# Patient Record
Sex: FEMALE | Race: WHITE | NOT HISPANIC OR LATINO | Employment: OTHER | URBAN - METROPOLITAN AREA
[De-identification: names, ages, dates, MRNs, and addresses within clinical notes are randomized per-mention and may not be internally consistent; named-entity substitution may affect disease eponyms.]

---

## 2017-06-12 ENCOUNTER — DOCUMENTATION ONLY (OUTPATIENT)
Dept: FAMILY MEDICINE | Facility: CLINIC | Age: 82
End: 2017-06-12

## 2017-06-12 NOTE — PROGRESS NOTES
Pre-Visit Chart Review  For Appointment Scheduled on 6/16/17.    Health Maintenance Due   Topic Date Due    Pneumococcal (65+) (1 of 2 - PCV13) 08/15/1990    DEXA SCAN  05/02/2017

## 2017-06-16 ENCOUNTER — OFFICE VISIT (OUTPATIENT)
Dept: FAMILY MEDICINE | Facility: CLINIC | Age: 82
End: 2017-06-16
Payer: MEDICARE

## 2017-06-16 VITALS
WEIGHT: 146.19 LBS | HEART RATE: 78 BPM | TEMPERATURE: 98 F | BODY MASS INDEX: 28.7 KG/M2 | DIASTOLIC BLOOD PRESSURE: 60 MMHG | HEIGHT: 60 IN | SYSTOLIC BLOOD PRESSURE: 120 MMHG

## 2017-06-16 DIAGNOSIS — E78.2 MIXED HYPERLIPIDEMIA: ICD-10-CM

## 2017-06-16 DIAGNOSIS — I10 ESSENTIAL HYPERTENSION: Primary | ICD-10-CM

## 2017-06-16 DIAGNOSIS — E55.9 HYPOVITAMINOSIS D: ICD-10-CM

## 2017-06-16 DIAGNOSIS — M62.838 NOCTURNAL MUSCLE SPASM: ICD-10-CM

## 2017-06-16 DIAGNOSIS — R79.89 ABNORMAL CBC: ICD-10-CM

## 2017-06-16 PROBLEM — K57.90 DIVERTICULOSIS: Status: ACTIVE | Noted: 2017-06-16

## 2017-06-16 PROBLEM — I25.810 CAD (CORONARY ARTERY DISEASE), AUTOLOGOUS VEIN BYPASS GRAFT: Status: ACTIVE | Noted: 2017-06-16

## 2017-06-16 PROBLEM — I87.9: Status: ACTIVE | Noted: 2017-06-16

## 2017-06-16 PROBLEM — K44.9 HIATAL HERNIA: Status: ACTIVE | Noted: 2017-06-16

## 2017-06-16 PROBLEM — G56.03 CARPAL TUNNEL SYNDROME, BILATERAL: Status: ACTIVE | Noted: 2017-06-16

## 2017-06-16 PROCEDURE — 99999 PR PBB SHADOW E&M-EST. PATIENT-LVL III: CPT | Mod: PBBFAC,,, | Performed by: FAMILY MEDICINE

## 2017-06-16 PROCEDURE — 1159F MED LIST DOCD IN RCRD: CPT | Mod: ,,, | Performed by: FAMILY MEDICINE

## 2017-06-16 PROCEDURE — 99213 OFFICE O/P EST LOW 20 MIN: CPT | Mod: PBBFAC,PO | Performed by: FAMILY MEDICINE

## 2017-06-16 PROCEDURE — 99214 OFFICE O/P EST MOD 30 MIN: CPT | Mod: S$PBB,,, | Performed by: FAMILY MEDICINE

## 2017-06-16 PROCEDURE — 1126F AMNT PAIN NOTED NONE PRSNT: CPT | Mod: ,,, | Performed by: FAMILY MEDICINE

## 2017-06-16 RX ORDER — OMEPRAZOLE 40 MG/1
40 CAPSULE, DELAYED RELEASE ORAL DAILY
Qty: 90 CAPSULE | Refills: 3 | Status: SHIPPED | OUTPATIENT
Start: 2017-06-16 | End: 2018-07-30 | Stop reason: SDUPTHER

## 2017-06-16 RX ORDER — NISOLDIPINE 8.5 MG/1
8.5 TABLET, FILM COATED, EXTENDED RELEASE ORAL DAILY
Qty: 90 TABLET | Refills: 3 | Status: SHIPPED | OUTPATIENT
Start: 2017-06-16 | End: 2018-07-30 | Stop reason: SDUPTHER

## 2017-06-16 RX ORDER — TRIAMCINOLONE ACETONIDE 0.25 MG/G
CREAM TOPICAL
COMMUNITY
Start: 2017-05-06 | End: 2018-08-22

## 2017-06-16 RX ORDER — LISINOPRIL 20 MG/1
20 TABLET ORAL DAILY
Qty: 90 TABLET | Refills: 3 | Status: SHIPPED | OUTPATIENT
Start: 2017-06-16 | End: 2018-07-30 | Stop reason: SDUPTHER

## 2017-06-16 NOTE — PROGRESS NOTES
CHIEF COMPLAINT:  Establish care       HISTORY OF PRESENT ILLNESS:  Arleen Zarate is a 89 y.o. female who presents to clinic as a new patient to me to establish care.  She has HTN and is on lisinopril and sular. She denies any CP, SOB, edema, cough.  He has hyperlipidemiai but is intolerant of statins. She has history of low vitamin D, is due for repeat vitamin D level. She has several year history of nocturnal leg cramps, denies any cramping during the day. She states that she is sometimes dehydrated.       REVIEW OF SYSTEMS:  The patient denies any fever, chills, night sweats, headaches, vision changes, difficulty speaking or swallowing, decreased hearing, weight loss, weight gain, chest pain, palpitations, shortness of breath, cough, nausea, vomiting, abdominal pain, dysuria, diarrhea, constipation, hematuria, hematochezia, melena, changes in her hair, skin, nails, numbness or weakness in her extremities, erythema, pain or swelling over any of her joints, myalgia, swollen glands, easy bruising, fatigue, edema, symptoms of anxiety or depression.       MEDICATIONS:   Reviewed and/or reconciled in EP[facility]    ALLERGIES:  Reviewed and/or reconciled in [facility]    PAST MEDICAL/SURGICAL HISTORY:   Past Medical History:   Diagnosis Date    Anxiety     Arthritis     fingers and back    Depression     Foot pain, left 9/18/2013    GERD (gastroesophageal reflux disease)     Hiatal hernia     Hypertension     Reflux     on meds    Tendon tear 8/21/2013    Tendonitis 8/21/2013      Past Surgical History:   Procedure Laterality Date    CHOLECYSTECTOMY      EYE SURGERY      bilateral catarcts    TONSILLECTOMY         FAMILY HISTORY:  No family history on file.    SOCIAL HISTORY:    Social History     Social History    Marital status:      Spouse name: N/A    Number of children: N/A    Years of education: N/A     Occupational History    Not on file.     Social History Main Topics    Smoking status: Never  Smoker    Smokeless tobacco: Not on file    Alcohol use No    Drug use: No    Sexual activity: No     Other Topics Concern    Not on file     Social History Narrative    No narrative on file       PHYSICAL EXAM:  VITAL SIGNS:   Vitals:    06/16/17 1024   Weight: 66.3 kg (146 lb 2.6 oz)   Height: 5' (1.524 m)     GENERAL:  Patient appears well nourished, sitting on exam table, in no acute distress.  HEENT:  Atraumatic, normocephalic, PERRLA, EOMI, no conjunctival injection, sclerae are anicteric, normal external auditory canals,TMs clear b/l, gross hearing intact to whisper, MMM, no oropharygneal erythema or exudate.  NECK:  Supple, normal ROM, trachea is midline , no supraclavicular or cervical LAD or masses palpated.  Thyroid gland not palpable.  CARDIOVASCULAR:  RRR, normal S1 and S2, no m/r/g.  RESPIRATORY:  CTA b/l, no wheezes, rhonchi, rales.  No increased work of breathing, no  use of accessory muscles.  ABDOMEN:  Soft, nontender, nondistended, normoactive bowel sounds in all four quadrants, no rebound or guarding, no HSM or masses palpated.  Normal percussion.  EXTREMITIES:  2+ DP pulses b/l, no edema.  SKIN:  Warm, no lesions on exposed skin.  NEUROMUSCULAR:  Cranial nerves II-XII grossly intact.   No clubbing or cyanosis of digits/nails.  Steady gait.  PSYCH:  Patient is alert and oriented to person, time, place. They are appropriately dressed and groomed. There is normal eye contact. Rate and tone of speech is normal. Normal insight, judgement. Normal thought content and process.     LABORATORY/IMAGING STUDIES: pending    ASSESSMENT/PLAN: This is a 89 y.o. female who presents to clinic to establish care    1. Essential hypertension  See below  - CBC auto differential; Future  - Comprehensive metabolic panel; Future  - Lipid panel; Future  - TSH; Future    2. Hypovitaminosis D  - Vitamin D; Future    3. Mixed hyperlipidemia  - Lipid panel; Future    4. Nocturnal muscle spasm  - Magnesium; Future  -  Ferritin; Future  - Iron and TIBC; Future  - Aldolase; Future  - Lactate dehydrogenase; Future  - CK; Future    5. Abnormal CBC  - Ferritin; Future  - Iron and TIBC; Future      Patient readiness: acceptance and barriers:none    During the course of the visit the patient was educated and counseled about the following:     Hypertension:   Medication: no change.    Goals: Hypertension: Reduce Blood Pressure    Did patient meet goals/outcomes: Yes    The following self management tools provided: declined    Patient Instructions (the written plan) was given to the patient/family.     Time spent with patient: 30 minutes      FOLLOW UP:  6 months      Tess Lew MD

## 2017-06-23 ENCOUNTER — LAB VISIT (OUTPATIENT)
Dept: LAB | Facility: HOSPITAL | Age: 82
End: 2017-06-23
Attending: FAMILY MEDICINE
Payer: MEDICARE

## 2017-06-23 DIAGNOSIS — R79.89 ABNORMAL CBC: ICD-10-CM

## 2017-06-23 DIAGNOSIS — E55.9 HYPOVITAMINOSIS D: ICD-10-CM

## 2017-06-23 DIAGNOSIS — E78.2 MIXED HYPERLIPIDEMIA: ICD-10-CM

## 2017-06-23 DIAGNOSIS — I10 ESSENTIAL HYPERTENSION: ICD-10-CM

## 2017-06-23 DIAGNOSIS — M62.838 NOCTURNAL MUSCLE SPASM: ICD-10-CM

## 2017-06-23 LAB
25(OH)D3+25(OH)D2 SERPL-MCNC: 33 NG/ML
ALBUMIN SERPL BCP-MCNC: 3.9 G/DL
ALP SERPL-CCNC: 99 U/L
ALT SERPL W/O P-5'-P-CCNC: 12 U/L
ANION GAP SERPL CALC-SCNC: 8 MMOL/L
AST SERPL-CCNC: 20 U/L
BASOPHILS # BLD AUTO: 0.03 K/UL
BASOPHILS NFR BLD: 0.4 %
BILIRUB SERPL-MCNC: 0.6 MG/DL
BUN SERPL-MCNC: 17 MG/DL
CALCIUM SERPL-MCNC: 9.7 MG/DL
CHLORIDE SERPL-SCNC: 107 MMOL/L
CHOLEST/HDLC SERPL: 3.8 {RATIO}
CK SERPL-CCNC: 66 U/L
CO2 SERPL-SCNC: 29 MMOL/L
CREAT SERPL-MCNC: 1 MG/DL
DIFFERENTIAL METHOD: ABNORMAL
EOSINOPHIL # BLD AUTO: 0.1 K/UL
EOSINOPHIL NFR BLD: 1.3 %
ERYTHROCYTE [DISTWIDTH] IN BLOOD BY AUTOMATED COUNT: 13.3 %
EST. GFR  (AFRICAN AMERICAN): 56.9 ML/MIN/1.73 M^2
EST. GFR  (NON AFRICAN AMERICAN): 49.4 ML/MIN/1.73 M^2
FERRITIN SERPL-MCNC: 29 NG/ML
GLUCOSE SERPL-MCNC: 92 MG/DL
HCT VFR BLD AUTO: 42.7 %
HDL/CHOLESTEROL RATIO: 26.2 %
HDLC SERPL-MCNC: 225 MG/DL
HDLC SERPL-MCNC: 59 MG/DL
HGB BLD-MCNC: 13.6 G/DL
IRON SERPL-MCNC: 78 UG/DL
LDH SERPL L TO P-CCNC: 193 U/L
LDLC SERPL CALC-MCNC: 144.2 MG/DL
LYMPHOCYTES # BLD AUTO: 2.2 K/UL
LYMPHOCYTES NFR BLD: 31.8 %
MAGNESIUM SERPL-MCNC: 2 MG/DL
MCH RBC QN AUTO: 29.2 PG
MCHC RBC AUTO-ENTMCNC: 31.9 %
MCV RBC AUTO: 92 FL
MONOCYTES # BLD AUTO: 0.6 K/UL
MONOCYTES NFR BLD: 8.4 %
NEUTROPHILS # BLD AUTO: 4 K/UL
NEUTROPHILS NFR BLD: 58 %
NONHDLC SERPL-MCNC: 166 MG/DL
PLATELET # BLD AUTO: 214 K/UL
PMV BLD AUTO: 10.8 FL
POTASSIUM SERPL-SCNC: 4.5 MMOL/L
PROT SERPL-MCNC: 7 G/DL
RBC # BLD AUTO: 4.65 M/UL
SATURATED IRON: 19 %
SODIUM SERPL-SCNC: 144 MMOL/L
TOTAL IRON BINDING CAPACITY: 404 UG/DL
TRANSFERRIN SERPL-MCNC: 273 MG/DL
TRIGL SERPL-MCNC: 109 MG/DL
TSH SERPL DL<=0.005 MIU/L-ACNC: 1.18 UIU/ML
WBC # BLD AUTO: 6.91 K/UL

## 2017-06-23 PROCEDURE — 80061 LIPID PANEL: CPT

## 2017-06-23 PROCEDURE — 83615 LACTATE (LD) (LDH) ENZYME: CPT

## 2017-06-23 PROCEDURE — 82550 ASSAY OF CK (CPK): CPT

## 2017-06-23 PROCEDURE — 85025 COMPLETE CBC W/AUTO DIFF WBC: CPT

## 2017-06-23 PROCEDURE — 83735 ASSAY OF MAGNESIUM: CPT

## 2017-06-23 PROCEDURE — 84443 ASSAY THYROID STIM HORMONE: CPT

## 2017-06-23 PROCEDURE — 82728 ASSAY OF FERRITIN: CPT

## 2017-06-23 PROCEDURE — 82085 ASSAY OF ALDOLASE: CPT

## 2017-06-23 PROCEDURE — 83540 ASSAY OF IRON: CPT

## 2017-06-23 PROCEDURE — 36415 COLL VENOUS BLD VENIPUNCTURE: CPT | Mod: PO

## 2017-06-23 PROCEDURE — 82306 VITAMIN D 25 HYDROXY: CPT

## 2017-06-23 PROCEDURE — 80053 COMPREHEN METABOLIC PANEL: CPT

## 2017-06-24 LAB — ALDOLASE SERPL-CCNC: 2 U/L

## 2017-07-07 ENCOUNTER — TELEPHONE (OUTPATIENT)
Dept: FAMILY MEDICINE | Facility: CLINIC | Age: 82
End: 2017-07-07

## 2017-07-07 NOTE — TELEPHONE ENCOUNTER
----- Message from Jacqui Velazquez sent at 7/7/2017 10:23 AM CDT -----  Contact: self  Patient called regarding her labs taken 6/23/17. Would like the results sent to her by mail. Please contact [phone] (home)

## 2017-08-05 ENCOUNTER — TELEPHONE (OUTPATIENT)
Dept: FAMILY MEDICINE | Facility: CLINIC | Age: 82
End: 2017-08-05

## 2017-08-05 NOTE — TELEPHONE ENCOUNTER
Lab work normal except for slightly decreased saturated iron level. Can add iron 325 mg daily with vitamin C or multivitamin with iron. Renal function is slightly low, needs to stay hydrated, avoid NSAIDS apart from daily ASA and will continue to monitor.

## 2017-08-08 ENCOUNTER — TELEPHONE (OUTPATIENT)
Dept: FAMILY MEDICINE | Facility: CLINIC | Age: 82
End: 2017-08-08

## 2017-11-29 DIAGNOSIS — F41.9 ANXIETY: ICD-10-CM

## 2017-11-29 RX ORDER — CLORAZEPATE DIPOTASSIUM 7.5 MG/1
7.5 TABLET ORAL DAILY PRN
Qty: 90 TABLET | Refills: 0 | Status: SHIPPED | OUTPATIENT
Start: 2017-11-29 | End: 2019-04-29 | Stop reason: SDUPTHER

## 2017-11-29 NOTE — TELEPHONE ENCOUNTER
Called pt. Need to reschedule appt with Dr. Lew on 12/21/17. Dr. Lew is not in clinic that day. Rescheduled to 1/24/17 with Dr. Lew (pt wanted to wait another month). Pt also asking if she can get a refill on her Tranxene? Last written 11/3/2015 #90 by Dr. Hobbs. Pt would like it to go to [facility]. Thanks, Alicja

## 2018-01-02 ENCOUNTER — DOCUMENTATION ONLY (OUTPATIENT)
Dept: FAMILY MEDICINE | Facility: CLINIC | Age: 83
End: 2018-01-02

## 2018-01-02 ENCOUNTER — OFFICE VISIT (OUTPATIENT)
Dept: FAMILY MEDICINE | Facility: CLINIC | Age: 83
End: 2018-01-02
Payer: MEDICARE

## 2018-01-02 ENCOUNTER — HOSPITAL ENCOUNTER (OUTPATIENT)
Facility: HOSPITAL | Age: 83
Discharge: HOME | End: 2018-01-03
Attending: EMERGENCY MEDICINE | Admitting: HOSPITALIST
Payer: MEDICARE

## 2018-01-02 ENCOUNTER — HOSPITAL ENCOUNTER (OUTPATIENT)
Dept: RADIOLOGY | Facility: CLINIC | Age: 83
Discharge: HOME | End: 2018-01-02
Attending: NURSE PRACTITIONER
Payer: MEDICARE

## 2018-01-02 VITALS
HEART RATE: 102 BPM | SYSTOLIC BLOOD PRESSURE: 152 MMHG | OXYGEN SATURATION: 95 % | BODY MASS INDEX: 25.19 KG/M2 | RESPIRATION RATE: 16 BRPM | WEIGHT: 128.31 LBS | DIASTOLIC BLOOD PRESSURE: 82 MMHG | TEMPERATURE: 101 F | HEIGHT: 60 IN

## 2018-01-02 DIAGNOSIS — R55 SYNCOPE, UNSPECIFIED SYNCOPE TYPE: Primary | ICD-10-CM

## 2018-01-02 DIAGNOSIS — R55 SYNCOPE: ICD-10-CM

## 2018-01-02 DIAGNOSIS — R05.9 COUGH: ICD-10-CM

## 2018-01-02 DIAGNOSIS — R50.9 FEVER, UNSPECIFIED FEVER CAUSE: Primary | ICD-10-CM

## 2018-01-02 DIAGNOSIS — R53.83 FATIGUE: ICD-10-CM

## 2018-01-02 DIAGNOSIS — J10.1 INFLUENZA A: ICD-10-CM

## 2018-01-02 PROBLEM — E86.0 DEHYDRATION: Status: ACTIVE | Noted: 2018-01-02

## 2018-01-02 PROBLEM — M19.90 ARTHRITIS: Status: ACTIVE | Noted: 2018-01-02

## 2018-01-02 PROBLEM — R53.81 DEBILITY: Status: ACTIVE | Noted: 2018-01-02

## 2018-01-02 PROBLEM — D69.6 THROMBOCYTOPENIA: Status: ACTIVE | Noted: 2018-01-02

## 2018-01-02 PROBLEM — N18.30 STAGE 3 CHRONIC KIDNEY DISEASE: Status: ACTIVE | Noted: 2018-01-02

## 2018-01-02 LAB
ALBUMIN SERPL BCP-MCNC: 3.4 G/DL
ALP SERPL-CCNC: 74 U/L
ALT SERPL W/O P-5'-P-CCNC: 17 U/L
ANION GAP SERPL CALC-SCNC: 6 MMOL/L
AST SERPL-CCNC: 30 U/L
BASOPHILS # BLD AUTO: 0 K/UL
BASOPHILS NFR BLD: 0.2 %
BILIRUB SERPL-MCNC: 0.4 MG/DL
BUN SERPL-MCNC: 14 MG/DL
CALCIUM SERPL-MCNC: 8.9 MG/DL
CHLORIDE SERPL-SCNC: 103 MMOL/L
CO2 SERPL-SCNC: 28 MMOL/L
CREAT SERPL-MCNC: 1 MG/DL
CTP QC/QA: YES
DIFFERENTIAL METHOD: ABNORMAL
EOSINOPHIL # BLD AUTO: 0 K/UL
EOSINOPHIL NFR BLD: 0.2 %
ERYTHROCYTE [DISTWIDTH] IN BLOOD BY AUTOMATED COUNT: 13.4 %
EST. GFR  (AFRICAN AMERICAN): 57 ML/MIN/1.73 M^2
EST. GFR  (NON AFRICAN AMERICAN): 49 ML/MIN/1.73 M^2
FLUAV AG NPH QL: POSITIVE
FLUBV AG NPH QL: NEGATIVE
GLUCOSE SERPL-MCNC: 102 MG/DL
HCT VFR BLD AUTO: 35.5 %
HGB BLD-MCNC: 12 G/DL
LYMPHOCYTES # BLD AUTO: 0.6 K/UL
LYMPHOCYTES NFR BLD: 8.9 %
MAGNESIUM SERPL-MCNC: 1.7 MG/DL
MCH RBC QN AUTO: 30.3 PG
MCHC RBC AUTO-ENTMCNC: 33.8 G/DL
MCV RBC AUTO: 90 FL
MONOCYTES # BLD AUTO: 0.7 K/UL
MONOCYTES NFR BLD: 10.3 %
NEUTROPHILS # BLD AUTO: 5.2 K/UL
NEUTROPHILS NFR BLD: 80.4 %
PLATELET # BLD AUTO: 121 K/UL
PMV BLD AUTO: 8.3 FL
POTASSIUM SERPL-SCNC: 3.9 MMOL/L
PROT SERPL-MCNC: 6.5 G/DL
RBC # BLD AUTO: 3.96 M/UL
SODIUM SERPL-SCNC: 137 MMOL/L
TROPONIN I SERPL DL<=0.01 NG/ML-MCNC: 0.02 NG/ML
WBC # BLD AUTO: 6.5 K/UL

## 2018-01-02 PROCEDURE — 84484 ASSAY OF TROPONIN QUANT: CPT

## 2018-01-02 PROCEDURE — 25000003 PHARM REV CODE 250: Performed by: EMERGENCY MEDICINE

## 2018-01-02 PROCEDURE — 36415 COLL VENOUS BLD VENIPUNCTURE: CPT

## 2018-01-02 PROCEDURE — 80053 COMPREHEN METABOLIC PANEL: CPT

## 2018-01-02 PROCEDURE — 25000003 PHARM REV CODE 250: Performed by: HOSPITALIST

## 2018-01-02 PROCEDURE — 99284 EMERGENCY DEPT VISIT MOD MDM: CPT

## 2018-01-02 PROCEDURE — G0378 HOSPITAL OBSERVATION PER HR: HCPCS

## 2018-01-02 PROCEDURE — 96361 HYDRATE IV INFUSION ADD-ON: CPT

## 2018-01-02 PROCEDURE — 93010 ELECTROCARDIOGRAM REPORT: CPT | Mod: ,,, | Performed by: INTERNAL MEDICINE

## 2018-01-02 PROCEDURE — 71046 X-RAY EXAM CHEST 2 VIEWS: CPT | Mod: 26,,, | Performed by: RADIOLOGY

## 2018-01-02 PROCEDURE — 99213 OFFICE O/P EST LOW 20 MIN: CPT | Mod: S$GLB,,, | Performed by: NURSE PRACTITIONER

## 2018-01-02 PROCEDURE — 83735 ASSAY OF MAGNESIUM: CPT

## 2018-01-02 PROCEDURE — 87804 INFLUENZA ASSAY W/OPTIC: CPT | Mod: ,,, | Performed by: NURSE PRACTITIONER

## 2018-01-02 PROCEDURE — 96360 HYDRATION IV INFUSION INIT: CPT

## 2018-01-02 PROCEDURE — 85025 COMPLETE CBC W/AUTO DIFF WBC: CPT

## 2018-01-02 PROCEDURE — 71046 X-RAY EXAM CHEST 2 VIEWS: CPT | Mod: TC,PO

## 2018-01-02 PROCEDURE — 93005 ELECTROCARDIOGRAM TRACING: CPT

## 2018-01-02 RX ORDER — IBUPROFEN 200 MG
200 TABLET ORAL EVERY 6 HOURS PRN
Status: DISCONTINUED | OUTPATIENT
Start: 2018-01-02 | End: 2018-01-03 | Stop reason: HOSPADM

## 2018-01-02 RX ORDER — BENZONATATE 200 MG/1
200 CAPSULE ORAL 3 TIMES DAILY PRN
Qty: 30 CAPSULE | Refills: 0 | Status: SHIPPED | OUTPATIENT
Start: 2018-01-02 | End: 2018-01-29

## 2018-01-02 RX ORDER — SODIUM CHLORIDE 9 MG/ML
INJECTION, SOLUTION INTRAVENOUS CONTINUOUS
Status: DISCONTINUED | OUTPATIENT
Start: 2018-01-02 | End: 2018-01-03

## 2018-01-02 RX ORDER — ONDANSETRON 2 MG/ML
4 INJECTION INTRAMUSCULAR; INTRAVENOUS EVERY 8 HOURS PRN
Status: DISCONTINUED | OUTPATIENT
Start: 2018-01-02 | End: 2018-01-02

## 2018-01-02 RX ORDER — CLORAZEPATE DIPOTASSIUM 3.75 MG/1
7.5 TABLET ORAL DAILY PRN
Status: DISCONTINUED | OUTPATIENT
Start: 2018-01-02 | End: 2018-01-03 | Stop reason: HOSPADM

## 2018-01-02 RX ORDER — LISINOPRIL 10 MG/1
20 TABLET ORAL DAILY
Status: DISCONTINUED | OUTPATIENT
Start: 2018-01-03 | End: 2018-01-03 | Stop reason: HOSPADM

## 2018-01-02 RX ORDER — PANTOPRAZOLE SODIUM 40 MG/1
40 TABLET, DELAYED RELEASE ORAL DAILY
Status: DISCONTINUED | OUTPATIENT
Start: 2018-01-03 | End: 2018-01-03 | Stop reason: HOSPADM

## 2018-01-02 RX ORDER — AMOXICILLIN 250 MG
1 CAPSULE ORAL DAILY PRN
Status: DISCONTINUED | OUTPATIENT
Start: 2018-01-02 | End: 2018-01-03 | Stop reason: HOSPADM

## 2018-01-02 RX ORDER — OSELTAMIVIR PHOSPHATE 75 MG/1
75 CAPSULE ORAL 2 TIMES DAILY
Status: DISCONTINUED | OUTPATIENT
Start: 2018-01-02 | End: 2018-01-02

## 2018-01-02 RX ORDER — PROMETHAZINE HYDROCHLORIDE AND DEXTROMETHORPHAN HYDROBROMIDE 6.25; 15 MG/5ML; MG/5ML
5 SYRUP ORAL NIGHTLY
Qty: 120 ML | Refills: 0 | Status: SHIPPED | OUTPATIENT
Start: 2018-01-02 | End: 2018-01-12

## 2018-01-02 RX ORDER — OSELTAMIVIR PHOSPHATE 6 MG/ML
30 FOR SUSPENSION ORAL 2 TIMES DAILY
Status: DISCONTINUED | OUTPATIENT
Start: 2018-01-03 | End: 2018-01-03 | Stop reason: HOSPADM

## 2018-01-02 RX ORDER — ONDANSETRON 2 MG/ML
4 INJECTION INTRAMUSCULAR; INTRAVENOUS EVERY 4 HOURS PRN
Status: DISCONTINUED | OUTPATIENT
Start: 2018-01-02 | End: 2018-01-03 | Stop reason: HOSPADM

## 2018-01-02 RX ORDER — ACETAMINOPHEN 325 MG/1
650 TABLET ORAL EVERY 6 HOURS PRN
Status: DISCONTINUED | OUTPATIENT
Start: 2018-01-02 | End: 2018-01-02

## 2018-01-02 RX ORDER — ACETAMINOPHEN 325 MG/1
650 TABLET ORAL EVERY 6 HOURS PRN
Status: DISCONTINUED | OUTPATIENT
Start: 2018-01-02 | End: 2018-01-03 | Stop reason: HOSPADM

## 2018-01-02 RX ORDER — BENZONATATE 100 MG/1
200 CAPSULE ORAL 3 TIMES DAILY PRN
Status: DISCONTINUED | OUTPATIENT
Start: 2018-01-02 | End: 2018-01-03 | Stop reason: HOSPADM

## 2018-01-02 RX ORDER — ONDANSETRON 4 MG/1
4 TABLET, ORALLY DISINTEGRATING ORAL
Status: COMPLETED | OUTPATIENT
Start: 2018-01-02 | End: 2018-01-02

## 2018-01-02 RX ORDER — OSELTAMIVIR PHOSPHATE 75 MG/1
75 CAPSULE ORAL 2 TIMES DAILY
Qty: 10 CAPSULE | Refills: 0 | Status: SHIPPED | OUTPATIENT
Start: 2018-01-02 | End: 2018-01-07

## 2018-01-02 RX ORDER — OSELTAMIVIR PHOSPHATE 75 MG/1
75 CAPSULE ORAL
Status: COMPLETED | OUTPATIENT
Start: 2018-01-02 | End: 2018-01-02

## 2018-01-02 RX ADMIN — ONDANSETRON 4 MG: 4 TABLET, ORALLY DISINTEGRATING ORAL at 03:01

## 2018-01-02 RX ADMIN — BENZONATATE 200 MG: 100 CAPSULE ORAL at 08:01

## 2018-01-02 RX ADMIN — SODIUM CHLORIDE 1000 ML: 0.9 INJECTION, SOLUTION INTRAVENOUS at 03:01

## 2018-01-02 RX ADMIN — OSELTAMIVIR PHOSPHATE 75 MG: 75 CAPSULE ORAL at 03:01

## 2018-01-02 RX ADMIN — SODIUM CHLORIDE: 0.9 INJECTION, SOLUTION INTRAVENOUS at 05:01

## 2018-01-02 RX ADMIN — CLORAZEPATE DIPOTASSIUM 7.5 MG: 3.75 TABLET ORAL at 08:01

## 2018-01-02 NOTE — PROGRESS NOTES
Subjective:       Patient ID: Arleen Zarate is a 89 y.o. female.    Chief Complaint: Cough; Chest Congestion; and Sore Throat    Sore Throat    This is a new problem. The current episode started in the past 7 days (2 days ago). The problem has been gradually worsening. The maximum temperature recorded prior to her arrival was 100.4 - 100.9 F. Associated symptoms include congestion, headaches and shortness of breath. Pertinent negatives include no diarrhea or vomiting.   States she had the flu vaccine at a pharmacy in [place].   Review of Systems   Constitutional: Positive for appetite change and fever.   HENT: Positive for congestion and sore throat. Negative for sinus pain and sinus pressure.    Respiratory: Positive for choking and shortness of breath.    Gastrointestinal: Negative for diarrhea, nausea and vomiting.   Neurological: Positive for headaches.       Objective:    Flu screen is positive for flu A  Physical Exam   Constitutional: She appears well-developed and well-nourished. No distress.   HENT:   Head: Normocephalic and atraumatic.   Right Ear: External ear normal.   Left Ear: External ear normal.   Nose: Nose normal.   Mouth/Throat: No oropharyngeal exudate.   Has post nasal drip and mild erythema of the throat.    Eyes: Conjunctivae are normal. Right eye exhibits no discharge. Left eye exhibits no discharge. No scleral icterus.   Neck: Normal range of motion. Neck supple.   Cardiovascular: Normal rate, regular rhythm and normal heart sounds.  Exam reveals no gallop and no friction rub.    No murmur heard.  Pulmonary/Chest: Effort normal. No respiratory distress. She has wheezes. She has no rales.   Left lower lobe wheeze noted.   Lymphadenopathy:     She has no cervical adenopathy.   Skin: Skin is warm and dry. She is not diaphoretic.   Psychiatric: She has a normal mood and affect. Her behavior is normal.       Assessment:       1. Fever, unspecified fever cause    2. Cough    3. Influenza A         Plan:       Fever, unspecified fever cause  -     POCT Influenza A/B    Cough  -     benzonatate (TESSALON) 200 MG capsule; Take 1 capsule (200 mg total) by mouth 3 (three) times daily as needed for Cough.  Dispense: 30 capsule; Refill: 0  -     promethazine-dextromethorphan (PROMETHAZINE-DM) 6.25-15 mg/5 mL Syrp; Take 5 mLs by mouth every evening.  Dispense: 120 mL; Refill: 0  -     X-Ray Chest PA And Lateral; Future; Expected date: 01/02/2018    Influenza A  -     oseltamivir (TAMIFLU) 75 MG capsule; Take 1 capsule (75 mg total) by mouth 2 (two) times daily.  Dispense: 10 capsule; Refill: 0         tylenol for fever 1 tab every 4 hours while awake. 1 week if not better

## 2018-01-02 NOTE — ED PROVIDER NOTES
Encounter Date: 1/2/2018    SCRIBE #1 NOTE: I, Regan Antonio, am scribing for, and in the presence of, Dr. Vasquez .       History     Chief Complaint   Patient presents with    Fatigue     lightheadedness. Tested pos for influenza this am by Katherine Estrella.       01/02/2018 2:23 PM     Chief complaint: Fatigue and lightheadedness      Arleen Zarate is a 89 y.o. female with a hx of HTN, GERD, and Arthritis who presents to the ED with a complaints of fatigue and lightheadedness PTA. She was seen by Katherine Estrella this morning with complaints of rhinorrhea, sore throat, congestion, cough, fever, chills and HA x 2 days. She tested positive for influenza, received a CXR and was advised to present to the ED if sx worsen. En route home, pt endorsed fatigue, lightheadedness, and nausea. Per daughter, pt slumped after gagging and vomiting. Sx improved now.  Allergens includes Doxy, Effexor, Biaxin, Codeine, Statins-hmg-coa- reductase inhibitors, and doxycycline. PSHx include cholecystectomy and tonsillectomy.       The history is provided by the patient and a relative.     Review of patient's allergies indicates:   Allergen Reactions    Doxy      Other reaction(s): Unknown    Effexor [venlafaxine] Other (See Comments)     shaking    Biaxin [clarithromycin] Other (See Comments)     Unknown    Codeine Other (See Comments)     Syncope, and nausea.    Statins-hmg-coa reductase inhibitors      myalgia    Doxycycline Other (See Comments)     Made very weak     Past Medical History:   Diagnosis Date    Anxiety     Arthritis     fingers and back    Depression     Foot pain, left 9/18/2013    GERD (gastroesophageal reflux disease)     Hiatal hernia     Hypertension     Reflux     on meds    Tendon tear 8/21/2013    Tendonitis 8/21/2013     Past Surgical History:   Procedure Laterality Date    CHOLECYSTECTOMY      EYE SURGERY Bilateral     cataracts and implants    TONSILLECTOMY       History reviewed. No pertinent  family history.  Social History   Substance Use Topics    Smoking status: Never Smoker    Smokeless tobacco: Not on file    Alcohol use No     Review of Systems   Constitutional: Positive for chills, fatigue and fever.   HENT: Positive for congestion, rhinorrhea and sore throat.    Respiratory: Positive for cough.    Gastrointestinal: Positive for nausea. Negative for vomiting.   Neurological: Positive for syncope, light-headedness and headaches.   All other systems reviewed and are negative.      Physical Exam     Initial Vitals [01/02/18 1307]   BP Pulse Resp Temp SpO2   113/70 95 16 99.1 °F (37.3 °C) 95 %      MAP       84.33         Physical Exam    Nursing note and vitals reviewed.  Constitutional: She appears well-developed and well-nourished.   HENT:   Head: Normocephalic and atraumatic.   Eyes: EOM are normal. Pupils are equal, round, and reactive to light.   Neck: Normal range of motion. Neck supple.   Cardiovascular: Normal rate, regular rhythm and normal heart sounds. Exam reveals no gallop and no friction rub.    No murmur heard.  Pulmonary/Chest: Breath sounds normal. No respiratory distress. She has no wheezes. She has no rhonchi. She has no rales.   Abdominal: She exhibits no distension. There is no tenderness.   Musculoskeletal: Normal range of motion.   Neurological: She is alert and oriented to person, place, and time.   Skin: Skin is warm and dry.   Psychiatric: She has a normal mood and affect. Her behavior is normal. Judgment and thought content normal.         ED Course   Procedures  Labs Reviewed - No data to display          Medical Decision Making:   History:   I obtained history from: someone other than patient.  Clinical Tests:   Lab Tests: Ordered and Reviewed  Radiological Study: Ordered and Reviewed  Medical Tests: Reviewed and Ordered            Scribe Attestation:   Scribe #1: I performed the above scribed service and the documentation accurately describes the services I performed.  I attest to the accuracy of the note.    I, Dr. Vasquez, personally performed the services described in this documentation. All medical record entries made by the scribe were at my direction and in my presence.  I have reviewed the chart and agree that the record reflects my personal performance and is accurate and complete.5:04 PM 01/02/2018            ED Course as of Jan 02 1702   Tue Jan 02, 2018   1606 Dr shrestha to admit  [EF]   1608 89-year-old presents to the ER after an episode of witnessed syncope by family member.  Diagnosed with influenza today.  Patient was driving home with her family member from the clinic when she had a witnessed episode of syncope after vomiting.  Triage nurse reports the patient had a decreased level of consciousness outside of the emergency room in the vehicle.  Dr. Shrestha of [facility] medicine will admit the patient.  Doing well at this time in the ER awake alert well-appearing.  [EF]      ED Course User Index  [EF] Magen Vasquez MD     Clinical Impression:     1. Fatigue                               Magen Vasquez MD  01/02/18 1515

## 2018-01-02 NOTE — HPI
This is a 89 y.o. female with a hx of HTN, GERD, and Arthritis who presents to the ED with a complaints of fatigue and lightheadedness PTA. She was seen by Katherine Estrella this morning with complaints of rhinorrhea, sore throat, congestion, cough, fever, chills and HA x 2 days. She tested positive for influenza, received a CXR and was advised to present to the ED if sx worsen. En route home, pt endorsed fatigue, lightheadedness, and nausea. Per daughter, pt slumped after gagging and vomiting. Sx improved now.  Allergens includes Doxy, Effexor, Biaxin, Codeine, Statins-hmg-coa- reductase inhibitors, and doxycycline. PSHx include cholecystectomy and tonsillectomy. The patient was evaluated in the ER and found to have Influenza A and placed in hospital for further evaluation and treatment.

## 2018-01-02 NOTE — SUBJECTIVE & OBJECTIVE
Past Medical History:   Diagnosis Date    Anxiety     Arthritis     fingers and back    Depression     Foot pain, left 9/18/2013    GERD (gastroesophageal reflux disease)     Hiatal hernia     Hypertension     Reflux     on meds    Tendon tear 8/21/2013    Tendonitis 8/21/2013       Past Surgical History:   Procedure Laterality Date    CHOLECYSTECTOMY      EYE SURGERY Bilateral     cataracts and implants    TONSILLECTOMY         Review of patient's allergies indicates:   Allergen Reactions    Doxy      Other reaction(s): Unknown    Effexor [venlafaxine] Other (See Comments)     shaking    Biaxin [clarithromycin] Other (See Comments)     Unknown    Codeine Other (See Comments)     Syncope, and nausea.    Statins-hmg-coa reductase inhibitors      myalgia    Doxycycline Other (See Comments)     Made very weak       No current facility-administered medications on file prior to encounter.      Current Outpatient Prescriptions on File Prior to Encounter   Medication Sig    benzonatate (TESSALON) 200 MG capsule Take 1 capsule (200 mg total) by mouth 3 (three) times daily as needed for Cough.    clorazepate (TRANXENE) 7.5 MG Tab Take 1 tablet (7.5 mg total) by mouth daily as needed.    clotrimazole-betamethasone 1-0.05% (LOTRISONE) cream Apply topically 2 (two) times daily.    ibuprofen (ADVIL,MOTRIN) 200 MG tablet Take 200 mg by mouth every 6 (six) hours as needed for Pain.    lisinopril (PRINIVIL,ZESTRIL) 20 MG tablet Take 1 tablet (20 mg total) by mouth once daily.    methylsulfonylmethane (MSM) 1,000 mg Cap Take 1 capsule by mouth 2 (two) times daily.     multivitamin capsule Take 1 capsule by mouth once daily.    nisoldipine (SULAR) 8.5 MG Tb24 Take 1 tablet (8.5 mg total) by mouth once daily.    omeprazole (PRILOSEC) 40 MG capsule Take 1 capsule (40 mg total) by mouth once daily.    oseltamivir (TAMIFLU) 75 MG capsule Take 1 capsule (75 mg total) by mouth 2 (two) times daily.     promethazine-dextromethorphan (PROMETHAZINE-DM) 6.25-15 mg/5 mL Syrp Take 5 mLs by mouth every evening.    triamcinolone acetonide 0.025% (KENALOG) 0.025 % cream     [DISCONTINUED] aspirin (ECOTRIN) 81 MG EC tablet Take 81 mg by mouth once daily.    [DISCONTINUED] diphenhydramine-acetaminophen (TYLENOL PM)  mg Tab Take 1 tablet by mouth nightly as needed.     Family History     None        Social History Main Topics    Smoking status: Never Smoker    Smokeless tobacco: Never Used    Alcohol use No    Drug use: No    Sexual activity: No     Review of Systems   Constitutional: Positive for activity change, appetite change, chills, fatigue and fever.   HENT: Positive for congestion and sore throat. Negative for facial swelling.    Eyes: Negative for pain and redness.   Respiratory: Negative for apnea, cough, choking, chest tightness, shortness of breath, wheezing and stridor.    Cardiovascular: Negative for chest pain, palpitations and leg swelling.   Gastrointestinal: Positive for nausea and vomiting. Negative for abdominal distention, abdominal pain, constipation and diarrhea.   Endocrine: Negative for polydipsia and polyphagia.   Genitourinary: Negative for difficulty urinating, dysuria, flank pain and hematuria.   Musculoskeletal: Positive for arthralgias and gait problem. Negative for neck pain and neck stiffness.   Allergic/Immunologic: Negative for food allergies.   Neurological: Positive for weakness, light-headedness and headaches.   Hematological: Does not bruise/bleed easily.   Psychiatric/Behavioral: Negative for agitation, behavioral problems, dysphoric mood and suicidal ideas. The patient is not nervous/anxious.      Objective:     Vital Signs (Most Recent):  Temp: 97.7 °F (36.5 °C) (01/02/18 1716)  Pulse: 90 (01/02/18 1716)  Resp: 15 (01/02/18 1716)  BP: (!) 151/55 (01/02/18 1716)  SpO2: 97 % (01/02/18 1716) Vital Signs (24h Range):  Temp:  [97.7 °F (36.5 °C)-100.6 °F (38.1 °C)] 97.7 °F  (36.5 °C)  Pulse:  [] 90  Resp:  [15-16] 15  SpO2:  [95 %-97 %] 97 %  BP: (113-152)/(55-82) 151/55     Weight: 64 kg (141 lb)  Body mass index is 27.54 kg/m².    Physical Exam   Constitutional: She is oriented to person, place, and time. She appears well-developed and well-nourished. No distress.   HENT:   Head: Normocephalic and atraumatic.   Eyes: Conjunctivae and EOM are normal. Pupils are equal, round, and reactive to light. Right eye exhibits no discharge. Left eye exhibits no discharge.   Neck: Normal range of motion. Neck supple. No JVD present.   Cardiovascular: Normal rate, regular rhythm and intact distal pulses.    Pulmonary/Chest: Effort normal and breath sounds normal. No stridor. No respiratory distress. She has no wheezes. She has no rales. She exhibits no tenderness.   Abdominal: Soft. Bowel sounds are normal. She exhibits no distension. There is no tenderness. There is no guarding.   Genitourinary:   Genitourinary Comments: Not examined   Musculoskeletal: Normal range of motion. She exhibits no edema.   Neurological: She is alert and oriented to person, place, and time.   Skin: Skin is warm and dry. Capillary refill takes less than 2 seconds. She is not diaphoretic.   Psychiatric: She has a normal mood and affect. Her behavior is normal. Judgment and thought content normal.         CRANIAL NERVES     CN III, IV, VI   Pupils are equal, round, and reactive to light.  Extraocular motions are normal.        Significant Labs: Reviewed    Significant Imaging: Reviewed

## 2018-01-02 NOTE — PROGRESS NOTES
Health Maintenance Due   Topic Date Due    TETANUS VACCINE  08/15/1943    Influenza Vaccine  08/01/2017

## 2018-01-03 VITALS
BODY MASS INDEX: 27.68 KG/M2 | WEIGHT: 141 LBS | HEIGHT: 60 IN | RESPIRATION RATE: 18 BRPM | DIASTOLIC BLOOD PRESSURE: 70 MMHG | SYSTOLIC BLOOD PRESSURE: 158 MMHG | OXYGEN SATURATION: 95 % | HEART RATE: 79 BPM | TEMPERATURE: 100 F

## 2018-01-03 PROCEDURE — G8978 MOBILITY CURRENT STATUS: HCPCS | Mod: CM

## 2018-01-03 PROCEDURE — 25000003 PHARM REV CODE 250: Performed by: HOSPITALIST

## 2018-01-03 PROCEDURE — 94761 N-INVAS EAR/PLS OXIMETRY MLT: CPT

## 2018-01-03 PROCEDURE — 97162 PT EVAL MOD COMPLEX 30 MIN: CPT

## 2018-01-03 PROCEDURE — 96361 HYDRATE IV INFUSION ADD-ON: CPT

## 2018-01-03 PROCEDURE — G8980 MOBILITY D/C STATUS: HCPCS | Mod: CM

## 2018-01-03 PROCEDURE — G8979 MOBILITY GOAL STATUS: HCPCS | Mod: CM

## 2018-01-03 PROCEDURE — G0378 HOSPITAL OBSERVATION PER HR: HCPCS

## 2018-01-03 RX ADMIN — THERA TABS 1 TABLET: TAB at 09:01

## 2018-01-03 RX ADMIN — LISINOPRIL 20 MG: 10 TABLET ORAL at 09:01

## 2018-01-03 RX ADMIN — OSELTAMIVIR PHOSPHATE 30 MG: 6 POWDER, FOR SUSPENSION ORAL at 09:01

## 2018-01-03 RX ADMIN — PANTOPRAZOLE SODIUM 40 MG: 40 TABLET, DELAYED RELEASE ORAL at 09:01

## 2018-01-03 NOTE — PLAN OF CARE
01/03/18 0814   Patient Assessment/Suction   Level of Consciousness (AVPU) alert   PRE-TX-O2-ETCO2   O2 Device (Oxygen Therapy) room air   SpO2 95 %   Pulse Oximetry Type Intermittent   $ Pulse Oximetry - Multiple Charge Pulse Oximetry - Multiple   Pulse 74   Resp 18

## 2018-01-03 NOTE — H&P
[facility] Medicine  History & Physical    Patient Name: Arleen Zarate  MRN: [id number]  Admission Date: 1/2/2018  Attending Physician: Kevin Jean Baptiste MD   Primary Care Provider: Tess Lew MD         Patient information was obtained from patient and ER records.     Subjective:     Principal Problem:Syncope    Chief Complaint:   Chief Complaint   Patient presents with    Fatigue     lightheadedness. Tested pos for influenza this am by Katherine Estrella.        HPI: This is a 89 y.o. female with a hx of HTN, GERD, and Arthritis who presents to the ED with a complaints of fatigue and lightheadedness PTA. She was seen by Katherine Estrella this morning with complaints of rhinorrhea, sore throat, congestion, cough, fever, chills and HA x 2 days. She tested positive for influenza, received a CXR and was advised to present to the ED if sx worsen. En route home, pt endorsed fatigue, lightheadedness, and nausea. Per daughter, pt slumped after gagging and vomiting. Sx improved now.  Allergens includes Doxy, Effexor, Biaxin, Codeine, Statins-hmg-coa- reductase inhibitors, and doxycycline. PSHx include cholecystectomy and tonsillectomy. The patient was evaluated in the ER and found to have Influenza A and placed in hospital for further evaluation and treatment.    Past Medical History:   Diagnosis Date    Anxiety     Arthritis     fingers and back    Depression     Foot pain, left 9/18/2013    GERD (gastroesophageal reflux disease)     Hiatal hernia     Hypertension     Reflux     on meds    Tendon tear 8/21/2013    Tendonitis 8/21/2013       Past Surgical History:   Procedure Laterality Date    CHOLECYSTECTOMY      EYE SURGERY Bilateral     cataracts and implants    TONSILLECTOMY         Review of patient's allergies indicates:   Allergen Reactions    Doxy      Other reaction(s): Unknown    Effexor [venlafaxine] Other (See Comments)     shaking    Biaxin [clarithromycin] Other (See  Comments)     Unknown    Codeine Other (See Comments)     Syncope, and nausea.    Statins-hmg-coa reductase inhibitors      myalgia    Doxycycline Other (See Comments)     Made very weak       No current facility-administered medications on file prior to encounter.      Current Outpatient Prescriptions on File Prior to Encounter   Medication Sig    benzonatate (TESSALON) 200 MG capsule Take 1 capsule (200 mg total) by mouth 3 (three) times daily as needed for Cough.    clorazepate (TRANXENE) 7.5 MG Tab Take 1 tablet (7.5 mg total) by mouth daily as needed.    clotrimazole-betamethasone 1-0.05% (LOTRISONE) cream Apply topically 2 (two) times daily.    ibuprofen (ADVIL,MOTRIN) 200 MG tablet Take 200 mg by mouth every 6 (six) hours as needed for Pain.    lisinopril (PRINIVIL,ZESTRIL) 20 MG tablet Take 1 tablet (20 mg total) by mouth once daily.    methylsulfonylmethane (MSM) 1,000 mg Cap Take 1 capsule by mouth 2 (two) times daily.     multivitamin capsule Take 1 capsule by mouth once daily.    nisoldipine (SULAR) 8.5 MG Tb24 Take 1 tablet (8.5 mg total) by mouth once daily.    omeprazole (PRILOSEC) 40 MG capsule Take 1 capsule (40 mg total) by mouth once daily.    oseltamivir (TAMIFLU) 75 MG capsule Take 1 capsule (75 mg total) by mouth 2 (two) times daily.    promethazine-dextromethorphan (PROMETHAZINE-DM) 6.25-15 mg/5 mL Syrp Take 5 mLs by mouth every evening.    triamcinolone acetonide 0.025% (KENALOG) 0.025 % cream     [DISCONTINUED] aspirin (ECOTRIN) 81 MG EC tablet Take 81 mg by mouth once daily.    [DISCONTINUED] diphenhydramine-acetaminophen (TYLENOL PM)  mg Tab Take 1 tablet by mouth nightly as needed.     Family History     None        Social History Main Topics    Smoking status: Never Smoker    Smokeless tobacco: Never Used    Alcohol use No    Drug use: No    Sexual activity: No     Review of Systems   Constitutional: Positive for activity change, appetite change, chills,  fatigue and fever.   HENT: Positive for congestion and sore throat. Negative for facial swelling.    Eyes: Negative for pain and redness.   Respiratory: Negative for apnea, cough, choking, chest tightness, shortness of breath, wheezing and stridor.    Cardiovascular: Negative for chest pain, palpitations and leg swelling.   Gastrointestinal: Positive for nausea and vomiting. Negative for abdominal distention, abdominal pain, constipation and diarrhea.   Endocrine: Negative for polydipsia and polyphagia.   Genitourinary: Negative for difficulty urinating, dysuria, flank pain and hematuria.   Musculoskeletal: Positive for arthralgias and gait problem. Negative for neck pain and neck stiffness.   Allergic/Immunologic: Negative for food allergies.   Neurological: Positive for weakness, light-headedness and headaches.   Hematological: Does not bruise/bleed easily.   Psychiatric/Behavioral: Negative for agitation, behavioral problems, dysphoric mood and suicidal ideas. The patient is not nervous/anxious.      Objective:     Vital Signs (Most Recent):  Temp: 97.7 °F (36.5 °C) (01/02/18 1716)  Pulse: 90 (01/02/18 1716)  Resp: 15 (01/02/18 1716)  BP: (!) 151/55 (01/02/18 1716)  SpO2: 97 % (01/02/18 1716) Vital Signs (24h Range):  Temp:  [97.7 °F (36.5 °C)-100.6 °F (38.1 °C)] 97.7 °F (36.5 °C)  Pulse:  [] 90  Resp:  [15-16] 15  SpO2:  [95 %-97 %] 97 %  BP: (113-152)/(55-82) 151/55     Weight: 64 kg (141 lb)  Body mass index is 27.54 kg/m².    Physical Exam   Constitutional: She is oriented to person, place, and time. She appears well-developed and well-nourished. No distress.   HENT:   Head: Normocephalic and atraumatic.   Eyes: Conjunctivae and EOM are normal. Pupils are equal, round, and reactive to light. Right eye exhibits no discharge. Left eye exhibits no discharge.   Neck: Normal range of motion. Neck supple. No JVD present.   Cardiovascular: Normal rate, regular rhythm and intact distal pulses.     Pulmonary/Chest: Effort normal and breath sounds normal. No stridor. No respiratory distress. She has no wheezes. She has no rales. She exhibits no tenderness.   Abdominal: Soft. Bowel sounds are normal. She exhibits no distension. There is no tenderness. There is no guarding.   Genitourinary:   Genitourinary Comments: Not examined   Musculoskeletal: Normal range of motion. She exhibits no edema.   Neurological: She is alert and oriented to person, place, and time.   Skin: Skin is warm and dry. Capillary refill takes less than 2 seconds. She is not diaphoretic.   Psychiatric: She has a normal mood and affect. Her behavior is normal. Judgment and thought content normal.         CRANIAL NERVES     CN III, IV, VI   Pupils are equal, round, and reactive to light.  Extraocular motions are normal.        Significant Labs: Reviewed    Significant Imaging: Reviewed    Assessment/Plan:     * Syncope    Fall precautions  Hydrate          Stage 3 chronic kidney disease    Monitor  Renal dose all medications  Avoid possible nephrotoxic agents          Arthritis    Prn tylenol  Increase activity as tolerated          Dehydration    Continue IVF          Thrombocytopenia    Monitor          Influenza A    Continue Tamiflu- dosed per pharmacy  Isolation          Debility    Fall and skin precautions  Consult physical therapy          Hiatal hernia              GERD (gastroesophageal reflux disease)    Hx Large Hiatal hernia-  Continue PPI          Benign essential HTN    Continue home lisinopril            VTE Risk Mitigation         Ordered     Medium Risk of VTE  Once      01/02/18 1704     Place MAURICIO hose  Until discontinued      01/02/18 1704        TITA Livingston  Department of Hospital Medicine   [facility]    Time spent seeing patient( greater than 1/2 spent in direct contact) : 76 minutes

## 2018-01-03 NOTE — PLAN OF CARE
PCP is Dr Lew.  Pharmacy is [facility] on [address].  Patient states she lives with her daughter in law; pt's son is .  Independent with ADL's; drives.  Denies HH.  Discharge plan is home; no needs.       18 1537   Discharge Assessment   Assessment Type Discharge Planning Assessment   Confirmed/corrected address and phone number on facesheet? Yes   Assessment information obtained from? Patient   Prior to hospitilization cognitive status: Alert/Oriented   Prior to hospitalization functional status: Independent   Current cognitive status: Alert/Oriented   Current Functional Status: Independent   Lives With other relative(s)  (daughter in law)   Able to Return to Prior Arrangements yes   Is patient able to care for self after discharge? Yes   Patient's perception of discharge disposition home or selfcare   Readmission Within The Last 30 Days no previous admission in last 30 days   Patient currently being followed by outpatient case management? No   Patient currently receives any other outside agency services? No   Equipment Currently Used at Home cane, straight  (uses prn)   Do you have any problems affording any of your prescribed medications? No   Is the patient taking medications as prescribed? yes   Does the patient have transportation home? Yes   Transportation Available family or friend will provide   Does the patient receive services at the [facility]? No   Discharge Plan A Home   Patient/Family In Agreement With Plan yes

## 2018-01-03 NOTE — PLAN OF CARE
Problem: Patient Care Overview  Goal: Plan of Care Review  Outcome: Ongoing (interventions implemented as appropriate)   01/03/18 8588   Coping/Psychosocial   Plan Of Care Reviewed With patient   Pt denies pain. NAD noted. POC reviewed w pt and they verbalized understanding.    Pt free from falls, Pt ambulates to the bathroom. Bed in low position, side rails up x 2. Call light in reach,  and wheels locked. Will continue to monitor.

## 2018-01-03 NOTE — DISCHARGE SUMMARY
[facility]  Discharge Summary      Patient Name: Arleen Zarate  MRN: [id number]  Admission Date: 1/2/2018  Hospital Length of Stay: 0 days  Discharge Date and Time: 1/3/2018  5:33 PM  Attending Physician: No att. providers found   Discharging Provider: Kevin Jean Baptiste MD  Primary Care Provider: Tess Lew MD        HPI: This is a 89 y.o. female with a hx of HTN, GERD, and Arthritis who presents to the ED with a complaints of fatigue and lightheadedness PTA. She was seen by Katherine Estrella this morning with complaints of rhinorrhea, sore throat, congestion, cough, fever, chills and HA x 2 days. She tested positive for influenza, received a CXR and was advised to present to the ED if sx worsen. En route home, pt endorsed fatigue, lightheadedness, and nausea. Per daughter, pt slumped after gagging and vomiting. Sx improved now.  Allergens includes Doxy, Effexor, Biaxin, Codeine, Statins-hmg-coa- reductase inhibitors, and doxycycline. PSHx include cholecystectomy and tonsillectomy. The patient was evaluated in the ER and found to have Influenza A and placed in hospital for further evaluation and treatment.    * No surgery found *      Hospital Course:   She was admitted with influenza.  Also syncope from dehydration and viral illness.  She was put on Tamiflu and IVF.  She felt better.  No fatigue or lightheadedness or nausea.  She was discharged home to continue her treatment.    PE:  Constitutional: She is oriented to person, place, and time. She appears well-developed and well-nourished. No distress.   Pulmonary/Chest: Effort normal and breath sounds normal. No stridor. No respiratory distress. She has no wheezes. She has no rales. She exhibits no tenderness.     Consults:     Final Active Diagnoses:    Diagnosis Date Noted POA    PRINCIPAL PROBLEM:  Syncope [R55] 01/02/2018 Yes    Debility [R53.81] 01/02/2018 Yes    Influenza A [J10.1] 01/02/2018 Yes    Thrombocytopenia  [D69.6] 01/02/2018 Yes    Dehydration [E86.0] 01/02/2018 Yes    Arthritis [M19.90] 01/02/2018 Yes    Stage 3 chronic kidney disease [N18.3] 01/02/2018 Yes    Hiatal hernia [K44.9] 06/16/2017 Yes    GERD (gastroesophageal reflux disease) [K21.9] 03/28/2012 Yes    Benign essential HTN [I10] 03/28/2012 Yes      Problems Resolved During this Admission:    Diagnosis Date Noted Date Resolved POA      Discharged Condition: stable    Disposition: Home or Self Care    Follow Up:  Follow-up Information     Tess Lew MD On 1/24/2018.    Specialty:  Family Medicine  Contact information:  [address]  [place] [id number] [phone]                 Patient Instructions:     Diet Adult Regular     Activity as tolerated       Medications:  Reconciled Home Medications:   Discharge Medication List as of 1/3/2018  5:05 PM      CONTINUE these medications which have NOT CHANGED    Details   benzonatate (TESSALON) 200 MG capsule Take 1 capsule (200 mg total) by mouth 3 (three) times daily as needed for Cough., Starting Tue 1/2/2018, Normal      clorazepate (TRANXENE) 7.5 MG Tab Take 1 tablet (7.5 mg total) by mouth daily as needed., Starting Wed 11/29/2017, Normal      clotrimazole-betamethasone 1-0.05% (LOTRISONE) cream Apply topically 2 (two) times daily., Starting 5/11/2016, Until Discontinued, Normal      ibuprofen (ADVIL,MOTRIN) 200 MG tablet Take 200 mg by mouth every 6 (six) hours as needed for Pain., Until Discontinued, Historical Med      lisinopril (PRINIVIL,ZESTRIL) 20 MG tablet Take 1 tablet (20 mg total) by mouth once daily., Starting Fri 6/16/2017, Normal      methylsulfonylmethane (MSM) 1,000 mg Cap Take 1 capsule by mouth 2 (two) times daily. , Historical Med      multivitamin capsule Take 1 capsule by mouth once daily., Until Discontinued, Historical Med      nisoldipine (SULAR) 8.5 MG Tb24 Take 1 tablet (8.5 mg total) by mouth once daily., Starting Fri 6/16/2017, Normal      omeprazole (PRILOSEC) 40 MG  capsule Take 1 capsule (40 mg total) by mouth once daily., Starting Fri 6/16/2017, Normal      oseltamivir (TAMIFLU) 75 MG capsule Take 1 capsule (75 mg total) by mouth 2 (two) times daily., Starting Tue 1/2/2018, Until Sun 1/7/2018, Normal      promethazine-dextromethorphan (PROMETHAZINE-DM) 6.25-15 mg/5 mL Syrp Take 5 mLs by mouth every evening., Starting Tue 1/2/2018, Until Fri 1/12/2018, Normal      triamcinolone acetonide 0.025% (KENALOG) 0.025 % cream Starting Sat 5/6/2017, Historical Med         STOP taking these medications       diphenhydramine-acetaminophen (TYLENOL PM)  mg Tab Comments:   Reason for Stopping:               Significant Diagnostic Studies:   BMP  Lab Results   Component Value Date     01/02/2018    K 3.9 01/02/2018     01/02/2018    CO2 28 01/02/2018    BUN 14 01/02/2018    CREATININE 1.0 01/02/2018    CALCIUM 8.9 01/02/2018    ANIONGAP 6 (L) 01/02/2018    ESTGFRAFRICA 57 (A) 01/02/2018    EGFRNONAA 49 (A) 01/02/2018   '  Lab Results   Component Value Date    WBC 6.50 01/02/2018    HGB 12.0 01/02/2018    HCT 35.5 (L) 01/02/2018    MCV 90 01/02/2018     (L) 01/02/2018         Pending Diagnostic Studies:     None        Indwelling Lines/Drains at time of discharge:   Lines/Drains/Airways          No matching active lines, drains, or airways          Time spent on the discharge of patient: 22 minutes  Patient was seen and examined on the date of discharge and determined to be suitable for discharge.         Kevin Jean Baptiste MD  Department of Hospital Medicine  [facility]

## 2018-01-03 NOTE — PLAN OF CARE
Problem: Patient Care Overview  Goal: Plan of Care Review  Outcome: Ongoing (interventions implemented as appropriate)  Patient alert and oriented resting in bed. NAD. Denies pain or SOB. VSS. Up with Standby assistance. No tele . Droplet precautions intact. Plan of care reviewed with patient. Verbalizes understanding.Call light in reach. Pt free from fall or injury. Will monitor.

## 2018-01-03 NOTE — UM SECONDARY REVIEW
Physician Advisor External    Level of Care Issue    Per Dr. Madrigal at [facility], pt is OP appropriate for 1/2/2018.

## 2018-01-03 NOTE — PLAN OF CARE
Problem: Patient Care Overview  Goal: Plan of Care Review  PT eval and treat completed. Pt ambulated 100ftn HHA- OOB to chair. Educated on MICHELLE's thera ex. Pt for discharge today

## 2018-01-03 NOTE — PT/OT/SLP EVAL
"Physical Therapy Evaluation    Patient Name:  Arleen Zarate   MRN:  [id number]    Recommendations:     Discharge Recommendations:  home with home health, home health PT   Discharge Equipment Recommendations: none   Barriers to discharge: None    Assessment:     Arleen Zarate is a 89 y.o. female admitted with a medical diagnosis of Syncope. Pt on droplet isolation for Flu. Pt stated feeling much better and stronger and is pleased about ability to walk today. Pt for discharge home today and will benefit from HHPT for 1-2 weeks.    Rehab Prognosis:  good; patient would benefit from acute skilled PT services to address these deficits and reach maximum level of function.      Recent Surgery: * No surgery found *      Plan:     During this hospitalization, patient to be seen  (eval and discharge) to address the above listed problems via gait training, therapeutic activities, therapeutic exercises  · Plan of Care Expires:  18   Plan of Care Reviewed with: patient    Subjective     Communicated with nurse Bita prior to session.  Patient found supine upon PT entry to room, agreeable to evaluation.    Pt pleased about ability to move today " could not walk yesterday"  "my son  but my daughter in law and her new  still takes care of me"    Chief Complaint: still weak but better  Patient comments/goals: go home today  Pain/Comfort:  · Pain Rating 1: 0/10    Patients cultural, spiritual, Adventism conflicts given the current situation:      Living Environment:  Home with daughter in law   Prior to admission, patients level of function was independent, ambulatory without AD and drives from [place] to [place].3..  Upon discharge, patient will have assistance from daughter in law.    Objective:     Patient found with: telemetry     General Precautions: Standard, droplet, fall   Orthopedic Precautions:    Braces: N/A     Exams:  · RLE ROM: WFL  · RLE Strength: WFL  · LLE ROM: WFL  · LLE Strength: " WFL    Functional Mobility:  · Bed Mobility:     · Supine to Sit: independence  · Transfers:     · Sit to Stand:  contact guard assistance with no AD  · Bed to Chair: contact guard assistance with  no AD  using  Stand Pivot  · Gait: 100ft HHA with mask on pt    AM-PAC 6 CLICK MOBILITY  Total Score:18       Therapeutic Activities and Exercises:   EOB sitting, another gown for draping, socks donned  Gait to hallways  OOB to chair  Educated on AP amd LAQ. Pt stated that she does her standing exercises at home  Tray table in front for lunch    Patient left up in chair with all lines intact, call button in reach and nurse miguel-  notified.    GOALS:    Physical Therapy Goals     Not on file                History:     Past Medical History:   Diagnosis Date    Anxiety     Arthritis     fingers and back    Depression     Foot pain, left 9/18/2013    GERD (gastroesophageal reflux disease)     Hiatal hernia     Hypertension     Reflux     on meds    Tendon tear 8/21/2013    Tendonitis 8/21/2013       Past Surgical History:   Procedure Laterality Date    CHOLECYSTECTOMY      EYE SURGERY Bilateral     cataracts and implants    TONSILLECTOMY         Clinical Decision Making:     History  Co-morbidities and personal factors that may impact the plan of care Examination  Body Structures and Functions, activity limitations and participation restrictions that may impact the plan of care Clinical Presentation   Decision Making/ Complexity Score   Co-morbidities:   [] Time since onset of injury / illness / exacerbation  [] Status of current condition  []Patient's cognitive status and safety concerns    [] Multiple Medical Problems (see med hx)  Personal Factors:   [] Patient's age  [] Prior Level of function   [] Patient's home situation (environment and family support)  [] Patient's level of motivation  [] Expected progression of patient      HISTORY:(criteria)    [] 88436 - no personal factors/history    [] 20071 - has  1-2 personal factor/comorbidity     [] 58659 - has >3 personal factor/comorbidity     Body Regions:  [] Objective examination findings  [] Head     []  Neck  [] Trunk   [] Upper Extremity  [] Lower Extremity    Body Systems:  [] For communication ability, affect, cognition, language, and learning style: the assessment of the ability to make needs known, consciousness, orientation (person, place, and time), expected emotional /behavioral responses, and learning preferences (eg, learning barriers, education  needs)  [] For the neuromuscular system: a general assessment of gross coordinated movement (eg, balance, gait, locomotion, transfers, and transitions) and motor function  (motor control and motor learning)  [] For the musculoskeletal system: the assessment of gross symmetry, gross range of motion, gross strength, height, and weight  [] For the integumentary system: the assessment of pliability(texture), presence of scar formation, skin color, and skin integrity  [] For cardiovascular/pulmonary system: the assessment of heart rate, respiratory rate, blood pressure, and edema     Activity limitations:    [] Patient's cognitive status and saf ety concerns          [] Status of current condition      [] Weight bearing restriction  [] Cardiopulmunary Restriction    Participation Restrictions:   [] Goals and goal agreement with the patient     [] Rehab potential (prognosis) and probable outcome      Examination of Body System: (criteria)    [] 11563 - addressing 1-2 elements    [] 63556 - addressing a total of 3 or more elements     [] 49913 -  Addressing a total of 4 or more elements         Clinical Presentation: (criteria)  Choose one     On examination of body system using standardized tests and measures patient presents with (CHOOSE ONE) elements from any of the following: body structures and functions, activity limitations, and/or participation restrictions.  Leading to a clinical presentation that is considered  (CHOOSE ONE)                              Clinical Decision Making  (Eval Complexity):  Choose One     Time Tracking:     PT Received On: 01/03/18  PT Start Time: 1136     PT Stop Time: 1152  PT Total Time (min): 16 min     Billable Minutes: Evaluation 16      Sharonda Diaz, PT  01/03/2018

## 2018-01-04 ENCOUNTER — TELEPHONE (OUTPATIENT)
Dept: MEDSURG UNIT | Facility: HOSPITAL | Age: 83
End: 2018-01-04

## 2018-01-19 ENCOUNTER — TELEPHONE (OUTPATIENT)
Dept: FAMILY MEDICINE | Facility: CLINIC | Age: 83
End: 2018-01-19

## 2018-01-19 NOTE — TELEPHONE ENCOUNTER
----- Message from Macey Rivas sent at 1/19/2018  8:54 AM CST -----  Contact: self   Patient want to speak with a nurse regarding blood work orders, please call back at [phone] (home)

## 2018-01-26 ENCOUNTER — DOCUMENTATION ONLY (OUTPATIENT)
Dept: FAMILY MEDICINE | Facility: CLINIC | Age: 83
End: 2018-01-26

## 2018-01-26 NOTE — PROGRESS NOTES
Pre-Visit Chart Review  For Appointment Scheduled on 1/29/2018    Health Maintenance Due   Topic Date Due    TETANUS VACCINE  08/15/1943

## 2018-01-29 ENCOUNTER — OFFICE VISIT (OUTPATIENT)
Dept: FAMILY MEDICINE | Facility: CLINIC | Age: 83
End: 2018-01-29
Payer: MEDICARE

## 2018-01-29 ENCOUNTER — LAB VISIT (OUTPATIENT)
Dept: LAB | Facility: HOSPITAL | Age: 83
End: 2018-01-29
Attending: PHYSICIAN ASSISTANT
Payer: MEDICARE

## 2018-01-29 VITALS
TEMPERATURE: 98 F | BODY MASS INDEX: 27.92 KG/M2 | DIASTOLIC BLOOD PRESSURE: 75 MMHG | WEIGHT: 142.19 LBS | SYSTOLIC BLOOD PRESSURE: 128 MMHG | HEART RATE: 79 BPM | HEIGHT: 60 IN

## 2018-01-29 DIAGNOSIS — E78.5 HYPERLIPIDEMIA, UNSPECIFIED HYPERLIPIDEMIA TYPE: ICD-10-CM

## 2018-01-29 DIAGNOSIS — N18.30 CKD (CHRONIC KIDNEY DISEASE), STAGE III: ICD-10-CM

## 2018-01-29 DIAGNOSIS — B37.2 YEAST DERMATITIS: ICD-10-CM

## 2018-01-29 DIAGNOSIS — I10 HYPERTENSION, UNSPECIFIED TYPE: Primary | ICD-10-CM

## 2018-01-29 DIAGNOSIS — R79.89 ABNORMAL CBC: ICD-10-CM

## 2018-01-29 DIAGNOSIS — I10 HYPERTENSION, UNSPECIFIED TYPE: ICD-10-CM

## 2018-01-29 LAB
ALBUMIN SERPL BCP-MCNC: 3.7 G/DL
ALP SERPL-CCNC: 86 U/L
ALT SERPL W/O P-5'-P-CCNC: 12 U/L
ANION GAP SERPL CALC-SCNC: 7 MMOL/L
AST SERPL-CCNC: 19 U/L
BASOPHILS # BLD AUTO: 0.04 K/UL
BASOPHILS NFR BLD: 0.7 %
BILIRUB SERPL-MCNC: 0.5 MG/DL
BUN SERPL-MCNC: 16 MG/DL
CALCIUM SERPL-MCNC: 9.5 MG/DL
CHLORIDE SERPL-SCNC: 106 MMOL/L
CHOLEST SERPL-MCNC: 229 MG/DL
CHOLEST/HDLC SERPL: 3.9 {RATIO}
CO2 SERPL-SCNC: 28 MMOL/L
CREAT SERPL-MCNC: 1 MG/DL
DIFFERENTIAL METHOD: NORMAL
EOSINOPHIL # BLD AUTO: 0.2 K/UL
EOSINOPHIL NFR BLD: 3.1 %
ERYTHROCYTE [DISTWIDTH] IN BLOOD BY AUTOMATED COUNT: 12.9 %
EST. GFR  (AFRICAN AMERICAN): 56.5 ML/MIN/1.73 M^2
EST. GFR  (NON AFRICAN AMERICAN): 49 ML/MIN/1.73 M^2
GLUCOSE SERPL-MCNC: 93 MG/DL
HCT VFR BLD AUTO: 37.6 %
HDLC SERPL-MCNC: 58 MG/DL
HDLC SERPL: 25.3 %
HGB BLD-MCNC: 12.3 G/DL
IMM GRANULOCYTES # BLD AUTO: 0.01 K/UL
IMM GRANULOCYTES NFR BLD AUTO: 0.2 %
LDLC SERPL CALC-MCNC: 147.8 MG/DL
LYMPHOCYTES # BLD AUTO: 1.5 K/UL
LYMPHOCYTES NFR BLD: 27.6 %
MCH RBC QN AUTO: 29.8 PG
MCHC RBC AUTO-ENTMCNC: 32.7 G/DL
MCV RBC AUTO: 91 FL
MONOCYTES # BLD AUTO: 0.6 K/UL
MONOCYTES NFR BLD: 11.3 %
NEUTROPHILS # BLD AUTO: 3.1 K/UL
NEUTROPHILS NFR BLD: 57.1 %
NONHDLC SERPL-MCNC: 171 MG/DL
NRBC BLD-RTO: 0 /100 WBC
PLATELET # BLD AUTO: 155 K/UL
PMV BLD AUTO: 11.1 FL
POTASSIUM SERPL-SCNC: 4.2 MMOL/L
PROT SERPL-MCNC: 7 G/DL
RBC # BLD AUTO: 4.13 M/UL
SODIUM SERPL-SCNC: 141 MMOL/L
TRIGL SERPL-MCNC: 116 MG/DL
WBC # BLD AUTO: 5.4 K/UL

## 2018-01-29 PROCEDURE — 80053 COMPREHEN METABOLIC PANEL: CPT

## 2018-01-29 PROCEDURE — 80061 LIPID PANEL: CPT

## 2018-01-29 PROCEDURE — 99999 PR PBB SHADOW E&M-EST. PATIENT-LVL III: CPT | Mod: PBBFAC,,, | Performed by: PHYSICIAN ASSISTANT

## 2018-01-29 PROCEDURE — 36415 COLL VENOUS BLD VENIPUNCTURE: CPT | Mod: PO

## 2018-01-29 PROCEDURE — 85025 COMPLETE CBC W/AUTO DIFF WBC: CPT

## 2018-01-29 PROCEDURE — 99213 OFFICE O/P EST LOW 20 MIN: CPT | Mod: PBBFAC,PO | Performed by: PHYSICIAN ASSISTANT

## 2018-01-29 PROCEDURE — 99214 OFFICE O/P EST MOD 30 MIN: CPT | Mod: S$PBB,,, | Performed by: PHYSICIAN ASSISTANT

## 2018-01-29 RX ORDER — CLOTRIMAZOLE AND BETAMETHASONE DIPROPIONATE 10; .64 MG/G; MG/G
CREAM TOPICAL 2 TIMES DAILY
Qty: 45 G | Refills: 3 | Status: SHIPPED | OUTPATIENT
Start: 2018-01-29 | End: 2018-08-22

## 2018-01-29 NOTE — PROGRESS NOTES
Subjective:       Patient ID: Arleen Zarate is a 89 y.o. female.    Chief Complaint: Follow-up    HPI   Patient is a 89 year old  female with HTN, GERD, DDD lumbar, HLD, CKD III presenting to the clinic for 6 month follow-up. She does report being admitted to the hospital earlier this month for Flu A. She reports feeling like herself again over the last 2 weeks.  1) HTN- well controlled on lisinopril 20mg daily; no side effects of medications reported. Bmp today  2) HLD- fair controlled; patient with STATIN allergy; lipid panel today  3) Abnormal CBC- low platelets on recent cbc in the hospital. Probably 2/2 flu.    Review of Systems   Constitutional: Negative for activity change, appetite change, chills, diaphoresis, fatigue and fever.   HENT: Negative for congestion, postnasal drip and rhinorrhea.    Respiratory: Negative.  Negative for cough, shortness of breath and wheezing.    Cardiovascular: Negative.  Negative for chest pain.   Gastrointestinal: Negative for abdominal pain, blood in stool, constipation, diarrhea, nausea and vomiting.   Genitourinary: Negative for dysuria, frequency, hematuria and urgency.   Musculoskeletal: Negative.    Skin: Negative.  Negative for color change and rash.   Neurological: Negative for dizziness and syncope.   Psychiatric/Behavioral: Negative for agitation, behavioral problems and confusion.       Objective:      Physical Exam   Constitutional: Vital signs are normal. She appears well-developed and well-nourished. No distress.   Cardiovascular: Normal rate, regular rhythm, S1 normal, S2 normal and normal heart sounds.  Exam reveals no gallop.    No murmur heard.  Pulses:       Radial pulses are 2+ on the right side, and 2+ on the left side.   <2sec cap refill fingers bilat     Pulmonary/Chest: Effort normal and breath sounds normal. No respiratory distress. She has no wheezes. She has no rhonchi.   Skin: Skin is warm and dry. She is not diaphoretic.   Appropriate  skin turgor   Psychiatric: She has a normal mood and affect. Her speech is normal and behavior is normal. Judgment and thought content normal. Cognition and memory are normal.       Assessment:       1. Hypertension, unspecified type    2. Hyperlipidemia, unspecified hyperlipidemia type    3. CKD (chronic kidney disease), stage III    4. Abnormal CBC    5. Yeast dermatitis        Plan:       Arleen was seen today for follow-up.    Diagnoses and all orders for this visit:    Hypertension, unspecified type  Well controlled; continue current BP mediations  -     Comprehensive metabolic panel; Future    Hyperlipidemia, unspecified hyperlipidemia type  -     Lipid panel; Future    CKD (chronic kidney disease), stage III  -     Comprehensive metabolic panel; Future    Abnormal CBC  -     CBC auto differential; Future    Yeast Dermatitis  -     clotrimazole-betamethasone 1-0.05% (LOTRISONE) cream; Apply topically 2 (two) times daily.    Patient readiness: acceptance and barriers:none    During the course of the visit the patient was educated and counseled about the following:     Hypertension:   Medication: no change.    Goals: Hypertension: Reduce Blood Pressure    Did patient meet goals/outcomes: No    The following self management tools provided: declined    Patient Instructions (the written plan) was given to the patient/family.     Time spent with patient: 25 minutes    Barriers to medications present (no )    Adverse reactions to current medications (no)    Over the counter medications reviewed (Yes)

## 2018-01-30 ENCOUNTER — TELEPHONE (OUTPATIENT)
Dept: FAMILY MEDICINE | Facility: CLINIC | Age: 83
End: 2018-01-30

## 2018-01-30 NOTE — TELEPHONE ENCOUNTER
----- Message from SAYRA Reddy sent at 1/30/2018  7:01 AM CST -----  Blood counts normal. Electrolytes are normal and show stable kidney function. Cholesterol controlled.

## 2018-01-31 ENCOUNTER — TELEPHONE (OUTPATIENT)
Dept: FAMILY MEDICINE | Facility: CLINIC | Age: 83
End: 2018-01-31

## 2018-04-16 ENCOUNTER — TELEPHONE (OUTPATIENT)
Dept: FAMILY MEDICINE | Facility: CLINIC | Age: 83
End: 2018-04-16

## 2018-04-16 ENCOUNTER — DOCUMENTATION ONLY (OUTPATIENT)
Dept: FAMILY MEDICINE | Facility: CLINIC | Age: 83
End: 2018-04-16

## 2018-04-16 ENCOUNTER — OFFICE VISIT (OUTPATIENT)
Dept: FAMILY MEDICINE | Facility: CLINIC | Age: 83
End: 2018-04-16
Payer: MEDICARE

## 2018-04-16 VITALS
DIASTOLIC BLOOD PRESSURE: 71 MMHG | TEMPERATURE: 98 F | BODY MASS INDEX: 28.35 KG/M2 | WEIGHT: 144.38 LBS | SYSTOLIC BLOOD PRESSURE: 131 MMHG | HEART RATE: 89 BPM | HEIGHT: 60 IN

## 2018-04-16 DIAGNOSIS — J01.90 ACUTE NON-RECURRENT SINUSITIS, UNSPECIFIED LOCATION: Primary | ICD-10-CM

## 2018-04-16 DIAGNOSIS — I10 BENIGN ESSENTIAL HTN: ICD-10-CM

## 2018-04-16 PROCEDURE — 99213 OFFICE O/P EST LOW 20 MIN: CPT | Mod: S$PBB,,, | Performed by: PHYSICIAN ASSISTANT

## 2018-04-16 PROCEDURE — 99999 PR PBB SHADOW E&M-EST. PATIENT-LVL IV: CPT | Mod: PBBFAC,,, | Performed by: PHYSICIAN ASSISTANT

## 2018-04-16 PROCEDURE — 99214 OFFICE O/P EST MOD 30 MIN: CPT | Mod: PBBFAC,PO | Performed by: PHYSICIAN ASSISTANT

## 2018-04-16 RX ORDER — AMOXICILLIN AND CLAVULANATE POTASSIUM 875; 125 MG/1; MG/1
1 TABLET, FILM COATED ORAL 2 TIMES DAILY
Qty: 20 TABLET | Refills: 0 | Status: SHIPPED | OUTPATIENT
Start: 2018-04-16 | End: 2018-04-26

## 2018-04-16 NOTE — TELEPHONE ENCOUNTER
----- Message from Brandi Oneil sent at 4/16/2018  9:46 AM CDT -----  Contact: patient   Patient calling to speak to the Nurse. Patient would like to schedule a same day appointment. No available appointments. Patient has had a cold for over a week. She is coughing up thick green mucus and she states she has chest congestion. Please advise.   Call back    Thanks!

## 2018-04-16 NOTE — TELEPHONE ENCOUNTER
Spoke to patient.  Patient has complaints of cough and congestion.  Appointment scheduled patient confirmed appointment for same day no appointments available with pcp team

## 2018-04-16 NOTE — PROGRESS NOTES
Subjective:       Patient ID: Arleen Zarate is a 89 y.o. female.    Chief Complaint: Cough and Nasal Congestion    Sinusitis   This is a new problem. The current episode started 1 to 4 weeks ago. The problem has been gradually worsening since onset. There has been no fever. She is experiencing no pain. Associated symptoms include congestion, coughing (rare), ear pain (and fullness), headaches and sinus pressure. Pertinent negatives include no chills, hoarse voice, neck pain, shortness of breath, sneezing, sore throat or swollen glands. (Thick purulent drainage and post-nasal drip) Treatments tried: corricidin  The treatment provided no relief.     Review of Systems   Constitutional: Negative for chills, fatigue and fever.   HENT: Positive for congestion, ear pain (and fullness), postnasal drip, rhinorrhea and sinus pressure. Negative for ear discharge, hoarse voice, nosebleeds, sneezing, sore throat, trouble swallowing and voice change.    Eyes: Negative.    Respiratory: Positive for cough (rare). Negative for chest tightness, shortness of breath and wheezing.    Cardiovascular: Negative for chest pain, palpitations and leg swelling.   Gastrointestinal: Negative for abdominal pain, constipation, diarrhea, nausea and vomiting.   Musculoskeletal: Negative for arthralgias, myalgias and neck pain.   Allergic/Immunologic: Negative for environmental allergies and immunocompromised state.   Neurological: Positive for headaches. Negative for dizziness, syncope and light-headedness.   Hematological: Negative for adenopathy.       Objective:      Vitals:    04/16/18 1447   BP: 131/71   Pulse: 89   Temp:      Physical Exam   Constitutional: She is oriented to person, place, and time. She appears well-developed and well-nourished.   HENT:   Head: Normocephalic and atraumatic.   Right Ear: Hearing, tympanic membrane, external ear and ear canal normal.   Left Ear: Hearing, tympanic membrane, external ear and ear canal normal.    Nose: Mucosal edema and rhinorrhea present. Right sinus exhibits no maxillary sinus tenderness and no frontal sinus tenderness. Left sinus exhibits no maxillary sinus tenderness and no frontal sinus tenderness.   Mouth/Throat: Oropharynx is clear and moist and mucous membranes are normal. No posterior oropharyngeal erythema.   Eyes: Conjunctivae, EOM and lids are normal. Pupils are equal, round, and reactive to light.   Cardiovascular: Normal rate, regular rhythm, normal heart sounds and intact distal pulses.    Pulmonary/Chest: Effort normal and breath sounds normal. She has no wheezes. She has no rhonchi. She has no rales.   Lymphadenopathy:     She has no cervical adenopathy.   Neurological: She is alert and oriented to person, place, and time.   Skin: Skin is warm.       Assessment:       1. Acute non-recurrent sinusitis, unspecified location    2. Benign essential HTN        Plan:       Acute non-recurrent sinusitis, unspecified location  -     amoxicillin-clavulanate 875-125mg (AUGMENTIN) 875-125 mg per tablet; Take 1 tablet by mouth 2 (two) times daily.  Dispense: 20 tablet; Refill: 0 creatinine clearance calculated at 37.12  - Take antibiotics with food.  Increase fluid intake.  Call the clinic if symptoms worsen, new symptoms develop or if you are not any better after completion of your antibiotics.      Benign essential HTN        - Stable on current medication    Patient readiness: acceptance and barriers:none    During the course of the visit the patient was educated and counseled about the following:     Hypertension:   Medication: no change.    Goals: Hypertension: Reduce Blood Pressure    Did patient meet goals/outcomes: Yes    The following self management tools provided: declined    Patient Instructions (the written plan) was given to the patient/family.     Time spent with patient: 15 minutes      Follow up in 1 week if symptoms persist or worsen

## 2018-04-16 NOTE — PROGRESS NOTES
Pre-Visit Chart Review  For Appointment Scheduled on 4/16/2018    Health Maintenance Due   Topic Date Due    TETANUS VACCINE  08/15/1943

## 2018-04-23 ENCOUNTER — DOCUMENTATION ONLY (OUTPATIENT)
Dept: FAMILY MEDICINE | Facility: CLINIC | Age: 83
End: 2018-04-23

## 2018-04-23 NOTE — PROGRESS NOTES
Pre-Visit Chart Review  For Appointment Scheduled on 4/24/2018    Health Maintenance Due   Topic Date Due    TETANUS VACCINE  08/15/1943

## 2018-04-24 ENCOUNTER — OFFICE VISIT (OUTPATIENT)
Dept: FAMILY MEDICINE | Facility: CLINIC | Age: 83
End: 2018-04-24
Payer: MEDICARE

## 2018-04-24 VITALS
TEMPERATURE: 98 F | SYSTOLIC BLOOD PRESSURE: 136 MMHG | DIASTOLIC BLOOD PRESSURE: 64 MMHG | WEIGHT: 143.5 LBS | BODY MASS INDEX: 28.17 KG/M2 | HEIGHT: 60 IN | HEART RATE: 90 BPM

## 2018-04-24 DIAGNOSIS — R53.83 FATIGUE, UNSPECIFIED TYPE: ICD-10-CM

## 2018-04-24 DIAGNOSIS — J32.9 SINUSITIS, UNSPECIFIED CHRONICITY, UNSPECIFIED LOCATION: Primary | ICD-10-CM

## 2018-04-24 DIAGNOSIS — I10 BENIGN ESSENTIAL HTN: ICD-10-CM

## 2018-04-24 PROCEDURE — 99213 OFFICE O/P EST LOW 20 MIN: CPT | Mod: S$PBB,,, | Performed by: PHYSICIAN ASSISTANT

## 2018-04-24 PROCEDURE — 99999 PR PBB SHADOW E&M-EST. PATIENT-LVL IV: CPT | Mod: PBBFAC,,, | Performed by: PHYSICIAN ASSISTANT

## 2018-04-24 PROCEDURE — 99214 OFFICE O/P EST MOD 30 MIN: CPT | Mod: PBBFAC,PO | Performed by: PHYSICIAN ASSISTANT

## 2018-04-24 RX ORDER — FLUTICASONE PROPIONATE 50 MCG
1 SPRAY, SUSPENSION (ML) NASAL DAILY
Qty: 1 BOTTLE | Refills: 0 | Status: SHIPPED | OUTPATIENT
Start: 2018-04-24 | End: 2018-08-22

## 2018-04-24 NOTE — PROGRESS NOTES
Subjective:       Patient ID: Arleen Zarate is a 89 y.o. female.    Chief Complaint: Follow-up    Mrs. Zarate is a 89 year old female who is presenting to clinic for follow up on sinusitis diagnosed last week. She reports significant improvement in her symptoms including nasal congestion, purulent drainage, and sinus pressure. She continues to have some post-nasal drip. She denies chest congestion or wheezing. She admits to mild shortness of breath on occasion, but is not sure if this is associated with activity. She overall feels like she has low energy. She was hospitalized in January of this year for influenza. She continues to have interest in activities and a very busy social life, but feels she can not keep up like she used to 3-4 months ago. She denies chest pain, lower ext edema, or palpitations. She is sleeping fairly well. Her mood is good.       Review of Systems   Constitutional: Positive for fatigue. Negative for activity change, appetite change, chills and fever.   HENT: Positive for postnasal drip. Negative for ear pain, rhinorrhea, sinus pain, sinus pressure and sore throat.    Eyes: Negative for visual disturbance.   Respiratory: Positive for shortness of breath. Negative for cough.    Cardiovascular: Negative for chest pain, palpitations and leg swelling.   Gastrointestinal: Negative for abdominal pain, constipation, diarrhea, nausea and vomiting.   Musculoskeletal: Negative for arthralgias.   Neurological: Negative for dizziness, weakness, light-headedness and headaches.       Objective:      Vitals:    04/24/18 1017   BP: 136/64   Pulse: 90   Temp: 97.9 °F (36.6 °C)     Physical Exam   Constitutional: She is oriented to person, place, and time. She appears well-developed and well-nourished.   HENT:   Head: Normocephalic and atraumatic.   Nose: No rhinorrhea. Right sinus exhibits no maxillary sinus tenderness and no frontal sinus tenderness. Left sinus exhibits no maxillary sinus tenderness and  no frontal sinus tenderness.   Mouth/Throat: Mucous membranes are normal. No posterior oropharyngeal erythema.   Eyes: Conjunctivae and EOM are normal. Pupils are equal, round, and reactive to light.   Cardiovascular: Normal rate, regular rhythm, normal heart sounds and intact distal pulses.    Pulmonary/Chest: Effort normal and breath sounds normal.   Neurological: She is alert and oriented to person, place, and time.   Skin: Skin is warm and dry.   Psychiatric: She has a normal mood and affect. Her behavior is normal.   Vitals reviewed.      Assessment:       1. Sinusitis, unspecified chronicity, unspecified location    2. Fatigue, unspecified type    3. Benign essential HTN        Plan:       Sinusitis, unspecified chronicity, unspecified location  -     fluticasone (FLONASE) 50 mcg/actuation nasal spray; 1 spray (50 mcg total) by Each Nare route once daily.  Dispense: 1 Bottle; Refill: 0\  - Improving, complete antibiotics as prescribed    Fatigue, unspecified type        - May be from recent illness, continue to monitor. If symptoms persist then consider additional work up. Pt to call with an update of symptoms x 1 week.     Benign essential HTN        - Controlled on current medication    Patient readiness: acceptance and barriers:none    During the course of the visit the patient was educated and counseled about the following:     Hypertension:   Medication: no change.    Goals: Hypertension: Reduce Blood Pressure    Did patient meet goals/outcomes: No    The following self management tools provided: declined    Patient Instructions (the written plan) was given to the patient/family.     Time spent with patient: 30 minutes

## 2018-07-23 ENCOUNTER — DOCUMENTATION ONLY (OUTPATIENT)
Dept: FAMILY MEDICINE | Facility: CLINIC | Age: 83
End: 2018-07-23

## 2018-07-23 NOTE — PROGRESS NOTES
Pre-Visit Chart Review  For Appointment Scheduled on 7/30/2018    Health Maintenance Due   Topic Date Due    TETANUS VACCINE  08/15/1943

## 2018-07-30 ENCOUNTER — TELEPHONE (OUTPATIENT)
Dept: FAMILY MEDICINE | Facility: CLINIC | Age: 83
End: 2018-07-30

## 2018-07-30 RX ORDER — NISOLDIPINE 8.5 MG/1
8.5 TABLET, FILM COATED, EXTENDED RELEASE ORAL DAILY
Qty: 90 TABLET | Refills: 3 | Status: SHIPPED | OUTPATIENT
Start: 2018-07-30 | End: 2019-07-09 | Stop reason: SDUPTHER

## 2018-07-30 RX ORDER — LISINOPRIL 20 MG/1
20 TABLET ORAL DAILY
Qty: 90 TABLET | Refills: 3 | Status: SHIPPED | OUTPATIENT
Start: 2018-07-30 | End: 2019-07-09 | Stop reason: SDUPTHER

## 2018-07-30 RX ORDER — OMEPRAZOLE 40 MG/1
40 CAPSULE, DELAYED RELEASE ORAL DAILY
Qty: 90 CAPSULE | Refills: 3 | Status: SHIPPED | OUTPATIENT
Start: 2018-07-30 | End: 2019-07-09 | Stop reason: SDUPTHER

## 2018-07-30 NOTE — TELEPHONE ENCOUNTER
----- Message from Yesica Garcia sent at 7/30/2018  8:23 AM CDT -----  Contact: Patient  Arleen, patient [phone] calling to speak with nurse in office, patient stated it was regarding some medication but she did not want to go into detail. Please advise. Thanks.

## 2018-07-30 NOTE — TELEPHONE ENCOUNTER
Called pt. Need to reschedule appt with Dr. Lew today. Dr. Lew called out sick.  Rescheduled to 7/30. Thanks, Alicja

## 2018-07-30 NOTE — TELEPHONE ENCOUNTER
Patient had appointment scheduled for today appointment scheduled for a later date.  Patient requesting refills on medication

## 2018-08-07 ENCOUNTER — DOCUMENTATION ONLY (OUTPATIENT)
Dept: FAMILY MEDICINE | Facility: CLINIC | Age: 83
End: 2018-08-07

## 2018-08-07 NOTE — PROGRESS NOTES
Pre-Visit Chart Review  For Appointment Scheduled on 8/13/2018    Health Maintenance Due   Topic Date Due    TETANUS VACCINE  08/15/1943    Influenza Vaccine  08/01/2018

## 2018-08-13 ENCOUNTER — TELEPHONE (OUTPATIENT)
Dept: FAMILY MEDICINE | Facility: CLINIC | Age: 83
End: 2018-08-13
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
## 2018-08-20 ENCOUNTER — TELEPHONE (OUTPATIENT)
Dept: FAMILY MEDICINE | Facility: CLINIC | Age: 83
End: 2018-08-20

## 2018-08-20 NOTE — TELEPHONE ENCOUNTER
----- Message from Sd Colby sent at 8/20/2018 11:04 AM CDT -----  Contact: pt  Pt is calling to see if she can get some orders for blood work for her appt pm 8-22-18,pls call pt back and schedule when orders are in system!  Call Back#[phone]  Thanks

## 2018-08-20 NOTE — TELEPHONE ENCOUNTER
Spoke with patient and she agreed to wait until her appointment to have the bloodwork done since Dr. Lew might add other orders to the bloodwork.

## 2018-08-22 ENCOUNTER — LAB VISIT (OUTPATIENT)
Dept: LAB | Facility: HOSPITAL | Age: 83
End: 2018-08-22
Attending: FAMILY MEDICINE
Payer: MEDICARE

## 2018-08-22 ENCOUNTER — OFFICE VISIT (OUTPATIENT)
Dept: FAMILY MEDICINE | Facility: CLINIC | Age: 83
End: 2018-08-22
Payer: MEDICARE

## 2018-08-22 VITALS
BODY MASS INDEX: 28.26 KG/M2 | DIASTOLIC BLOOD PRESSURE: 78 MMHG | HEIGHT: 60 IN | HEART RATE: 84 BPM | WEIGHT: 143.94 LBS | TEMPERATURE: 99 F | SYSTOLIC BLOOD PRESSURE: 140 MMHG

## 2018-08-22 DIAGNOSIS — E78.2 MIXED HYPERLIPIDEMIA: ICD-10-CM

## 2018-08-22 DIAGNOSIS — E55.9 HYPOVITAMINOSIS D: ICD-10-CM

## 2018-08-22 DIAGNOSIS — R25.2 LEG CRAMPS: ICD-10-CM

## 2018-08-22 DIAGNOSIS — I10 BENIGN ESSENTIAL HTN: ICD-10-CM

## 2018-08-22 DIAGNOSIS — I10 BENIGN ESSENTIAL HTN: Primary | ICD-10-CM

## 2018-08-22 DIAGNOSIS — R79.9 ABNORMAL FINDING OF BLOOD CHEMISTRY: ICD-10-CM

## 2018-08-22 DIAGNOSIS — I83.90 VARICOSE VEIN OF LEG: ICD-10-CM

## 2018-08-22 DIAGNOSIS — I77.1 TORTUOUS AORTA: ICD-10-CM

## 2018-08-22 LAB
25(OH)D3+25(OH)D2 SERPL-MCNC: 26 NG/ML
ANION GAP SERPL CALC-SCNC: 7 MMOL/L
BASOPHILS # BLD AUTO: 0.04 K/UL
BASOPHILS NFR BLD: 0.6 %
BUN SERPL-MCNC: 18 MG/DL
CALCIUM SERPL-MCNC: 9.6 MG/DL
CHLORIDE SERPL-SCNC: 105 MMOL/L
CK SERPL-CCNC: 143 U/L
CO2 SERPL-SCNC: 29 MMOL/L
CREAT SERPL-MCNC: 0.9 MG/DL
DIFFERENTIAL METHOD: ABNORMAL
EOSINOPHIL # BLD AUTO: 0.1 K/UL
EOSINOPHIL NFR BLD: 1.3 %
ERYTHROCYTE [DISTWIDTH] IN BLOOD BY AUTOMATED COUNT: 13 %
EST. GFR  (AFRICAN AMERICAN): >60 ML/MIN/1.73 M^2
EST. GFR  (NON AFRICAN AMERICAN): 55.3 ML/MIN/1.73 M^2
FERRITIN SERPL-MCNC: 36 NG/ML
GLUCOSE SERPL-MCNC: 78 MG/DL
HCT VFR BLD AUTO: 39 %
HGB BLD-MCNC: 12.2 G/DL
IMM GRANULOCYTES # BLD AUTO: 0.02 K/UL
IMM GRANULOCYTES NFR BLD AUTO: 0.3 %
IRON SERPL-MCNC: 56 UG/DL
LDH SERPL L TO P-CCNC: 214 U/L
LYMPHOCYTES # BLD AUTO: 2.2 K/UL
LYMPHOCYTES NFR BLD: 31 %
MAGNESIUM SERPL-MCNC: 2 MG/DL
MCH RBC QN AUTO: 29.8 PG
MCHC RBC AUTO-ENTMCNC: 31.3 G/DL
MCV RBC AUTO: 95 FL
MONOCYTES # BLD AUTO: 0.6 K/UL
MONOCYTES NFR BLD: 8.8 %
NEUTROPHILS # BLD AUTO: 4.1 K/UL
NEUTROPHILS NFR BLD: 58 %
NRBC BLD-RTO: 0 /100 WBC
PLATELET # BLD AUTO: 191 K/UL
PMV BLD AUTO: 11 FL
POTASSIUM SERPL-SCNC: 4.6 MMOL/L
RBC # BLD AUTO: 4.1 M/UL
SATURATED IRON: 15 %
SODIUM SERPL-SCNC: 141 MMOL/L
TOTAL IRON BINDING CAPACITY: 382 UG/DL
TRANSFERRIN SERPL-MCNC: 258 MG/DL
WBC # BLD AUTO: 7.13 K/UL

## 2018-08-22 PROCEDURE — 82085 ASSAY OF ALDOLASE: CPT

## 2018-08-22 PROCEDURE — 36415 COLL VENOUS BLD VENIPUNCTURE: CPT | Mod: PO

## 2018-08-22 PROCEDURE — 82550 ASSAY OF CK (CPK): CPT

## 2018-08-22 PROCEDURE — 82728 ASSAY OF FERRITIN: CPT

## 2018-08-22 PROCEDURE — 80048 BASIC METABOLIC PNL TOTAL CA: CPT

## 2018-08-22 PROCEDURE — 82306 VITAMIN D 25 HYDROXY: CPT

## 2018-08-22 PROCEDURE — 83615 LACTATE (LD) (LDH) ENZYME: CPT

## 2018-08-22 PROCEDURE — 99999 PR PBB SHADOW E&M-EST. PATIENT-LVL III: CPT | Mod: PBBFAC,,, | Performed by: FAMILY MEDICINE

## 2018-08-22 PROCEDURE — 85025 COMPLETE CBC W/AUTO DIFF WBC: CPT

## 2018-08-22 PROCEDURE — 83735 ASSAY OF MAGNESIUM: CPT

## 2018-08-22 PROCEDURE — 99213 OFFICE O/P EST LOW 20 MIN: CPT | Mod: PBBFAC,PO | Performed by: FAMILY MEDICINE

## 2018-08-22 PROCEDURE — 99214 OFFICE O/P EST MOD 30 MIN: CPT | Mod: S$PBB,,, | Performed by: FAMILY MEDICINE

## 2018-08-22 PROCEDURE — 83540 ASSAY OF IRON: CPT

## 2018-08-22 NOTE — PROGRESS NOTES
CHIEF COMPLAINT:  Follow up HTN      HISTORY OF PRESENT ILLNESS:  Arleen Zarate is a 89 y.o. female who presents to clinic for follow up on HTN.  She is currently on  lisinopril and sular. She denies any CP, SOB, edema, cough.  She has hyperlipidemia  but is intolerant of statins. She has history of low vitamin D, is due for repeat vitamin D level. She still occasionally has LE leg cramps at night. They will sometimes occur when she is dehydrated. She does not notice them during the day.      REVIEW OF SYSTEMS:  The patient denies any fever, chills, night sweats, headaches, vision changes, difficulty speaking or swallowing, decreased hearing, weight loss, weight gain, chest pain, palpitations, shortness of breath, cough, nausea, vomiting, abdominal pain, dysuria, diarrhea, constipation, hematuria, hematochezia, melena, changes in her hair, skin, nails, numbness or weakness in her extremities, erythema, pain or swelling over any of her joints, myalgia, swollen glands, easy bruising, fatigue, edema, symptoms of anxiety or depression.       MEDICATIONS:   Reviewed and/or reconciled in EPIC    ALLERGIES:  Reviewed and/or reconciled in [facility]    PAST MEDICAL/SURGICAL HISTORY:   Past Medical History:   Diagnosis Date    Anxiety     Arthritis     fingers and back    Depression     Foot pain, left 9/18/2013    GERD (gastroesophageal reflux disease)     Hiatal hernia     Hypertension     Reflux     on meds    Tendon tear 8/21/2013    Tendonitis 8/21/2013      Past Surgical History:   Procedure Laterality Date    CHOLECYSTECTOMY      EYE SURGERY Bilateral     cataracts and implants    TONSILLECTOMY         FAMILY HISTORY:  No family history on file.    SOCIAL HISTORY:    Social History     Socioeconomic History    Marital status:      Spouse name: Not on file    Number of children: Not on file    Years of education: Not on file    Highest education level: Not on file   Social Needs    Financial  resource strain: Not on file    Food insecurity - worry: Not on file    Food insecurity - inability: Not on file    Transportation needs - medical: Not on file    Transportation needs - non-medical: Not on file   Occupational History    Not on file   Tobacco Use    Smoking status: Never Smoker    Smokeless tobacco: Never Used   Substance and Sexual Activity    Alcohol use: No    Drug use: No    Sexual activity: No   Other Topics Concern    Not on file   Social History Narrative    Not on file       PHYSICAL EXAM:  VITAL SIGNS:   Vitals:    08/22/18 1530   BP: (!) 140/78   BP Location: Left arm   Patient Position: Sitting   BP Method: Medium (Automatic)   Pulse: 84   Temp: 98.5 °F (36.9 °C)   TempSrc: Oral   Weight: 65.3 kg (143 lb 15.4 oz)   Height: 5' (1.524 m)     GENERAL:  Patient appears well nourished, sitting on exam table, in no acute distress.  HEENT:  Atraumatic, normocephalic, PERRLA, EOMI, no conjunctival injection, sclerae are anicteric, normal external auditory canals,TMs clear b/l, gross hearing intact to whisper, MMM, no oropharygneal erythema or exudate.  NECK:  Supple, normal ROM, trachea is midline , no supraclavicular or cervical LAD or masses palpated.  Thyroid gland not palpable.  CARDIOVASCULAR:  RRR, normal S1 and S2, no m/r/g.  RESPIRATORY:  CTA b/l, no wheezes, rhonchi, rales.  No increased work of breathing, no  use of accessory muscles.  ABDOMEN:  Soft, nontender, nondistended, normoactive bowel sounds in all four quadrants, no rebound or guarding, no HSM or masses palpated.  Normal percussion.  EXTREMITIES:  2+ DP pulses b/l, no edema.  SKIN:  Warm, no lesions on exposed skin.  NEUROMUSCULAR:  Cranial nerves II-XII grossly intact.   No clubbing or cyanosis of digits/nails.  Steady gait.  PSYCH:  Patient is alert and oriented to person, time, place. They are appropriately dressed and groomed. There is normal eye contact. Rate and tone of speech is normal. Normal insight,  judgement. Normal thought content and process.     LABORATORY/IMAGING STUDIES: pending    ASSESSMENT/PLAN: This is a 89 y.o. female who presents to clinic to establish care    1. Essential hypertension  See below    2. Hypovitaminosis D  - Vitamin D; Future    3. Mixed hyperlipidemia  -continue with low fat diet, recheck in 6 months    4. Leg cramps  -obtain BMP, magnesium level, iron studies, CK, aldolase, LDH. Consider repeat LE arterial u/s if labs are normal.     5. Tortuous aorta  Stable, continue to [facility].    Patient readiness: acceptance and barriers:none    During the course of the visit the patient was educated and counseled about the following:     Hypertension:   Medication: no change. BMP    Goals: Hypertension: Reduce Blood Pressure    Did patient meet goals/outcomes: Yes    The following self management tools provided: declined    Patient Instructions (the written plan) was given to the patient/family.     Time spent with patient: 30 minutes      FOLLOW UP:  6 months      Tess Lew MD

## 2018-08-24 LAB — ALDOLASE SERPL-CCNC: 3.5 U/L

## 2018-08-29 ENCOUNTER — TELEPHONE (OUTPATIENT)
Dept: FAMILY MEDICINE | Facility: CLINIC | Age: 83
End: 2018-08-29

## 2018-08-29 NOTE — TELEPHONE ENCOUNTER
Spoke with patient and she wondered when she would get her 8/22 lab results. I explained to the patient that Dr. Lew was away from the office both this week and next week. Patient confirmed her understanding.

## 2018-08-29 NOTE — TELEPHONE ENCOUNTER
----- Message from Irma Connor sent at 8/29/2018 11:43 AM CDT -----  Contact: Patient  Type:  Test Results    Who Called: Patient  Name of Test (Lab/Mammo/Etc): Lab  Date of Test:  8/22/18  Ordering Provider:  Dr Tess Lew  Where the test was performed: [facility]  Best Call Back Number:    Additional Information: Call to pod. No answer.

## 2018-09-12 ENCOUNTER — TELEPHONE (OUTPATIENT)
Dept: FAMILY MEDICINE | Facility: CLINIC | Age: 83
End: 2018-09-12

## 2018-09-12 DIAGNOSIS — R25.2 LEG CRAMPING: Primary | ICD-10-CM

## 2018-09-12 NOTE — TELEPHONE ENCOUNTER
Renal function is at baseline.    Her muscle enzymes are normal.    Electrolytes are normal.    There is slightly low vitamin D and saturated iron levels.    Can add vitamin D 4352-2490 IU daily, multivitamin with iron to help with leg cramps. If she wants repeat LE arterial ultrasound let me know

## 2018-09-12 NOTE — TELEPHONE ENCOUNTER
Called pt regarding below message. Pt states she had labs drawn approximately 3 wks ago and would like Dr. Lew's recommendations. Informed pt that Dr. Lew has been out of clinic and is working on her messages. Pt requests message be sent to provider for her recommendations. Informed pt I have sent a message to provider. Pt verbalized understanding with no further questions.     ----- Message from Diane Quevedo sent at 9/10/2018 11:50 AM CDT -----  Contact: pt  Pt calling states needs to speak with the nurse concerning a blood test had done,been 3 weeks...[phone] (home)

## 2018-09-20 NOTE — TELEPHONE ENCOUNTER
Called pt regarding below results and recommendations per Dr. Lew. Left voicemail with return number.

## 2018-09-21 ENCOUNTER — TELEPHONE (OUTPATIENT)
Dept: FAMILY MEDICINE | Facility: CLINIC | Age: 83
End: 2018-09-21

## 2018-09-21 NOTE — TELEPHONE ENCOUNTER
Called pt regarding below message. Left voicemail with return number.     ----- Message from RT Amado sent at 9/21/2018  9:07 AM CDT -----  Contact: pt    Called pod, pt , returned missed call, she will be at home until 01:00 pm, today, thanks.

## 2018-09-26 ENCOUNTER — TELEPHONE (OUTPATIENT)
Dept: FAMILY MEDICINE | Facility: CLINIC | Age: 83
End: 2018-09-26

## 2018-09-26 NOTE — TELEPHONE ENCOUNTER
Called Pt in regards to her results and instructions per Dr. Lew, understanding verbalized, she stated that she's fine in in regards to LE arterial US, she's said if anything progresses she will notify you

## 2018-10-01 ENCOUNTER — TELEPHONE (OUTPATIENT)
Dept: FAMILY MEDICINE | Facility: CLINIC | Age: 83
End: 2018-10-01

## 2018-10-01 NOTE — TELEPHONE ENCOUNTER
Called pt regarding below message. Pt has questions regarding previous recommendations per Dr. Lew. Informed pt per Dr. Lew, add OTC Vitamin D 1,00-2,000 units daily and multivitamin with iron. Pt takes Centrum silver multivitamin she takes does not contain Iron. Instructed pt to purchase OTC iron 65 mg in addition to the Vitamin D and Multivitamin daily. Pt verbalized understanding with no further questions.     ----- Message from Jacqui Velazquez sent at 10/1/2018 12:15 PM CDT -----  Type: Needs Medical Advice    Who Called: Patient    Best Call Back Number:[phone] (home)     Additional Information:  Directions on medications prescribed, has some questions. Confused on what was given before

## 2019-01-07 ENCOUNTER — DOCUMENTATION ONLY (OUTPATIENT)
Dept: FAMILY MEDICINE | Facility: CLINIC | Age: 84
End: 2019-01-07

## 2019-01-07 NOTE — PROGRESS NOTES
Pre-Visit Chart Review  For Appointment Scheduled on 1/10/2019    Health Maintenance Due   Topic Date Due    Influenza Vaccine  08/01/2018

## 2019-03-01 ENCOUNTER — DOCUMENTATION ONLY (OUTPATIENT)
Dept: FAMILY MEDICINE | Facility: CLINIC | Age: 84
End: 2019-03-01

## 2019-03-01 NOTE — PROGRESS NOTES
Pre-Visit Chart Review  For Appointment Scheduled on 3/11/19.    Health Maintenance Due   Topic Date Due    Influenza Vaccine  08/01/2018

## 2019-03-12 ENCOUNTER — TELEPHONE (OUTPATIENT)
Dept: GASTROENTEROLOGY | Facility: CLINIC | Age: 84
End: 2019-03-12

## 2019-03-12 NOTE — TELEPHONE ENCOUNTER
----- Message from Irma Connor sent at 3/12/2019  7:25 AM CDT -----  Contact: Patient  Type: Needs Medical Advice    Who Called:  Patient  Symptoms (please be specific): stool full of blood  How long has patient had these symptoms:  today  Best Call Back Number:   Additional Information: Calling to schedule a new patient appt today. She only wants to see the Doctor. Please advise.

## 2019-03-12 NOTE — TELEPHONE ENCOUNTER
Patient given first available 4/4 at 230pm  and placed on cancel list. Instructed to go to the er if symptoms worsen.

## 2019-03-13 ENCOUNTER — TELEPHONE (OUTPATIENT)
Dept: FAMILY MEDICINE | Facility: CLINIC | Age: 84
End: 2019-03-13

## 2019-03-13 NOTE — TELEPHONE ENCOUNTER
pts labs and appt rescheduled for 3/22            ----- Message from Irma Connor sent at 3/13/2019 12:56 PM CDT -----  Contact: Patient  Type: Needs Medical Advice    Who Called:  Patient  Best Call Back Number:   Additional Information: Calling to reschedule her appt that was cancelled on 3/11/19, due to the Provider being sick. Please advise.

## 2019-03-22 ENCOUNTER — DOCUMENTATION ONLY (OUTPATIENT)
Dept: FAMILY MEDICINE | Facility: CLINIC | Age: 84
End: 2019-03-22

## 2019-03-22 ENCOUNTER — OFFICE VISIT (OUTPATIENT)
Dept: FAMILY MEDICINE | Facility: CLINIC | Age: 84
End: 2019-03-22
Payer: MEDICARE

## 2019-03-22 ENCOUNTER — LAB VISIT (OUTPATIENT)
Dept: LAB | Facility: HOSPITAL | Age: 84
End: 2019-03-22
Attending: FAMILY MEDICINE
Payer: MEDICARE

## 2019-03-22 VITALS
SYSTOLIC BLOOD PRESSURE: 131 MMHG | TEMPERATURE: 99 F | BODY MASS INDEX: 28.13 KG/M2 | DIASTOLIC BLOOD PRESSURE: 67 MMHG | WEIGHT: 143.31 LBS | HEART RATE: 93 BPM | HEIGHT: 60 IN

## 2019-03-22 DIAGNOSIS — E78.2 MIXED HYPERLIPIDEMIA: ICD-10-CM

## 2019-03-22 DIAGNOSIS — I83.93 VARICOSE VEINS OF BOTH LOWER EXTREMITIES, UNSPECIFIED WHETHER COMPLICATED: ICD-10-CM

## 2019-03-22 DIAGNOSIS — F41.9 ANXIETY: ICD-10-CM

## 2019-03-22 DIAGNOSIS — K92.1 BLOOD IN STOOL: ICD-10-CM

## 2019-03-22 DIAGNOSIS — M79.604 PAIN IN BOTH LOWER EXTREMITIES: ICD-10-CM

## 2019-03-22 DIAGNOSIS — I10 BENIGN ESSENTIAL HTN: ICD-10-CM

## 2019-03-22 DIAGNOSIS — R25.2 LEG CRAMPING: ICD-10-CM

## 2019-03-22 DIAGNOSIS — I10 BENIGN ESSENTIAL HTN: Primary | ICD-10-CM

## 2019-03-22 DIAGNOSIS — M79.605 PAIN IN BOTH LOWER EXTREMITIES: ICD-10-CM

## 2019-03-22 DIAGNOSIS — E55.9 VITAMIN D DEFICIENCY: ICD-10-CM

## 2019-03-22 LAB
ALBUMIN SERPL BCP-MCNC: 4.1 G/DL
ALP SERPL-CCNC: 84 U/L
ALT SERPL W/O P-5'-P-CCNC: 14 U/L
ANION GAP SERPL CALC-SCNC: 10 MMOL/L
AST SERPL-CCNC: 21 U/L
BASOPHILS # BLD AUTO: 0.04 K/UL
BASOPHILS NFR BLD: 0.7 %
BILIRUB SERPL-MCNC: 0.6 MG/DL
BUN SERPL-MCNC: 18 MG/DL
CALCIUM SERPL-MCNC: 9.7 MG/DL
CHLORIDE SERPL-SCNC: 107 MMOL/L
CHOLEST SERPL-MCNC: 203 MG/DL
CHOLEST/HDLC SERPL: 3.6 {RATIO}
CK SERPL-CCNC: 116 U/L
CO2 SERPL-SCNC: 26 MMOL/L
CREAT SERPL-MCNC: 0.9 MG/DL
DIFFERENTIAL METHOD: ABNORMAL
EOSINOPHIL # BLD AUTO: 0.1 K/UL
EOSINOPHIL NFR BLD: 2.1 %
ERYTHROCYTE [DISTWIDTH] IN BLOOD BY AUTOMATED COUNT: 12.7 %
EST. GFR  (AFRICAN AMERICAN): >60 ML/MIN/1.73 M^2
EST. GFR  (NON AFRICAN AMERICAN): 55.3 ML/MIN/1.73 M^2
GLUCOSE SERPL-MCNC: 68 MG/DL
HCT VFR BLD AUTO: 38.9 %
HDLC SERPL-MCNC: 57 MG/DL
HDLC SERPL: 28.1 %
HGB BLD-MCNC: 12.3 G/DL
IMM GRANULOCYTES # BLD AUTO: 0.02 K/UL
IMM GRANULOCYTES NFR BLD AUTO: 0.3 %
LDLC SERPL CALC-MCNC: 123.2 MG/DL
LYMPHOCYTES # BLD AUTO: 1.7 K/UL
LYMPHOCYTES NFR BLD: 28.7 %
MAGNESIUM SERPL-MCNC: 1.9 MG/DL
MCH RBC QN AUTO: 29.9 PG
MCHC RBC AUTO-ENTMCNC: 31.6 G/DL
MCV RBC AUTO: 95 FL
MONOCYTES # BLD AUTO: 0.5 K/UL
MONOCYTES NFR BLD: 8.9 %
NEUTROPHILS # BLD AUTO: 3.6 K/UL
NEUTROPHILS NFR BLD: 59.3 %
NONHDLC SERPL-MCNC: 146 MG/DL
NRBC BLD-RTO: 0 /100 WBC
PLATELET # BLD AUTO: 201 K/UL
PMV BLD AUTO: 10.8 FL
POTASSIUM SERPL-SCNC: 4.2 MMOL/L
PROT SERPL-MCNC: 6.8 G/DL
RBC # BLD AUTO: 4.11 M/UL
SODIUM SERPL-SCNC: 143 MMOL/L
TRIGL SERPL-MCNC: 114 MG/DL
TSH SERPL DL<=0.005 MIU/L-ACNC: 0.97 UIU/ML
WBC # BLD AUTO: 6.07 K/UL

## 2019-03-22 PROCEDURE — 85025 COMPLETE CBC W/AUTO DIFF WBC: CPT

## 2019-03-22 PROCEDURE — 84443 ASSAY THYROID STIM HORMONE: CPT

## 2019-03-22 PROCEDURE — 99999 PR PBB SHADOW E&M-EST. PATIENT-LVL IV: ICD-10-PCS | Mod: PBBFAC,,, | Performed by: PHYSICIAN ASSISTANT

## 2019-03-22 PROCEDURE — 82550 ASSAY OF CK (CPK): CPT

## 2019-03-22 PROCEDURE — 99214 OFFICE O/P EST MOD 30 MIN: CPT | Mod: PBBFAC,PO | Performed by: PHYSICIAN ASSISTANT

## 2019-03-22 PROCEDURE — 99214 OFFICE O/P EST MOD 30 MIN: CPT | Mod: S$PBB,,, | Performed by: PHYSICIAN ASSISTANT

## 2019-03-22 PROCEDURE — 80061 LIPID PANEL: CPT

## 2019-03-22 PROCEDURE — 36415 COLL VENOUS BLD VENIPUNCTURE: CPT | Mod: PO

## 2019-03-22 PROCEDURE — 99999 PR PBB SHADOW E&M-EST. PATIENT-LVL IV: CPT | Mod: PBBFAC,,, | Performed by: PHYSICIAN ASSISTANT

## 2019-03-22 PROCEDURE — 99214 PR OFFICE/OUTPT VISIT, EST, LEVL IV, 30-39 MIN: ICD-10-PCS | Mod: S$PBB,,, | Performed by: PHYSICIAN ASSISTANT

## 2019-03-22 PROCEDURE — 80053 COMPREHEN METABOLIC PANEL: CPT

## 2019-03-22 PROCEDURE — 83735 ASSAY OF MAGNESIUM: CPT

## 2019-03-22 NOTE — PROGRESS NOTES
Subjective:       Patient ID: Arleen Zarate is a 89 y.o. female.    Chief Complaint: Follow-up    HPI     This is a 89 year old  female who presents to the clinic for regular follow up.     1) HTN - She is currently on lisinopril 20 mg and nisoldipine 8.5 mg. /67 today. States she does not check her pressure at home. Denies any CP, HA, or blurry vision.  2) Vitamin D deficiency - She is due for a vitamin D check today.  3) HLD - Last lipid panel 1/2018. Total cholesterol elevated at 229. Otherwise good, HDL 58, Triglycerides 116, . Patient with a statin allergy.  4) Leg cramps - Reports continued leg cramping in bilateral legs. This is intermittent, occurring on and off throughout the week, not daily. States it was extremely painful last night.   5) Varicose veins - Reports varicose veins in both legs with new pain in her right anterior foot which is worse after a long day. Denies any calf pain, claudication, or leg swelling.    She is complaining today of blood in stool. Reports she was started on iron at her last visit 8/2018 at which point she began to experience constipation and black stool. 10 days ago, she noticed bright red blood in the toilet and on her toilet paper. No blood in the stool. She does have a history of internal hemorrhoids which have never caused her any problems previously. She discontinued the iron and has since noticed relief in the constipation. She is having normal BM once per day and they are soft and brown. She additionally notes that the bleeding is improving. She has an appointment with GI on 4/4. Denies any N/V/D, abdominal pain, fevers or chills.    Review of Systems   Constitutional: Negative for activity change, appetite change, chills, diaphoresis, fatigue and fever.   HENT: Negative for congestion, postnasal drip and rhinorrhea.    Respiratory: Negative.  Negative for cough, shortness of breath and wheezing.    Cardiovascular: Negative.  Negative for chest  pain.   Gastrointestinal: Negative for abdominal distention, abdominal pain, constipation, diarrhea, nausea and vomiting.        Bleeding with bowel movements   Genitourinary: Negative for dysuria, frequency, hematuria and urgency.   Musculoskeletal: Negative.         Leg cramps, foot pain, varicose veins   Skin: Negative.  Negative for color change and rash.   Neurological: Negative for dizziness and syncope.   Psychiatric/Behavioral: Negative for agitation, behavioral problems and confusion.       Objective:      Physical Exam   Constitutional: She is oriented to person, place, and time. Vital signs are normal. She appears well-developed and well-nourished. No distress.   HENT:   Head: Normocephalic and atraumatic.   Right Ear: External ear normal.   Left Ear: External ear normal.   Mouth/Throat: Oropharynx is clear and moist.   Neck: Carotid bruit is not present.   Cardiovascular: Normal rate, regular rhythm, S1 normal, S2 normal and normal heart sounds. Exam reveals no gallop.   No murmur heard.  Pulses:       Radial pulses are 2+ on the right side, and 2+ on the left side.        Dorsalis pedis pulses are 2+ on the right side, and 2+ on the left side.        Posterior tibial pulses are 2+ on the right side, and 2+ on the left side.   <2sec cap refill fingers bilat     Pulmonary/Chest: Effort normal and breath sounds normal. No respiratory distress. She has no wheezes. She has no rhonchi.   Abdominal: Soft. Bowel sounds are normal. She exhibits no distension and no mass. There is no tenderness. There is no rebound and no guarding.   Musculoskeletal:        Right foot: There is normal range of motion and no deformity.        Left foot: There is normal range of motion and no deformity.   Diffuse varicose veins in bilateral legs that are not painful. Negative Leda's sign.    Neurological: She is alert and oriented to person, place, and time.   Skin: Skin is warm and dry. She is not diaphoretic.   Appropriate skin  katiecasandra   Psychiatric: She has a normal mood and affect. Her speech is normal and behavior is normal. Judgment and thought content normal. Cognition and memory are normal.       Assessment:       1. Benign essential HTN    2. Mixed hyperlipidemia    3. Vitamin D deficiency    4. Varicose veins of both lower extremities, unspecified whether complicated    5. Pain in both lower extremities    6. Leg cramping    7. Anxiety    8. Blood in stool        Plan:       Arleen was seen today for follow-up.    Diagnoses and all orders for this visit:    Benign essential HTN       -     Controlled. Blood pressure is 131/67 today. Continue lisinopril 20 mg and nisoldipine 8.5 mg.     Mixed hyperlipidemia       -     Labs to evaluate. Will f/u with results.    Vitamin D deficiency  Labs drawn prior to office visit and vitamin D could not be added.   We will check this at next visit, but ensure she is taking 800-1,000 IU vitamin D daily.    Varicose veins of both lower extremities, unspecified whether complicated  -     Ultrasound Lower Extremity Veins Bilateral Insuf (Cupid Only); Future  -     Will f/u with results. Encouraged use of compression stockings.     Pain in both lower extremities  -     Ultrasound Lower Extremity Veins Bilateral Insuf (Cupid Only); Future  -     Will f/u with results.     Leg cramping  -     CK; Future  -     Magnesium; Future  -     Will f/u with results.    Anxiety        -    Controlled. Patient reports this has been stable for years on current medication dose. Continue clorazepate. Will send rx request to Dr. Lew for refill.    Blood in stool  Likely 2/2 bleeding hemorrhoids. Will check CBC and ensure no anemia.        -     Discontinue iron. Keep appointment with GI 4/4. Use Colace if constipation occurs again.   Go to the ER with any worsening bleeding, black stools after stopping iron supplementation.         Patient readiness: acceptance and barriers:none    During the course of the visit  the patient was educated and counseled about the following:     Hypertension:   Medication: no change.    Goals: Hypertension: Reduce Blood Pressure    Did patient meet goals/outcomes: Yes    The following self management tools provided: blood pressure log    Patient Instructions (the written plan) was given to the patient/family.     Time spent with patient: 45 minutes    Barriers to medications present (no )    Adverse reactions to current medications (no)    Over the counter medications reviewed (Yes)

## 2019-03-22 NOTE — PROGRESS NOTES
Pre-Visit Chart Review  For Appointment Scheduled on 03/22/19    Health Maintenance Due   Topic Date Due    Influenza Vaccine  08/01/2018

## 2019-03-29 ENCOUNTER — TELEPHONE (OUTPATIENT)
Dept: FAMILY MEDICINE | Facility: CLINIC | Age: 84
End: 2019-03-29

## 2019-03-29 NOTE — TELEPHONE ENCOUNTER
While notifying patient of results. Patient requested to cancel the ultrasound that is schedule. Notified patient that the ultrasound was schedule for her leg pain patient states she is no longer having leg pain. Patient also requested to can GI appointment patient that's the bleeding was coming from her hemorrhoids. Advised patient to call office if her symptoms return

## 2019-04-03 ENCOUNTER — TELEPHONE (OUTPATIENT)
Dept: FAMILY MEDICINE | Facility: CLINIC | Age: 84
End: 2019-04-03

## 2019-04-03 NOTE — TELEPHONE ENCOUNTER
Spoke to patient,patient declines to schedule ultrasound at this time patient states she is not having pain at this time.

## 2019-04-03 NOTE — TELEPHONE ENCOUNTER
----- Message from Marylou Robledo sent at 4/3/2019 10:28 AM CDT -----  Contact: Arleen hunter  Type:  Patient Returning Call    Who Called:  Arleen  Who Left Message for Patient:  Breann  Does the patient know what this is regarding?:  beatriz  Best Call Back Number:  [phone]  Additional Information:  Pls call pt back to adv.

## 2019-04-29 ENCOUNTER — OFFICE VISIT (OUTPATIENT)
Dept: FAMILY MEDICINE | Facility: CLINIC | Age: 84
End: 2019-04-29
Payer: MEDICARE

## 2019-04-29 ENCOUNTER — TELEPHONE (OUTPATIENT)
Dept: FAMILY MEDICINE | Facility: CLINIC | Age: 84
End: 2019-04-29

## 2019-04-29 VITALS
HEART RATE: 85 BPM | HEIGHT: 60 IN | TEMPERATURE: 98 F | SYSTOLIC BLOOD PRESSURE: 149 MMHG | BODY MASS INDEX: 27.71 KG/M2 | OXYGEN SATURATION: 98 % | DIASTOLIC BLOOD PRESSURE: 86 MMHG | WEIGHT: 141.13 LBS

## 2019-04-29 DIAGNOSIS — E16.2 HYPOGLYCEMIA: ICD-10-CM

## 2019-04-29 DIAGNOSIS — K21.9 GASTROESOPHAGEAL REFLUX DISEASE, ESOPHAGITIS PRESENCE NOT SPECIFIED: Primary | ICD-10-CM

## 2019-04-29 DIAGNOSIS — R05.9 COUGH: ICD-10-CM

## 2019-04-29 DIAGNOSIS — I10 BENIGN ESSENTIAL HTN: ICD-10-CM

## 2019-04-29 DIAGNOSIS — N18.30 STAGE 3 CHRONIC KIDNEY DISEASE: ICD-10-CM

## 2019-04-29 DIAGNOSIS — F41.9 ANXIETY: ICD-10-CM

## 2019-04-29 PROCEDURE — 99999 PR PBB SHADOW E&M-EST. PATIENT-LVL IV: CPT | Mod: PBBFAC,,, | Performed by: NURSE PRACTITIONER

## 2019-04-29 PROCEDURE — 99214 OFFICE O/P EST MOD 30 MIN: CPT | Mod: PBBFAC,PO | Performed by: NURSE PRACTITIONER

## 2019-04-29 PROCEDURE — 99999 PR PBB SHADOW E&M-EST. PATIENT-LVL IV: ICD-10-PCS | Mod: PBBFAC,,, | Performed by: NURSE PRACTITIONER

## 2019-04-29 PROCEDURE — 99214 OFFICE O/P EST MOD 30 MIN: CPT | Mod: S$PBB,,, | Performed by: NURSE PRACTITIONER

## 2019-04-29 PROCEDURE — 99214 PR OFFICE/OUTPT VISIT, EST, LEVL IV, 30-39 MIN: ICD-10-PCS | Mod: S$PBB,,, | Performed by: NURSE PRACTITIONER

## 2019-04-29 RX ORDER — CLORAZEPATE DIPOTASSIUM 7.5 MG/1
7.5 TABLET ORAL DAILY PRN
Qty: 90 TABLET | Refills: 0 | Status: SHIPPED | OUTPATIENT
Start: 2019-04-29 | End: 2019-10-21 | Stop reason: ALTCHOICE

## 2019-04-29 NOTE — PROGRESS NOTES
"Subjective:       Patient ID: Arleen Zarate is a 89 y.o. female.    Chief Complaint: Cough (congestion)    Ms. Zarate presents to the clinic today for cough present for one week.  She denies allergies, sinus symptoms, or fever.  She states she "pigged out" over Easter weekend and since then has been eating poorly.  She has been taking omeprazole for years but this did not help her acid reflux earlier last week.  She woke up with burning in her chest.  She also had some constipation which has since resolved.  She also mentions she has been feeling occasionally weak and feels like she needs to eat more often.  She recently had fasting labs with mildly low glucose.  She admits she mostly eats carbohydrates.    Review of Systems   Constitutional: Negative for chills and fever.   HENT: Negative for congestion, ear pain and sinus pressure.    Respiratory: Positive for cough. Negative for shortness of breath and wheezing.    Cardiovascular: Negative for chest pain, palpitations and leg swelling.   Gastrointestinal: Positive for constipation. Negative for abdominal pain, diarrhea, nausea and vomiting.        Acid reflux   Neurological: Positive for weakness (off and on). Negative for dizziness and light-headedness.       Objective:      Physical Exam   Constitutional: She is oriented to person, place, and time. She appears well-nourished. No distress.   HENT:   Head: Normocephalic and atraumatic.   Right Ear: External ear normal.   Left Ear: External ear normal.   Mouth/Throat: Oropharynx is clear and moist. No oropharyngeal exudate.   Eyes: Pupils are equal, round, and reactive to light. Right eye exhibits no discharge. Left eye exhibits no discharge.   Neck: Neck supple. No thyromegaly present.   Cardiovascular: Normal rate and regular rhythm. Exam reveals no gallop and no friction rub.   No murmur heard.  Pulmonary/Chest: Effort normal and breath sounds normal. No respiratory distress. She has no wheezes. She has no " rales.   Abdominal: Soft. Bowel sounds are normal. She exhibits no distension. There is tenderness in the epigastric area.   Lymphadenopathy:     She has no cervical adenopathy.   Neurological: She is alert and oriented to person, place, and time. Coordination normal.   Skin: Skin is warm and dry.   Psychiatric: She has a normal mood and affect. Her behavior is normal. Thought content normal.   Vitals reviewed.          Current Outpatient Medications:     clorazepate (TRANXENE) 7.5 MG Tab, Take 1 tablet (7.5 mg total) by mouth daily as needed., Disp: 90 tablet, Rfl: 0    lisinopril (PRINIVIL,ZESTRIL) 20 MG tablet, Take 1 tablet (20 mg total) by mouth once daily., Disp: 90 tablet, Rfl: 3    methylsulfonylmethane (MSM) 1,000 mg Cap, Take 1 capsule by mouth 2 (two) times daily. , Disp: , Rfl:     multivitamin capsule, Take 1 capsule by mouth once daily., Disp: , Rfl:     nisoldipine (SULAR) 8.5 MG Tb24, Take 1 tablet (8.5 mg total) by mouth once daily., Disp: 90 tablet, Rfl: 3    omeprazole (PRILOSEC) 40 MG capsule, Take 1 capsule (40 mg total) by mouth once daily., Disp: 90 capsule, Rfl: 3    ranitidine (ZANTAC) 150 MG tablet, Take 1 tablet (150 mg total) by mouth 2 (two) times daily., Disp: 60 tablet, Rfl: 0  Assessment:       1. Gastroesophageal reflux disease, esophagitis presence not specified    2. Benign essential HTN    3. Cough    4. Stage 3 chronic kidney disease    5. Hypoglycemia        Plan:       Gastroesophageal reflux disease, esophagitis presence not specified  GERD diet handout given.  -     ranitidine (ZANTAC) 150 MG tablet; Take 1 tablet (150 mg total) by mouth 2 (two) times daily.  Dispense: 60 tablet; Refill: 0    Benign essential HTN  Stable, continue current medication.    Cough  Possibly due to cough.  Call for fever, worsening symptoms, or no improvement with ranitidine.    Stage 3 chronic kidney disease  Stable    Hypoglycemia  Small, frequent, high protein meals every 2-3  hours    Patient readiness: acceptance and barriers:none    During the course of the visit the patient was educated and counseled about the following:     Hypertension:   Medication: no change.    Goals: Hypertension: Reduce Blood Pressure    Did patient meet goals/outcomes: Yes    The following self management tools provided: declined    Patient Instructions (the written plan) was given to the patient/family.     Time spent with patient: 15 minutes    Barriers to medications present (no )    Adverse reactions to current medications (no)    Over the counter medications reviewed (No)

## 2019-04-29 NOTE — PATIENT INSTRUCTIONS

## 2019-04-29 NOTE — TELEPHONE ENCOUNTER
appt made today for cold and congestion only              ----- Message from Zenobia Bhardwaj sent at 4/29/2019  1:20 PM CDT -----    Type:  Same Day Appointment Request    Caller is requesting a same day appointment.  Caller declined first available appointment listed below.      Name of Caller: pt  When is the first available appointment?   May  22  Symptoms:  Cold  And  congestion  Best Call Back Number:[phone]  Additional Information:  pt  Is  Wanting to  Be seen  By  tomorrow

## 2019-07-09 RX ORDER — NISOLDIPINE 8.5 MG/1
TABLET, FILM COATED, EXTENDED RELEASE ORAL
Qty: 90 TABLET | Refills: 0 | Status: SHIPPED | OUTPATIENT
Start: 2019-07-09 | End: 2019-10-03 | Stop reason: SDUPTHER

## 2019-07-09 RX ORDER — OMEPRAZOLE 40 MG/1
CAPSULE, DELAYED RELEASE ORAL
Qty: 90 CAPSULE | Refills: 0 | Status: SHIPPED | OUTPATIENT
Start: 2019-07-09 | End: 2019-10-03 | Stop reason: SDUPTHER

## 2019-07-09 RX ORDER — LISINOPRIL 20 MG/1
TABLET ORAL
Qty: 90 TABLET | Refills: 0 | Status: SHIPPED | OUTPATIENT
Start: 2019-07-09 | End: 2019-10-03 | Stop reason: SDUPTHER

## 2019-09-11 ENCOUNTER — OFFICE VISIT (OUTPATIENT)
Dept: FAMILY MEDICINE | Facility: CLINIC | Age: 84
End: 2019-09-11
Payer: MEDICARE

## 2019-09-11 ENCOUNTER — TELEPHONE (OUTPATIENT)
Dept: FAMILY MEDICINE | Facility: CLINIC | Age: 84
End: 2019-09-11

## 2019-09-11 ENCOUNTER — LAB VISIT (OUTPATIENT)
Dept: LAB | Facility: HOSPITAL | Age: 84
End: 2019-09-11
Attending: NURSE PRACTITIONER
Payer: MEDICARE

## 2019-09-11 VITALS
HEART RATE: 88 BPM | OXYGEN SATURATION: 96 % | HEIGHT: 60 IN | WEIGHT: 140 LBS | SYSTOLIC BLOOD PRESSURE: 132 MMHG | TEMPERATURE: 98 F | DIASTOLIC BLOOD PRESSURE: 64 MMHG | BODY MASS INDEX: 27.48 KG/M2

## 2019-09-11 DIAGNOSIS — J01.90 ACUTE NON-RECURRENT SINUSITIS, UNSPECIFIED LOCATION: Primary | ICD-10-CM

## 2019-09-11 DIAGNOSIS — J01.90 ACUTE NON-RECURRENT SINUSITIS, UNSPECIFIED LOCATION: ICD-10-CM

## 2019-09-11 LAB
ALBUMIN SERPL BCP-MCNC: 3.8 G/DL (ref 3.5–5.2)
ALP SERPL-CCNC: 94 U/L (ref 55–135)
ALT SERPL W/O P-5'-P-CCNC: 11 U/L (ref 10–44)
ANION GAP SERPL CALC-SCNC: 8 MMOL/L (ref 8–16)
AST SERPL-CCNC: 18 U/L (ref 10–40)
BASOPHILS # BLD AUTO: 0.02 K/UL (ref 0–0.2)
BASOPHILS NFR BLD: 0.3 % (ref 0–1.9)
BILIRUB SERPL-MCNC: 0.3 MG/DL (ref 0.1–1)
BUN SERPL-MCNC: 18 MG/DL (ref 10–30)
CALCIUM SERPL-MCNC: 9.4 MG/DL (ref 8.7–10.5)
CHLORIDE SERPL-SCNC: 105 MMOL/L (ref 95–110)
CO2 SERPL-SCNC: 27 MMOL/L (ref 23–29)
CREAT SERPL-MCNC: 1.1 MG/DL (ref 0.5–1.4)
DIFFERENTIAL METHOD: ABNORMAL
EOSINOPHIL # BLD AUTO: 0.1 K/UL (ref 0–0.5)
EOSINOPHIL NFR BLD: 1 % (ref 0–8)
ERYTHROCYTE [DISTWIDTH] IN BLOOD BY AUTOMATED COUNT: 12.8 % (ref 11.5–14.5)
EST. GFR  (AFRICAN AMERICAN): 49.7 ML/MIN/1.73 M^2
EST. GFR  (NON AFRICAN AMERICAN): 43.1 ML/MIN/1.73 M^2
GLUCOSE SERPL-MCNC: 83 MG/DL (ref 70–110)
HCT VFR BLD AUTO: 37.5 % (ref 37–48.5)
HGB BLD-MCNC: 12 G/DL (ref 12–16)
IMM GRANULOCYTES # BLD AUTO: 0.02 K/UL (ref 0–0.04)
IMM GRANULOCYTES NFR BLD AUTO: 0.3 % (ref 0–0.5)
LYMPHOCYTES # BLD AUTO: 1.6 K/UL (ref 1–4.8)
LYMPHOCYTES NFR BLD: 24.1 % (ref 18–48)
MCH RBC QN AUTO: 30.4 PG (ref 27–31)
MCHC RBC AUTO-ENTMCNC: 32 G/DL (ref 32–36)
MCV RBC AUTO: 95 FL (ref 82–98)
MONOCYTES # BLD AUTO: 0.6 K/UL (ref 0.3–1)
MONOCYTES NFR BLD: 9.4 % (ref 4–15)
NEUTROPHILS # BLD AUTO: 4.3 K/UL (ref 1.8–7.7)
NEUTROPHILS NFR BLD: 64.9 % (ref 38–73)
NRBC BLD-RTO: 0 /100 WBC
PLATELET # BLD AUTO: 208 K/UL (ref 150–350)
PMV BLD AUTO: 11.2 FL (ref 9.2–12.9)
POTASSIUM SERPL-SCNC: 4.5 MMOL/L (ref 3.5–5.1)
PROT SERPL-MCNC: 6.8 G/DL (ref 6–8.4)
RBC # BLD AUTO: 3.95 M/UL (ref 4–5.4)
SODIUM SERPL-SCNC: 140 MMOL/L (ref 136–145)
WBC # BLD AUTO: 6.69 K/UL (ref 3.9–12.7)

## 2019-09-11 PROCEDURE — 99213 PR OFFICE/OUTPT VISIT, EST, LEVL III, 20-29 MIN: ICD-10-PCS | Mod: S$PBB,,, | Performed by: NURSE PRACTITIONER

## 2019-09-11 PROCEDURE — 85025 COMPLETE CBC W/AUTO DIFF WBC: CPT

## 2019-09-11 PROCEDURE — 36415 COLL VENOUS BLD VENIPUNCTURE: CPT | Mod: PO

## 2019-09-11 PROCEDURE — 99213 OFFICE O/P EST LOW 20 MIN: CPT | Mod: PBBFAC,PO | Performed by: NURSE PRACTITIONER

## 2019-09-11 PROCEDURE — 80053 COMPREHEN METABOLIC PANEL: CPT

## 2019-09-11 PROCEDURE — 99999 PR PBB SHADOW E&M-EST. PATIENT-LVL III: CPT | Mod: PBBFAC,,, | Performed by: NURSE PRACTITIONER

## 2019-09-11 PROCEDURE — 99999 PR PBB SHADOW E&M-EST. PATIENT-LVL III: ICD-10-PCS | Mod: PBBFAC,,, | Performed by: NURSE PRACTITIONER

## 2019-09-11 PROCEDURE — 99213 OFFICE O/P EST LOW 20 MIN: CPT | Mod: S$PBB,,, | Performed by: NURSE PRACTITIONER

## 2019-09-11 RX ORDER — FLUTICASONE PROPIONATE 50 MCG
1 SPRAY, SUSPENSION (ML) NASAL DAILY
Qty: 1 BOTTLE | Refills: 3 | Status: SHIPPED | OUTPATIENT
Start: 2019-09-11 | End: 2021-05-19

## 2019-09-11 RX ORDER — AMOXICILLIN AND CLAVULANATE POTASSIUM 875; 125 MG/1; MG/1
1 TABLET, FILM COATED ORAL 2 TIMES DAILY
Qty: 14 TABLET | Refills: 0 | Status: SHIPPED | OUTPATIENT
Start: 2019-09-11 | End: 2019-09-18

## 2019-09-11 NOTE — TELEPHONE ENCOUNTER
Spoke to patient complaints of uri symptoms x 4days appointment scheduled for same day appointment

## 2019-09-11 NOTE — PROGRESS NOTES
Subjective:       Patient ID: Arleen Zarate is a 89 y.o. female.    Chief Complaint: URI    Ms. Zarate presents today with sinus complaints. Began over a week ago and continues to become worse. Has very thick nasal discharge and congestion, hoarseness and fatigue. Denies fever, SOB, wheezing or appetite change. Cough has recently become productive.      Patient Active Problem List   Diagnosis    Benign essential HTN    GERD (gastroesophageal reflux disease)    ALLERGIC RHINITIS    Decreased mobility    Nocturnal muscle spasm    DDD (degenerative disc disease), lumbar    Lumbar spondylosis    Mixed hyperlipidemia    Encounter for long-term (current) use of other medications    Anxiety    Osteopenia    Hypovitaminosis D    Diverticulosis    Hiatal hernia    Carpal tunnel syndrome, bilateral    Disease of vein    Arthritis    Stage 3 chronic kidney disease    Varicose vein of leg    Tortuous aorta     Review of Systems   Constitutional: Positive for fatigue. Negative for appetite change, chills, fever and unexpected weight change.   HENT: Positive for congestion (yellow, green, thick), rhinorrhea, sinus pressure, sinus pain and voice change. Negative for sore throat.         Ear fullness     Respiratory: Positive for cough (productive). Negative for chest tightness, shortness of breath and wheezing.    Neurological: Negative for dizziness, weakness and light-headedness.       Objective:      Physical Exam   Constitutional: She is oriented to person, place, and time. She appears well-developed and well-nourished. She has a sickly appearance. She does not appear ill. No distress.   HENT:   Right Ear: Tympanic membrane, external ear and ear canal normal.   Left Ear: Tympanic membrane, external ear and ear canal normal.   Nose: Mucosal edema present. Right sinus exhibits maxillary sinus tenderness and frontal sinus tenderness. Left sinus exhibits maxillary sinus tenderness and frontal sinus  tenderness.   Mouth/Throat: Oropharynx is clear and moist.   Cerumen bilateral ear canals. Does no obstruct vision of TM.  Allergic shiners under eyes bilaterally    Cardiovascular: Normal rate, regular rhythm and normal heart sounds.   Pulmonary/Chest: Effort normal and breath sounds normal. She has no decreased breath sounds. She has no wheezes.   Musculoskeletal: She exhibits no edema.   Neurological: She is alert and oriented to person, place, and time.   Skin: Skin is warm and dry.   Psychiatric: She has a normal mood and affect.   Nursing note and vitals reviewed.      Assessment:       1. Acute non-recurrent sinusitis, unspecified location        Plan:       Arleen was seen today for uri.    Diagnoses and all orders for this visit:    Acute non-recurrent sinusitis, unspecified location  -     amoxicillin-clavulanate 875-125mg (AUGMENTIN) 875-125 mg per tablet; Take 1 tablet by mouth 2 (two) times daily. for 7 days  -     Comprehensive metabolic panel; Future  -     fluticasone propionate (FLONASE) 50 mcg/actuation nasal spray; 1 spray (50 mcg total) by Each Nostril route once daily.  -     CBC auto differential; Future  Take antibiotics with food, increase fluids. RTC if symptoms fail to improve, become worse or return after completion of antibiotics   Check labs to monitor kidney function   Creatinine clearance 38.32    F/U 1 week

## 2019-09-11 NOTE — TELEPHONE ENCOUNTER
----- Message from Tatum Naranjo sent at 9/11/2019  8:17 AM CDT -----  Contact: Patient  Type: Needs Medical Advice    Who Called:  Patient  Best Call Back Number: [phone] (home)   Additional Information: the patient wants to know if she can come in today she has a bad cold and very congested please call to advise. Thanks.

## 2019-09-18 ENCOUNTER — OFFICE VISIT (OUTPATIENT)
Dept: FAMILY MEDICINE | Facility: CLINIC | Age: 84
End: 2019-09-18
Payer: MEDICARE

## 2019-09-18 VITALS
TEMPERATURE: 98 F | HEART RATE: 84 BPM | BODY MASS INDEX: 27.61 KG/M2 | DIASTOLIC BLOOD PRESSURE: 66 MMHG | WEIGHT: 140.63 LBS | SYSTOLIC BLOOD PRESSURE: 112 MMHG | OXYGEN SATURATION: 94 % | HEIGHT: 60 IN

## 2019-09-18 DIAGNOSIS — J01.90 ACUTE NON-RECURRENT SINUSITIS, UNSPECIFIED LOCATION: Primary | ICD-10-CM

## 2019-09-18 DIAGNOSIS — N18.30 STAGE 3 CHRONIC KIDNEY DISEASE: ICD-10-CM

## 2019-09-18 DIAGNOSIS — J34.89 DRY NARES: ICD-10-CM

## 2019-09-18 DIAGNOSIS — I10 BENIGN ESSENTIAL HTN: ICD-10-CM

## 2019-09-18 DIAGNOSIS — K21.9 GASTROESOPHAGEAL REFLUX DISEASE, ESOPHAGITIS PRESENCE NOT SPECIFIED: ICD-10-CM

## 2019-09-18 PROCEDURE — 99213 OFFICE O/P EST LOW 20 MIN: CPT | Mod: S$PBB,,, | Performed by: NURSE PRACTITIONER

## 2019-09-18 PROCEDURE — 99213 OFFICE O/P EST LOW 20 MIN: CPT | Mod: PBBFAC,PO | Performed by: NURSE PRACTITIONER

## 2019-09-18 PROCEDURE — 99999 PR PBB SHADOW E&M-EST. PATIENT-LVL III: CPT | Mod: PBBFAC,,, | Performed by: NURSE PRACTITIONER

## 2019-09-18 PROCEDURE — 99999 PR PBB SHADOW E&M-EST. PATIENT-LVL III: ICD-10-PCS | Mod: PBBFAC,,, | Performed by: NURSE PRACTITIONER

## 2019-09-18 PROCEDURE — 99213 PR OFFICE/OUTPT VISIT, EST, LEVL III, 20-29 MIN: ICD-10-PCS | Mod: S$PBB,,, | Performed by: NURSE PRACTITIONER

## 2019-09-18 NOTE — PROGRESS NOTES
Subjective:       Patient ID: Arleen Zarate is a 89 y.o. female.    Chief Complaint: Follow-up    Ms. Zarate presents today for a 1 week follow up. Reports she is feeling much better. Flonase greatly helped congestion within 1 day of starting. Taking antibiotic as prescribed. Denies side effects. Still feels tired but has been resting more. Going on weekly trip to [email or web address] tomorrow. Feels hoarse but is unsure if it is related to sinusitis or GERD. Wants to wait to see if this continues to improve. Denies fever or appetite change. Has clear, thin nasal discharge only in the morning. No longer congested. Nose feels irritated from blowing and wiping. Vitals stable.     Patient Active Problem List   Diagnosis    Benign essential HTN    GERD (gastroesophageal reflux disease)    ALLERGIC RHINITIS    Decreased mobility    Nocturnal muscle spasm    DDD (degenerative disc disease), lumbar    Lumbar spondylosis    Mixed hyperlipidemia    Encounter for long-term (current) use of other medications    Anxiety    Osteopenia    Hypovitaminosis D    Diverticulosis    Hiatal hernia    Carpal tunnel syndrome, bilateral    Disease of vein    Arthritis    Stage 3 chronic kidney disease    Varicose vein of leg    Tortuous aorta     Lab Results   Component Value Date    WBC 6.69 09/11/2019    HGB 12.0 09/11/2019    HCT 37.5 09/11/2019     09/11/2019    CHOL 203 (H) 03/22/2019    TRIG 114 03/22/2019    HDL 57 03/22/2019    ALT 11 09/11/2019    AST 18 09/11/2019     09/11/2019    K 4.5 09/11/2019     09/11/2019    CREATININE 1.1 09/11/2019    BUN 18 09/11/2019    CO2 27 09/11/2019    TSH 0.974 03/22/2019       Current Outpatient Medications:     amoxicillin-clavulanate 875-125mg (AUGMENTIN) 875-125 mg per tablet, Take 1 tablet by mouth 2 (two) times daily. for 7 days, Disp: 14 tablet, Rfl: 0    clorazepate (TRANXENE) 7.5 MG Tab, Take 1 tablet (7.5 mg total) by mouth daily as needed., Disp: 90  tablet, Rfl: 0    fluticasone propionate (FLONASE) 50 mcg/actuation nasal spray, 1 spray (50 mcg total) by Each Nostril route once daily., Disp: 1 Bottle, Rfl: 3    lisinopril (PRINIVIL,ZESTRIL) 20 MG tablet, TAKE 1 TABLET DAILY, Disp: 90 tablet, Rfl: 0    methylsulfonylmethane (MSM) 1,000 mg Cap, Take 1 capsule by mouth 2 (two) times daily. , Disp: , Rfl:     multivitamin capsule, Take 1 capsule by mouth once daily., Disp: , Rfl:     nisoldipine (SULAR) 8.5 MG Tb24, TAKE 1 TABLET DAILY, Disp: 90 tablet, Rfl: 0    omeprazole (PRILOSEC) 40 MG capsule, TAKE 1 CAPSULE DAILY, Disp: 90 capsule, Rfl: 0    ranitidine (ZANTAC) 150 MG tablet, Take 1 tablet (150 mg total) by mouth 2 (two) times daily., Disp: 60 tablet, Rfl: 0    Review of Systems   Constitutional: Positive for fatigue. Negative for activity change, appetite change, fever and unexpected weight change.   HENT: Positive for rhinorrhea (scant in morning, clear/thin ) and voice change (reflux). Negative for congestion, ear pain, sinus pressure, sinus pain and sore throat.    Respiratory: Negative for cough, shortness of breath and wheezing.    Cardiovascular: Negative for chest pain and palpitations.   Neurological: Negative for dizziness, weakness and light-headedness.       Objective:      Physical Exam   Constitutional: She is oriented to person, place, and time. She appears well-developed and well-nourished. She does not have a sickly appearance. She does not appear ill. No distress.   HENT:   Mouth/Throat: Oropharynx is clear and moist.   Cardiovascular: Normal rate, regular rhythm and normal heart sounds.   Pulmonary/Chest: Effort normal and breath sounds normal. She has no wheezes. She has no rales.   Musculoskeletal: She exhibits no edema.   Neurological: She is alert and oriented to person, place, and time.   Skin: Skin is warm and dry.   Psychiatric: She has a normal mood and affect.   Nursing note and vitals reviewed.      Assessment:       1.  Acute non-recurrent sinusitis, unspecified location    2. Stage 3 chronic kidney disease    3. Dry nares    4. Gastroesophageal reflux disease, esophagitis presence not specified    5. Benign essential HTN        Plan:       Arleen was seen today for follow-up.    Diagnoses and all orders for this visit:    Acute non-recurrent sinusitis, unspecified location  Improved  Complete antibiotics as prescribed  Notify clinic if symptoms do not resolve or return    Stage 3 chronic kidney disease  -     Basic metabolic panel; Future  Monitor  Increase fluids  Recheck labs 1 week to monitor    Dry nares  Encouraged saline nasal spray  Should improve with less frequent blowing or wiping    Gastroesophageal reflux disease, esophagitis presence not specified  Continue current medications  If hoarseness does not improve, notify clinic    Benign essential HTN  Controlled    F/U to establish care with Dr. Gillespie or SAUR[facility]

## 2019-09-25 ENCOUNTER — LAB VISIT (OUTPATIENT)
Dept: LAB | Facility: HOSPITAL | Age: 84
End: 2019-09-25
Attending: NURSE PRACTITIONER
Payer: MEDICARE

## 2019-09-25 DIAGNOSIS — N18.30 STAGE 3 CHRONIC KIDNEY DISEASE: ICD-10-CM

## 2019-09-25 LAB
ANION GAP SERPL CALC-SCNC: 6 MMOL/L (ref 8–16)
BUN SERPL-MCNC: 17 MG/DL (ref 10–30)
CALCIUM SERPL-MCNC: 9.4 MG/DL (ref 8.7–10.5)
CHLORIDE SERPL-SCNC: 104 MMOL/L (ref 95–110)
CO2 SERPL-SCNC: 28 MMOL/L (ref 23–29)
CREAT SERPL-MCNC: 0.9 MG/DL (ref 0.5–1.4)
EST. GFR  (AFRICAN AMERICAN): >60 ML/MIN/1.73 M^2
EST. GFR  (NON AFRICAN AMERICAN): 54.9 ML/MIN/1.73 M^2
GLUCOSE SERPL-MCNC: 86 MG/DL (ref 70–110)
POTASSIUM SERPL-SCNC: 4.4 MMOL/L (ref 3.5–5.1)
SODIUM SERPL-SCNC: 138 MMOL/L (ref 136–145)

## 2019-09-25 PROCEDURE — 80048 BASIC METABOLIC PNL TOTAL CA: CPT

## 2019-09-25 PROCEDURE — 36415 COLL VENOUS BLD VENIPUNCTURE: CPT | Mod: PO

## 2019-10-02 ENCOUNTER — TELEPHONE (OUTPATIENT)
Dept: FAMILY MEDICINE | Facility: CLINIC | Age: 84
End: 2019-10-02

## 2019-10-02 NOTE — TELEPHONE ENCOUNTER
----- Message from Avery Ferreira sent at 10/2/2019 11:52 AM CDT -----  Contact: patient  Type: Needs Medical Advice    Who Called:  patient  Symptoms (please be specific):    How long has patient had these symptoms:    Pharmacy name and phone #:    Best Call Back Number: [phone]  Additional Information: requesting a call back regarding medications,stated [facility] will be sending over request for patient medications to be filled.

## 2019-10-03 RX ORDER — LISINOPRIL 20 MG/1
20 TABLET ORAL DAILY
Qty: 90 TABLET | Refills: 0 | Status: SHIPPED | OUTPATIENT
Start: 2019-10-03 | End: 2020-04-14

## 2019-10-03 RX ORDER — OMEPRAZOLE 40 MG/1
40 CAPSULE, DELAYED RELEASE ORAL DAILY
Qty: 90 CAPSULE | Refills: 0 | Status: SHIPPED | OUTPATIENT
Start: 2019-10-03 | End: 2020-04-14

## 2019-10-03 RX ORDER — NISOLDIPINE 8.5 MG/1
8.5 TABLET, FILM COATED, EXTENDED RELEASE ORAL DAILY
Qty: 90 TABLET | Refills: 0 | Status: SHIPPED | OUTPATIENT
Start: 2019-10-03 | End: 2020-04-14

## 2019-10-21 ENCOUNTER — LAB VISIT (OUTPATIENT)
Dept: LAB | Facility: HOSPITAL | Age: 84
End: 2019-10-21
Attending: FAMILY MEDICINE
Payer: MEDICARE

## 2019-10-21 ENCOUNTER — OFFICE VISIT (OUTPATIENT)
Dept: FAMILY MEDICINE | Facility: CLINIC | Age: 84
End: 2019-10-21
Payer: MEDICARE

## 2019-10-21 VITALS
HEART RATE: 80 BPM | BODY MASS INDEX: 27.57 KG/M2 | HEIGHT: 60 IN | DIASTOLIC BLOOD PRESSURE: 68 MMHG | WEIGHT: 140.44 LBS | TEMPERATURE: 97 F | SYSTOLIC BLOOD PRESSURE: 138 MMHG

## 2019-10-21 DIAGNOSIS — E55.9 HYPOVITAMINOSIS D: ICD-10-CM

## 2019-10-21 DIAGNOSIS — K21.9 HIATAL HERNIA WITH GERD WITHOUT ESOPHAGITIS: ICD-10-CM

## 2019-10-21 DIAGNOSIS — E78.2 MIXED HYPERLIPIDEMIA: ICD-10-CM

## 2019-10-21 DIAGNOSIS — J30.2 CHRONIC SEASONAL ALLERGIC RHINITIS: Primary | ICD-10-CM

## 2019-10-21 DIAGNOSIS — M15.9 PRIMARY OSTEOARTHRITIS INVOLVING MULTIPLE JOINTS: ICD-10-CM

## 2019-10-21 DIAGNOSIS — I10 ESSENTIAL HYPERTENSION: ICD-10-CM

## 2019-10-21 DIAGNOSIS — K44.9 HIATAL HERNIA WITH GERD WITHOUT ESOPHAGITIS: ICD-10-CM

## 2019-10-21 DIAGNOSIS — L30.4 INTERTRIGO: ICD-10-CM

## 2019-10-21 DIAGNOSIS — N18.30 STAGE 3 CHRONIC KIDNEY DISEASE: ICD-10-CM

## 2019-10-21 DIAGNOSIS — M85.80 OSTEOPENIA DETERMINED BY X-RAY: ICD-10-CM

## 2019-10-21 PROBLEM — M15.0 PRIMARY OSTEOARTHRITIS INVOLVING MULTIPLE JOINTS: Status: ACTIVE | Noted: 2019-10-21

## 2019-10-21 PROBLEM — M19.90 ARTHRITIS: Status: RESOLVED | Noted: 2018-01-02 | Resolved: 2019-10-21

## 2019-10-21 LAB
25(OH)D3+25(OH)D2 SERPL-MCNC: 38 NG/ML (ref 30–96)
CHOLEST SERPL-MCNC: 220 MG/DL (ref 120–199)
CHOLEST/HDLC SERPL: 3.5 {RATIO} (ref 2–5)
HDLC SERPL-MCNC: 62 MG/DL (ref 40–75)
HDLC SERPL: 28.2 % (ref 20–50)
LDLC SERPL CALC-MCNC: 138.2 MG/DL (ref 63–159)
NONHDLC SERPL-MCNC: 158 MG/DL
TRIGL SERPL-MCNC: 99 MG/DL (ref 30–150)

## 2019-10-21 PROCEDURE — 99214 PR OFFICE/OUTPT VISIT, EST, LEVL IV, 30-39 MIN: ICD-10-PCS | Mod: S$PBB,,, | Performed by: FAMILY MEDICINE

## 2019-10-21 PROCEDURE — 99214 OFFICE O/P EST MOD 30 MIN: CPT | Mod: S$PBB,,, | Performed by: FAMILY MEDICINE

## 2019-10-21 PROCEDURE — 36415 COLL VENOUS BLD VENIPUNCTURE: CPT | Mod: PO

## 2019-10-21 PROCEDURE — 82306 VITAMIN D 25 HYDROXY: CPT

## 2019-10-21 PROCEDURE — 99213 OFFICE O/P EST LOW 20 MIN: CPT | Mod: PBBFAC,PO | Performed by: FAMILY MEDICINE

## 2019-10-21 PROCEDURE — 80061 LIPID PANEL: CPT

## 2019-10-21 PROCEDURE — 99999 PR PBB SHADOW E&M-EST. PATIENT-LVL III: ICD-10-PCS | Mod: PBBFAC,,, | Performed by: FAMILY MEDICINE

## 2019-10-21 PROCEDURE — 99999 PR PBB SHADOW E&M-EST. PATIENT-LVL III: CPT | Mod: PBBFAC,,, | Performed by: FAMILY MEDICINE

## 2019-10-21 RX ORDER — CLOTRIMAZOLE AND BETAMETHASONE DIPROPIONATE 10; .64 MG/G; MG/G
CREAM TOPICAL 2 TIMES DAILY
Qty: 45 G | Refills: 0 | Status: SHIPPED | OUTPATIENT
Start: 2019-10-21 | End: 2020-09-23 | Stop reason: SDUPTHER

## 2019-10-21 NOTE — PROGRESS NOTES
Subjective:       Patient ID: Arleen Zarate is a 89 y.o. female.    Chief Complaint: Establish Care    HPI   Patient presents to establish care with a new family doctor for medication refill and to complete labs.  She was previously followed by Dr. Lew seen last about 14 months ago but has been followed by other providers within our system seen last about 1 month ago.  She tells me she is my normal, just fine and has no new concerns or complaints today although she notes she did have a rash under her breasts again and had to use her clotrimazole and betamethasone cream again.  She tells me she uses this a few times yearly and was upset with her pharmacy as she had refills on the medication but they would not refill this as it was prescribed last on 01/29/2018.  She is requesting a refill of this today but tells me her rash is completely gone currently.  She has a history of osteoarthritis in multiple joints but tells me pains are mild and infrequent and she only uses a single ibuprofen a few times monthly for pains with these resolving almost immediately.  She has a history of GERD but tells me on her current dosing of Prilosec she has not had any reflux breakthrough symptoms in many months following lifestyle modifications for GERD fairly closely.  She has a history of osteopenia found on past DEXA scan.  She is overdue for this to be repeated but tells me she does not want to do any more DEXA scans or other health maintenance issues and notes she does not accept any vaccines anymore.  She is taking an unknown dose of calcium and vitamin-D at home and tells me she will have to call us with this.  She has a history of hypertension but tells me her blood pressures have been controlled for well over a decade on her current dosing of lisinopril.  She does check her blood pressures at home infrequently and tells me they are generally around 120/70.  She does have a history of mixed hyperlipidemia but cannot  tolerate statin medications.  She notes that her values have been very good just with following a low-cholesterol diet and walking for exercise daily for at least 30 min.  She has a history of anxiety but tells me shin she has started completing a relaxation exercise program daily she has not had any problems with anxiety at all.  She has carpal tunnel syndrome bilateral listed on her problem list but denies this and has no problems with any upper extremity neuropathy symptoms.  She does have bilateral lower extremity varicosities but these are completely asymptomatic.  She has stage 3 chronic kidney disease although this has remained stable chronically.  She has nocturnal muscle spasm listed on her problem list but has not had any problems with muscle spasms recently.  She also has chronic seasonal allergic rhinitis listed but tells me she only takes Flonase as needed for symptoms and has not had any symptoms this fall.    Review of Systems   Constitutional: Negative for activity change, appetite change, fatigue and unexpected weight change.   HENT: Negative for congestion, hearing loss, postnasal drip, rhinorrhea, sinus pressure, sinus pain, sneezing, sore throat, trouble swallowing and voice change.    Eyes: Negative for visual disturbance.   Respiratory: Negative for cough, chest tightness, shortness of breath and wheezing.    Cardiovascular: Negative for chest pain, palpitations and leg swelling.   Gastrointestinal: Negative for abdominal pain, blood in stool, constipation, nausea and vomiting.   Genitourinary: Negative for difficulty urinating, dysuria, flank pain, frequency and pelvic pain.   Musculoskeletal: Negative for arthralgias, back pain, gait problem, joint swelling, myalgias, neck pain and neck stiffness.   Skin: Negative for rash and wound.   Neurological: Negative for dizziness, tremors, syncope, weakness, light-headedness, numbness and headaches.   Hematological: Negative for adenopathy. Does not  bruise/bleed easily.   Psychiatric/Behavioral: Negative for dysphoric mood and sleep disturbance. The patient is not nervous/anxious.          Objective:      Vitals:    10/21/19 1023   BP: 138/68   Pulse: 80   Temp: 97.4 °F (36.3 °C)   TempSrc: Oral   Weight: 63.7 kg (140 lb 6.9 oz)   Height: 5' (1.524 m)   PainSc: 0-No pain     Physical Exam   Constitutional: She is oriented to person, place, and time. She appears well-developed and well-nourished. No distress.   HENT:   Head: Normocephalic and atraumatic.   Right Ear: External ear normal.   Left Ear: External ear normal.   Nose: Nose normal.   Mouth/Throat: Oropharynx is clear and moist.   Eyes: Pupils are equal, round, and reactive to light. Conjunctivae and EOM are normal. No scleral icterus.   Neck: Trachea normal, normal range of motion and phonation normal. Neck supple. No thyroid mass and no thyromegaly present.   Cardiovascular: Normal rate, regular rhythm and normal heart sounds. Exam reveals no gallop and no friction rub.   No murmur heard.  Pulmonary/Chest: Effort normal and breath sounds normal. No respiratory distress. She has no wheezes.   Abdominal: Soft. Bowel sounds are normal. She exhibits no distension and no mass. There is no tenderness. There is no guarding.   Musculoskeletal: Normal range of motion. She exhibits no edema or deformity.   Extensive lower extremity varicose veins present bilateral without other abnormalities.   Lymphadenopathy:     She has no cervical adenopathy.   Neurological: She is alert and oriented to person, place, and time.   Skin: Skin is warm and dry. She is not diaphoretic.   Psychiatric: She has a normal mood and affect. Her behavior is normal. Judgment and thought content normal.   Nursing note and vitals reviewed.        Assessment:       1. Chronic seasonal allergic rhinitis    2. Primary osteoarthritis involving multiple joints    3. Osteopenia determined by x-ray    4. Hypovitaminosis D    5. Hiatal hernia with  GERD without esophagitis    6. Stage 3 chronic kidney disease    7. Mixed hyperlipidemia    8. Essential hypertension    9. Intertrigo          Plan:   Chronic seasonal allergic rhinitis    Primary osteoarthritis involving multiple joints    Osteopenia determined by x-ray    Hypovitaminosis D  -     Vitamin D; Future; Expected date: 10/21/2019    Hiatal hernia with GERD without esophagitis    Stage 3 chronic kidney disease    Mixed hyperlipidemia  -     Lipid panel; Future; Expected date: 10/21/2019    Essential hypertension  -     Microalbumin/creatinine urine ratio; Future    Intertrigo  -     clotrimazole-betamethasone 1-0.05% (LOTRISONE) cream; Apply topically 2 (two) times daily.  Dispense: 45 g; Refill: 0      Follow up in about 6 months (around 4/21/2020).    Arleen appears to be stable and doing quite well today with her blood pressure is well controlled taking lisinopril, GERD symptoms well controlled with use of Prilosec and lifestyle modifications for GERD and no current allergy symptoms even without use of Flonase.  She has also done well with use of Lotrisone cream as needed for intertrigo and I did provide her with a refill of this today after discussing the risks and benefits.  She can continue use this p.r.n. and I discussed ways to prevent intertrigo from developing future.  She was very interested in completing labs today and was fasting and recommended checking a lipid panel, microalbumin to creatinine ratio and vitamin-D level as above has she had essentially normal CBC and CMP last month showing her stage 3 chronic kidney disease was stable and at her baseline.  She was interested in all these.  I discussed osteopenia with her in the need for daily low-impact aerobic weight-bearing exercise and making sure she is getting 1200 mg of calcium and 800 international units of vitamin-D daily in the diet especially with her history of vitamin-D deficiency.  She will call us when she gets home with her  current dosing of calcium and vitamin-D but believe she is following recommendations for this.  I did recommend repeating a DEXA scan but after discussing this with her including the risks of avoiding continued screening she had no interest in this.  I also discussed the risks and benefits of the influenza, pneumococcal 13, Shingrix and Tdap vaccines with her but she has no interest in any vaccinations.  I did discuss the risks of going unvaccinated and she appears to understand these and has no concerns.  If she changes her mind I advised her she could return here for pneumococcal and influenza vaccination at any time but would have to receive the other vaccines through her pharmacy.  As long she continues to do well, I will see her back at 6 month interval as she requests.  Sooner if any problems.

## 2019-10-30 ENCOUNTER — TELEPHONE (OUTPATIENT)
Dept: FAMILY MEDICINE | Facility: CLINIC | Age: 84
End: 2019-10-30

## 2019-10-30 NOTE — TELEPHONE ENCOUNTER
Pt verbalized understanding of results               ----- Message from [employer] sent at 10/30/2019  8:52 AM CDT -----  Contact: pt  Type: Needs Medical Advice    Who Called:  pt  Best Call Back Number:      Additional Information: Requesting a call back from Nurse regarding Test results that was done the other day pt stated she had a miss call from a nurse but we show no record of it, please call back with advice pt will be home after 12

## 2019-11-18 ENCOUNTER — TELEPHONE (OUTPATIENT)
Dept: FAMILY MEDICINE | Facility: CLINIC | Age: 84
End: 2019-11-18

## 2019-11-18 NOTE — TELEPHONE ENCOUNTER
Pt made appt for 11/22/19              ----- Message from Avery Ferreira sent at 11/18/2019 11:12 AM CST -----  Contact: patient  Type: Needs Medical Advice    Who Called:  patient  Symptoms (please be specific):  pain right leg  How long has patient had these symptoms:  About two weeks  Pharmacy name and phone #:    Best Call Back Number: [phone]  Additional Information: requesting a call back unable to schedule in epic only wants to see

## 2019-11-22 ENCOUNTER — OFFICE VISIT (OUTPATIENT)
Dept: FAMILY MEDICINE | Facility: CLINIC | Age: 84
End: 2019-11-22
Payer: MEDICARE

## 2019-11-22 VITALS
TEMPERATURE: 98 F | DIASTOLIC BLOOD PRESSURE: 78 MMHG | WEIGHT: 141.13 LBS | HEIGHT: 60 IN | BODY MASS INDEX: 27.71 KG/M2 | SYSTOLIC BLOOD PRESSURE: 134 MMHG | HEART RATE: 80 BPM

## 2019-11-22 DIAGNOSIS — R25.2 LEG CRAMPS: ICD-10-CM

## 2019-11-22 DIAGNOSIS — I83.93 ASYMPTOMATIC VARICOSE VEINS OF LOWER EXTREMITY, BILATERAL: Primary | ICD-10-CM

## 2019-11-22 PROCEDURE — 1126F PR PAIN SEVERITY QUANTIFIED, NO PAIN PRESENT: ICD-10-PCS | Mod: ,,, | Performed by: FAMILY MEDICINE

## 2019-11-22 PROCEDURE — 99999 PR PBB SHADOW E&M-EST. PATIENT-LVL III: CPT | Mod: PBBFAC,,, | Performed by: FAMILY MEDICINE

## 2019-11-22 PROCEDURE — 99999 PR PBB SHADOW E&M-EST. PATIENT-LVL III: ICD-10-PCS | Mod: PBBFAC,,, | Performed by: FAMILY MEDICINE

## 2019-11-22 PROCEDURE — 99213 OFFICE O/P EST LOW 20 MIN: CPT | Mod: S$PBB,,, | Performed by: FAMILY MEDICINE

## 2019-11-22 PROCEDURE — 1159F MED LIST DOCD IN RCRD: CPT | Mod: ,,, | Performed by: FAMILY MEDICINE

## 2019-11-22 PROCEDURE — 1159F PR MEDICATION LIST DOCUMENTED IN MEDICAL RECORD: ICD-10-PCS | Mod: ,,, | Performed by: FAMILY MEDICINE

## 2019-11-22 PROCEDURE — 99213 PR OFFICE/OUTPT VISIT, EST, LEVL III, 20-29 MIN: ICD-10-PCS | Mod: S$PBB,,, | Performed by: FAMILY MEDICINE

## 2019-11-22 PROCEDURE — 99213 OFFICE O/P EST LOW 20 MIN: CPT | Mod: PBBFAC,PO | Performed by: FAMILY MEDICINE

## 2019-11-22 PROCEDURE — 1126F AMNT PAIN NOTED NONE PRSNT: CPT | Mod: ,,, | Performed by: FAMILY MEDICINE

## 2019-11-22 NOTE — PROGRESS NOTES
Subjective:       Patient ID: Arleen Zarate is a 89 y.o. female.    Chief Complaint: Varicose Veins    HPI   Patient presents scheduled for evaluation of right sided leg pain but she tells me this is not the case and she only had a few days of episodes of leg cramps and was recommended to drink an ounce of tonic water with quinine in it nightly and doing this the pains resolved.  She just has concerns that doing this long-term could have adverse health effects and would like my blessing to continue drinking the tonic water.  She notes she would also like me to look at her legs to make sure there is nothing wrong with her varicosities although these are unchanged in past years and do not cause her any claudication symptoms or other pains.  She has no other concerns or complaints.    Review of Systems      Objective:      Vitals:    11/22/19 1011   BP: 134/78   Pulse: 80   Temp: 98 °F (36.7 °C)   TempSrc: Oral   Weight: 64 kg (141 lb 1.5 oz)   Height: 5' (1.524 m)   PainSc: 0-No pain     Physical Exam   Constitutional: She is oriented to person, place, and time. She appears well-developed and well-nourished. No distress.   HENT:   Head: Normocephalic and atraumatic.   Cardiovascular: Normal rate, regular rhythm and normal heart sounds. Exam reveals no gallop and no friction rub.   No murmur heard.  Pulmonary/Chest: Effort normal and breath sounds normal. No respiratory distress. She has no wheezes. She exhibits no tenderness.   Abdominal: Soft. Bowel sounds are normal. She exhibits no distension and no mass. There is no tenderness. There is no rebound and no guarding.   Musculoskeletal: Normal range of motion. She exhibits no edema, tenderness or deformity.        Right knee: Normal.        Left knee: Normal.        Right ankle: Normal.        Left ankle: Normal.        Right upper leg: Normal.        Left upper leg: Normal.        Right lower leg: Normal.        Left lower leg: Normal.   Moderate lower extremity  varicose veins are present bilateral.  She has no rash, swelling, increased warmth, bruising or other skin changes present and has no bony or soft tissue tenderness to palpation.   Neurological: She is alert and oriented to person, place, and time.   Skin: Skin is warm and dry. She is not diaphoretic.   Psychiatric: She has a normal mood and affect. Her behavior is normal. Judgment and thought content normal.   Nursing note and vitals reviewed.        Assessment:       1. Asymptomatic varicose veins of lower extremity, bilateral    2. Leg cramps          Plan:   Asymptomatic varicose veins of lower extremity, bilateral    Leg cramps      Follow up if symptoms worsen or fail to improve.    Arleen appears to be stable and doing well today without any current concerns or complaints.  Her leg cramps remotely have resolved with drinking 1 oz of tonic water with quinine and advised her I am fine with her continuing on this.  I discussed varicose veins with her and recommended against any type of surgical intervention as these are completely asymptomatic.  We did discuss use of compression stockings but she has tried these in the past and they have been too tight.  I advised her she could try nonprescription compression stockings over-the-counter which would not be as tight or she could even use socks or regular stockings that are 1 size too small to provide a small amount of compression as tolerated.  As long she continues to do well I will see her back at her routine 6 month follow-up visit.  Sooner if any problems.    Arleen was seen for a 15 min visit today and greater than half this time was spent counseling on the above.

## 2019-11-25 ENCOUNTER — TELEPHONE (OUTPATIENT)
Dept: FAMILY MEDICINE | Facility: CLINIC | Age: 84
End: 2019-11-25

## 2019-11-25 NOTE — TELEPHONE ENCOUNTER
----- Message from Jacquelyn Bradley sent at 11/25/2019 10:51 AM CST -----  Type: Needs Medical Advice    Who Called:  Patient - Arleen Fournier Call Back Number: [phone]  Additional Information: pt would like nurse to call her directly has some questions that only the nurse can answer - please contact her to follow up

## 2019-11-25 NOTE — TELEPHONE ENCOUNTER
Patient notified I can't help with billing problem and do not know why they called her. She will try calling billing again.

## 2019-11-25 NOTE — TELEPHONE ENCOUNTER
Spoke to patient states she received a call regarding billing. Notified patient that im not sure of the reasoning for the call patient states she will try contact billing department

## 2019-11-25 NOTE — TELEPHONE ENCOUNTER
----- Message from Frank Ferro sent at 11/25/2019 10:27 AM CST -----  Contact: pt  Type: Needs Medical Advice    Who Called:  pt    Best Call Back Number: [phone]  Additional Information: billing questions transferred to billing department but still may have questions.

## 2020-04-14 RX ORDER — LISINOPRIL 20 MG/1
20 TABLET ORAL DAILY
Qty: 14 TABLET | Refills: 0 | Status: SHIPPED | OUTPATIENT
Start: 2020-04-14 | End: 2020-07-29 | Stop reason: SDUPTHER

## 2020-04-14 RX ORDER — OMEPRAZOLE 40 MG/1
40 CAPSULE, DELAYED RELEASE ORAL DAILY
Qty: 14 CAPSULE | Refills: 0 | Status: SHIPPED | OUTPATIENT
Start: 2020-04-14 | End: 2020-07-29 | Stop reason: SDUPTHER

## 2020-04-14 RX ORDER — NISOLDIPINE 8.5 MG/1
8.5 TABLET, FILM COATED, EXTENDED RELEASE ORAL DAILY
Qty: 14 TABLET | Refills: 0 | Status: SHIPPED | OUTPATIENT
Start: 2020-04-14 | End: 2020-07-29 | Stop reason: SDUPTHER

## 2020-04-14 RX ORDER — NISOLDIPINE 8.5 MG/1
TABLET, FILM COATED, EXTENDED RELEASE ORAL
Qty: 90 TABLET | Refills: 1 | Status: SHIPPED | OUTPATIENT
Start: 2020-04-14 | End: 2020-04-14 | Stop reason: SDUPTHER

## 2020-04-14 RX ORDER — LISINOPRIL 20 MG/1
TABLET ORAL
Qty: 90 TABLET | Refills: 1 | Status: SHIPPED | OUTPATIENT
Start: 2020-04-14 | End: 2020-04-14 | Stop reason: SDUPTHER

## 2020-04-14 RX ORDER — OMEPRAZOLE 40 MG/1
CAPSULE, DELAYED RELEASE ORAL
Qty: 90 CAPSULE | Refills: 1 | Status: SHIPPED | OUTPATIENT
Start: 2020-04-14 | End: 2020-04-14 | Stop reason: SDUPTHER

## 2020-04-14 NOTE — TELEPHONE ENCOUNTER
Pt notified that 14 day supply of medications sent to local preferred pharmacy [facility] located at [address] [place] [address] . Further refills for medications will be sent to [facility] per pt request.

## 2020-04-14 NOTE — TELEPHONE ENCOUNTER
Spoke to pt. Pt is concerned that she won't get her medications from [facility] in time. She only have 4 pills left. Pt would like enough medication to be e-prescribed to local pharmacy for pick-up to cover her until she's able to get medication delivered from [facility].

## 2020-04-14 NOTE — TELEPHONE ENCOUNTER
----- Message from Lia Vo sent at 4/14/2020  1:03 PM CDT -----  Contact: Pt  Type: Needs Medical Advice  Who Called: Pt  Symptoms (please be specific):    How long has patient had these symptoms:    Pharmacy name and phone #:    Best Call Back Number:   Additional Information: Asking for a call back in regards to the delivery of her medications.. [facility] haven't sent pt her 3 months supply, pt said she on;y have 4 tablet left. Please contact pt.

## 2020-07-27 RX ORDER — LISINOPRIL 20 MG/1
20 TABLET ORAL DAILY
Qty: 14 TABLET | Refills: 0 | OUTPATIENT
Start: 2020-07-27 | End: 2020-08-10

## 2020-07-27 NOTE — TELEPHONE ENCOUNTER
Refill request sent in a different encounter.       ----- Message from Sarah Escobar sent at 7/27/2020  9:17 AM CDT -----  Regarding: refill  Contact: patient  Type:  RX Refill Request    Who Called:  patient  Refill or New Rx:  Refill  RX Name and Strength:    1)lisinopriL (PRINIVIL,ZESTRIL) 20 MG tablet  2)nisoldipine (SULAR) 8.5 MG Tb24  3)omeprazole (PRILOSEC) 40 MG capsule  How is the patient currently taking it? (ex. 1XDay):  ?  Is this a 30 day or 90 day RX:  ?  Preferred Pharmacy with phone number:    [facility] - [address] [address]  Phone: [phone] Fax: [phone]  Local or Mail Order:  mail  Ordering Provider:  tristen Fournier Call Back Number:  [phone]  Additional Information:  Please call to advise.  Thanks!

## 2020-07-28 NOTE — TELEPHONE ENCOUNTER
Spoke with pt in regards to medication refills, and pt is non compliant with following up. She will see Mrs. Dawson tomorrow to discuss medication refills for her BP medications. She has verbalized understanding of date and time.

## 2020-07-29 ENCOUNTER — OFFICE VISIT (OUTPATIENT)
Dept: FAMILY MEDICINE | Facility: CLINIC | Age: 85
End: 2020-07-29
Payer: MEDICARE

## 2020-07-29 ENCOUNTER — LAB VISIT (OUTPATIENT)
Dept: LAB | Facility: HOSPITAL | Age: 85
End: 2020-07-29
Attending: NURSE PRACTITIONER
Payer: MEDICARE

## 2020-07-29 VITALS
SYSTOLIC BLOOD PRESSURE: 132 MMHG | BODY MASS INDEX: 28.05 KG/M2 | HEIGHT: 60 IN | HEART RATE: 87 BPM | WEIGHT: 142.88 LBS | DIASTOLIC BLOOD PRESSURE: 76 MMHG | TEMPERATURE: 98 F | OXYGEN SATURATION: 98 %

## 2020-07-29 DIAGNOSIS — E78.2 MIXED HYPERLIPIDEMIA: ICD-10-CM

## 2020-07-29 DIAGNOSIS — I10 ESSENTIAL HYPERTENSION: ICD-10-CM

## 2020-07-29 DIAGNOSIS — I10 ESSENTIAL HYPERTENSION: Primary | ICD-10-CM

## 2020-07-29 DIAGNOSIS — K21.9 GASTROESOPHAGEAL REFLUX DISEASE, ESOPHAGITIS PRESENCE NOT SPECIFIED: ICD-10-CM

## 2020-07-29 LAB
BASOPHILS # BLD AUTO: 0.04 K/UL (ref 0–0.2)
BASOPHILS NFR BLD: 0.7 % (ref 0–1.9)
DIFFERENTIAL METHOD: ABNORMAL
EOSINOPHIL # BLD AUTO: 0.1 K/UL (ref 0–0.5)
EOSINOPHIL NFR BLD: 1.1 % (ref 0–8)
ERYTHROCYTE [DISTWIDTH] IN BLOOD BY AUTOMATED COUNT: 12.6 % (ref 11.5–14.5)
HCT VFR BLD AUTO: 41.2 % (ref 37–48.5)
HGB BLD-MCNC: 12.5 G/DL (ref 12–16)
IMM GRANULOCYTES # BLD AUTO: 0.02 K/UL (ref 0–0.04)
IMM GRANULOCYTES NFR BLD AUTO: 0.4 % (ref 0–0.5)
LYMPHOCYTES # BLD AUTO: 1.5 K/UL (ref 1–4.8)
LYMPHOCYTES NFR BLD: 27 % (ref 18–48)
MCH RBC QN AUTO: 30 PG (ref 27–31)
MCHC RBC AUTO-ENTMCNC: 30.3 G/DL (ref 32–36)
MCV RBC AUTO: 99 FL (ref 82–98)
MONOCYTES # BLD AUTO: 0.4 K/UL (ref 0.3–1)
MONOCYTES NFR BLD: 7.5 % (ref 4–15)
NEUTROPHILS # BLD AUTO: 3.6 K/UL (ref 1.8–7.7)
NEUTROPHILS NFR BLD: 63.3 % (ref 38–73)
NRBC BLD-RTO: 0 /100 WBC
PLATELET # BLD AUTO: 193 K/UL (ref 150–350)
PMV BLD AUTO: 11.2 FL (ref 9.2–12.9)
RBC # BLD AUTO: 4.17 M/UL (ref 4–5.4)
WBC # BLD AUTO: 5.6 K/UL (ref 3.9–12.7)

## 2020-07-29 PROCEDURE — 99214 OFFICE O/P EST MOD 30 MIN: CPT | Mod: PBBFAC,PO | Performed by: NURSE PRACTITIONER

## 2020-07-29 PROCEDURE — 99214 OFFICE O/P EST MOD 30 MIN: CPT | Mod: S$PBB,,, | Performed by: NURSE PRACTITIONER

## 2020-07-29 PROCEDURE — 99214 PR OFFICE/OUTPT VISIT, EST, LEVL IV, 30-39 MIN: ICD-10-PCS | Mod: S$PBB,,, | Performed by: NURSE PRACTITIONER

## 2020-07-29 PROCEDURE — 99999 PR PBB SHADOW E&M-EST. PATIENT-LVL IV: CPT | Mod: PBBFAC,,, | Performed by: NURSE PRACTITIONER

## 2020-07-29 PROCEDURE — 85025 COMPLETE CBC W/AUTO DIFF WBC: CPT

## 2020-07-29 PROCEDURE — 80053 COMPREHEN METABOLIC PANEL: CPT

## 2020-07-29 PROCEDURE — 80061 LIPID PANEL: CPT

## 2020-07-29 PROCEDURE — 36415 COLL VENOUS BLD VENIPUNCTURE: CPT | Mod: PO

## 2020-07-29 PROCEDURE — 99999 PR PBB SHADOW E&M-EST. PATIENT-LVL IV: ICD-10-PCS | Mod: PBBFAC,,, | Performed by: NURSE PRACTITIONER

## 2020-07-29 RX ORDER — NISOLDIPINE 8.5 MG/1
8.5 TABLET, FILM COATED, EXTENDED RELEASE ORAL DAILY
Qty: 90 TABLET | Refills: 1 | Status: SHIPPED | OUTPATIENT
Start: 2020-07-29 | End: 2020-08-03 | Stop reason: SDUPTHER

## 2020-07-29 RX ORDER — OMEPRAZOLE 40 MG/1
40 CAPSULE, DELAYED RELEASE ORAL DAILY
Qty: 90 CAPSULE | Refills: 1 | Status: SHIPPED | OUTPATIENT
Start: 2020-07-29 | End: 2021-01-21 | Stop reason: SDUPTHER

## 2020-07-29 RX ORDER — LISINOPRIL 20 MG/1
20 TABLET ORAL DAILY
Qty: 90 TABLET | Refills: 1 | Status: SHIPPED | OUTPATIENT
Start: 2020-07-29 | End: 2020-08-03 | Stop reason: SDUPTHER

## 2020-07-29 NOTE — PATIENT INSTRUCTIONS
Lifestyle Changes for Controlling GERD  When you have GERD, stomach acid feels as if its backing up toward your mouth. Whether or not you take medicine to control your GERD, your symptoms can often be improved with lifestyle changes. Talk to your healthcare provider about the following suggestions. These suggestions may help you get relief from your symptoms.      Raise your head  Reflux is more likely to strike when youre lying down flat, because stomach fluid can flow backward more easily. Raising the head of your bed 4 to 6 inches can help. To do this:  · Slide blocks or books under the legs at the head of your bed. Or, place a wedge under the mattress. Many [employer] can make a suitable wedge for you. The wedge should run from your waist to the top of your head.  · Dont just prop your head on several pillows. This increases pressure on your stomach. It can make GERD worse.  Watch your eating habits  Certain foods may increase the acid in your stomach or relax the lower esophageal sphincter. This makes GERD more likely. Its best to avoid the following if they cause you symptoms:  · Coffee, tea, and carbonated drinks (with and without caffeine)  · Fatty, fried, or spicy food  · Mint, chocolate, onions, and tomatoes  · Peppermint  · Any other foods that seem to irritate your stomach or cause you pain  Relieve the pressure  Tips include the following:  · Eat smaller meals, even if you have to eat more often.  · Dont lie down right after you eat. Wait a few hours for your stomach to empty.  · Avoid tight belts and tight-fitting clothes.  · Lose excess weight.  Tobacco and alcohol  Avoid smoking tobacco and drinking alcohol. They can make GERD symptoms worse.  Date Last Reviewed: 7/1/2016  © 5341-9042 [employer]. [address] [place] [address], [place] [address]. All rights reserved. This information is not intended as a substitute for professional medical care. Always follow your healthcare  professional's instructions.

## 2020-07-29 NOTE — PROGRESS NOTES
Subjective:       Patient ID: Arleen Zarate is a 89 y.o. female presents to the clinic for medication refill. This patient is new to me.     Chief Complaint: Medication Refill    Hypertension  This is a chronic problem. The current episode started more than 1 year ago. The problem is unchanged. The problem is controlled. Pertinent negatives include no anxiety, blurred vision, chest pain, headaches, malaise/fatigue, neck pain, orthopnea, palpitations, peripheral edema, PND, shortness of breath or sweats. There are no associated agents to hypertension. Risk factors for coronary artery disease include obesity. Past treatments include ACE inhibitors and lifestyle changes. The current treatment provides significant improvement. There are no compliance problems.  Hypertensive end-organ damage includes kidney disease. There is no history of angina, CAD/MI, CVA, heart failure, left ventricular hypertrophy, PVD or retinopathy. There is no history of chronic renal disease, coarctation of the aorta, hyperaldosteronism, hypercortisolism, hyperparathyroidism, a hypertension causing med, pheochromocytoma, renovascular disease, sleep apnea or a thyroid problem.   Gastroesophageal Reflux  She reports no abdominal pain, no belching, no chest pain, no choking, no coughing, no dysphagia, no early satiety, no globus sensation, no heartburn, no hoarse voice, no nausea, no sore throat, no stridor, no tooth decay, no water brash or no wheezing. This is a chronic problem. The current episode started more than 1 year ago. The problem occurs occasionally. The problem has been unchanged. The heartburn duration is less than a minute. The heartburn is located in the substernum. The heartburn is of moderate intensity. The heartburn does not wake her from sleep. The heartburn does not limit her activity. The heartburn doesn't change with position. The symptoms are aggravated by certain foods. Pertinent negatives include no anemia, fatigue,  melena, muscle weakness, orthopnea or weight loss. There are no known risk factors. She has tried a PPI and a diet change for the symptoms. The treatment provided significant relief. Past procedures do not include an abdominal ultrasound, an EGD, esophageal manometry, esophageal pH monitoring, H. pylori antibody titer or a UGI. Past invasive treatments do not include gastroplasty or gastroplication.       Vitals:    07/29/20 1106 07/29/20 1114   BP: 132/76 132/76   Pulse: 87    Temp: 97.9 °F (36.6 °C)    SpO2: 98%    Weight: 64.8 kg (142 lb 13.7 oz)    Height: 5' (1.524 m)    PainSc: 0-No pain      Body mass index is 27.9 kg/m².    Past Medical History:   Diagnosis Date    Anxiety     Arthritis     fingers and back    Depression     Foot pain, left 9/18/2013    GERD (gastroesophageal reflux disease)     Hiatal hernia     Hypertension     Reflux     on meds    Tendon tear 8/21/2013    Tendonitis 8/21/2013     Past Surgical History:   Procedure Laterality Date    CHOLECYSTECTOMY      EYE SURGERY Bilateral     cataracts and implants    TONSILLECTOMY       Social History     Socioeconomic History    Marital status:      Spouse name: Not on file    Number of children: Not on file    Years of education: Not on file    Highest education level: Not on file   Occupational History    Not on file   Social Needs    Financial resource strain: Not on file    Food insecurity     Worry: Not on file     Inability: Not on file    Transportation needs     Medical: Not on file     Non-medical: Not on file   Tobacco Use    Smoking status: Never Smoker    Smokeless tobacco: Never Used   Substance and Sexual Activity    Alcohol use: No    Drug use: No    Sexual activity: Not Currently   Lifestyle    Physical activity     Days per week: Not on file     Minutes per session: Not on file    Stress: Not on file   Relationships    Social connections     Talks on phone: Not on file     Gets together: Not on  file     Attends Church service: Not on file     Active member of club or organization: Not on file     Attends meetings of clubs or organizations: Not on file     Relationship status: Not on file   Other Topics Concern    Not on file   Social History Narrative    Not on file       Review of patient's allergies indicates:   Allergen Reactions    Doxy      Other reaction(s): Unknown    Effexor [venlafaxine] Other (See Comments)     shaking    Biaxin [clarithromycin] Other (See Comments)     Unknown    Codeine Other (See Comments)     Syncope, and nausea.    Statins-hmg-coa reductase inhibitors      myalgia    Doxycycline Other (See Comments)     Made very weak       Current Outpatient Medications:     clotrimazole-betamethasone 1-0.05% (LOTRISONE) cream, Apply topically 2 (two) times daily., Disp: 45 g, Rfl: 0    fluticasone propionate (FLONASE) 50 mcg/actuation nasal spray, 1 spray (50 mcg total) by Each Nostril route once daily., Disp: 1 Bottle, Rfl: 3    lisinopriL (PRINIVIL,ZESTRIL) 20 MG tablet, Take 1 tablet (20 mg total) by mouth once daily., Disp: 90 tablet, Rfl: 1    methylsulfonylmethane (MSM) 1,000 mg Cap, Take 1 capsule by mouth 2 (two) times daily. , Disp: , Rfl:     multivitamin capsule, Take 1 capsule by mouth once daily., Disp: , Rfl:     nisoldipine (SULAR) 8.5 MG Tb24, Take 1 tablet (8.5 mg total) by mouth once daily., Disp: 90 tablet, Rfl: 1    omeprazole (PRILOSEC) 40 MG capsule, Take 1 capsule (40 mg total) by mouth once daily., Disp: 90 capsule, Rfl: 1    Review of Systems   Constitutional: Negative for activity change, appetite change, chills, fatigue, fever, malaise/fatigue and weight loss.   HENT: Negative for congestion, ear discharge, ear pain, facial swelling, hearing loss, hoarse voice, postnasal drip, rhinorrhea, sinus pressure, sore throat, trouble swallowing and voice change.    Eyes: Negative for blurred vision, pain, discharge and itching.   Respiratory: Negative  for cough, choking, chest tightness, shortness of breath and wheezing.    Cardiovascular: Negative for chest pain, palpitations, orthopnea, leg swelling and PND.   Gastrointestinal: Negative for abdominal distention, abdominal pain, constipation, diarrhea, dysphagia, heartburn, melena, nausea and vomiting.   Endocrine: Negative for polydipsia, polyphagia and polyuria.   Genitourinary: Negative for difficulty urinating, flank pain and urgency.   Musculoskeletal: Negative for back pain, gait problem, muscle weakness and neck pain.   Skin: Negative for color change, pallor, rash and wound.   Neurological: Negative for dizziness, syncope, facial asymmetry, numbness and headaches.   Hematological: Negative for adenopathy.   Psychiatric/Behavioral: Negative for agitation, behavioral problems and confusion.       Objective:      Physical Exam  Vitals signs and nursing note reviewed.   Constitutional:       General: She is not in acute distress.     Appearance: Normal appearance. She is well-developed. She is not ill-appearing, toxic-appearing or diaphoretic.   HENT:      Head: Normocephalic and atraumatic.      Right Ear: Tympanic membrane, ear canal and external ear normal. There is no impacted cerumen.      Left Ear: Tympanic membrane, ear canal and external ear normal.      Nose: Nose normal. No congestion or rhinorrhea.      Mouth/Throat:      Mouth: Mucous membranes are moist.      Pharynx: Oropharynx is clear. No oropharyngeal exudate or posterior oropharyngeal erythema.   Eyes:      General: No scleral icterus.        Right eye: No discharge.         Left eye: No discharge.      Extraocular Movements: Extraocular movements intact.      Conjunctiva/sclera: Conjunctivae normal.      Pupils: Pupils are equal, round, and reactive to light.   Neck:      Musculoskeletal: Normal range of motion and neck supple. No neck rigidity or muscular tenderness.      Thyroid: No thyromegaly.      Vascular: No carotid bruit or JVD.       Trachea: No tracheal deviation.   Cardiovascular:      Rate and Rhythm: Normal rate and regular rhythm.      Pulses: Normal pulses.           Carotid pulses are 2+ on the right side and 2+ on the left side.       Radial pulses are 2+ on the right side and 2+ on the left side.        Femoral pulses are 2+ on the right side and 2+ on the left side.       Dorsalis pedis pulses are 2+ on the right side and 2+ on the left side.        Posterior tibial pulses are 2+ on the right side and 2+ on the left side.      Heart sounds: Normal heart sounds, S1 normal and S2 normal. No murmur. No friction rub. No gallop.       Comments: Manual BP: 132/76  Pulmonary:      Effort: Pulmonary effort is normal. No respiratory distress.      Breath sounds: Normal breath sounds. No stridor. No wheezing, rhonchi or rales.   Chest:      Chest wall: No tenderness.   Abdominal:      General: Abdomen is flat. Bowel sounds are normal. There is no distension.      Palpations: Abdomen is soft. There is no mass.      Tenderness: There is no abdominal tenderness. There is no right CVA tenderness, left CVA tenderness, guarding or rebound.      Hernia: No hernia is present.   Musculoskeletal: Normal range of motion.         General: No swelling, tenderness, deformity or signs of injury.      Right lower leg: No edema.      Left lower leg: No edema.   Lymphadenopathy:      Cervical: No cervical adenopathy.   Skin:     General: Skin is warm and dry.      Capillary Refill: Capillary refill takes less than 2 seconds.      Coloration: Skin is not jaundiced or pale.      Findings: No bruising, erythema, lesion or rash.   Neurological:      General: No focal deficit present.      Mental Status: She is alert and oriented to person, place, and time.      Cranial Nerves: No cranial nerve deficit.      Sensory: No sensory deficit.      Motor: No weakness or abnormal muscle tone.      Coordination: Coordination normal.      Gait: Gait normal.      Deep Tendon  Reflexes: Reflexes normal.   Psychiatric:         Mood and Affect: Mood normal.         Behavior: Behavior normal.         Thought Content: Thought content normal.         Judgment: Judgment normal.         Assessment:       1. Essential hypertension    2. Gastroesophageal reflux disease, esophagitis presence not specified    3. Mixed hyperlipidemia        Plan:       Arleen was seen today for medication refill.    Diagnoses and all orders for this visit:    Essential hypertension  - Controlled   - continue current medication regimen  -     lisinopriL (PRINIVIL,ZESTRIL) 20 MG tablet; Take 1 tablet (20 mg total) by mouth once daily.  -     nisoldipine (SULAR) 8.5 MG Tb24; Take 1 tablet (8.5 mg total) by mouth once daily.  -     CBC auto differential; Future  -     Comprehensive metabolic panel; Future  -     Lipid Panel; Future    Gastroesophageal reflux disease, esophagitis presence not specified  - Controlled   - continue current medication   -     omeprazole (PRILOSEC) 40 MG capsule; Take 1 capsule (40 mg total) by mouth once daily.    Mixed hyperlipidemia  -     Lipid Panel; Future    Patient refused DEXA, pneumonia and shingles.  We will revisit in next visit.  Patient education provided.  All questions and concerns addressed  Patient verbalizes understanding    Follow up in about 6 months (around 1/29/2021) for with PCP.

## 2020-07-30 LAB
ALBUMIN SERPL BCP-MCNC: 4 G/DL (ref 3.5–5.2)
ALP SERPL-CCNC: 79 U/L (ref 55–135)
ALT SERPL W/O P-5'-P-CCNC: 13 U/L (ref 10–44)
ANION GAP SERPL CALC-SCNC: 9 MMOL/L (ref 8–16)
AST SERPL-CCNC: 23 U/L (ref 10–40)
BILIRUB SERPL-MCNC: 0.5 MG/DL (ref 0.1–1)
BUN SERPL-MCNC: 15 MG/DL (ref 10–30)
CALCIUM SERPL-MCNC: 9.5 MG/DL (ref 8.7–10.5)
CHLORIDE SERPL-SCNC: 107 MMOL/L (ref 95–110)
CHOLEST SERPL-MCNC: 225 MG/DL (ref 120–199)
CHOLEST/HDLC SERPL: 3.5 {RATIO} (ref 2–5)
CO2 SERPL-SCNC: 26 MMOL/L (ref 23–29)
CREAT SERPL-MCNC: 1 MG/DL (ref 0.5–1.4)
EST. GFR  (AFRICAN AMERICAN): 55.7 ML/MIN/1.73 M^2
EST. GFR  (NON AFRICAN AMERICAN): 48.3 ML/MIN/1.73 M^2
GLUCOSE SERPL-MCNC: 75 MG/DL (ref 70–110)
HDLC SERPL-MCNC: 65 MG/DL (ref 40–75)
HDLC SERPL: 28.9 % (ref 20–50)
LDLC SERPL CALC-MCNC: 136 MG/DL (ref 63–159)
NONHDLC SERPL-MCNC: 160 MG/DL
POTASSIUM SERPL-SCNC: 4.1 MMOL/L (ref 3.5–5.1)
PROT SERPL-MCNC: 7 G/DL (ref 6–8.4)
SODIUM SERPL-SCNC: 142 MMOL/L (ref 136–145)
TRIGL SERPL-MCNC: 120 MG/DL (ref 30–150)

## 2020-07-31 ENCOUNTER — TELEPHONE (OUTPATIENT)
Dept: FAMILY MEDICINE | Facility: CLINIC | Age: 85
End: 2020-07-31

## 2020-07-31 NOTE — TELEPHONE ENCOUNTER
----- Message from Griselda Serna MA sent at 7/31/2020 12:16 PM CDT -----  Type:  Patient Returning Call    Who Called:  Arleen  Who Left Message for Patient:  unknown  Does the patient know what this is regarding?:  results  Best Call Back Number:  [phone]  Additional Information:

## 2020-08-03 DIAGNOSIS — I10 ESSENTIAL HYPERTENSION: ICD-10-CM

## 2020-08-03 RX ORDER — LISINOPRIL 20 MG/1
20 TABLET ORAL DAILY
Qty: 30 TABLET | Refills: 0 | Status: SHIPPED | OUTPATIENT
Start: 2020-08-03 | End: 2020-08-11 | Stop reason: SDUPTHER

## 2020-08-03 RX ORDER — NISOLDIPINE 8.5 MG/1
8.5 TABLET, FILM COATED, EXTENDED RELEASE ORAL DAILY
Qty: 30 TABLET | Refills: 0 | Status: SHIPPED | OUTPATIENT
Start: 2020-08-03 | End: 2020-08-11 | Stop reason: SDUPTHER

## 2020-08-11 DIAGNOSIS — I10 ESSENTIAL HYPERTENSION: ICD-10-CM

## 2020-08-11 NOTE — TELEPHONE ENCOUNTER
----- Message from Frank Ferro sent at 8/10/2020 12:04 PM CDT -----  Regarding: pt  Type: Needs Medical Advice    Who Called:  pt    Best Call Back Number: [phone]   Additional Information: please call pt regarding meds.

## 2020-08-11 NOTE — TELEPHONE ENCOUNTER
Spoke to patient, patient stated that [facility] called patient and said Lisinopril nisoldipine and omeprazole need to be 90 day supply. It looks like the omeprazole is already a 90 supply however the other two are not. Pending medication and sending over to PCP.

## 2020-08-12 RX ORDER — NISOLDIPINE 8.5 MG/1
8.5 TABLET, FILM COATED, EXTENDED RELEASE ORAL DAILY
Qty: 90 TABLET | Refills: 3 | Status: SHIPPED | OUTPATIENT
Start: 2020-08-12 | End: 2021-01-21 | Stop reason: SDUPTHER

## 2020-08-12 RX ORDER — LISINOPRIL 20 MG/1
20 TABLET ORAL DAILY
Qty: 90 TABLET | Refills: 3 | Status: SHIPPED | OUTPATIENT
Start: 2020-08-12 | End: 2020-11-03

## 2020-08-26 ENCOUNTER — PES CALL (OUTPATIENT)
Dept: ADMINISTRATIVE | Facility: CLINIC | Age: 85
End: 2020-08-26

## 2020-09-22 ENCOUNTER — OFFICE VISIT (OUTPATIENT)
Dept: FAMILY MEDICINE | Facility: CLINIC | Age: 85
End: 2020-09-22
Payer: MEDICARE

## 2020-09-22 VITALS
OXYGEN SATURATION: 97 % | HEART RATE: 79 BPM | TEMPERATURE: 98 F | WEIGHT: 140.44 LBS | SYSTOLIC BLOOD PRESSURE: 130 MMHG | DIASTOLIC BLOOD PRESSURE: 74 MMHG | BODY MASS INDEX: 27.57 KG/M2 | HEIGHT: 60 IN

## 2020-09-22 DIAGNOSIS — I77.1 TORTUOUS AORTA: ICD-10-CM

## 2020-09-22 DIAGNOSIS — N18.30 STAGE 3 CHRONIC KIDNEY DISEASE: ICD-10-CM

## 2020-09-22 DIAGNOSIS — Z00.00 ENCOUNTER FOR MEDICARE ANNUAL WELLNESS EXAM: Primary | ICD-10-CM

## 2020-09-22 PROCEDURE — G0439 PPPS, SUBSEQ VISIT: HCPCS | Mod: S$GLB,,, | Performed by: NURSE PRACTITIONER

## 2020-09-22 PROCEDURE — 99214 OFFICE O/P EST MOD 30 MIN: CPT | Mod: PBBFAC,PO | Performed by: NURSE PRACTITIONER

## 2020-09-22 PROCEDURE — 99999 PR PBB SHADOW E&M-EST. PATIENT-LVL IV: ICD-10-PCS | Mod: PBBFAC,,, | Performed by: NURSE PRACTITIONER

## 2020-09-22 PROCEDURE — 99999 PR PBB SHADOW E&M-EST. PATIENT-LVL IV: CPT | Mod: PBBFAC,,, | Performed by: NURSE PRACTITIONER

## 2020-09-22 PROCEDURE — G0439 PR MEDICARE ANNUAL WELLNESS SUBSEQUENT VISIT: ICD-10-PCS | Mod: S$GLB,,, | Performed by: NURSE PRACTITIONER

## 2020-09-22 NOTE — PATIENT INSTRUCTIONS

## 2020-09-22 NOTE — PROGRESS NOTES
Subjective:       Patient ID: Arleen aZrate is a 89 y.o. female presented for a  Medicare AWV and comprehensive Health Risk Assessment today. The following components were reviewed and updated:     · Medical history  · Family History  · Social history  · Allergies and Current Medications  · Health Risk Assessment  · Health Maintenance  · Care Team      ** See Completed Assessments for Annual Wellness Visit within the encounter summary.**     The following assessments were completed:  · Living Situation  · CAGE  · Depression Screening  · Timed Get Up and Go  · Whisper Test  · Cognitive Function Screening  · Nutrition Screening  · ADL Screening  · PAQ Screening       Chief Complaint: Health Risk Assessment    Vitals:    09/22/20 1037   BP: 130/74   Pulse: 79   Temp: 97.9 °F (36.6 °C)   TempSrc: Skin   SpO2: 97%   Weight: 63.7 kg (140 lb 6.9 oz)   Height: 5' (1.524 m)   PainSc: 0-No pain     Body mass index is 27.43 kg/m².    Past Medical History:   Diagnosis Date    Anxiety     Arthritis     fingers and back    Depression     Foot pain, left 9/18/2013    GERD (gastroesophageal reflux disease)     Hiatal hernia     Hypertension     Reflux     on meds    Tendon tear 8/21/2013    Tendonitis 8/21/2013     Past Surgical History:   Procedure Laterality Date    CHOLECYSTECTOMY      EYE SURGERY Bilateral     cataracts and implants    TONSILLECTOMY       Social History     Socioeconomic History    Marital status:      Spouse name: Not on file    Number of children: Not on file    Years of education: Not on file    Highest education level: Not on file   Occupational History    Not on file   Social Needs    Financial resource strain: Not on file    Food insecurity     Worry: Not on file     Inability: Not on file    Transportation needs     Medical: Not on file     Non-medical: Not on file   Tobacco Use    Smoking status: Never Smoker    Smokeless tobacco: Never Used   Substance and Sexual  Activity    Alcohol use: Yes     Alcohol/week: 1.0 standard drinks     Types: 1 Glasses of wine per week     Comment: rarely    Drug use: No    Sexual activity: Not Currently   Lifestyle    Physical activity     Days per week: Not on file     Minutes per session: Not on file    Stress: Not on file   Relationships    Social connections     Talks on phone: Not on file     Gets together: Not on file     Attends [employer] service: Not on file     Active member of club or organization: Not on file     Attends meetings of clubs or organizations: Not on file     Relationship status: Not on file   Other Topics Concern    Not on file   Social History Narrative    Not on file       Review of patient's allergies indicates:   Allergen Reactions    Doxy      Other reaction(s): Unknown    Effexor [venlafaxine] Other (See Comments)     shaking    Biaxin [clarithromycin] Other (See Comments)     Unknown    Codeine Other (See Comments)     Syncope, and nausea.    Statins-hmg-coa reductase inhibitors      myalgia    Doxycycline Other (See Comments)     Made very weak       Current Outpatient Medications:     clotrimazole-betamethasone 1-0.05% (LOTRISONE) cream, Apply topically 2 (two) times daily., Disp: 45 g, Rfl: 0    fluticasone propionate (FLONASE) 50 mcg/actuation nasal spray, 1 spray (50 mcg total) by Each Nostril route once daily., Disp: 1 Bottle, Rfl: 3    lisinopriL (PRINIVIL,ZESTRIL) 20 MG tablet, Take 1 tablet (20 mg total) by mouth once daily., Disp: 90 tablet, Rfl: 3    methylsulfonylmethane (MSM) 1,000 mg Cap, Take 1 capsule by mouth 2 (two) times daily. , Disp: , Rfl:     multivitamin capsule, Take 1 capsule by mouth once daily., Disp: , Rfl:     nisoldipine (SULAR) 8.5 MG Tb24, Take 1 tablet (8.5 mg total) by mouth once daily., Disp: 90 tablet, Rfl: 3    omeprazole (PRILOSEC) 40 MG capsule, Take 1 capsule (40 mg total) by mouth once daily., Disp: 90 capsule, Rfl: 1        Objective:      Physical  Exam  Constitutional:       Appearance: Normal appearance. She is obese.   HENT:      Head: Normocephalic and atraumatic.      Right Ear: Hearing normal.      Left Ear: Hearing normal.      Nose: Nose normal.   Eyes:      General: Lids are normal.      Extraocular Movements: Extraocular movements intact.      Conjunctiva/sclera: Conjunctivae normal.      Pupils: Pupils are equal, round, and reactive to light.   Neck:      Musculoskeletal: Normal range of motion and neck supple.   Cardiovascular:      Rate and Rhythm: Normal rate and regular rhythm.      Pulses: Normal pulses.      Heart sounds: Normal heart sounds.   Pulmonary:      Effort: Pulmonary effort is normal.      Breath sounds: Normal breath sounds.   Abdominal:      General: Abdomen is flat.      Palpations: Abdomen is soft.   Musculoskeletal: Normal range of motion.   Skin:     General: Skin is warm and dry.      Capillary Refill: Capillary refill takes less than 2 seconds.   Neurological:      General: No focal deficit present.      Mental Status: She is alert and oriented to person, place, and time.   Psychiatric:         Mood and Affect: Mood normal.         Behavior: Behavior normal.         Thought Content: Thought content normal.         Assessment:       1. Encounter for Medicare annual wellness exam    2. Tortuous aorta    3. Stage 3 chronic kidney disease        Plan:    Arleen was seen today for health risk assessment.  Patient refused influenza vaccine and bone density scan.  A new PCP is Dr. Jorge.    Diagnoses and all orders for this visit:    Encounter for Medicare annual wellness exam    Tortuous aorta   Stable- continue current treatment and follow up routinely with PCP    Stage 3 chronic kidney disease  Continue to monitor  Followed by PCP.     Recommendations were developed using the USPSTF age appropriate recommendations. Education, counseling, and referrals were provided as needed. After Visit Summary printed and given to patient  which includes a list of additional screenings\tests needed.  Follow up in about 1 year (around 9/22/2021) for AWV.

## 2020-09-23 DIAGNOSIS — L30.4 INTERTRIGO: ICD-10-CM

## 2020-09-23 RX ORDER — CLOTRIMAZOLE AND BETAMETHASONE DIPROPIONATE 10; .64 MG/G; MG/G
CREAM TOPICAL 2 TIMES DAILY
Qty: 45 G | Refills: 0 | Status: SHIPPED | OUTPATIENT
Start: 2020-09-23 | End: 2020-10-16

## 2020-09-29 ENCOUNTER — LAB VISIT (OUTPATIENT)
Dept: LAB | Facility: HOSPITAL | Age: 85
End: 2020-09-29
Attending: STUDENT IN AN ORGANIZED HEALTH CARE EDUCATION/TRAINING PROGRAM
Payer: MEDICARE

## 2020-09-29 ENCOUNTER — OFFICE VISIT (OUTPATIENT)
Dept: FAMILY MEDICINE | Facility: CLINIC | Age: 85
End: 2020-09-29
Payer: MEDICARE

## 2020-09-29 VITALS
OXYGEN SATURATION: 93 % | TEMPERATURE: 98 F | HEART RATE: 92 BPM | DIASTOLIC BLOOD PRESSURE: 68 MMHG | SYSTOLIC BLOOD PRESSURE: 152 MMHG | BODY MASS INDEX: 27.57 KG/M2 | WEIGHT: 140.44 LBS | HEIGHT: 60 IN | RESPIRATION RATE: 16 BRPM

## 2020-09-29 DIAGNOSIS — D64.9 ANEMIA, UNSPECIFIED TYPE: ICD-10-CM

## 2020-09-29 DIAGNOSIS — E78.2 MIXED HYPERLIPIDEMIA: ICD-10-CM

## 2020-09-29 DIAGNOSIS — I83.90 VARICOSE VEINS OF LOWER EXTREMITY, UNSPECIFIED LATERALITY, UNSPECIFIED WHETHER COMPLICATED: ICD-10-CM

## 2020-09-29 DIAGNOSIS — I10 ESSENTIAL HYPERTENSION: ICD-10-CM

## 2020-09-29 DIAGNOSIS — R25.2 NOCTURNAL MUSCLE CRAMPS: ICD-10-CM

## 2020-09-29 DIAGNOSIS — R53.83 FATIGUE, UNSPECIFIED TYPE: ICD-10-CM

## 2020-09-29 DIAGNOSIS — R53.83 FATIGUE, UNSPECIFIED TYPE: Primary | ICD-10-CM

## 2020-09-29 DIAGNOSIS — E55.9 HYPOVITAMINOSIS D: ICD-10-CM

## 2020-09-29 DIAGNOSIS — F41.1 GAD (GENERALIZED ANXIETY DISORDER): ICD-10-CM

## 2020-09-29 DIAGNOSIS — N18.30 STAGE 3 CHRONIC KIDNEY DISEASE: ICD-10-CM

## 2020-09-29 DIAGNOSIS — M15.9 PRIMARY OSTEOARTHRITIS INVOLVING MULTIPLE JOINTS: ICD-10-CM

## 2020-09-29 DIAGNOSIS — M85.80 OSTEOPENIA DETERMINED BY X-RAY: ICD-10-CM

## 2020-09-29 LAB
ALBUMIN SERPL BCP-MCNC: 4 G/DL (ref 3.5–5.2)
ALP SERPL-CCNC: 81 U/L (ref 55–135)
ALT SERPL W/O P-5'-P-CCNC: 12 U/L (ref 10–44)
ANION GAP SERPL CALC-SCNC: 10 MMOL/L (ref 8–16)
AST SERPL-CCNC: 18 U/L (ref 10–40)
BASOPHILS # BLD AUTO: 0.04 K/UL (ref 0–0.2)
BASOPHILS NFR BLD: 0.7 % (ref 0–1.9)
BILIRUB SERPL-MCNC: 0.5 MG/DL (ref 0.1–1)
BUN SERPL-MCNC: 17 MG/DL (ref 10–30)
CALCIUM SERPL-MCNC: 9.5 MG/DL (ref 8.7–10.5)
CHLORIDE SERPL-SCNC: 106 MMOL/L (ref 95–110)
CO2 SERPL-SCNC: 27 MMOL/L (ref 23–29)
CREAT SERPL-MCNC: 1 MG/DL (ref 0.5–1.4)
DIFFERENTIAL METHOD: ABNORMAL
EOSINOPHIL # BLD AUTO: 0 K/UL (ref 0–0.5)
EOSINOPHIL NFR BLD: 0.7 % (ref 0–8)
ERYTHROCYTE [DISTWIDTH] IN BLOOD BY AUTOMATED COUNT: 12.7 % (ref 11.5–14.5)
EST. GFR  (AFRICAN AMERICAN): 55.3 ML/MIN/1.73 M^2
EST. GFR  (NON AFRICAN AMERICAN): 48 ML/MIN/1.73 M^2
FERRITIN SERPL-MCNC: 31 NG/ML (ref 20–300)
GLUCOSE SERPL-MCNC: 90 MG/DL (ref 70–110)
HCT VFR BLD AUTO: 39.8 % (ref 37–48.5)
HGB BLD-MCNC: 12.3 G/DL (ref 12–16)
IMM GRANULOCYTES # BLD AUTO: 0.01 K/UL (ref 0–0.04)
IMM GRANULOCYTES NFR BLD AUTO: 0.2 % (ref 0–0.5)
IRON SERPL-MCNC: 73 UG/DL (ref 30–160)
LYMPHOCYTES # BLD AUTO: 1.5 K/UL (ref 1–4.8)
LYMPHOCYTES NFR BLD: 26.2 % (ref 18–48)
MAGNESIUM SERPL-MCNC: 2 MG/DL (ref 1.6–2.6)
MCH RBC QN AUTO: 29.5 PG (ref 27–31)
MCHC RBC AUTO-ENTMCNC: 30.9 G/DL (ref 32–36)
MCV RBC AUTO: 95 FL (ref 82–98)
MONOCYTES # BLD AUTO: 0.5 K/UL (ref 0.3–1)
MONOCYTES NFR BLD: 8.1 % (ref 4–15)
NEUTROPHILS # BLD AUTO: 3.7 K/UL (ref 1.8–7.7)
NEUTROPHILS NFR BLD: 64.1 % (ref 38–73)
NRBC BLD-RTO: 0 /100 WBC
PLATELET # BLD AUTO: 200 K/UL (ref 150–350)
PMV BLD AUTO: 11.2 FL (ref 9.2–12.9)
POTASSIUM SERPL-SCNC: 4.5 MMOL/L (ref 3.5–5.1)
PROT SERPL-MCNC: 6.8 G/DL (ref 6–8.4)
RBC # BLD AUTO: 4.17 M/UL (ref 4–5.4)
SATURATED IRON: 19 % (ref 20–50)
SODIUM SERPL-SCNC: 143 MMOL/L (ref 136–145)
TOTAL IRON BINDING CAPACITY: 389 UG/DL (ref 250–450)
TRANSFERRIN SERPL-MCNC: 263 MG/DL (ref 200–375)
TSH SERPL DL<=0.005 MIU/L-ACNC: 0.95 UIU/ML (ref 0.4–4)
WBC # BLD AUTO: 5.83 K/UL (ref 3.9–12.7)

## 2020-09-29 PROCEDURE — 99214 OFFICE O/P EST MOD 30 MIN: CPT | Mod: PBBFAC,PO | Performed by: STUDENT IN AN ORGANIZED HEALTH CARE EDUCATION/TRAINING PROGRAM

## 2020-09-29 PROCEDURE — 99999 PR PBB SHADOW E&M-EST. PATIENT-LVL IV: CPT | Mod: PBBFAC,,, | Performed by: STUDENT IN AN ORGANIZED HEALTH CARE EDUCATION/TRAINING PROGRAM

## 2020-09-29 PROCEDURE — 85025 COMPLETE CBC W/AUTO DIFF WBC: CPT

## 2020-09-29 PROCEDURE — 84443 ASSAY THYROID STIM HORMONE: CPT

## 2020-09-29 PROCEDURE — 36415 COLL VENOUS BLD VENIPUNCTURE: CPT | Mod: PO

## 2020-09-29 PROCEDURE — 83540 ASSAY OF IRON: CPT

## 2020-09-29 PROCEDURE — 99999 PR PBB SHADOW E&M-EST. PATIENT-LVL IV: ICD-10-PCS | Mod: PBBFAC,,, | Performed by: STUDENT IN AN ORGANIZED HEALTH CARE EDUCATION/TRAINING PROGRAM

## 2020-09-29 PROCEDURE — 99214 OFFICE O/P EST MOD 30 MIN: CPT | Mod: S$PBB,,, | Performed by: STUDENT IN AN ORGANIZED HEALTH CARE EDUCATION/TRAINING PROGRAM

## 2020-09-29 PROCEDURE — 83921 ORGANIC ACID SINGLE QUANT: CPT

## 2020-09-29 PROCEDURE — 99214 PR OFFICE/OUTPT VISIT, EST, LEVL IV, 30-39 MIN: ICD-10-PCS | Mod: S$PBB,,, | Performed by: STUDENT IN AN ORGANIZED HEALTH CARE EDUCATION/TRAINING PROGRAM

## 2020-09-29 PROCEDURE — 83735 ASSAY OF MAGNESIUM: CPT

## 2020-09-29 PROCEDURE — 82728 ASSAY OF FERRITIN: CPT

## 2020-09-29 PROCEDURE — 80053 COMPREHEN METABOLIC PANEL: CPT

## 2020-09-29 RX ORDER — BUPROPION HYDROCHLORIDE 75 MG/1
75 TABLET ORAL DAILY
Qty: 30 TABLET | Refills: 1 | Status: SHIPPED | OUTPATIENT
Start: 2020-09-29 | End: 2021-03-01

## 2020-10-03 LAB — METHYLMALONATE SERPL-SCNC: 0.19 UMOL/L

## 2020-10-13 ENCOUNTER — CLINICAL SUPPORT (OUTPATIENT)
Dept: FAMILY MEDICINE | Facility: CLINIC | Age: 85
End: 2020-10-13
Payer: MEDICARE

## 2020-10-13 ENCOUNTER — TELEPHONE (OUTPATIENT)
Dept: FAMILY MEDICINE | Facility: CLINIC | Age: 85
End: 2020-10-13

## 2020-10-13 VITALS — SYSTOLIC BLOOD PRESSURE: 152 MMHG | DIASTOLIC BLOOD PRESSURE: 74 MMHG

## 2020-10-13 DIAGNOSIS — Z01.30 BP CHECK: Primary | ICD-10-CM

## 2020-10-13 PROCEDURE — 99212 OFFICE O/P EST SF 10 MIN: CPT | Mod: PBBFAC,PO

## 2020-10-13 PROCEDURE — 99999 PR PBB SHADOW E&M-EST. PATIENT-LVL II: CPT | Mod: PBBFAC,,,

## 2020-10-13 PROCEDURE — 99999 PR PBB SHADOW E&M-EST. PATIENT-LVL II: ICD-10-PCS | Mod: PBBFAC,,,

## 2020-10-13 NOTE — TELEPHONE ENCOUNTER
Patient presented to clinic for bp follow up.  Initial bp was 160/78 after a minimum of 5 minutes of rest, pt bp was 152/74.  Patient denied SOB, nausea, blurry vision, chest pain, and dizziness.  I advised patient to continue to monitor and report to ER if symptoms occur. Patient verbalized understanding.    Due to patient's allergies she is not interested in adding a new medication, but would be okay with changing her current meds if needed.

## 2020-10-13 NOTE — PROGRESS NOTES
Arleen Zarate 89 y.o. female is here today for Blood Pressure check.       Review of patient's allergies indicates:   Allergen Reactions    Doxy      Other reaction(s): Unknown    Effexor [venlafaxine] Other (See Comments)     shaking    Biaxin [clarithromycin] Other (See Comments)     Unknown    Codeine Other (See Comments)     Syncope, and nausea.    Statins-hmg-coa reductase inhibitors      myalgia    Doxycycline Other (See Comments)     Made very weak     Creatinine   Date Value Ref Range Status   09/29/2020 1.0 0.5 - 1.4 mg/dL Final     Sodium   Date Value Ref Range Status   09/29/2020 143 136 - 145 mmol/L Final     Potassium   Date Value Ref Range Status   09/29/2020 4.5 3.5 - 5.1 mmol/L Final   ]  Patient verifies taking blood pressure medications on a regular basis at the same time of the day.     Current Outpatient Medications:     lisinopriL (PRINIVIL,ZESTRIL) 20 MG tablet, Take 1 tablet (20 mg total) by mouth once daily., Disp: 90 tablet, Rfl: 3    nisoldipine (SULAR) 8.5 MG Tb24, Take 1 tablet (8.5 mg total) by mouth once daily., Disp: 90 tablet, Rfl: 3    buPROPion (WELLBUTRIN) 75 MG tablet, Take 1 tablet (75 mg total) by mouth once daily., Disp: 30 tablet, Rfl: 1    clotrimazole-betamethasone 1-0.05% (LOTRISONE) cream, Apply topically 2 (two) times daily. (Patient not taking: Reported on 9/29/2020), Disp: 45 g, Rfl: 0    fluticasone propionate (FLONASE) 50 mcg/actuation nasal spray, 1 spray (50 mcg total) by Each Nostril route once daily. (Patient not taking: Reported on 9/29/2020), Disp: 1 Bottle, Rfl: 3    methylsulfonylmethane (MSM) 1,000 mg Cap, Take 1 capsule by mouth 2 (two) times daily. , Disp: , Rfl:     multivitamin capsule, Take 1 capsule by mouth once daily., Disp: , Rfl:     omeprazole (PRILOSEC) 40 MG capsule, Take 1 capsule (40 mg total) by mouth once daily., Disp: 90 capsule, Rfl: 1      BP: (!) 152/74 ,   .    Patient presented to clinic for bp follow up.  Initial bp was  160/78 after a minimum of 5 minutes of rest, pt bp was 152/74.  Patient denied SOB, nausea, blurry vision, chest pain, and dizziness.  I advised patient to continue to monitor and report to ER if symptoms occur. Patient verbalized understanding. Dr. Jorge notified.

## 2020-10-26 ENCOUNTER — TELEPHONE (OUTPATIENT)
Dept: FAMILY MEDICINE | Facility: CLINIC | Age: 85
End: 2020-10-26

## 2020-10-26 NOTE — TELEPHONE ENCOUNTER
----- Message from Tonie Maria sent at 10/26/2020  1:08 PM CDT -----  Type: Needs to reschedule tomorrow's appointment    Who Called:  Patient  Best Call Back Number: [phone]  Additional Information:  Patient needs to reschedule tomorrow's nurse visit appointment/requesting to reschedule for next Tuesday, November 3rd around 10:00/please reschedule and call back to advise.

## 2020-11-03 ENCOUNTER — CLINICAL SUPPORT (OUTPATIENT)
Dept: FAMILY MEDICINE | Facility: CLINIC | Age: 85
End: 2020-11-03
Payer: MEDICARE

## 2020-11-03 ENCOUNTER — TELEPHONE (OUTPATIENT)
Dept: FAMILY MEDICINE | Facility: CLINIC | Age: 85
End: 2020-11-03

## 2020-11-03 VITALS — SYSTOLIC BLOOD PRESSURE: 146 MMHG | DIASTOLIC BLOOD PRESSURE: 68 MMHG

## 2020-11-03 DIAGNOSIS — I10 ESSENTIAL HYPERTENSION: ICD-10-CM

## 2020-11-03 DIAGNOSIS — Z01.30 BP CHECK: Primary | ICD-10-CM

## 2020-11-03 PROCEDURE — 99212 OFFICE O/P EST SF 10 MIN: CPT | Mod: PBBFAC,PO

## 2020-11-03 PROCEDURE — 99999 PR PBB SHADOW E&M-EST. PATIENT-LVL II: ICD-10-PCS | Mod: PBBFAC,,,

## 2020-11-03 PROCEDURE — 99999 PR PBB SHADOW E&M-EST. PATIENT-LVL II: CPT | Mod: PBBFAC,,,

## 2020-11-03 RX ORDER — LISINOPRIL 30 MG/1
30 TABLET ORAL DAILY
Qty: 90 TABLET | Refills: 0 | Status: SHIPPED | OUTPATIENT
Start: 2020-11-03 | End: 2021-01-21 | Stop reason: SDUPTHER

## 2020-11-03 NOTE — PROGRESS NOTES
Arleen Zarate 89 y.o. female is here today for Blood Pressure check.       Review of patient's allergies indicates:   Allergen Reactions    Doxy      Other reaction(s): Unknown    Effexor [venlafaxine] Other (See Comments)     shaking    Biaxin [clarithromycin] Other (See Comments)     Unknown    Codeine Other (See Comments)     Syncope, and nausea.    Statins-hmg-coa reductase inhibitors      myalgia    Doxycycline Other (See Comments)     Made very weak     Creatinine   Date Value Ref Range Status   09/29/2020 1.0 0.5 - 1.4 mg/dL Final     Sodium   Date Value Ref Range Status   09/29/2020 143 136 - 145 mmol/L Final     Potassium   Date Value Ref Range Status   09/29/2020 4.5 3.5 - 5.1 mmol/L Final   ]  Patient verifies taking blood pressure medications on a regular basis at the same time of the day.     Current Outpatient Medications:     methylsulfonylmethane (MSM) 1,000 mg Cap, Take 1 capsule by mouth 2 (two) times daily. , Disp: , Rfl:     nisoldipine (SULAR) 8.5 MG Tb24, Take 1 tablet (8.5 mg total) by mouth once daily., Disp: 90 tablet, Rfl: 3    buPROPion (WELLBUTRIN) 75 MG tablet, Take 1 tablet (75 mg total) by mouth once daily., Disp: 30 tablet, Rfl: 1    clotrimazole-betamethasone 1-0.05% (LOTRISONE) cream, APPLY TOPICALLY TWICE A DAY, Disp: 45 g, Rfl: 14    fluticasone propionate (FLONASE) 50 mcg/actuation nasal spray, 1 spray (50 mcg total) by Each Nostril route once daily. (Patient not taking: Reported on 9/29/2020), Disp: 1 Bottle, Rfl: 3    lisinopriL (PRINIVIL,ZESTRIL) 20 MG tablet, Take 1 tablet (20 mg total) by mouth once daily., Disp: 90 tablet, Rfl: 3    multivitamin capsule, Take 1 capsule by mouth once daily., Disp: , Rfl:     omeprazole (PRILOSEC) 40 MG capsule, Take 1 capsule (40 mg total) by mouth once daily., Disp: 90 capsule, Rfl: 1      BP: (!) 146/68 ,   .    Patient presented to clinic for bp follow up.  Initial bp was 152/74 after a minimum of 5 minutes of rest, pt bp  was 146/68.  Patient denied SOB, nausea, blurry vision, chest pain, and dizziness.  I advised patient to continue to monitor and report to ER if symptoms occur. Patient verbalized understanding.

## 2020-11-03 NOTE — TELEPHONE ENCOUNTER
Patient presented to clinic for bp follow up.  Initial bp was 152/74 after a minimum of 5 minutes of rest, pt bp was 146/68.  Patient denied SOB, nausea, blurry vision, chest pain, and dizziness.  I advised patient to continue to monitor and report to ER if symptoms occur. Patient verbalized understanding.

## 2020-11-03 NOTE — TELEPHONE ENCOUNTER
"Patient has elevated blood pressure above goal and after speaking with her we decided to increase her lisinopril dose from 20 mg daily to 30 mg daily.  Script was sent.  Will schedule nurse visit in 2-3 weeks for blood pressure check.  She agreed with the plan.    Documentation entered by me for this encounter may have been done in part using speech-recognition technology. Although I have made an effort to ensure accuracy, "sound like" errors may exist and should be interpreted in context.     Perry Jorge MD  11/03/2020        "

## 2020-11-24 ENCOUNTER — CLINICAL SUPPORT (OUTPATIENT)
Dept: FAMILY MEDICINE | Facility: CLINIC | Age: 85
End: 2020-11-24
Payer: MEDICARE

## 2020-11-24 VITALS — DIASTOLIC BLOOD PRESSURE: 70 MMHG | SYSTOLIC BLOOD PRESSURE: 132 MMHG

## 2020-11-24 DIAGNOSIS — Z01.30 BP CHECK: Primary | ICD-10-CM

## 2020-11-24 PROCEDURE — 99999 PR PBB SHADOW E&M-EST. PATIENT-LVL I: CPT | Mod: PBBFAC,,,

## 2020-11-24 PROCEDURE — 99211 OFF/OP EST MAY X REQ PHY/QHP: CPT | Mod: PBBFAC,PO

## 2020-11-24 PROCEDURE — 99999 PR PBB SHADOW E&M-EST. PATIENT-LVL I: ICD-10-PCS | Mod: PBBFAC,,,

## 2020-12-15 ENCOUNTER — TELEPHONE (OUTPATIENT)
Dept: FAMILY MEDICINE | Facility: CLINIC | Age: 85
End: 2020-12-15

## 2020-12-15 NOTE — TELEPHONE ENCOUNTER
Patient currently scheduled for appointment with Dr Jorge on 12- (3 month follow up). Dr Jorge will be out of the office due to temporary reassignment to hospital. Patient notified. Verbalized understanding. Patient states she will contact office after the holiday to reschedule.

## 2021-01-21 ENCOUNTER — TELEPHONE (OUTPATIENT)
Dept: FAMILY MEDICINE | Facility: CLINIC | Age: 86
End: 2021-01-21

## 2021-01-21 DIAGNOSIS — I10 ESSENTIAL HYPERTENSION: ICD-10-CM

## 2021-01-21 DIAGNOSIS — K21.9 GASTROESOPHAGEAL REFLUX DISEASE: ICD-10-CM

## 2021-01-21 RX ORDER — LISINOPRIL 30 MG/1
30 TABLET ORAL DAILY
Qty: 90 TABLET | Refills: 1 | Status: SHIPPED | OUTPATIENT
Start: 2021-01-21 | End: 2021-05-03 | Stop reason: SDUPTHER

## 2021-01-21 RX ORDER — NISOLDIPINE 8.5 MG/1
8.5 TABLET, FILM COATED, EXTENDED RELEASE ORAL DAILY
Qty: 90 TABLET | Refills: 3 | Status: SHIPPED | OUTPATIENT
Start: 2021-01-21 | End: 2021-05-03 | Stop reason: SDUPTHER

## 2021-01-21 RX ORDER — OMEPRAZOLE 40 MG/1
40 CAPSULE, DELAYED RELEASE ORAL DAILY
Qty: 90 CAPSULE | Refills: 1 | Status: SHIPPED | OUTPATIENT
Start: 2021-01-21 | End: 2021-05-03 | Stop reason: SDUPTHER

## 2021-03-01 ENCOUNTER — LAB VISIT (OUTPATIENT)
Dept: LAB | Facility: HOSPITAL | Age: 86
End: 2021-03-01
Attending: STUDENT IN AN ORGANIZED HEALTH CARE EDUCATION/TRAINING PROGRAM
Payer: MEDICARE

## 2021-03-01 ENCOUNTER — OFFICE VISIT (OUTPATIENT)
Dept: FAMILY MEDICINE | Facility: CLINIC | Age: 86
End: 2021-03-01
Payer: MEDICARE

## 2021-03-01 VITALS
HEIGHT: 60 IN | WEIGHT: 141.75 LBS | OXYGEN SATURATION: 96 % | RESPIRATION RATE: 16 BRPM | TEMPERATURE: 97 F | SYSTOLIC BLOOD PRESSURE: 136 MMHG | BODY MASS INDEX: 27.83 KG/M2 | HEART RATE: 86 BPM | DIASTOLIC BLOOD PRESSURE: 58 MMHG

## 2021-03-01 DIAGNOSIS — R79.9 ABNORMAL FINDING OF BLOOD CHEMISTRY, UNSPECIFIED: ICD-10-CM

## 2021-03-01 DIAGNOSIS — M15.9 PRIMARY OSTEOARTHRITIS INVOLVING MULTIPLE JOINTS: ICD-10-CM

## 2021-03-01 DIAGNOSIS — K21.9 HIATAL HERNIA WITH GERD WITHOUT ESOPHAGITIS: ICD-10-CM

## 2021-03-01 DIAGNOSIS — Z00.00 HEALTHCARE MAINTENANCE: Primary | ICD-10-CM

## 2021-03-01 DIAGNOSIS — R53.83 FATIGUE, UNSPECIFIED TYPE: ICD-10-CM

## 2021-03-01 DIAGNOSIS — K44.9 HIATAL HERNIA WITH GERD WITHOUT ESOPHAGITIS: ICD-10-CM

## 2021-03-01 DIAGNOSIS — D50.9 IRON DEFICIENCY ANEMIA, UNSPECIFIED IRON DEFICIENCY ANEMIA TYPE: ICD-10-CM

## 2021-03-01 DIAGNOSIS — N18.31 STAGE 3A CHRONIC KIDNEY DISEASE: ICD-10-CM

## 2021-03-01 DIAGNOSIS — E78.2 MIXED HYPERLIPIDEMIA: ICD-10-CM

## 2021-03-01 DIAGNOSIS — F41.1 GAD (GENERALIZED ANXIETY DISORDER): ICD-10-CM

## 2021-03-01 DIAGNOSIS — I10 ESSENTIAL HYPERTENSION: ICD-10-CM

## 2021-03-01 DIAGNOSIS — J30.2 CHRONIC SEASONAL ALLERGIC RHINITIS: ICD-10-CM

## 2021-03-01 DIAGNOSIS — E55.9 HYPOVITAMINOSIS D: ICD-10-CM

## 2021-03-01 DIAGNOSIS — M47.816 LUMBAR SPONDYLOSIS: ICD-10-CM

## 2021-03-01 LAB
ALBUMIN SERPL BCP-MCNC: 3.6 G/DL (ref 3.5–5.2)
ALP SERPL-CCNC: 80 U/L (ref 55–135)
ALT SERPL W/O P-5'-P-CCNC: 12 U/L (ref 10–44)
ANION GAP SERPL CALC-SCNC: 7 MMOL/L (ref 8–16)
AST SERPL-CCNC: 18 U/L (ref 10–40)
BASOPHILS # BLD AUTO: 0.03 K/UL (ref 0–0.2)
BASOPHILS NFR BLD: 0.5 % (ref 0–1.9)
BILIRUB SERPL-MCNC: 0.4 MG/DL (ref 0.1–1)
BUN SERPL-MCNC: 17 MG/DL (ref 10–30)
CALCIUM SERPL-MCNC: 9.1 MG/DL (ref 8.7–10.5)
CHLORIDE SERPL-SCNC: 108 MMOL/L (ref 95–110)
CO2 SERPL-SCNC: 27 MMOL/L (ref 23–29)
CREAT SERPL-MCNC: 1 MG/DL (ref 0.5–1.4)
DIFFERENTIAL METHOD: ABNORMAL
EOSINOPHIL # BLD AUTO: 0.1 K/UL (ref 0–0.5)
EOSINOPHIL NFR BLD: 1.8 % (ref 0–8)
ERYTHROCYTE [DISTWIDTH] IN BLOOD BY AUTOMATED COUNT: 12.7 % (ref 11.5–14.5)
EST. GFR  (AFRICAN AMERICAN): 55.3 ML/MIN/1.73 M^2
EST. GFR  (NON AFRICAN AMERICAN): 48 ML/MIN/1.73 M^2
FERRITIN SERPL-MCNC: 36 NG/ML (ref 20–300)
GLUCOSE SERPL-MCNC: 86 MG/DL (ref 70–110)
HCT VFR BLD AUTO: 38.8 % (ref 37–48.5)
HGB BLD-MCNC: 12.2 G/DL (ref 12–16)
IMM GRANULOCYTES # BLD AUTO: 0.01 K/UL (ref 0–0.04)
IMM GRANULOCYTES NFR BLD AUTO: 0.2 % (ref 0–0.5)
IRON SERPL-MCNC: 75 UG/DL (ref 30–160)
LYMPHOCYTES # BLD AUTO: 1.2 K/UL (ref 1–4.8)
LYMPHOCYTES NFR BLD: 21.6 % (ref 18–48)
MCH RBC QN AUTO: 29.5 PG (ref 27–31)
MCHC RBC AUTO-ENTMCNC: 31.4 G/DL (ref 32–36)
MCV RBC AUTO: 94 FL (ref 82–98)
MONOCYTES # BLD AUTO: 0.5 K/UL (ref 0.3–1)
MONOCYTES NFR BLD: 8.3 % (ref 4–15)
NEUTROPHILS # BLD AUTO: 3.8 K/UL (ref 1.8–7.7)
NEUTROPHILS NFR BLD: 67.6 % (ref 38–73)
NRBC BLD-RTO: 0 /100 WBC
PLATELET # BLD AUTO: 190 K/UL (ref 150–350)
PMV BLD AUTO: 11.1 FL (ref 9.2–12.9)
POTASSIUM SERPL-SCNC: 4.4 MMOL/L (ref 3.5–5.1)
PROT SERPL-MCNC: 6.5 G/DL (ref 6–8.4)
RBC # BLD AUTO: 4.13 M/UL (ref 4–5.4)
SATURATED IRON: 21 % (ref 20–50)
SODIUM SERPL-SCNC: 142 MMOL/L (ref 136–145)
TOTAL IRON BINDING CAPACITY: 355 UG/DL (ref 250–450)
TRANSFERRIN SERPL-MCNC: 240 MG/DL (ref 200–375)
WBC # BLD AUTO: 5.65 K/UL (ref 3.9–12.7)

## 2021-03-01 PROCEDURE — 36415 COLL VENOUS BLD VENIPUNCTURE: CPT | Mod: PO

## 2021-03-01 PROCEDURE — 85025 COMPLETE CBC W/AUTO DIFF WBC: CPT

## 2021-03-01 PROCEDURE — 83540 ASSAY OF IRON: CPT

## 2021-03-01 PROCEDURE — 80053 COMPREHEN METABOLIC PANEL: CPT

## 2021-03-01 PROCEDURE — 99214 PR OFFICE/OUTPT VISIT, EST, LEVL IV, 30-39 MIN: ICD-10-PCS | Mod: S$PBB,,, | Performed by: STUDENT IN AN ORGANIZED HEALTH CARE EDUCATION/TRAINING PROGRAM

## 2021-03-01 PROCEDURE — 99214 OFFICE O/P EST MOD 30 MIN: CPT | Mod: S$PBB,,, | Performed by: STUDENT IN AN ORGANIZED HEALTH CARE EDUCATION/TRAINING PROGRAM

## 2021-03-01 PROCEDURE — 82728 ASSAY OF FERRITIN: CPT

## 2021-03-01 PROCEDURE — 99214 OFFICE O/P EST MOD 30 MIN: CPT | Mod: PBBFAC,PO | Performed by: STUDENT IN AN ORGANIZED HEALTH CARE EDUCATION/TRAINING PROGRAM

## 2021-03-01 PROCEDURE — 99999 PR PBB SHADOW E&M-EST. PATIENT-LVL IV: ICD-10-PCS | Mod: PBBFAC,,, | Performed by: STUDENT IN AN ORGANIZED HEALTH CARE EDUCATION/TRAINING PROGRAM

## 2021-03-01 PROCEDURE — 99999 PR PBB SHADOW E&M-EST. PATIENT-LVL IV: CPT | Mod: PBBFAC,,, | Performed by: STUDENT IN AN ORGANIZED HEALTH CARE EDUCATION/TRAINING PROGRAM

## 2021-03-01 RX ORDER — ASPIRIN 81 MG/1
81 TABLET ORAL DAILY PRN
COMMUNITY
End: 2022-04-12

## 2021-04-27 ENCOUNTER — TELEPHONE (OUTPATIENT)
Dept: FAMILY MEDICINE | Facility: CLINIC | Age: 86
End: 2021-04-27

## 2021-05-03 DIAGNOSIS — K21.9 GASTROESOPHAGEAL REFLUX DISEASE: ICD-10-CM

## 2021-05-03 DIAGNOSIS — I10 ESSENTIAL HYPERTENSION: ICD-10-CM

## 2021-05-03 RX ORDER — NISOLDIPINE 8.5 MG/1
8.5 TABLET, FILM COATED, EXTENDED RELEASE ORAL DAILY
Qty: 90 TABLET | Refills: 3 | Status: SHIPPED | OUTPATIENT
Start: 2021-05-03 | End: 2022-04-12

## 2021-05-03 RX ORDER — OMEPRAZOLE 40 MG/1
40 CAPSULE, DELAYED RELEASE ORAL DAILY
Qty: 90 CAPSULE | Refills: 1 | Status: SHIPPED | OUTPATIENT
Start: 2021-05-03 | End: 2022-01-19

## 2021-05-03 RX ORDER — LISINOPRIL 30 MG/1
30 TABLET ORAL NIGHTLY
Qty: 90 TABLET | Refills: 1 | Status: SHIPPED | OUTPATIENT
Start: 2021-05-03 | End: 2021-09-26

## 2021-05-19 ENCOUNTER — HOSPITAL ENCOUNTER (OUTPATIENT)
Dept: RADIOLOGY | Facility: HOSPITAL | Age: 86
Discharge: HOME | End: 2021-05-19
Attending: PHYSICIAN ASSISTANT
Payer: MEDICARE

## 2021-05-19 ENCOUNTER — TELEPHONE (OUTPATIENT)
Dept: URGENT CARE | Facility: CLINIC | Age: 86
End: 2021-05-19

## 2021-05-19 ENCOUNTER — OFFICE VISIT (OUTPATIENT)
Dept: URGENT CARE | Facility: CLINIC | Age: 86
End: 2021-05-19
Payer: MEDICARE

## 2021-05-19 VITALS
HEIGHT: 60 IN | OXYGEN SATURATION: 98 % | BODY MASS INDEX: 26.7 KG/M2 | TEMPERATURE: 98 F | SYSTOLIC BLOOD PRESSURE: 148 MMHG | HEART RATE: 105 BPM | WEIGHT: 136 LBS | RESPIRATION RATE: 16 BRPM | DIASTOLIC BLOOD PRESSURE: 71 MMHG

## 2021-05-19 DIAGNOSIS — S00.31XA ABRASION, NOSE W/O INFECTION: ICD-10-CM

## 2021-05-19 DIAGNOSIS — S00.33XA CONTUSION OF NOSE, INITIAL ENCOUNTER: ICD-10-CM

## 2021-05-19 DIAGNOSIS — S80.212A ABRASION, KNEE, LEFT, INITIAL ENCOUNTER: ICD-10-CM

## 2021-05-19 DIAGNOSIS — S80.02XA CONTUSION OF LEFT KNEE, INITIAL ENCOUNTER: ICD-10-CM
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
## 2021-05-19 DIAGNOSIS — M25.561 ACUTE PAIN OF BOTH KNEES: ICD-10-CM

## 2021-05-19 DIAGNOSIS — S09.90XA HEAD TRAUMA, INITIAL ENCOUNTER: ICD-10-CM

## 2021-05-19 DIAGNOSIS — S80.01XA CONTUSION OF RIGHT KNEE, INITIAL ENCOUNTER: ICD-10-CM

## 2021-05-19 DIAGNOSIS — S09.90XA HEAD TRAUMA, INITIAL ENCOUNTER: Primary | ICD-10-CM

## 2021-05-19 DIAGNOSIS — S80.211A ABRASION OF RIGHT KNEE, INITIAL ENCOUNTER: ICD-10-CM

## 2021-05-19 DIAGNOSIS — M25.562 ACUTE PAIN OF BOTH KNEES: ICD-10-CM

## 2021-05-19 PROCEDURE — 99214 PR OFFICE/OUTPT VISIT, EST, LEVL IV, 30-39 MIN: ICD-10-PCS | Mod: S$GLB,,, | Performed by: PHYSICIAN ASSISTANT

## 2021-05-19 PROCEDURE — 73562 XR KNEE 3 VIEW BILATERAL: ICD-10-PCS | Mod: 50,S$GLB,, | Performed by: RADIOLOGY

## 2021-05-19 PROCEDURE — 73562 X-RAY EXAM OF KNEE 3: CPT | Mod: 50,S$GLB,, | Performed by: RADIOLOGY

## 2021-05-19 PROCEDURE — 99214 OFFICE O/P EST MOD 30 MIN: CPT | Mod: S$GLB,,, | Performed by: PHYSICIAN ASSISTANT

## 2021-05-19 PROCEDURE — 70450 CT HEAD/BRAIN W/O DYE: CPT | Mod: TC,PO

## 2021-05-19 RX ORDER — MUPIROCIN 20 MG/G
OINTMENT TOPICAL 2 TIMES DAILY
Qty: 22 G | Refills: 1 | Status: SHIPPED | OUTPATIENT
Start: 2021-05-19 | End: 2021-11-18

## 2021-09-09 ENCOUNTER — TELEPHONE (OUTPATIENT)
Dept: FAMILY MEDICINE | Facility: CLINIC | Age: 86
End: 2021-09-09

## 2021-10-12 ENCOUNTER — OFFICE VISIT (OUTPATIENT)
Dept: FAMILY MEDICINE | Facility: CLINIC | Age: 86
End: 2021-10-12
Payer: MEDICARE

## 2021-10-12 ENCOUNTER — LAB VISIT (OUTPATIENT)
Dept: LAB | Facility: HOSPITAL | Age: 86
End: 2021-10-12
Attending: STUDENT IN AN ORGANIZED HEALTH CARE EDUCATION/TRAINING PROGRAM
Payer: MEDICARE

## 2021-10-12 VITALS
TEMPERATURE: 98 F | RESPIRATION RATE: 16 BRPM | DIASTOLIC BLOOD PRESSURE: 70 MMHG | OXYGEN SATURATION: 96 % | BODY MASS INDEX: 26.75 KG/M2 | WEIGHT: 136.25 LBS | HEART RATE: 79 BPM | SYSTOLIC BLOOD PRESSURE: 134 MMHG | HEIGHT: 60 IN

## 2021-10-12 DIAGNOSIS — G62.9 NEUROPATHY: ICD-10-CM

## 2021-10-12 DIAGNOSIS — R53.83 FATIGUE, UNSPECIFIED TYPE: ICD-10-CM

## 2021-10-12 DIAGNOSIS — N18.31 STAGE 3A CHRONIC KIDNEY DISEASE: ICD-10-CM

## 2021-10-12 DIAGNOSIS — K31.83 ACHLORHYDRIA: ICD-10-CM

## 2021-10-12 DIAGNOSIS — I10 ESSENTIAL HYPERTENSION: ICD-10-CM

## 2021-10-12 DIAGNOSIS — E78.2 MIXED HYPERLIPIDEMIA: ICD-10-CM

## 2021-10-12 DIAGNOSIS — R79.9 ABNORMAL FINDING OF BLOOD CHEMISTRY, UNSPECIFIED: ICD-10-CM

## 2021-10-12 DIAGNOSIS — F41.1 GAD (GENERALIZED ANXIETY DISORDER): ICD-10-CM

## 2021-10-12 DIAGNOSIS — G62.9 NEUROPATHY: Primary | ICD-10-CM

## 2021-10-12 DIAGNOSIS — R41.3 MEMORY LOSS: ICD-10-CM

## 2021-10-12 DIAGNOSIS — M51.36 DDD (DEGENERATIVE DISC DISEASE), LUMBAR: ICD-10-CM

## 2021-10-12 LAB
ALBUMIN SERPL BCP-MCNC: 4 G/DL (ref 3.5–5.2)
ALP SERPL-CCNC: 83 U/L (ref 55–135)
ALT SERPL W/O P-5'-P-CCNC: 11 U/L (ref 10–44)
ANION GAP SERPL CALC-SCNC: 15 MMOL/L (ref 8–16)
AST SERPL-CCNC: 19 U/L (ref 10–40)
BASOPHILS # BLD AUTO: 0.05 K/UL (ref 0–0.2)
BASOPHILS NFR BLD: 0.7 % (ref 0–1.9)
BILIRUB SERPL-MCNC: 0.4 MG/DL (ref 0.1–1)
BUN SERPL-MCNC: 18 MG/DL (ref 10–30)
CALCIUM SERPL-MCNC: 9.9 MG/DL (ref 8.7–10.5)
CHLORIDE SERPL-SCNC: 104 MMOL/L (ref 95–110)
CHOLEST SERPL-MCNC: 220 MG/DL (ref 120–199)
CHOLEST/HDLC SERPL: 3.9 {RATIO} (ref 2–5)
CO2 SERPL-SCNC: 21 MMOL/L (ref 23–29)
CREAT SERPL-MCNC: 0.9 MG/DL (ref 0.5–1.4)
DIFFERENTIAL METHOD: NORMAL
EOSINOPHIL # BLD AUTO: 0.2 K/UL (ref 0–0.5)
EOSINOPHIL NFR BLD: 2.5 % (ref 0–8)
ERYTHROCYTE [DISTWIDTH] IN BLOOD BY AUTOMATED COUNT: 12.9 % (ref 11.5–14.5)
EST. GFR  (AFRICAN AMERICAN): >60 ML/MIN/1.73 M^2
EST. GFR  (NON AFRICAN AMERICAN): 54.1 ML/MIN/1.73 M^2
ESTIMATED AVG GLUCOSE: 97 MG/DL (ref 68–131)
FOLATE SERPL-MCNC: 36.3 NG/ML (ref 4–24)
GLUCOSE SERPL-MCNC: 74 MG/DL (ref 70–110)
HBA1C MFR BLD: 5 % (ref 4–5.6)
HCT VFR BLD AUTO: 38 % (ref 37–48.5)
HDLC SERPL-MCNC: 57 MG/DL (ref 40–75)
HDLC SERPL: 25.9 % (ref 20–50)
HGB BLD-MCNC: 12.3 G/DL (ref 12–16)
IMM GRANULOCYTES # BLD AUTO: 0.01 K/UL (ref 0–0.04)
IMM GRANULOCYTES NFR BLD AUTO: 0.1 % (ref 0–0.5)
LDLC SERPL CALC-MCNC: 134.8 MG/DL (ref 63–159)
LYMPHOCYTES # BLD AUTO: 1.6 K/UL (ref 1–4.8)
LYMPHOCYTES NFR BLD: 20.9 % (ref 18–48)
MCH RBC QN AUTO: 30 PG (ref 27–31)
MCHC RBC AUTO-ENTMCNC: 32.4 G/DL (ref 32–36)
MCV RBC AUTO: 93 FL (ref 82–98)
MONOCYTES # BLD AUTO: 0.6 K/UL (ref 0.3–1)
MONOCYTES NFR BLD: 8.6 % (ref 4–15)
NEUTROPHILS # BLD AUTO: 5 K/UL (ref 1.8–7.7)
NEUTROPHILS NFR BLD: 67.2 % (ref 38–73)
NONHDLC SERPL-MCNC: 163 MG/DL
NRBC BLD-RTO: 0 /100 WBC
PLATELET # BLD AUTO: 219 K/UL (ref 150–450)
PMV BLD AUTO: 10.9 FL (ref 9.2–12.9)
POTASSIUM SERPL-SCNC: 4.5 MMOL/L (ref 3.5–5.1)
PROT SERPL-MCNC: 7 G/DL (ref 6–8.4)
RBC # BLD AUTO: 4.1 M/UL (ref 4–5.4)
SODIUM SERPL-SCNC: 140 MMOL/L (ref 136–145)
TRIGL SERPL-MCNC: 141 MG/DL (ref 30–150)
TSH SERPL DL<=0.005 MIU/L-ACNC: 1.15 UIU/ML (ref 0.4–4)
WBC # BLD AUTO: 7.48 K/UL (ref 3.9–12.7)

## 2021-10-12 PROCEDURE — 99214 OFFICE O/P EST MOD 30 MIN: CPT | Mod: PBBFAC,PO | Performed by: STUDENT IN AN ORGANIZED HEALTH CARE EDUCATION/TRAINING PROGRAM

## 2021-10-12 PROCEDURE — 83921 ORGANIC ACID SINGLE QUANT: CPT | Performed by: STUDENT IN AN ORGANIZED HEALTH CARE EDUCATION/TRAINING PROGRAM

## 2021-10-12 PROCEDURE — 82607 VITAMIN B-12: CPT | Performed by: STUDENT IN AN ORGANIZED HEALTH CARE EDUCATION/TRAINING PROGRAM

## 2021-10-12 PROCEDURE — 85025 COMPLETE CBC W/AUTO DIFF WBC: CPT | Performed by: STUDENT IN AN ORGANIZED HEALTH CARE EDUCATION/TRAINING PROGRAM

## 2021-10-12 PROCEDURE — 99999 PR PBB SHADOW E&M-EST. PATIENT-LVL IV: CPT | Mod: PBBFAC,,, | Performed by: STUDENT IN AN ORGANIZED HEALTH CARE EDUCATION/TRAINING PROGRAM

## 2021-10-12 PROCEDURE — 80053 COMPREHEN METABOLIC PANEL: CPT | Performed by: STUDENT IN AN ORGANIZED HEALTH CARE EDUCATION/TRAINING PROGRAM

## 2021-10-12 PROCEDURE — 83036 HEMOGLOBIN GLYCOSYLATED A1C: CPT | Performed by: STUDENT IN AN ORGANIZED HEALTH CARE EDUCATION/TRAINING PROGRAM

## 2021-10-12 PROCEDURE — 99214 OFFICE O/P EST MOD 30 MIN: CPT | Mod: S$PBB,,, | Performed by: STUDENT IN AN ORGANIZED HEALTH CARE EDUCATION/TRAINING PROGRAM

## 2021-10-12 PROCEDURE — 84443 ASSAY THYROID STIM HORMONE: CPT | Performed by: STUDENT IN AN ORGANIZED HEALTH CARE EDUCATION/TRAINING PROGRAM

## 2021-10-12 PROCEDURE — 82746 ASSAY OF FOLIC ACID SERUM: CPT | Performed by: STUDENT IN AN ORGANIZED HEALTH CARE EDUCATION/TRAINING PROGRAM

## 2021-10-12 PROCEDURE — 80061 LIPID PANEL: CPT | Performed by: STUDENT IN AN ORGANIZED HEALTH CARE EDUCATION/TRAINING PROGRAM

## 2021-10-12 PROCEDURE — 99999 PR PBB SHADOW E&M-EST. PATIENT-LVL IV: ICD-10-PCS | Mod: PBBFAC,,, | Performed by: STUDENT IN AN ORGANIZED HEALTH CARE EDUCATION/TRAINING PROGRAM

## 2021-10-12 PROCEDURE — 99214 PR OFFICE/OUTPT VISIT, EST, LEVL IV, 30-39 MIN: ICD-10-PCS | Mod: S$PBB,,, | Performed by: STUDENT IN AN ORGANIZED HEALTH CARE EDUCATION/TRAINING PROGRAM

## 2021-10-12 RX ORDER — BUSPIRONE HYDROCHLORIDE 15 MG/1
15 TABLET ORAL 2 TIMES DAILY PRN
Qty: 60 TABLET | Refills: 2 | Status: SHIPPED | OUTPATIENT
Start: 2021-10-12 | End: 2021-11-04

## 2021-10-13 ENCOUNTER — TELEPHONE (OUTPATIENT)
Dept: FAMILY MEDICINE | Facility: CLINIC | Age: 86
End: 2021-10-13

## 2021-10-13 LAB — VIT B12 SERPL-MCNC: 451 PG/ML (ref 210–950)

## 2021-10-14 LAB — VIT B12 SERPL-MCNC: 312 NG/L (ref 180–914)

## 2021-10-19 LAB — METHYLMALONATE SERPL-SCNC: 0.16 NMOL/ML

## 2021-10-30 ENCOUNTER — HOSPITAL ENCOUNTER (EMERGENCY)
Facility: HOSPITAL | Age: 86
Discharge: HOME | End: 2021-10-30
Attending: EMERGENCY MEDICINE
Payer: MEDICARE

## 2021-10-30 VITALS
RESPIRATION RATE: 14 BRPM | TEMPERATURE: 97 F | OXYGEN SATURATION: 95 % | HEIGHT: 60 IN | BODY MASS INDEX: 26.5 KG/M2 | SYSTOLIC BLOOD PRESSURE: 160 MMHG | HEART RATE: 79 BPM | DIASTOLIC BLOOD PRESSURE: 71 MMHG | WEIGHT: 135 LBS

## 2021-10-30 DIAGNOSIS — K29.00 ACUTE SUPERFICIAL GASTRITIS WITHOUT HEMORRHAGE: ICD-10-CM

## 2021-10-30 DIAGNOSIS — R11.2 NON-INTRACTABLE VOMITING WITH NAUSEA, UNSPECIFIED VOMITING TYPE: Primary | ICD-10-CM

## 2021-10-30 LAB
ALBUMIN SERPL BCP-MCNC: 3.7 G/DL (ref 3.5–5.2)
ALP SERPL-CCNC: 68 U/L (ref 55–135)
ALT SERPL W/O P-5'-P-CCNC: 13 U/L (ref 10–44)
ANION GAP SERPL CALC-SCNC: 12 MMOL/L (ref 8–16)
AST SERPL-CCNC: 25 U/L (ref 10–40)
BASOPHILS # BLD AUTO: 0.05 K/UL (ref 0–0.2)
BASOPHILS NFR BLD: 0.6 % (ref 0–1.9)
BILIRUB SERPL-MCNC: 0.8 MG/DL (ref 0.1–1)
BNP SERPL-MCNC: 83 PG/ML (ref 0–99)
BUN SERPL-MCNC: 16 MG/DL (ref 10–30)
CALCIUM SERPL-MCNC: 8.8 MG/DL (ref 8.7–10.5)
CHLORIDE SERPL-SCNC: 105 MMOL/L (ref 95–110)
CO2 SERPL-SCNC: 20 MMOL/L (ref 23–29)
CREAT SERPL-MCNC: 0.9 MG/DL (ref 0.5–1.4)
DIFFERENTIAL METHOD: ABNORMAL
EOSINOPHIL # BLD AUTO: 0.1 K/UL (ref 0–0.5)
EOSINOPHIL NFR BLD: 1.7 % (ref 0–8)
ERYTHROCYTE [DISTWIDTH] IN BLOOD BY AUTOMATED COUNT: 12.5 % (ref 11.5–14.5)
EST. GFR  (AFRICAN AMERICAN): >60 ML/MIN/1.73 M^2
EST. GFR  (NON AFRICAN AMERICAN): 54.1 ML/MIN/1.73 M^2
GLUCOSE SERPL-MCNC: 120 MG/DL (ref 70–110)
HCT VFR BLD AUTO: 35.2 % (ref 37–48.5)
HGB BLD-MCNC: 11.5 G/DL (ref 12–16)
IMM GRANULOCYTES # BLD AUTO: 0.03 K/UL (ref 0–0.04)
IMM GRANULOCYTES NFR BLD AUTO: 0.4 % (ref 0–0.5)
LIPASE SERPL-CCNC: 38 U/L (ref 4–60)
LYMPHOCYTES # BLD AUTO: 2.8 K/UL (ref 1–4.8)
LYMPHOCYTES NFR BLD: 36 % (ref 18–48)
MCH RBC QN AUTO: 29.9 PG (ref 27–31)
MCHC RBC AUTO-ENTMCNC: 32.7 G/DL (ref 32–36)
MCV RBC AUTO: 92 FL (ref 82–98)
MONOCYTES # BLD AUTO: 0.6 K/UL (ref 0.3–1)
MONOCYTES NFR BLD: 8 % (ref 4–15)
NEUTROPHILS # BLD AUTO: 4.2 K/UL (ref 1.8–7.7)
NEUTROPHILS NFR BLD: 53.3 % (ref 38–73)
NRBC BLD-RTO: 0 /100 WBC
PLATELET # BLD AUTO: 175 K/UL (ref 150–450)
PMV BLD AUTO: 11.4 FL (ref 9.2–12.9)
POTASSIUM SERPL-SCNC: 3.8 MMOL/L (ref 3.5–5.1)
PROT SERPL-MCNC: 6.4 G/DL (ref 6–8.4)
RBC # BLD AUTO: 3.84 M/UL (ref 4–5.4)
SODIUM SERPL-SCNC: 137 MMOL/L (ref 136–145)
TROPONIN I SERPL DL<=0.01 NG/ML-MCNC: <0.03 NG/ML
WBC # BLD AUTO: 7.86 K/UL (ref 3.9–12.7)

## 2021-10-30 PROCEDURE — 93010 ELECTROCARDIOGRAM REPORT: CPT | Mod: ,,, | Performed by: INTERNAL MEDICINE

## 2021-10-30 PROCEDURE — 99284 EMERGENCY DEPT VISIT MOD MDM: CPT | Mod: 25

## 2021-10-30 PROCEDURE — 96375 TX/PRO/DX INJ NEW DRUG ADDON: CPT

## 2021-10-30 PROCEDURE — 96365 THER/PROPH/DIAG IV INF INIT: CPT

## 2021-10-30 PROCEDURE — 84484 ASSAY OF TROPONIN QUANT: CPT | Performed by: EMERGENCY MEDICINE

## 2021-10-30 PROCEDURE — 63600175 PHARM REV CODE 636 W HCPCS: Performed by: EMERGENCY MEDICINE

## 2021-10-30 PROCEDURE — 80053 COMPREHEN METABOLIC PANEL: CPT | Performed by: EMERGENCY MEDICINE

## 2021-10-30 PROCEDURE — 25000003 PHARM REV CODE 250: Performed by: EMERGENCY MEDICINE

## 2021-10-30 PROCEDURE — 83690 ASSAY OF LIPASE: CPT | Performed by: EMERGENCY MEDICINE

## 2021-10-30 PROCEDURE — 85025 COMPLETE CBC W/AUTO DIFF WBC: CPT | Performed by: EMERGENCY MEDICINE

## 2021-10-30 PROCEDURE — 93010 EKG 12-LEAD: ICD-10-PCS | Mod: ,,, | Performed by: INTERNAL MEDICINE

## 2021-10-30 PROCEDURE — 93005 ELECTROCARDIOGRAM TRACING: CPT | Performed by: INTERNAL MEDICINE

## 2021-10-30 PROCEDURE — 83880 ASSAY OF NATRIURETIC PEPTIDE: CPT | Performed by: EMERGENCY MEDICINE

## 2021-10-30 RX ORDER — ONDANSETRON 4 MG/1
4 TABLET, FILM COATED ORAL EVERY 6 HOURS PRN
Qty: 20 TABLET | Refills: 1 | Status: SHIPPED | OUTPATIENT
Start: 2021-10-30 | End: 2021-11-18

## 2021-10-30 RX ORDER — FAMOTIDINE 10 MG/ML
40 INJECTION INTRAVENOUS
Status: COMPLETED | OUTPATIENT
Start: 2021-10-30 | End: 2021-10-30

## 2021-10-30 RX ORDER — FAMOTIDINE 20 MG/1
20 TABLET, FILM COATED ORAL 2 TIMES DAILY
Qty: 20 TABLET | Refills: 0 | Status: SHIPPED | OUTPATIENT
Start: 2021-10-30 | End: 2021-11-18

## 2021-10-30 RX ADMIN — PROMETHAZINE HYDROCHLORIDE 6.25 MG: 25 INJECTION INTRAMUSCULAR; INTRAVENOUS at 12:10

## 2021-10-30 RX ADMIN — FAMOTIDINE 40 MG: 10 INJECTION INTRAVENOUS at 12:10

## 2021-11-01 ENCOUNTER — TELEPHONE (OUTPATIENT)
Dept: FAMILY MEDICINE | Facility: CLINIC | Age: 86
End: 2021-11-01
Payer: MEDICARE

## 2021-11-04 ENCOUNTER — OFFICE VISIT (OUTPATIENT)
Dept: FAMILY MEDICINE | Facility: CLINIC | Age: 86
End: 2021-11-04
Payer: MEDICARE

## 2021-11-04 VITALS
DIASTOLIC BLOOD PRESSURE: 62 MMHG | SYSTOLIC BLOOD PRESSURE: 136 MMHG | TEMPERATURE: 98 F | HEART RATE: 90 BPM | WEIGHT: 134.5 LBS | BODY MASS INDEX: 26.41 KG/M2 | RESPIRATION RATE: 16 BRPM | HEIGHT: 60 IN | OXYGEN SATURATION: 98 %

## 2021-11-04 DIAGNOSIS — T78.40XS ALLERGIC REACTION, SEQUELA: Primary | ICD-10-CM

## 2021-11-04 DIAGNOSIS — E78.2 MIXED HYPERLIPIDEMIA: ICD-10-CM

## 2021-11-04 DIAGNOSIS — K44.9 HIATAL HERNIA WITH GERD WITHOUT ESOPHAGITIS: ICD-10-CM

## 2021-11-04 DIAGNOSIS — I70.0 ATHEROSCLEROSIS OF AORTA: ICD-10-CM

## 2021-11-04 DIAGNOSIS — K21.9 HIATAL HERNIA WITH GERD WITHOUT ESOPHAGITIS: ICD-10-CM

## 2021-11-04 DIAGNOSIS — I10 ESSENTIAL HYPERTENSION: ICD-10-CM

## 2021-11-04 DIAGNOSIS — F41.1 GAD (GENERALIZED ANXIETY DISORDER): ICD-10-CM

## 2021-11-04 DIAGNOSIS — N18.31 STAGE 3A CHRONIC KIDNEY DISEASE: ICD-10-CM

## 2021-11-04 PROCEDURE — 99999 PR PBB SHADOW E&M-EST. PATIENT-LVL IV: CPT | Mod: PBBFAC,,, | Performed by: STUDENT IN AN ORGANIZED HEALTH CARE EDUCATION/TRAINING PROGRAM

## 2021-11-04 PROCEDURE — 99214 OFFICE O/P EST MOD 30 MIN: CPT | Mod: PBBFAC,PO | Performed by: STUDENT IN AN ORGANIZED HEALTH CARE EDUCATION/TRAINING PROGRAM

## 2021-11-04 PROCEDURE — 99214 OFFICE O/P EST MOD 30 MIN: CPT | Mod: S$PBB,,, | Performed by: STUDENT IN AN ORGANIZED HEALTH CARE EDUCATION/TRAINING PROGRAM

## 2021-11-04 PROCEDURE — 99214 PR OFFICE/OUTPT VISIT, EST, LEVL IV, 30-39 MIN: ICD-10-PCS | Mod: S$PBB,,, | Performed by: STUDENT IN AN ORGANIZED HEALTH CARE EDUCATION/TRAINING PROGRAM

## 2021-11-04 PROCEDURE — 99999 PR PBB SHADOW E&M-EST. PATIENT-LVL IV: ICD-10-PCS | Mod: PBBFAC,,, | Performed by: STUDENT IN AN ORGANIZED HEALTH CARE EDUCATION/TRAINING PROGRAM

## 2021-11-18 ENCOUNTER — TELEPHONE (OUTPATIENT)
Dept: FAMILY MEDICINE | Facility: CLINIC | Age: 86
End: 2021-11-18
Payer: MEDICARE

## 2021-11-18 DIAGNOSIS — S80.212A ABRASION, KNEE, LEFT, INITIAL ENCOUNTER: ICD-10-CM

## 2021-11-18 DIAGNOSIS — S00.31XA ABRASION, NOSE W/O INFECTION: ICD-10-CM

## 2021-11-18 DIAGNOSIS — S80.211A ABRASION OF RIGHT KNEE, INITIAL ENCOUNTER: ICD-10-CM

## 2021-11-18 RX ORDER — BUSPIRONE HYDROCHLORIDE 15 MG/1
15 TABLET ORAL 2 TIMES DAILY
COMMUNITY
Start: 2021-11-16 | End: 2021-11-18

## 2021-11-30 ENCOUNTER — PES CALL (OUTPATIENT)
Dept: ADMINISTRATIVE | Facility: CLINIC | Age: 86
End: 2021-11-30
Payer: MEDICARE

## 2021-12-03 NOTE — PROGRESS NOTES
[facility]  Subjective:    Chief Complaint:   Weak   History of Present Illness:  89 y.o. female presents for multiple issues.       Fatigue-suspect iron deficiency for due to night cramps and restricted diet verses depression.  Will get iron levels and recommend an iron pill trial and will start Wellbutrin.  Will check TSH.  No blood or black in stool no weight loss.  Good sleep   HPI-present for the last 4 weeks constant nothing alleviates or aggravates this.  No chest pain shortness of breath fever chills myalgias or joint pain.   Night cramps-present for the past few weeks in early present at night constant worsening calves suspect iron def trial iron check lytes.    ANYA-patient reports she has some anxiety due to being quarantined in no contact with her family and she was stress of the for this.    Hypertension-stable taking lisinopril for you she is now on try different medicine at home it is in the 140 systolic.   CKD 3-patient reports she has not take NSAIDs stable      This is the extent of the patient's complaints at this present time.     She denies chest pain upon exertion, dyspnea, nausea, vomiting, diaphoresis, and syncope. No pleuritic chest pain, unilateral leg swelling, calf tenderness, or calf pain.     The following portions of the patient's history were reviewed and updated as appropriate: allergies, current medications, past family history, past medical history, past social history, past surgical history and problem list.    Review of Systems [Negative unless checked off]  Gen ROS: []fever []chills []weight loss []malaise [x]fatigue   Neuro: []dizzy []numbness []LOC [x]weakness []headaches   Psych ROS: []hallucinate [] depression []anxiety []suicidal ideation    ENT ROS: []congestion []rhinorrhea []sore throat []neck pain []hearing loss   Eye ROS: []hazy vision [] diplopia []photophobia []eye pain    Pulm ROS: []cough []pleuritic pain []dyspnea []wheezing    [facility]S :  []chest pain []SOB on exertion []orthopnea []PND []leg swelling   GI ROS: []n/v []abd pain []diarrhea []constipation []blood/black stool   Uro ROS: []dysuria []frequency []flank pain [] trouble voiding []hematuria   MSK ROS: []myalgias []joint pain []neck pain []back pain [] falls   Derm ROS: []pruritis []rash []jaundice        Past Medical History:   Diagnosis Date    Anxiety     Arthritis     fingers and back    Depression     Disorder of kidney and ureter     Foot pain, left 9/18/2013    GERD (gastroesophageal reflux disease)     Hiatal hernia     Hyperlipidemia     Hypertension     Reflux     on meds    Tendon tear 8/21/2013    Tendonitis 8/21/2013     Past Surgical History:   Procedure Laterality Date    CHOLECYSTECTOMY      EYE SURGERY Bilateral     cataracts and implants    TONSILLECTOMY       Social History  Social History     Tobacco Use    Smoking status: Never Smoker    Smokeless tobacco: Never Used   Substance Use Topics    Alcohol use: Yes     Alcohol/week: 1.0 standard drinks     Types: 1 Glasses of wine per week     Comment: rarely    Drug use: No     History reviewed. No pertinent family history.  Review of patient's allergies indicates:   Allergen Reactions    Doxy      Other reaction(s): Unknown    Effexor [venlafaxine] Other (See Comments)     shaking    Biaxin [clarithromycin] Other (See Comments)     Unknown    Codeine Other (See Comments)     Syncope, and nausea.    Statins-hmg-coa reductase inhibitors      myalgia    Doxycycline Other (See Comments)     Made very weak       Physical Examination  BP (!) 150/70 (BP Location: Left arm)   Pulse 92   Temp 97.6 °F (36.4 °C) (Temporal)   Resp 16   Ht 5' (1.524 m)   Wt 63.7 kg (140 lb 6.9 oz)   SpO2 (!) 93%   BMI 27.43 kg/m²   Wt Readings from Last 3 Encounters:   09/29/20 63.7 kg (140 lb 6.9 oz)   09/22/20 63.7 kg (140 lb 6.9 oz)   07/29/20 64.8 kg (142 lb 13.7 oz)     BP Readings from Last 3 Encounters:   09/29/20 (!)  150/70   09/22/20 130/74   07/29/20 132/76     Estimated body mass index is 27.43 kg/m² as calculated from the following:    Height as of this encounter: 5' (1.524 m).    Weight as of this encounter: 63.7 kg (140 lb 6.9 oz).     General appearance: alert, cooperative, no distress  Eyes: pupils equal and reactive, extraocular eye movements intact   Ears: bilateral TM's and external ear canals normal   Nose: normal and patent, no erythema, discharge or polyps   Sinuses: Normal paranasal sinuses without tenderness   Throat: mucous membranes moist, pharynx normal without lesions   Neck: no thyromegaly, trachea midline  Lungs: clear to auscultation, no wheezes, rales or rhonchi, symmetric air entry, no dullness to percussion bilaterally.  Heart: normal rate, regular rhythm, normal S1, S2, no murmurs, rubs, clicks or gallops, no displacement of the PMI.  Abdomen: soft, nontender, nondistended, no masses or organomegaly No rigidity, rebound, or guarding.   Back: full range of motion, no tenderness, palpable spasm or pain on motion   Extremities: peripheral pulses normal, no pedal edema, no clubbing or cyanosis   Feet: warm, good capillary refill.  Neurological:alert, oriented, normal speech, no focal findings or movement disorder noted   Psychiatric: alert, oriented to person, place, and time  Integument: normal coloration and turgor, no rashes, no suspicious skin lesions noted.    Data reviewed    Previous medical records reviewed and summarized above in HPI.     Laboratory    I have reviewed old labs below:    Lab Results   Component Value Date    WBC 5.60 07/29/2020    HGB 12.5 07/29/2020    HCT 41.2 07/29/2020    MCV 99 (H) 07/29/2020     07/29/2020     07/29/2020    K 4.1 07/29/2020     07/29/2020    CALCIUM 9.5 07/29/2020    CO2 26 07/29/2020    GLU 75 07/29/2020    BUN 15 07/29/2020    CREATININE 1.0 07/29/2020    ANIONGAP 9 07/29/2020    ESTGFRAFRICA 55.7 (A) 07/29/2020    EGFRNONAA 48.3 (A)  07/29/2020    PROT 7.0 07/29/2020    ALBUMIN 4.0 07/29/2020    BILITOT 0.5 07/29/2020    ALKPHOS 79 07/29/2020    ALT 13 07/29/2020    AST 23 07/29/2020    CHOL 225 (H) 07/29/2020    TRIG 120 07/29/2020    HDL 65 07/29/2020    LDLCALC 136.0 07/29/2020    TSH 0.974 03/22/2019     Lab reviewed by me: Particular labs of significance that I will monitor, workup, or treat to improve are marked below.     []HGB low []A1c  high []LDL high [x]Cr    high []Na  high []K  high []AST high []TSH  high   []Plt low  []Gluc high  []Trig high [x]GFR low []Na  low []K  low []ALT high []TSH  low       Imaging/Tracing: I have reviewed the pertinent results/findings and my personal findings are below:       Assessment/Plan    Arleen Zarate is a 89 y.o. female who presents to clinic with:    1. Fatigue, unspecified type    2. Anemia, unspecified type    3. ANYA (generalized anxiety disorder)    4. Essential hypertension    5. Mixed hyperlipidemia    6. Stage 3 chronic kidney disease    7. Hypovitaminosis D    8. Varicose veins of lower extremity, unspecified laterality, unspecified whether complicated    9. Osteopenia determined by x-ray    10. Primary osteoarthritis involving multiple joints    11. Nocturnal muscle cramps         Diagnostic impression remarks      Fatigue-suspect iron deficiency for due to night cramps and restricted diet verses depression.  Will get iron levels and recommend an iron pill trial and will start Wellbutrin.  Will check TSH.  No blood or black in stool no weight loss.  Good sleep   HPI-present for the last 4 weeks constant nothing alleviates or aggravates this.  No chest pain shortness of breath fever chills myalgias or joint pain.   Night cramps-present for the past few weeks in early present at night constant worsening calves   GED-patient reports she has some anxiety due to being quarantined in no contact with her family and she was stress of the for this.   Hypertension-stable taking lisinopril  for you she is now on try different medicine at home it is in the 140 systolic.   CKD 3-patient reports she has not take NSAIDs stable   Low vitamin-D-stable taking vitamin-D    At this point in time the patient is considered a low risk as determined by her history, physical, and [place] syncope score. I have a low suspicion that her syncope is due to cardiac ischemia since she does not endorse chest pain upon exertion, dyspnea, nausea, vomiting, diaphoresis, or radiation to the arm, jaw, or back. Her EKG also did not show acute ischemic changes. Aortic dissection is low on the differential since she denies a ripping or tearing sensation chest pain, chest pain that radiates to the back, sudden severe abdominal pain, there is no pulse deficit, and there is no pulsatile abdominal mass. A PE is unlikely since she has no pleuritic chest pain, unilateral leg swelling, calf tenderness, tachycardia, tachypnea or hypoxia.     Regarding syncope, although patient presents with a syncopal or near-syncopal episode, I have no suspicion that the event is secondary to an arrhythmia such as WPW, prolonged QT syndrome, or ventricular tachycardia. I have no suspicion for a seizure episode, intracranial hemorrhage, pulmonary embolus, myocardial infarction, sepsis, ectopic pregnancy, hemorrhagic shock, or hypoglycemia. Based on my evaluation, there is no emergent medical condition. Nonetheless, syncope precautions were discussed with the patient and/or caretaker, specifically not to swim or bathe unattended, not to operate motor vehicles or other machinery, and to avoid heights or other areas where falls may occur until cleared by primary care physician. Patient is safe for discharge. I explained to patient possible reasons for a syncopal episode including: coughing very hard; straining while having a bowel movement; have been standing in one place for too long; experiencing emotional distress or fear; in severe pain; taking certain  medications (anxiety, depression, high blood pressure, and allergies); drug or alcohol use; hyperventilation; hypoglycemia; seizures; dehydration; or standing up suddenly from a lying position. Encouraged pt to drink plenty of water and eat healthy diet.    I explained the risks and benefits and share decision making was made. She verbalized understanding and chose symptomatic treatment with close monitoring. Therefore, we decided to monitor for improvement and then proceed with close follow up if necessary. She was instructed that if her symptoms persists or worsen to immediately go to the ED and she agreed to this plan.           BMI Goal    Counseled patient on her ideal body weight, health consequences of being obese and current recommendations including weekly exercise and a heart healthy diet.  She is aware that ideal BMI < 25  Estimated body mass index is 27.43 kg/m² as calculated from the following:    Height as of this encounter: 5' (1.524 m).    Weight as of this encounter: 63.7 kg (140 lb 6.9 oz).     She was counseled about the importance of healthy dietary habits as well as routine physical activity and exercise for better health outcomes. I also discussed the importance of cancer screening.     Medication Monitoring    In today's visit, monitoring for drug toxicity was accomplished. Proper use of medications was also discussed.     Counseling    Anxiety Counseling  Regarding anxiety, I discussed signs and symptoms of anxiety with patient including: tachycardia, dysrhythmias, tachypnea, diaphoresis, trembling, dizziness, diarrhea, dry mouth, and difficulty swallowing.    Patient was encouraged to eat a well-balanced, healthy diet; get plenty of rest; exercise daily; limit caffeine and alcohol intake; participate in meditation; talk with family and/or friends about things that may be considered stressful; and keep a diary of feelings and stress triggers.    Patient was instructed to take medications as  prescribed and follow up with primary care provider for long term management.   I recommended that the patient return to the emergency department if they: feel lightheaded or too dizzy to stand up; develop feelings that they want to hurt themselves or someone else; or develop chest pain, tightness, or heaviness that radiates to the shoulders, arms, jaw, neck, or back.       I discussed imaging findings, diagnosis, possibilities, treatment options, medications, risks, and benefits. She had many questions regarding the options and long-term effects. All questions were answered. She expressed understanding after counseling regarding the diagnosis and recommendations. She was capable and demonstrated competence with understanding of these options. Shared decision making was performed resulting in her choosing the current treatment plan.     I also discussed the importance of close follow up to discuss labs, change or modify her medications if needed, monitor side effects, and further evaluation of medical problems.     Additional workup planned: see labs ordered below.    See below for labs and meds ordered with associated diagnosis    1. Fatigue, unspecified type  - CBC auto differential; Future  - Comprehensive metabolic panel; Future  - TSH; Future  - Methylmalonic Acid, Serum; Future  - Ferritin; Future  - Iron and TIBC; Future    2. Anemia, unspecified type  - Methylmalonic Acid, Serum; Future  - Ferritin; Future  - Iron and TIBC; Future    3. ANYA (generalized anxiety disorder)  - buPROPion (WELLBUTRIN) 75 MG tablet; Take 1 tablet (75 mg total) by mouth once daily.  Dispense: 30 tablet; Refill: 1    4. Essential hypertension    5. Mixed hyperlipidemia    6. Stage 3 chronic kidney disease    7. Hypovitaminosis D    8. Varicose veins of lower extremity, unspecified laterality, unspecified whether complicated    9. Osteopenia determined by x-ray    10. Primary osteoarthritis involving multiple joints    11. Nocturnal  "muscle cramps  - Comprehensive metabolic panel; Future  - Ferritin; Future  - Iron and TIBC; Future  - Magnesium; Future       Medication List with Changes/Refills   New Medications    BUPROPION (WELLBUTRIN) 75 MG TABLET    Take 1 tablet (75 mg total) by mouth once daily.   Current Medications    CLOTRIMAZOLE-BETAMETHASONE 1-0.05% (LOTRISONE) CREAM    Apply topically 2 (two) times daily.    FLUTICASONE PROPIONATE (FLONASE) 50 MCG/ACTUATION NASAL SPRAY    1 spray (50 mcg total) by Each Nostril route once daily.    LISINOPRIL (PRINIVIL,ZESTRIL) 20 MG TABLET    Take 1 tablet (20 mg total) by mouth once daily.    METHYLSULFONYLMETHANE (MSM) 1,000 MG CAP    Take 1 capsule by mouth 2 (two) times daily.     MULTIVITAMIN CAPSULE    Take 1 capsule by mouth once daily.    NISOLDIPINE (SULAR) 8.5 MG TB24    Take 1 tablet (8.5 mg total) by mouth once daily.    OMEPRAZOLE (PRILOSEC) 40 MG CAPSULE    Take 1 capsule (40 mg total) by mouth once daily.     Modified Medications    No medications on file       Follow up in about 3 months (around 12/29/2020). for further workup and reassessment if labs and tests obtained are stable or sooner as needed. She was instructed to call the clinic or go to the emergency department if her symptoms do not improve, worsens, or if new symptoms develop. Patient knows to call any time if an emergency arises. Shared decision making occurred and she verbalized understanding in agreement with this plan.     Documentation entered by me for this encounter may have been done in part using speech-recognition technology. Although I have made an effort to ensure accuracy, "sound like" errors may exist and should be interpreted in context.     Perry Jorge MD  09/29/2020     " Admission

## 2021-12-21 ENCOUNTER — OFFICE VISIT (OUTPATIENT)
Dept: FAMILY MEDICINE | Facility: CLINIC | Age: 86
End: 2021-12-21
Payer: MEDICARE

## 2021-12-21 VITALS
TEMPERATURE: 99 F | DIASTOLIC BLOOD PRESSURE: 68 MMHG | SYSTOLIC BLOOD PRESSURE: 126 MMHG | HEART RATE: 78 BPM | WEIGHT: 136.69 LBS | BODY MASS INDEX: 26.84 KG/M2 | OXYGEN SATURATION: 96 % | HEIGHT: 60 IN

## 2021-12-21 DIAGNOSIS — R26.9 ABNORMALITY OF GAIT AND MOBILITY: ICD-10-CM

## 2021-12-21 DIAGNOSIS — Z00.00 ENCOUNTER FOR PREVENTIVE HEALTH EXAMINATION: Primary | ICD-10-CM

## 2021-12-21 DIAGNOSIS — I70.0 ATHEROSCLEROSIS OF AORTA: ICD-10-CM

## 2021-12-21 DIAGNOSIS — N18.31 STAGE 3A CHRONIC KIDNEY DISEASE: ICD-10-CM

## 2021-12-21 PROCEDURE — G0439 PR MEDICARE ANNUAL WELLNESS SUBSEQUENT VISIT: ICD-10-PCS | Mod: ,,, | Performed by: NURSE PRACTITIONER

## 2021-12-21 PROCEDURE — G0439 PPPS, SUBSEQ VISIT: HCPCS | Mod: ,,, | Performed by: NURSE PRACTITIONER

## 2021-12-21 PROCEDURE — 99214 OFFICE O/P EST MOD 30 MIN: CPT | Mod: PBBFAC,PO | Performed by: NURSE PRACTITIONER

## 2021-12-21 PROCEDURE — 99999 PR PBB SHADOW E&M-EST. PATIENT-LVL IV: ICD-10-PCS | Mod: PBBFAC,,, | Performed by: NURSE PRACTITIONER

## 2021-12-21 PROCEDURE — 99999 PR PBB SHADOW E&M-EST. PATIENT-LVL IV: CPT | Mod: PBBFAC,,, | Performed by: NURSE PRACTITIONER

## 2022-01-12 DIAGNOSIS — K21.9 GASTROESOPHAGEAL REFLUX DISEASE: ICD-10-CM

## 2022-01-12 NOTE — TELEPHONE ENCOUNTER
No new care gaps identified.  Powered by [employer]. Reference number: [id number].   1/12/2022 4:34:45 PM CST

## 2022-01-19 RX ORDER — OMEPRAZOLE 40 MG/1
40 CAPSULE, DELAYED RELEASE ORAL DAILY
Qty: 90 CAPSULE | Refills: 3 | Status: SHIPPED | OUTPATIENT
Start: 2022-01-19 | End: 2022-10-11 | Stop reason: SDUPTHER

## 2022-01-19 NOTE — TELEPHONE ENCOUNTER
Refill Authorization Note   Arleen Zarate  is requesting a refill authorization.  Brief Assessment and Rationale for Refill:  Approve     Medication Therapy Plan:       Medication Reconciliation Completed: No   Comments:   --->Care Gap information included below if applicable.   Orders Placed This Encounter    omeprazole (PRILOSEC) 40 MG capsule      Requested Prescriptions   Signed Prescriptions Disp Refills    omeprazole (PRILOSEC) 40 MG capsule 90 capsule 3     Sig: Take 1 capsule (40 mg total) by mouth once daily.       Gastroenterology: Proton Pump Inhibitors Passed - 1/19/2022  8:01 AM        Passed - Patient is at least 18 years old        Passed - Osteoporosis is not on problem list        Passed - Plavix is not on active medication list        Passed - An appropriate indication is on the problem list        Passed - Valid encounter within last 15 months     Recent Visits  Date Type Provider Dept   11/04/21 Office Visit Perry Jogre MD [facility]   10/12/21 Office Visit Perry Jorge MD [facility]   03/01/21 Office Visit Perry Jorge MD [facility]   09/29/20 Office Visit Perry Jorge MD [facility]   Showing recent visits within past 720 days and meeting all other requirements  Future Appointments  No visits were found meeting these conditions.  Showing future appointments within next 150 days and meeting all other requirements      Future Appointments              In 2 months Perry Jorge MD [facility] [place]                    Appointments  past 12m or future 3m with PCP    Date Provider   Last Visit   11/4/2021 Perry Jorge MD   Next Visit   4/12/2022 Perry Jorge MD   ED visits in past 90 days: 1     Note composed:8:22 AM 01/19/2022

## 2022-03-26 ENCOUNTER — HOSPITAL ENCOUNTER (EMERGENCY)
Facility: HOSPITAL | Age: 87
Discharge: HOME | End: 2022-03-26
Attending: EMERGENCY MEDICINE
Payer: MEDICARE

## 2022-03-26 VITALS
HEIGHT: 59 IN | TEMPERATURE: 99 F | WEIGHT: 130 LBS | RESPIRATION RATE: 18 BRPM | HEART RATE: 82 BPM | BODY MASS INDEX: 26.21 KG/M2 | OXYGEN SATURATION: 2 % | DIASTOLIC BLOOD PRESSURE: 67 MMHG | SYSTOLIC BLOOD PRESSURE: 151 MMHG

## 2022-03-26 DIAGNOSIS — R06.00 DYSPNEA: ICD-10-CM

## 2022-03-26 DIAGNOSIS — N30.00 ACUTE CYSTITIS WITHOUT HEMATURIA: Primary | ICD-10-CM

## 2022-03-26 DIAGNOSIS — R53.1 GENERALIZED WEAKNESS: ICD-10-CM

## 2022-03-26 DIAGNOSIS — R53.1 WEAKNESS: ICD-10-CM

## 2022-03-26 DIAGNOSIS — J01.10 ACUTE FRONTAL SINUSITIS, RECURRENCE NOT SPECIFIED: ICD-10-CM

## 2022-03-26 LAB
ALBUMIN SERPL BCP-MCNC: 3.9 G/DL (ref 3.5–5.2)
ALP SERPL-CCNC: 69 U/L (ref 55–135)
ALT SERPL W/O P-5'-P-CCNC: 16 U/L (ref 10–44)
ANION GAP SERPL CALC-SCNC: 10 MMOL/L (ref 8–16)
AST SERPL-CCNC: 20 U/L (ref 10–40)
BACTERIA #/AREA URNS HPF: NEGATIVE /HPF
BASOPHILS # BLD AUTO: 0.04 K/UL (ref 0–0.2)
BASOPHILS NFR BLD: 0.4 % (ref 0–1.9)
BILIRUB SERPL-MCNC: 0.7 MG/DL (ref 0.1–1)
BILIRUB UR QL STRIP: NEGATIVE
BNP SERPL-MCNC: 241 PG/ML (ref 0–99)
BUN SERPL-MCNC: 14 MG/DL (ref 10–30)
CALCIUM SERPL-MCNC: 9.6 MG/DL (ref 8.7–10.5)
CHLORIDE SERPL-SCNC: 104 MMOL/L (ref 95–110)
CLARITY UR: CLEAR
CO2 SERPL-SCNC: 29 MMOL/L (ref 23–29)
COLOR UR: YELLOW
CREAT SERPL-MCNC: 1.1 MG/DL (ref 0.5–1.4)
DIFFERENTIAL METHOD: ABNORMAL
EOSINOPHIL # BLD AUTO: 0 K/UL (ref 0–0.5)
EOSINOPHIL NFR BLD: 0.3 % (ref 0–8)
ERYTHROCYTE [DISTWIDTH] IN BLOOD BY AUTOMATED COUNT: 12.9 % (ref 11.5–14.5)
EST. GFR  (AFRICAN AMERICAN): 49 ML/MIN/1.73 M^2
EST. GFR  (NON AFRICAN AMERICAN): 42.5 ML/MIN/1.73 M^2
GLUCOSE SERPL-MCNC: 106 MG/DL (ref 70–110)
GLUCOSE SERPL-MCNC: 113 MG/DL (ref 70–110)
GLUCOSE UR QL STRIP: NEGATIVE
HCT VFR BLD AUTO: 37.9 % (ref 37–48.5)
HGB BLD-MCNC: 12.4 G/DL (ref 12–16)
HGB UR QL STRIP: NEGATIVE
HYALINE CASTS #/AREA URNS LPF: 20 /LPF
IMM GRANULOCYTES # BLD AUTO: 0.06 K/UL (ref 0–0.04)
IMM GRANULOCYTES NFR BLD AUTO: 0.6 % (ref 0–0.5)
INFLUENZA A, MOLECULAR: NEGATIVE
INFLUENZA B, MOLECULAR: NEGATIVE
KETONES UR QL STRIP: ABNORMAL
LEUKOCYTE ESTERASE UR QL STRIP: ABNORMAL
LYMPHOCYTES # BLD AUTO: 0.8 K/UL (ref 1–4.8)
LYMPHOCYTES NFR BLD: 7.5 % (ref 18–48)
MAGNESIUM SERPL-MCNC: 1.8 MG/DL (ref 1.6–2.6)
MCH RBC QN AUTO: 29.5 PG (ref 27–31)
MCHC RBC AUTO-ENTMCNC: 32.7 G/DL (ref 32–36)
MCV RBC AUTO: 90 FL (ref 82–98)
MICROSCOPIC COMMENT: ABNORMAL
MONOCYTES # BLD AUTO: 1.1 K/UL (ref 0.3–1)
MONOCYTES NFR BLD: 10.3 % (ref 4–15)
NEUTROPHILS # BLD AUTO: 8.3 K/UL (ref 1.8–7.7)
NEUTROPHILS NFR BLD: 80.9 % (ref 38–73)
NITRITE UR QL STRIP: NEGATIVE
NRBC BLD-RTO: 0 /100 WBC
PH UR STRIP: 7 [PH] (ref 5–8)
PLATELET # BLD AUTO: 156 K/UL (ref 150–450)
PMV BLD AUTO: 10.5 FL (ref 9.2–12.9)
POTASSIUM SERPL-SCNC: 4.1 MMOL/L (ref 3.5–5.1)
PROT SERPL-MCNC: 6.7 G/DL (ref 6–8.4)
PROT UR QL STRIP: ABNORMAL
RBC # BLD AUTO: 4.2 M/UL (ref 4–5.4)
RBC #/AREA URNS HPF: 3 /HPF (ref 0–4)
SARS-COV-2 RDRP RESP QL NAA+PROBE: NEGATIVE
SODIUM SERPL-SCNC: 143 MMOL/L (ref 136–145)
SP GR UR STRIP: 1.02 (ref 1–1.03)
SPECIMEN SOURCE: NORMAL
SQUAMOUS #/AREA URNS HPF: 6 /HPF
TROPONIN I SERPL DL<=0.01 NG/ML-MCNC: <0.03 NG/ML
URN SPEC COLLECT METH UR: ABNORMAL
UROBILINOGEN UR STRIP-ACNC: NEGATIVE EU/DL
WBC # BLD AUTO: 10.2 K/UL (ref 3.9–12.7)
WBC #/AREA URNS HPF: 15 /HPF (ref 0–5)

## 2022-03-26 PROCEDURE — 63600175 PHARM REV CODE 636 W HCPCS: Performed by: EMERGENCY MEDICINE

## 2022-03-26 PROCEDURE — 99284 EMERGENCY DEPT VISIT MOD MDM: CPT | Mod: 25

## 2022-03-26 PROCEDURE — U0002 COVID-19 LAB TEST NON-CDC: HCPCS | Performed by: EMERGENCY MEDICINE

## 2022-03-26 PROCEDURE — 87086 URINE CULTURE/COLONY COUNT: CPT | Performed by: EMERGENCY MEDICINE

## 2022-03-26 PROCEDURE — 84484 ASSAY OF TROPONIN QUANT: CPT | Performed by: EMERGENCY MEDICINE

## 2022-03-26 PROCEDURE — 85025 COMPLETE CBC W/AUTO DIFF WBC: CPT | Performed by: EMERGENCY MEDICINE

## 2022-03-26 PROCEDURE — 82962 GLUCOSE BLOOD TEST: CPT

## 2022-03-26 PROCEDURE — 81001 URINALYSIS AUTO W/SCOPE: CPT | Performed by: EMERGENCY MEDICINE

## 2022-03-26 PROCEDURE — 96374 THER/PROPH/DIAG INJ IV PUSH: CPT

## 2022-03-26 PROCEDURE — 83880 ASSAY OF NATRIURETIC PEPTIDE: CPT | Performed by: EMERGENCY MEDICINE

## 2022-03-26 PROCEDURE — 80053 COMPREHEN METABOLIC PANEL: CPT | Performed by: EMERGENCY MEDICINE

## 2022-03-26 PROCEDURE — 93010 ELECTROCARDIOGRAM REPORT: CPT | Mod: ,,, | Performed by: SPECIALIST

## 2022-03-26 PROCEDURE — 93010 EKG 12-LEAD: ICD-10-PCS | Mod: ,,, | Performed by: SPECIALIST

## 2022-03-26 PROCEDURE — 83735 ASSAY OF MAGNESIUM: CPT | Performed by: EMERGENCY MEDICINE

## 2022-03-26 PROCEDURE — 93005 ELECTROCARDIOGRAM TRACING: CPT | Performed by: SPECIALIST

## 2022-03-26 PROCEDURE — 87502 INFLUENZA DNA AMP PROBE: CPT | Performed by: EMERGENCY MEDICINE

## 2022-03-26 RX ORDER — TRIAMCINOLONE ACETONIDE 0.25 MG/G
CREAM TOPICAL
COMMUNITY
Start: 2021-08-10 | End: 2022-04-12

## 2022-03-26 RX ORDER — FLUTICASONE PROPIONATE 50 MCG
1 SPRAY, SUSPENSION (ML) NASAL 2 TIMES DAILY PRN
Qty: 15 G | Refills: 0 | Status: SHIPPED | OUTPATIENT
Start: 2022-03-26 | End: 2024-01-29

## 2022-03-26 RX ORDER — CEFDINIR 300 MG/1
300 CAPSULE ORAL 2 TIMES DAILY
Qty: 14 CAPSULE | Refills: 0 | Status: SHIPPED | OUTPATIENT
Start: 2022-03-26 | End: 2022-04-02

## 2022-03-26 RX ORDER — MUPIROCIN 20 MG/G
OINTMENT TOPICAL
COMMUNITY
Start: 2021-05-19 | End: 2022-04-12

## 2022-03-26 RX ORDER — BUSPIRONE HYDROCHLORIDE 15 MG/1
TABLET ORAL
COMMUNITY
Start: 2021-10-12 | End: 2022-04-12

## 2022-03-26 RX ORDER — FAMOTIDINE 20 MG/1
TABLET, FILM COATED ORAL
COMMUNITY
Start: 2021-10-30 | End: 2022-04-12

## 2022-03-26 RX ORDER — CETIRIZINE HYDROCHLORIDE 10 MG/1
10 TABLET ORAL DAILY
Qty: 15 TABLET | Refills: 0 | Status: SHIPPED | OUTPATIENT
Start: 2022-03-26 | End: 2022-04-12

## 2022-03-26 RX ORDER — ONDANSETRON 4 MG/1
TABLET, FILM COATED ORAL
COMMUNITY
Start: 2021-10-30 | End: 2022-04-12

## 2022-03-26 RX ADMIN — CEFTRIAXONE 1 G: 1 INJECTION, SOLUTION INTRAVENOUS at 12:03

## 2022-03-26 NOTE — ED PROVIDER NOTES
Encounter Date: 3/26/2022       History     Chief Complaint   Patient presents with    Weakness     Generalized weakness onset this am     This is a 89-year-old female who presents emergency department with generalized weakness and fatigue since earlier today.  She says yesterday she started with sinus congestion and drip and coughing up thick white sputum.  Says that she went to bed around 530 last night because she was tired slept altered tonight and when she got up this morning she felt very weak and fatigued and had no energy.  She said that she went to get out of bed and she just slid down to the floor and sat down.  She did not fall.  She did not hit her head.  Said that she had trouble getting back up just to generalized weakness and fatigue.  No vomiting or any diarrhea.  No fever chills reported.  No abdominal pain.  She denies any chest pain or shortness of breath.  She denies any urinary symptoms as well.        Review of patient's allergies indicates:   Allergen Reactions    Buspar [buspirone] Other (See Comments)     Syncope and nausea     Doxy      Other reaction(s): Unknown    Effexor [venlafaxine] Other (See Comments)     shaking    Biaxin [clarithromycin] Other (See Comments)     Unknown    Codeine Other (See Comments)     Syncope, and nausea.    Statins-hmg-coa reductase inhibitors      myalgia    Doxycycline Other (See Comments)     Made very weak     Past Medical History:   Diagnosis Date    Anxiety     Arthritis     fingers and back    Depression     Disorder of kidney and ureter     Foot pain, left 9/18/2013    GERD (gastroesophageal reflux disease)     Hiatal hernia     Hyperlipidemia     Hypertension     Reflux     on meds    Tendon tear 8/21/2013    Tendonitis 8/21/2013     Past Surgical History:   Procedure Laterality Date    CHOLECYSTECTOMY      EYE SURGERY Bilateral     cataracts and implants    TONSILLECTOMY       Family History   Problem Relation Age of Onset     Hearing loss Son      Social History     Tobacco Use    Smoking status: Never Smoker    Smokeless tobacco: Never Used   Substance Use Topics    Alcohol use: Yes     Alcohol/week: 1.0 standard drink     Types: 1 Glasses of wine per week     Comment: rarely    Drug use: No     Review of Systems   Constitutional: Positive for fatigue. Negative for chills and fever.   HENT: Positive for postnasal drip. Negative for sore throat.    Respiratory: Positive for cough. Negative for shortness of breath.    Cardiovascular: Negative for chest pain and palpitations.   Gastrointestinal: Negative for abdominal pain, nausea and vomiting.   Genitourinary: Negative for dysuria.   Musculoskeletal: Negative for back pain.   Skin: Negative for rash.   Neurological: Positive for weakness. Negative for headaches.   Hematological: Does not bruise/bleed easily.   All other systems reviewed and are negative.      Physical Exam     Initial Vitals [03/26/22 0906]   BP Pulse Resp Temp SpO2   (!) 144/68 87 18 98.6 °F (37 °C) 97 %      MAP       --         Physical Exam    Nursing note and vitals reviewed.  Constitutional: She appears well-developed and well-nourished. No distress.   HENT:   Head: Normocephalic and atraumatic.   Mouth/Throat: No oropharyngeal exudate.   Eyes: Conjunctivae and EOM are normal. Pupils are equal, round, and reactive to light.   Neck: Neck supple. No tracheal deviation present.   Normal range of motion.  Cardiovascular: Normal rate, regular rhythm, normal heart sounds and intact distal pulses.   No murmur heard.  Pulmonary/Chest: Breath sounds normal. No stridor. No respiratory distress. She has no wheezes. She has no rhonchi. She has no rales.   Abdominal: Abdomen is soft. She exhibits no distension. There is no abdominal tenderness. There is no rebound.   Musculoskeletal:         General: No tenderness or edema. Normal range of motion.      Cervical back: Normal range of motion and neck supple.     Neurological:  She is alert and oriented to person, place, and time. She has normal strength. No cranial nerve deficit or sensory deficit.   Skin: Skin is warm and dry. Capillary refill takes less than 2 seconds. No rash noted. No erythema. No pallor.   Psychiatric: She has a normal mood and affect. Her behavior is normal. Thought content normal.         ED Course   Procedures  Labs Reviewed   CBC W/ AUTO DIFFERENTIAL - Abnormal; Notable for the following components:       Result Value    Immature Granulocytes 0.6 (*)     Gran # (ANC) 8.3 (*)     Immature Grans (Abs) 0.06 (*)     Lymph # 0.8 (*)     Mono # 1.1 (*)     Gran % 80.9 (*)     Lymph % 7.5 (*)     All other components within normal limits   COMPREHENSIVE METABOLIC PANEL - Abnormal; Notable for the following components:    Glucose 113 (*)     eGFR if  49.0 (*)     eGFR if non  42.5 (*)     All other components within normal limits   B-TYPE NATRIURETIC PEPTIDE - Abnormal; Notable for the following components:     (*)     All other components within normal limits   URINALYSIS, REFLEX TO URINE CULTURE - Abnormal; Notable for the following components:    Protein, UA Trace (*)     Ketones, UA Trace (*)     Leukocytes, UA 3+ (*)     All other components within normal limits    Narrative:     Specimen Source->Urine   URINALYSIS MICROSCOPIC - Abnormal; Notable for the following components:    WBC, UA 15 (*)     Hyaline Casts, UA 20 (*)     All other components within normal limits    Narrative:     Specimen Source->Urine   CULTURE, URINE   INFLUENZA A AND B ANTIGEN    Narrative:     Specimen Source->Nasopharyngeal Swab   TROPONIN I   SARS-COV-2 RNA AMPLIFICATION, QUAL   MAGNESIUM   POCT GLUCOSE        ECG Results          EKG 12-lead (In process)  Result time 03/26/22 10:57:37    In process by Interface, Lab In [facility] (03/26/22 10:57:37)                 Narrative:    Test Reason : R53.1,    Vent. Rate : 079 BPM     Atrial Rate : 079 BPM      P-R Int : 152 ms          QRS Dur : 094 ms      QT Int : 382 ms       P-R-T Axes : 040 -10 018 degrees     QTc Int : 438 ms    Normal sinus rhythm  Inferior infarct ,age undetermined  Cannot rule out Anterior infarct ,age undetermined  Abnormal ECG  When compared with ECG of 30-OCT-2021 12:55,  No significant change was found    Referred By: [email or web address]   SELF           Confirmed By:                              Results for orders placed or performed during the hospital encounter of 03/26/22   CBC auto differential   Result Value Ref Range    WBC 10.20 3.90 - 12.70 K/uL    RBC 4.20 4.00 - 5.40 M/uL    Hemoglobin 12.4 12.0 - 16.0 g/dL    Hematocrit 37.9 37.0 - 48.5 %    MCV 90 82 - 98 fL    MCH 29.5 27.0 - 31.0 pg    MCHC 32.7 32.0 - 36.0 g/dL    RDW 12.9 11.5 - 14.5 %    Platelets 156 150 - 450 K/uL    MPV 10.5 9.2 - 12.9 fL    Immature Granulocytes 0.6 (H) 0.0 - 0.5 %    Gran # (ANC) 8.3 (H) 1.8 - 7.7 K/uL    Immature Grans (Abs) 0.06 (H) 0.00 - 0.04 K/uL    Lymph # 0.8 (L) 1.0 - 4.8 K/uL    Mono # 1.1 (H) 0.3 - 1.0 K/uL    Eos # 0.0 0.0 - 0.5 K/uL    Baso # 0.04 0.00 - 0.20 K/uL    nRBC 0 0 /100 WBC    Gran % 80.9 (H) 38.0 - 73.0 %    Lymph % 7.5 (L) 18.0 - 48.0 %    Mono % 10.3 4.0 - 15.0 %    Eosinophil % 0.3 0.0 - 8.0 %    Basophil % 0.4 0.0 - 1.9 %    Differential Method Automated    Comprehensive metabolic panel   Result Value Ref Range    Sodium 143 136 - 145 mmol/L    Potassium 4.1 3.5 - 5.1 mmol/L    Chloride 104 95 - 110 mmol/L    CO2 29 23 - 29 mmol/L    Glucose 113 (H) 70 - 110 mg/dL    BUN 14 10 - 30 mg/dL    Creatinine 1.1 0.5 - 1.4 mg/dL    Calcium 9.6 8.7 - 10.5 mg/dL    Total Protein 6.7 6.0 - 8.4 g/dL    Albumin 3.9 3.5 - 5.2 g/dL    Total Bilirubin 0.7 0.1 - 1.0 mg/dL    Alkaline Phosphatase 69 55 - 135 U/L    AST 20 10 - 40 U/L    ALT 16 10 - 44 U/L    Anion Gap 10 8 - 16 mmol/L    eGFR if African American 49.0 (A) >60 mL/min/1.73 m^2    eGFR if non  42.5 (A) >60 mL/min/1.73 m^2    Influenza antigen Nasopharyngeal Swab   Result Value Ref Range    Influenza A, Molecular Negative Negative    Influenza B, Molecular Negative Negative    Flu A & B Source Nasal swab    Troponin I   Result Value Ref Range    Troponin I <0.030 <=0.040 ng/mL   Brain natriuretic peptide   Result Value Ref Range     (H) 0 - 99 pg/mL   Urinalysis, Reflex to Urine Culture Urine, Clean Catch    Specimen: Urine, Clean Catch   Result Value Ref Range    Specimen UA Urine, Clean Catch     Color, UA Yellow Yellow, Straw, Sylvia    Appearance, UA Clear Clear    pH, UA 7.0 5.0 - 8.0    Specific Gravity, UA 1.020 1.005 - 1.030    Protein, UA Trace (A) Negative    Glucose, UA Negative Negative    Ketones, UA Trace (A) Negative    Bilirubin (UA) Negative Negative    Occult Blood UA Negative Negative    Nitrite, UA Negative Negative    Urobilinogen, UA Negative Negative EU/dL    Leukocytes, UA 3+ (A) Negative   COVID-19 Rapid Screening   Result Value Ref Range    SARS-CoV-2 RNA, Amplification, Qual Negative Negative   Magnesium   Result Value Ref Range    Magnesium 1.8 1.6 - 2.6 mg/dL   Urinalysis Microscopic   Result Value Ref Range    RBC, UA 3 0 - 4 /hpf    WBC, UA 15 (H) 0 - 5 /hpf    Bacteria Negative None-Occ /hpf    Squam Epithel, UA 6 /hpf    Hyaline Casts, UA 20 (A) 0-1/lpf /lpf    Microscopic Comment SEE COMMENT    POCT glucose   Result Value Ref Range    POC Glucose 106 70 - 110     Results for orders placed or performed during the hospital encounter of 03/26/22   CBC auto differential   Result Value Ref Range    WBC 10.20 3.90 - 12.70 K/uL    RBC 4.20 4.00 - 5.40 M/uL    Hemoglobin 12.4 12.0 - 16.0 g/dL    Hematocrit 37.9 37.0 - 48.5 %    MCV 90 82 - 98 fL    MCH 29.5 27.0 - 31.0 pg    MCHC 32.7 32.0 - 36.0 g/dL    RDW 12.9 11.5 - 14.5 %    Platelets 156 150 - 450 K/uL    MPV 10.5 9.2 - 12.9 fL    Immature Granulocytes 0.6 (H) 0.0 - 0.5 %    Gran # (ANC) 8.3 (H) 1.8 - 7.7 K/uL    Immature Grans (Abs) 0.06 (H) 0.00  - 0.04 K/uL    Lymph # 0.8 (L) 1.0 - 4.8 K/uL    Mono # 1.1 (H) 0.3 - 1.0 K/uL    Eos # 0.0 0.0 - 0.5 K/uL    Baso # 0.04 0.00 - 0.20 K/uL    nRBC 0 0 /100 WBC    Gran % 80.9 (H) 38.0 - 73.0 %    Lymph % 7.5 (L) 18.0 - 48.0 %    Mono % 10.3 4.0 - 15.0 %    Eosinophil % 0.3 0.0 - 8.0 %    Basophil % 0.4 0.0 - 1.9 %    Differential Method Automated    Comprehensive metabolic panel   Result Value Ref Range    Sodium 143 136 - 145 mmol/L    Potassium 4.1 3.5 - 5.1 mmol/L    Chloride 104 95 - 110 mmol/L    CO2 29 23 - 29 mmol/L    Glucose 113 (H) 70 - 110 mg/dL    BUN 14 10 - 30 mg/dL    Creatinine 1.1 0.5 - 1.4 mg/dL    Calcium 9.6 8.7 - 10.5 mg/dL    Total Protein 6.7 6.0 - 8.4 g/dL    Albumin 3.9 3.5 - 5.2 g/dL    Total Bilirubin 0.7 0.1 - 1.0 mg/dL    Alkaline Phosphatase 69 55 - 135 U/L    AST 20 10 - 40 U/L    ALT 16 10 - 44 U/L    Anion Gap 10 8 - 16 mmol/L    eGFR if African American 49.0 (A) >60 mL/min/1.73 m^2    eGFR if non  42.5 (A) >60 mL/min/1.73 m^2   Influenza antigen Nasopharyngeal Swab   Result Value Ref Range    Influenza A, Molecular Negative Negative    Influenza B, Molecular Negative Negative    Flu A & B Source Nasal swab    Troponin I   Result Value Ref Range    Troponin I <0.030 <=0.040 ng/mL   Brain natriuretic peptide   Result Value Ref Range     (H) 0 - 99 pg/mL   Urinalysis, Reflex to Urine Culture Urine, Clean Catch    Specimen: Urine, Clean Catch   Result Value Ref Range    Specimen UA Urine, Clean Catch     Color, UA Yellow Yellow, Straw, Sylvia    Appearance, UA Clear Clear    pH, UA 7.0 5.0 - 8.0    Specific Gravity, UA 1.020 1.005 - 1.030    Protein, UA Trace (A) Negative    Glucose, UA Negative Negative    Ketones, UA Trace (A) Negative    Bilirubin (UA) Negative Negative    Occult Blood UA Negative Negative    Nitrite, UA Negative Negative    Urobilinogen, UA Negative Negative EU/dL    Leukocytes, UA 3+ (A) Negative   COVID-19 Rapid Screening   Result Value Ref  Range    SARS-CoV-2 RNA, Amplification, Qual Negative Negative   Magnesium   Result Value Ref Range    Magnesium 1.8 1.6 - 2.6 mg/dL   Urinalysis Microscopic   Result Value Ref Range    RBC, UA 3 0 - 4 /hpf    WBC, UA 15 (H) 0 - 5 /hpf    Bacteria Negative None-Occ /hpf    Squam Epithel, UA 6 /hpf    Hyaline Casts, UA 20 (A) 0-1/lpf /lpf    Microscopic Comment SEE COMMENT    POCT glucose   Result Value Ref Range    POC Glucose 106 70 - 110       Imaging Results          CT Head Without Contrast (Final result)  Result time 03/26/22 11:08:24    Final result by Andrea Avila Jr., MD (03/26/22 11:08:24)                 Narrative:    CT of THE HEAD WITHOUT CONTRAST    HISTORY: Dizziness and persistent and recurrent weakness generalized since this morning.    Technical factors:   Spiral acquisition of the brain was generated at 3.0 mm thickness from the skull base to the skull vertex in helical fashion in the absence of intravenous contrast.  Additional coronal and sagittal reconstructed images were also included and reviewed.    CMS MANDATED QUALITY DATA - CT RADIATION  436    All CT scans at this facility utilize dose modulation, iterative reconstruction, and/or weight based dosing when appropriate to reduce radiation dose to as low as reasonably achievable.        FINDINGS:    Symmetrical low-attenuation changes throughout both the periventricular and subcortical white matter nonspecific though most likely attributed towards chronic deep white matter ischemia. No evidence of intracranial hemorrhage, masses, mass effect, or midline shift.    The sulcal space are widened though concordant degree of cerebral atrophy and involuting involutional changes. No evidence of intracranial hemorrhage.    Ventricular spaces are slightly prominent the concordant with the previous radiograph.    There is evidence of an exophytic bony protuberance projecting over the patient's right calvarium appears unchanged as compared to  previous probable small senescent osteoma formation. No evidence of bony destructive changes. The visualized paranasal sinuses are clear. The orbits and retro-orbital compartments are well-maintained.    IMPRESSION:  1. Age-appropriate cerebral atrophy and superimposed chronic deep white matter ischemia.  2. No evidence of acute intracranial pathology.  3. No adverse change upon comparison.    Electronically signed by:  Andrea Avila MD  3/26/2022 11:08 AM CDT Workstation: [id number]                             X-Ray Chest AP Portable (Final result)  Result time 03/26/22 10:06:37   Procedure changed from X-Ray Chest 1 View     Final result by Andrea Avila Jr., MD (03/26/22 10:06:37)                 Narrative:    Examination: Single view the chest.    CLINICAL HISTORY: Weakness    COMPARISON: 10/3/2021.    TECHNIQUE: AP view the chest.    FINDINGS: Heart is enlarged. Stable large area of added density in the posterior mediastinum in keeping with a large sliding-type hiatal hernia. Improvement in the degree of vascular congestion borderline pulmonary edema with returned to near normal parameters. No evidence of confluent airspace opacity or significant pleural effusion or pneumothorax formation. The osseous, there is normal. Mild spurring along the inferior at the right AC joint.    IMPRESSION:  1. No evidence of acute cardiopulmonary findings.  2. Stable moderate to large sized sliding-type hiatus hernia.  3. Improving pattern of pulmonary edema.    Electronically signed by:  Andrea Avila MD  3/26/2022 10:06 AM CDT Workstation: [id number]                               Medications   cefTRIAXone (ROCEPHIN) 1 g/50 mL D5W IVPB (1 g Intravenous New Bag 3/26/22 1244)     Medical Decision Making:   ED Management:  This is a 89-year-old female who presents emergency department with generalized weakness fatigue malaise.  She has improved after some fluids emergency department.  Ultimately was found that she has  evidence of urinary tract infection.  She also has sinusitis by history.  Plan is to place her on antibiotics Omnicef and will also treat with Flonase and Zyrtec which should cover for sinuses and for urine.  Urine culture has been sent.  Patient does not have sepsis, white count is normal, influenza and COVID are negative.  Patient is feeling much better.  Patient does not have any focal weakness to suggest any stroke.  Doubt any acute coronary syndrome with negative troponin nondiagnostic EKG.  Patient will be discharged home to follow-up with her primary care provider and to take her antibiotics as directed.  Also the last oxygen documented was 2%.  This is inaccurate.  Patient was between 95 and 98% on room air.    I had a detailed discussion with the patient and/or guardian regarding: The historical points, exam findings, and diagnostic results supporting the discharge diagnosis, lab results, pertinent radiology results, and the need for outpatient follow-up, for definitive care with a family practitioner and to return to the emergency department if symptoms worsen or persist or if there are any questions or concerns that arise at home. All questions have been answered in detail. Strict return to Emergency Department precautions have been provided.    A dictation software program was used for this note.  Please expect some simple typographical  errors in this note.              ED Course as of 03/26/22 1817   Sat Mar 26, 2022   0939 EKG 9:09 a.m. normal sinus rhythm rate of 79, no ST elevation or depression.  No STEMI.  EKG interpreted independently by me. [JR]      ED Course User Index  [JR] Sim Pedraza DO             Clinical Impression:   Final diagnoses:  [R53.1] Weakness  [R06.00] Dyspnea  [N30.00] Acute cystitis without hematuria (Primary)  [R53.1] Generalized weakness  [J01.10] Acute frontal sinusitis, recurrence not specified          ED Disposition Condition    Discharge Stable        ED  Prescriptions     Medication Sig Dispense Start Date End Date Auth. Provider    cefdinir (OMNICEF) 300 MG capsule Take 1 capsule (300 mg total) by mouth 2 (two) times daily. for 7 days 14 capsule 3/26/2022 4/2/2022 Sim Pedraza DO    fluticasone propionate (FLONASE) 50 mcg/actuation nasal spray 1 spray (50 mcg total) by Each Nostril route 2 (two) times daily as needed for Rhinitis. 15 g 3/26/2022  Sim Pedraza DO    cetirizine (ZYRTEC) 10 MG tablet Take 1 tablet (10 mg total) by mouth once daily. for 15 days 15 tablet 3/26/2022 4/10/2022 Sim Pedraza DO        Follow-up Information     Follow up With Specialties Details Why Contact Info Additional Information    Perry Jorge MD Family Medicine In 3 days  [address] [facility] [address]  [phone]       [facility] - Emergency Dept Emergency Medicine  If symptoms worsen [address] [facility] [address] [phone] 1st floor           Sim Pedraza DO  03/26/22 1789

## 2022-03-26 NOTE — DISCHARGE INSTRUCTIONS
RETURN TO EMERGENCY DEPARTMENT WITHOUT FAIL, IF YOUR SYMPTOMS WORSEN, IF YOU GET NEW OR DIFFERENT SYMPTOMS, IF YOU ARE UNABLE TO FOLLOW UP AS DIRECTED, OR IF YOU HAVE ANY CONCERNS OR WORRIES.    Take antibiotics as directed.  Follow-up with primary care provider.  Try Flonase as well for the sinuses and antihistamine.

## 2022-03-27 LAB
BACTERIA UR CULT: NORMAL
BACTERIA UR CULT: NORMAL

## 2022-04-12 ENCOUNTER — OFFICE VISIT (OUTPATIENT)
Dept: FAMILY MEDICINE | Facility: CLINIC | Age: 87
End: 2022-04-12
Payer: MEDICARE

## 2022-04-12 VITALS
TEMPERATURE: 98 F | WEIGHT: 134.5 LBS | SYSTOLIC BLOOD PRESSURE: 132 MMHG | OXYGEN SATURATION: 98 % | BODY MASS INDEX: 27.12 KG/M2 | RESPIRATION RATE: 16 BRPM | DIASTOLIC BLOOD PRESSURE: 64 MMHG | HEART RATE: 79 BPM | HEIGHT: 59 IN

## 2022-04-12 DIAGNOSIS — Z00.00 HEALTHCARE MAINTENANCE: Primary | ICD-10-CM

## 2022-04-12 DIAGNOSIS — E78.2 MIXED HYPERLIPIDEMIA: ICD-10-CM

## 2022-04-12 DIAGNOSIS — K21.9 GASTROESOPHAGEAL REFLUX DISEASE, UNSPECIFIED WHETHER ESOPHAGITIS PRESENT: ICD-10-CM

## 2022-04-12 DIAGNOSIS — I10 ESSENTIAL HYPERTENSION: ICD-10-CM

## 2022-04-12 DIAGNOSIS — N18.31 STAGE 3A CHRONIC KIDNEY DISEASE: ICD-10-CM

## 2022-04-12 DIAGNOSIS — I70.0 ATHEROSCLEROSIS OF AORTA: ICD-10-CM

## 2022-04-12 PROCEDURE — 99999 PR PBB SHADOW E&M-EST. PATIENT-LVL IV: ICD-10-PCS | Mod: PBBFAC,,, | Performed by: STUDENT IN AN ORGANIZED HEALTH CARE EDUCATION/TRAINING PROGRAM

## 2022-04-12 PROCEDURE — 99214 OFFICE O/P EST MOD 30 MIN: CPT | Mod: S$PBB,,, | Performed by: STUDENT IN AN ORGANIZED HEALTH CARE EDUCATION/TRAINING PROGRAM

## 2022-04-12 PROCEDURE — 99999 PR PBB SHADOW E&M-EST. PATIENT-LVL IV: CPT | Mod: PBBFAC,,, | Performed by: STUDENT IN AN ORGANIZED HEALTH CARE EDUCATION/TRAINING PROGRAM

## 2022-04-12 PROCEDURE — 99214 OFFICE O/P EST MOD 30 MIN: CPT | Mod: PBBFAC,PO | Performed by: STUDENT IN AN ORGANIZED HEALTH CARE EDUCATION/TRAINING PROGRAM

## 2022-04-12 PROCEDURE — 99214 PR OFFICE/OUTPT VISIT, EST, LEVL IV, 30-39 MIN: ICD-10-PCS | Mod: S$PBB,,, | Performed by: STUDENT IN AN ORGANIZED HEALTH CARE EDUCATION/TRAINING PROGRAM

## 2022-04-12 RX ORDER — NISOLDIPINE 8.5 MG/1
TABLET, FILM COATED, EXTENDED RELEASE ORAL
Qty: 90 TABLET | Refills: 1 | Status: SHIPPED | OUTPATIENT
Start: 2022-04-12 | End: 2022-10-10

## 2022-04-12 NOTE — TELEPHONE ENCOUNTER
Refill Routing Note   Medication(s) are not appropriate for processing by [facility] for the following reason(s):      - Required vitals are abnormal  - Patient has been seen in the ED/Hospital since the last PCP visit    [facility] action(s):  Defer          Medication reconciliation completed: No     Appointments  past 12m or future 3m with PCP    Date Provider   Last Visit   11/4/2021 Perry Jorge MD   Next Visit   4/12/2022 Perry Jorge MD   ED visits in past 90 days: 1        Note composed:9:41 AM 04/12/2022

## 2022-04-12 NOTE — TELEPHONE ENCOUNTER
No new care gaps identified.  Powered by [employer]. Reference number: [id number].   4/12/2022 2:37:38 AM CDT

## 2022-04-12 NOTE — PROGRESS NOTES
[facility] Note    Subjective:    The HPI and pertinent ROS is included in the Diagnostic Impression Remarks section at the end of the note. Please see below for further details. Chief complaint is at end of note.     Data reviewed 274}  Previous medical records reviewed and summarized in plan section at end of note.      The following portions of the patient's history were reviewed and updated as appropriate: allergies, current medications, past family history, past medical history, past social history, past surgical history and problem list.    She  has a past medical history of Anxiety, Arthritis, Depression, Disorder of kidney and ureter, Foot pain, left (9/18/2013), GERD (gastroesophageal reflux disease), Hiatal hernia, Hyperlipidemia, Hypertension, Reflux, Tendon tear (8/21/2013), and Tendonitis (8/21/2013).  She  has a past surgical history that includes Cholecystectomy; Tonsillectomy; and Eye surgery (Bilateral).    She  reports that she has never smoked. She has never used smokeless tobacco. She reports current alcohol use of about 1.0 standard drink of alcohol per week. She reports that she does not use drugs.  She family history includes Hearing loss in her son.    Review of patient's allergies indicates:   Allergen Reactions    Buspar [buspirone] Other (See Comments)     Syncope and nausea     Doxy      Other reaction(s): Unknown    Effexor [venlafaxine] Other (See Comments)     shaking    Biaxin [clarithromycin] Other (See Comments)     Unknown    Codeine Other (See Comments)     Syncope, and nausea.    Statins-hmg-coa reductase inhibitors      myalgia    Doxycycline Other (See Comments)     Made very weak       Tobacco Use: Low Risk     Smoking Tobacco Use: Never Smoker    Smokeless Tobacco Use: Never Used     Physical Examination  Wt Readings from Last 3 Encounters:   04/12/22 61 kg (134 lb 7.7 oz)   03/26/22 59 kg (130 lb)   12/21/21 62 kg (136 lb 11 oz)               "  Estimated body mass index is 27.16 kg/m² as calculated from the following:    Height as of this encounter: 4' 11" (1.499 m).    Weight as of this encounter: 61 kg (134 lb 7.7 oz).     General appearance: alert, cooperative, no distress  Neck: no thyromegaly, no neck stiffness  Lungs: clear to auscultation, no wheezes, rales or rhonchi, symmetric air entry  Heart: normal rate, regular rhythm, normal S1, S2, no murmurs, rubs, clicks or gallops  Abdomen: soft, nontender, nondistended, no rigidity, rebound, or guarding.   Back: no point tenderness over spine  Extremities: peripheral pulses normal, no unilateral leg swelling or calf tenderness   Neurological:alert, oriented, normal speech, no new focal findings or movement disorder noted from baseline    BP Readings from Last 3 Encounters:   04/12/22 132/64   03/26/22 (!) 151/67   12/21/21 126/68     /64 (BP Location: Right arm, Patient Position: Sitting, BP Method: Large (Manual))   Pulse 79   Temp 98 °F (36.7 °C) (Oral)   Resp 16   Ht 4' 11" (1.499 m)   Wt 61 kg (134 lb 7.7 oz)   SpO2 98%   BMI 27.16 kg/m²    274}  Laboratory: I have reviewed old labs below:    274}    Lab Results   Component Value Date    WBC 10.20 03/26/2022    HGB 12.4 03/26/2022    HCT 37.9 03/26/2022    MCV 90 03/26/2022     03/26/2022     03/26/2022    K 4.1 03/26/2022     03/26/2022    CALCIUM 9.6 03/26/2022    CO2 29 03/26/2022     (H) 03/26/2022    BUN 14 03/26/2022    CREATININE 1.1 03/26/2022    ANIONGAP 10 03/26/2022    ESTGFRAFRICA 49.0 (A) 03/26/2022    EGFRNONAA 42.5 (A) 03/26/2022    PROT 6.7 03/26/2022    ALBUMIN 3.9 03/26/2022    BILITOT 0.7 03/26/2022    ALKPHOS 69 03/26/2022    ALT 16 03/26/2022    AST 20 03/26/2022    CHOL 220 (H) 10/12/2021    TRIG 141 10/12/2021    HDL 57 10/12/2021    LDLCALC 134.8 10/12/2021    TSH 1.147 10/12/2021    HGBA1C 5.0 10/12/2021     Lab reviewed by me: Particular labs of significance that I will monitor, " "workup, or treat to improve are mentioned below in diagnostic impression remarks.    Imaging/EKG: I have reviewed the pertinent results and my findings are noted in remarks.  274}    Chief Complaint:   Chief Complaint   Patient presents with    Follow-up    Hypertension    Hospital Follow Up        274}    Assessment/Plan  Arleen Zarate is a 89 y.o. female who presents to clinic with:  1. Healthcare maintenance    2. Essential hypertension    3. Mixed hyperlipidemia    4. Stage 3a chronic kidney disease    5. Atherosclerosis of aorta    6. Gastroesophageal reflux disease, unspecified whether esophagitis present       274}  Diagnostic Impression Remarks + HPI     Documentation entered by me for this encounter may have been done in part using speech-recognition technology. Although I have made an effort to ensure accuracy, "sound like" errors may exist and should be interpreted in context.      HTN-well controlled continue current meds   HLD- stable rec healthy diet monitor   GERD-stable on ppi continue    atherosclerosis aorta-stable monitor lipids     The patient's chronic kidney disease stage 3 was monitored, evaluated, addressed and/or treated.    Regarding CKD, the patient was educated on etiology (including infections, diabetes, high blood pressure, kidney stones, circulation problems, and reactions to medications) and the importance on making healthy changes and complying with treatment plan to prevent kidney failure.   Patient was advised to limit sodium intake by:  eating 1,500 mg or less of sodium daily; limiting processed foods (i.e., frozen dinners and packaged meals, canned fish and meats, pickled foods, salted snacks, lunch meats, most cheeses, and fast foods); not adding salt to food while cooking or before eating at the table; eating unprocessed foods to lower the sodium intake (i.e., fresh turkey and chicken, lean beef, unsalted tuna, fresh fish, and fresh vegetables and fruits); seasoning " "foods with fresh herbs, garlic, onions, citrus, flavored vinegar, and sodium-free spice blends instead of salt when cooking;  avoiding drinking "softened" water, because of the sodium content; and avoiding over-the-counter medicines that contain sodium bicarbonate or sodium carbonate.  Patient was also instructed to: avoid becoming overly fatigued; getting plenty of rest and more sleep at night; moving around and bending legs to avoid getting blood clots when resting for long periods of time; taking medications as directed; keeping all medical appointments; taking steps to control high blood pressure and diabetes; and by obtaining a daily weight and keeping a record of those weights for healthcare provider to review.  The patient was instructed to contact primary care provider or seek medical care at the closest emergency department if they experience: difficulty eating or drinking; weight loss of more than 2 pounds in 24 hours or more than 5 pounds in 7 days; little or no urine output; trouble breathing; muscle aches;  fever of 100.4°F or higher, or chills; blood in urine or stool; bloody discharge from the nose, mouth, or ears; severe headache or a seizure; vomiting; swelling of legs or ankles, and/or chest pain.    Atherosclerosis of aorta- stable continue statin and low fat diet monitor follow lipid levels     This is the extent of the patient's complaints at this present time. She denies chest pain upon exertion, dyspnea, nausea, vomiting, diaphoresis, and syncope. No pleuritic chest pain, unilateral leg swelling, calf tenderness, or calf pain.     Arleen will return to clinic in a few months for further workup and reassessment or sooner as needed. She was instructed to call the clinic or go to the emergency department if her symptoms do not improve, worsens, or if new symptoms develop. As we discussed that symptoms could worsen over the next 24 hours she was advised that if any increased swelling, pain, or " numbness arise to go immediately to the ED. Patient knows to call any time if an emergency arises. Shared decision making occurred and she verbalized understanding in agreement with this plan.     I discussed imaging findings, diagnosis, possibilities, treatment options, medications, risks, and benefits. She had many questions regarding the options and long-term effects. All questions were answered. She expressed understanding after counseling regarding the diagnosis and recommendations. She was capable and demonstrated competence with understanding of these options. Shared decision making was performed resulting in her choosing the current treatment plan. Patient handout was given with instructions and recommendations. Advised the patient that if they become pregnant to alert us immediately to assess for medication changes.     I also discussed the importance of close follow up to discuss labs, change or modify her medications if needed, monitor side effects, and further evaluation of medical problems.     Additional workup planned: see labs ordered below.    See below for labs and meds ordered with associated diagnosis      1. Healthcare maintenance    2. Essential hypertension    3. Mixed hyperlipidemia    4. Stage 3a chronic kidney disease    5. Atherosclerosis of aorta    6. Gastroesophageal reflux disease, unspecified whether esophagitis present    Medication List with Changes/Refills   Current Medications    FLUTICASONE PROPIONATE (FLONASE) 50 MCG/ACTUATION NASAL SPRAY    1 spray (50 mcg total) by Each Nostril route 2 (two) times daily as needed for Rhinitis.    LISINOPRIL (PRINIVIL,ZESTRIL) 30 MG TABLET    TAKE 1 TABLET EVERY EVENING    MULTIVITAMIN CAPSULE    Take 1 capsule by mouth once daily.    NISOLDIPINE (SULAR) 8.5 MG TB24    TAKE 1 TABLET DAILY    OMEPRAZOLE (PRILOSEC) 40 MG CAPSULE    Take 1 capsule (40 mg total) by mouth once daily.   Discontinued Medications    ASPIRIN (ECOTRIN) 81 MG EC TABLET     Take 81 mg by mouth daily as needed.    BUSPIRONE (BUSPAR) 15 MG TABLET        CETIRIZINE (ZYRTEC) 10 MG TABLET    Take 1 tablet (10 mg total) by mouth once daily. for 15 days    FAMOTIDINE (PEPCID) 20 MG TABLET        METHYLSULFONYLMETHANE 1,000 MG CAP    Take 1 capsule by mouth 2 (two) times daily.     MUPIROCIN (BACTROBAN) 2 % OINTMENT        ONDANSETRON (ZOFRAN) 4 MG TABLET        TRIAMCINOLONE ACETONIDE 0.025% (KENALOG) 0.025 % CREAM         Modified Medications    No medications on file       Perry Jorge MD   274}  04/12/2022     This note was completed with dictation software and grammatical errors may exist.    If you are due for any health screening(s) below please notify me so we can arrange them to be ordered and scheduled to maintain your health.     Health Maintenance Due   Topic Date Due    TETANUS VACCINE  Never done    Shingles Vaccine (1 of 2) Never done    Pneumococcal Vaccines (Age 65+) (1 - PCV) Never done    DEXA Scan  05/02/2016

## 2022-04-12 NOTE — PATIENT INSTRUCTIONS
"  3 ounces of Ocean Spray Pure Unsweetened Cranberry provides a high enough concentration of proanthocyanidins (PACs) based on consumerlab testing which is a component that some studies show can help make it more difficult for bacterial to cling to your urinary tract cells thus helping prevent infection.             D-Mannose is a supplement that many studies have shown can help prevent urinary tract infections. There is some disagreement on the best dose to take daily but many studies have shown that 2 grams daily of D-Mannose has good results.  Please see study below.       Role of D-Mannose in the Prevention of Recurrent Urinary Tract Infections: Evidence from a Systematic Review of the Literature     "A potential approach is administration of D-mannose, an inert monosaccharide that is metabolised and excreted in urine and acts by inhibiting bacterial adhesion to the urothelium."     "PATIENT SUMMARY: D-Mannose is a sugar that seems to reduce the incidence of recurrent urinary tract infections and associated bothersome symptoms. It also leads to a longer duration between episodes of recurrences and consequently improves patient quality of life. D-Mannose can be used as a supplementary or alternate treatment for recurrent urinary tract infections."    Tha R, Maciej P, Lily BK. Role of D-Mannose in the Prevention of Recurrent Urinary Tract Infections: Evidence from a Systematic Review of the Literature. Eur Urol Focus. 2021 Sep;7(5):0188-5456. doi: [email or web address]. Epub 2020 Sep 22. PMID: [id number].      Amrit Bacon, Please read below to learn more on healthy choices, screenings, and useful information related to your health.  Most of my patients read it. Most of my healthy patients complete their cancer screenings. Don't lose out on improving your health.     Table of Contents:     Overdue health maintenance   Cancer prevention  Explanation on the different components of your blood work and " interpretation  Frequently asked questions     If you are due for any health screening(s) below please notify me so we can arrange them to be ordered and scheduled to maintain your health. Most healthy patients at your age complete them.   Health Maintenance Due   Topic    TETANUS VACCINE     Shingles Vaccine (1 of 2)    Pneumococcal Vaccines (Age 65+) (1 - PCV)    DEXA Scan                                 Cancer Prevention    Why should you choose to get screened for cancer? One simple reason is because you are important. You matter and deserve to have the best health so you can fulfill your great potential.       Colon Cancer screening - Most colon cancers can be prevented by screening. In most cases a polyp in the colon can grow and is not cancerous at first, but it can become cancerous years later. A polyp is like a seed that can grow into a weed if it is left in the soil. If the seed is detected and removed, no weed sprouts. A FIT test/Cologuard test and colonoscopy can detect precancerous polyps and lead to the prevention of cancer. A polyp is just like a seed that can be removed before the roots take hold. A colonoscopy can remove these polyps and eliminate the chance that these polyps turn into cancer.     Cervical Cancer screening- A PAP smear can detect precancerous cells that can become cervical cancer and can lead to procedures to remove them. It is very important to get a PAP smear as investing these few minutes can help prevent a lot of trouble down the road. If you want to do the most you can do to spend more time with your family and friends', cancer screenings can help with that goal.     Breast Cancer screening- Mammograms can detect breast cancer before it has spread and early detection allows the ability to remove the lesion prior to any spread. It is similar to a small rotten spot on fruit. If the spoiled part is removed quickly it can preserve the rest of the fruit, but if it is left alone it  "will corrupt the whole peach.     Smoking Cessation- "Smoking is like a chimney. The tar builds up and causes a back draft which leads to a cough. Smoking is like sitting in a car with a blocked exhaust system." Smoking can increase your risk for lung, mouth, throat, nose, esophagus, bladder, kidney, ureter, pancreas, stomach, liver, cervix, ovary, bowel, and leukemia [2]. If you have smoked in the past, you might meet the criteria for a CT lung cancer screening.     Lung Cancer Screening - "The U.S. Preventive Services Task Force (USPSTF) recommendsexternal icon yearly lung cancer screening with LDCT for people who--have a 20 pack-year or more smoking history, and smoke now or have quit within the past 15 years, and are between 50 and 80 years old. A pack-year is smoking an average of one pack of cigarettes per day for one year. For example, a person could have a 20 pack-year history by smoking one pack a day for 20 years or two packs a day for 10 years." [3]    Hypertension - Common high blood pressure meds may lower colorectal cancer risk-JULITA, July 6, 2020 - Hypertension Journal Report Medications commonly prescribed to treat high blood pressure may also reduce patients' colorectal cancer risk, according to new research published today in Hypertension, an American Heart Association journal." [4].     Low HbA1c - "Higher HbA1c levels (within both the non?diabetic and diabetic ranges) were associated with the risk of colorectal cancer (Model 2; P linear?=?0.009), especially for colon cancer." [5].    A healthy diet, exercise, limitation of alcohol use, certain infections, avoidance of radiation/environmental toxins, and staying lean lowers your cancer risk [6].     I want to empower you to make informed choices for your health. I have a listed below the most common blood components that we check for when you get blood work. I discuss what each component measures along with common reasons for why they can be " abnormal. If you are interested in understanding your blood work better, please read the lab explanation attached below.     Explanation of Lab Results    Please note: This information is included as a reference to help you better understand your lab results and is not be used for diagnosis.     Lipid Panel:  Cholesterol is a measure of cardiac risk and stroke risk. A lipid panel measures total cholesterol and provides readings which are broken down into 3 subsections: Triglycerides, HDL and LDL.    Total Cholesterol: Your total blood cholesterol is a measure of LDL cholesterol, HDL cholesterol, and other lipid components.  If your individual lipid level components are in the normal range and you have an elevated total cholesterol level we are generally not to concerned since we primarily look at the LDL cholesterol which is considered the bad cholesterol which can lead to heart attacks and strokes.  Your calculated cardiac risk is the most important factor in deciding if you would benefit from a cholesterol-lowering medication that the total cholesterol level.    Triglycerides are most diet and weight sensitive. They are affected easily by lifestyle changes. Ideally, this reading should be less than 150, although if the remainder of the cholesterol panel is within normal limits, we will tolerate upwards of 250-300. For the most part, if triglycerides are the only part of your cholesterol panel reading as 'abnormal', it is best to lose weight and modify your diet, including less saturated fat and less simple sugars. We only start medication to lower this is the level is greater than 500 since this can increase ones risk for pancreatitis. This is not the cholesterol type that leads to heart attacks.     HDL is your 'good cholesterol.' Ideally, the reading should be over 40 and is particularly helpful when it is greater than 60. Research indicates that high HDL is extremely protective; and if your HDL level is greater  than 60, we tolerate far worse LDL or triglyceride levels. For the most part, HDL is responsive to aerobic activity and ideal weight. We do not have medicines that increase the HDL reading very easily.    LDL is your 'bad cholesterol.' Our goals depend on how many cardiac risk factors you have.     High LDL: If you are diabetic or have had a heart attack, we like the LDL level to be less than 100. If you have 2 cardiac risk factors (such as diabetes, hypertension, family history, a low HDL and smoking), we like your LDL to be less than 130. If you have 0-2 cardiac risk factors, we like your LDL level to be less than 160, but we may not necessarily initiate medications to 190 unless you cannot lower it. The latest guidelines recommend to consider taking a statin only if your ASCVD cardiac risk score is at least greater than 7.5% and a strong recommendation if your risk score is greater than 10%.     Low LDL: Normal or low LDL is considered good and having an LDL below the normal range is not of a concern.     Complete Blood Count (CBC):    White blood cells: These are infection fighting cells. Mild fluctuations may represent minor illness at the time of blood draw. Marked fluctuations can represent immune diseases. This can also be elevated from smoking.     High WBC: Elevation in wbc can commonly be caused by an infection, steroid use, or any cause of inflammation such as many rheumatologic diseases, surgery, or trauma.      Low WBC: Many different things can cause this such as infections, medications, rheumatologic disorders, malignancies, nutritional deficiencies, and a normal variant in some individuals. If this occurs it is common practice to rule out a few viruses such as HIV, Hep C, B12, folate along with a a peripheral blood smear to look for abnormalities. If you are otherwise healthy with no concerning symptoms and the workup is negative we usually monitor it with yearly blood work.  If there are other  abnormal cell line abnormalities sometimes we refer to hematology to further evaluate.    Hemoglobin: A key indicator for anemia. Ranges depend on age. If you are significantly out of this range, we may need to talk with you.    High Hemoglobin: This can be elevated in some blood disorders, sleep apnea, chronic lung disease, kidney disease, testosterone use, dehydration, smoking, along with some other rare causes.     Low Hemoglobin: Many things can lead to this but the most common cause is an iron deficiency in females who lose blood during their periods, decreased absorption due to antacids, poor diet, sickle cell, anemia of chronic disease, malignancy, bleeding from the gut, along with some other less common causes. If you are a young female who has periods it is usually assumed that this is from that blood loss unless other symptoms or abnormal blood work is present. If you are above 45 and have an iron deficiency it is always recommended to get a colonoscopy to ensure that there is no lesion causing blood loss and to exclude malignancy.     Hematocrit: Another way of looking at anemia, much like your hemoglobin. If you are significantly out of this range, we may need to talk with you.    MCV: This looks at the size of your red blood cells.     High MCV:  A large number may indicate a B-12 deficiency, folate deficiency, alcohol use, liver disease, medication-induced phenomenon, or a need for further workup.    Low MCV: A small number may imply iron anemia or genetic disorder such as alpha-thalassemia. We may check iron studies called to see if you are low.    MCH: Much like MCV above.    Platelets: These are clotting factors. We tolerate a broad range in this. If your numbers are less than 100, we may need to work it up.     High Platelet Count: If your numbers are elevated, this could represent ongoing inflammation within the body which can be caused by any infection, illness, iron deficiency, or other  malignancy. We usually recheck it to monitor for improvement and if it does not resolve will work it up with more blood work.     Low Platelet Count: Many things can cause this such as infections, alcohol use, medications, liver disease, vitamin deficiencies, aspleenia, and some other rare blood disorders. If it persists many times we refer to hematology if a cause is not identified.     Iron:  This is not a reliable blood marker since this fluctuates throughout the day and if you are fasting many times your iron level in your blood is low but this does not necessarily mean you have a deficiency.  There are more reliable ways to measure your iron stores and these are discussed below.    Transferrin:  Many things can cause an abnormal result and this is not as important as some other labs mentioned below.    TIBC:  This is the total iron-binding capacity and this measures the total amount of iron that can be bound by proteins in the blood.  If you have an elevated TIBC this is a good marker that you could be low on iron and this is useful blood marker.  A simple way to think of this is seats in a car. The TIBC is the number of open seats that iron can sit in. If you have a high TIBC (number of open seats) that means you have a low iron level.     Ferritin:  A low ferritin is almost always caused from an iron deficiency.  A normal ferritin level does not need necessarily exclude an iron deficiency since many inflammatory conditions such as kidney disease, diabetes, arthritis, and obesity can raise this level hiding an iron deficiency.  If you are healthy with no inflammatory conditions this is a very reliable test for detecting an iron deficiency since nothing else lowers your ferritin level except a low iron level. Keep in mind that you can have an iron deficiency if your ferritin level is normal but your TIBC is elevated.  If you have a low iron level the next step is always to identify why you have a low iron  level.    CMP (Comprehensive Metabolic Panel):  Broadly, this is a test of organ function including the kidneys, liver, and electrolyte levels.    Glucose: This is primarily looking for diabetes. We like your blood glucose level to be less than 100 when fasting. Readings of 100-125 indicate what we call 'pre-diabetes' or 'glucose intolerance.' This does not necessarily indicate diabetes, but we may check another test called a hemoglobin A1C for confirmation. This level puts you at great risk for becoming a true diabetic and we would encourage the reduction of simple sugars and processed white flour as well as appropriate weight loss. If this number is greater than 125, it is likely you are diabetic; we will get an additional hemoglobin A1C test and will likely schedule you for an appointment. If you notice that your blood glucose is above 125 and we have not scheduled a follow-up appointment, please call us. Patients who are known diabetics can have readings greater than 125.    Urea Nitrogen: This is a kidney function and maybe elevated because of mild dehydration or because of excessive muscle breakdown from aggressive exercise habits.    Creatinine: This also is a kidney function test. It may be mildly elevated if you have particularly large muscle bulk or taking a supplement like creatine. It is related to the GFR. It is a muscle product that we track to look at your kidney function. If this is elevated and new, we may need to talk with you. If you have had this mildly elevated in the past, it is likely that we will just track it to ensure that it does not worsen quickly. Some medications may gently worsen this, namely blood pressure pills called ace inhibitors. To some degree, we will permit levels of up to 1.6.    Sodium: This is essentially the concentration of salt within the body. This may be mildly low because of dehydration, overhydration, diuretics, .    Potassium: This is an electrolyte that can cause  muscular cramping or cardiac difficulties. It is sometimes lowered by diuretic medications.    Chloride: For the most part, this is only relevant if the other electrolytes are abnormal.    CO2: This is a function of acid balance within the body. For the most part, mild abnormalities are not important and may represent a starvation or dehydration state when blood was drawn. Many medications can change this level as well such as diuretics, acetazolamide, calcium carbonate, laxatives, aspirin, and many others.    High CO2: Many things can cause this which includes dehydration, sleep apnea, and COPD.     Low CO2: Many things can lead to a low level and if you are generally healthy we are usually  not concerned. Diarrhea, renal disease, diabetes, and many medications can cause this.     Anion Gap: Only relevant if your CO2 is abnormal.    Calcium: This is not related to dietary intake of calcium. It may fluctuate gently based on the amount of protein within your body. If it is above 10.9, we may need to do additional testing. Many times if low the albumin level is low and once the corrected calcium level is calculated the calcium is in a normal range.     Total protein: This looks at protein within the body. Markedly elevated levels can represent an immune response and may require further workup.    Albumin: This may be elevated because of particularly high level of fitness. If it is markedly suppressed, it may represent organ dysfunction, particularly in the liver or kidneys.    AST and ALT: These are enzymes in the liver and if elevated can indicate liver damage.     Elevated AST/ALT: Many things can caused elevated liver enzymes and the most common reason is viral infections, alcohol use, medications, supplements, autoimmune, along with some other rare causes. One of the most common reasons for a mild elevation in liver enzymes (less than 3 times the upper limit) is fatty liver disease. Many individuals who have extra  fat in their diet store this in the liver and this can build up and cause a mild elevation. This is usually diagnosed with a liver ultrasound and exclusion of other causes. The only treatment for this is diet and exercise along with avoidance of liver toxic medications and alcohol. It is standard of care to rule our viral hepatitis and get imaging of the liver if elevated. This is monitored and if I feel concerned will refer to hepatology.     Alk Phos: Part of liver function or bones    High Alk Phos: elevated levels may indicate a liver injury or obstruction of bile flow. Elevated levels can also be seen in vitamin D deficiency, drug induced, or bone disorders.     Low Alk Phos: In most cases having a low Alkaline Phosphatase enzyme activity is not due to any disease and is simply a normal variant.  Having an elevated Alk Phos is more concerning and associated with many diseases and thus I would not be overly concerned with a low level which is seen in many health individuals. The reasons for a low serum alkaline phosphatase activity were reviewed in a 1-yr retrospective study and in this study it was found that no cause was found in most cases.  Low activity values were recorded in several individuals in the absence of any obvious cause. This would suggest that the definition of the lower limit of the reference range for alkaline phosphatase is arbitrary, thus limiting the use of low serum activity as a marker of disease.  In some cases micronutrients like Zinc (Zn) and Magnesium (Mg) are causes of low ALP activity and you can take zinc or magnesium supplements. Unfortunately, the blood work to measure zinc and magnesium levels are unreliable and not very accurate since it does not test for the intracellular zinc or magnesium levels.     Irene HELTOND, Chris JH, Anh OCAMPO. Clinical significance of a low serum alkaline phosphatase. Net J Med. 1992 Feb;40(1-2):9-14. PMID: 1338202.  Nic LAM, Candace Prater  I, Behera S, Nic LAM. Low Alkaline Phosphatase (ALP) In Adult Population an Indicator of Zinc (Zn) and Magnesium (Mg) Deficiency. Curr Res Nutr Food Sci 2017;5(3). doi : [email or web address]    Total Bilirubin: This is also part of liver function.    High T Bili: If you have right upper quadrant pain an elevation in total bilirubin can be caused by a gallbladder stone that is blocking the biliary tract that leads to your gastrointestinal tract. If you have fever and right upper quadrant pain this can sometimes be elevated when your gallbladder is infected and most individuals have nausea with vomiting associated with it. If you have no symptoms and are otherwise healthy this can be caused by Gilbert syndrome which is a benign normal variant. There are other causes such as some anemias but there would be abnormal blood counts.     Low T Bili: Nothing to be concerned about.     Thyroid Stimulating Hormone (TSH): This reading is an indicator of your thyroid function. The thyroid regulates energy levels throughout the entire body, affecting almost every organ system. This is an inverse relationship so a high number actually presents a low thyroid. If this is abnormal, we will often check an additional lab called a Free T4 to evaluate this more carefully. Borderline elevations, those of 5-10, can be watched or worked up further. Please do not take the supplement Biotin for at least a week prior to getting your TSH checked since this can lead to false measurement levels.     Prostate Specific Antigen (PSA): (Men only.) This is a prostate cancer screening test, and is no longer a routine screening test. Levels are truly a function of age. Being less than 4 is typical for someone more in their 60s. If you are young, it should probably be less than 3. A higher PSA result does not necessarily mean that you have cancer, but may indicate a need for a discussion with your provider. Options include observation  "to look at rate of rise or prostate biopsy. Not only is the absolute value important, but how much it has changed from previous years. Please ensure that there is not a dramatic rise from previous years.    Please Note: This information is included as a reference to help you better understand your lab results and is not be used for diagnosis.    Frequently asked questions    When should I take my blood pressure medication?    The latest studies show that taking your blood pressure medication at night is the best time. A recent study showed that this prevents more heart attacks and strokes. See the answer below from [place].     "Q. I've taken blood pressure medicines every morning for many years, and they keep my pressure under control. Recently, my doctor recommended taking them at bedtime, instead. Does that make sense?    A. It actually does make sense -- based on recent research. For many years, there have been at least three theoretical reasons for taking blood pressure medicines before bedtime. First, a body system that strongly affects blood pressure, called the renin-angiotensin system, has its peak activity during sleep. Second, circadian rhythms cause differences in the body chemistry at night compared with daytime. Third, most heart attacks occur in the morning, before medicines taken in the morning have a chance to "kick in."" [6].     When should I take my cholesterol medication?    It used to be recommended to take your cholesterol medication at night since the original statins that lowered cholesterol did not last 24 hours and most cholesterol synthesis is done at night. The long acting statins such as atorvastatin and rosuvastatin last 24 hours so they can be taken any time during the day. Simvastatin, pravastatin, fluvastatin, and lovastatin are shorter acting and should be taken at bed time.     Can supplements affect my blood work?    Yes they can. A very important supplement to not take for at " "least a week prior to your blood work is biotin. "Biotin supplement use is common and can lead to the false measurement of thyroid hormone in commonly used assays." [8.]    What are conditions that should not be addressed during a virtual visit?    There are some conditions that should not be evaluated via a virtual visit since optimal care is impossible. Chest pain, shortness of breath, lung conditions, abdominal pain, and any neurological complaint such as headache, dizziness, numbness ect.         When will I get commentary of my blood work?    I review all blood work that you get and I will send out commentary on this via [employer] within 72 hours. In most cases you will get a message from me sooner, but many times not all of the blood work is completed thus I usually wait until all results have returned. If there is a critical abnormality you should be contacted the same day you got blood work.     How frequently do I need to have visits to get controlled substances?    It is standard protocol to have a visit every 3 months if you are taking scheduled substances such as ADHD medications, psychiatric medications, and pain medications. This is to ensure your safety and monitor for any side effects.     When should I bring prior to a visit if I want to lose weight?    I recommend that you make a food diary for a week and fill out what you ate each day. You can bring this form in to your visit and I can look over it to suggest changes that you can make.     Which over the counter medications should I avoid if I have decreased kidney function?    NSAIDs which includes ibuprofen (Advil, Motrin, Nuprin), naproxen (Aleve), meloxicam (Mobic) and diclofenac(Zorvolex, Voltaren) and ketorolac (Toradol) can damage your kidneys if you take this long term.Tylenol does not affect your kidney and thus is safe as long as you don't have liver disease.     Is there an [facility]?     Yes! The [facility] is located on the " first floor of the [facility]. The address is listed below. You can get curbside pickup if you call their number at [phone]. One of the many benefits of using the [facility] pharmacy is that the pharmacists can contact me directly if a cheaper alternative is available to save you money. They also see your note to know more about what the medication was prescribed for. I recommend this pharmacy since communication with me is quick in case any confusion arises on your medications.     [address] [place] [address].  [address]  [place] [address]  Phone: [phone]    Hours:  Monday - Friday  8:00 a.m. - 5:30 a.m.    Why is it not in my best interest to call in order to get an antibiotic?    Medicine is a complex field and many times the correct diagnosis is critical in order to provide the correct care. One of the most important goals of a healthcare provider is to ensure that no dangerous condition is masquerading as a mild illness. Specific questions are very important to obtain during an examination that provide a wealth of information to understand your illness. Health care providers are trained to investigate for signs that can be dangerous to your health. Messaging or calling the office in order to get an antibiotic can be very dangerous.     For example, many urinary tract infections can lead to an infection in the kidney that can result in a serious blood stream infection that can lead to hospitalization if not recognized. A cough can be caused by many different things and not necessarily an infection. It is not uncommon that one assumes a cough is from an infection when it is actually caused by a blood clot in the lungs. This can lead to death. Determining your risk can only be performed after a thorough history and examination. A few sentences through e-mail is not enough.     What are some common symptoms that should be evaluated by the emergency department and not by phone or e-mail?    This does not include every  symptom, but common examples of symptoms that should prompt one to go to the emergency department are chest pain, chest pressure, shortness of breath, difficulty breathing, abdominal pain, weakness or numbness or an extremity, sudden weakness or drooping on one side of the body, speaking difficulty, unusual or bad headache (particularly if it started suddenly), head injury, confusion, seizure, passing out, lightheaded, pain in arm or jaw, suddenly not able to speak, see, walk, or move, dizziness, neck stiffness, suicidal thoughts, testicular pain, cuts and wounds, severe pain, along with many others. This is not an inclusive list.     Outside Records    It is common to have an colonoscopy, mammogram, PAP smear, or eye exam done outside the [facility] system. Many times we do not get the records automatically sent to us. Please call your provider's office to notify them to fax us your records so that we can have the most up to date information. Your provider will review your outside results in order to provide you with the complete care that you deserve. We appreciate that you decided to choose us to be serving on your healthcare team and the more information we have about your health is essential.     If I have a psychiatric crisis what should I do?    If you ever feel that there is a risk of a harm to yourself we recommend to go to the emergency room. There is a National Suicide Prevention lifeline number 8-203-099-TALK which route you to the nearest crisis center. There is also a suicide hotline 5-928-BEAHLZH (1-116.204.7689).         Wishing you good health,     Perry Jorge MD     References:  1-https://www.[place].[employer]/speakers/angeles_jazs  2-https://www.cancercouncil.com.au/news/caewz-all-58-cancers-that-can-bp-ipjtvs-ti-smoking/  3-https://www.cdc.gov/cancer/lung/basic_info/screening.htm  4-https://newsroom.heart.org/news/common-hypertension-medications-may-reduce-colorectal-cancer-risk  5-Darling Gutierres M, Sawada N,  et al. High hemoglobin A1c levels within the non-diabetic range are associated with the risk of all cancers. Int J Cancer. 2016;138(7):0008-1903. doi:10.1002/ijc.19581  6-https://www.[facility].[place].[facility]/newsletter_article/the-10-commandments-of-cancer-prevention  7-https://www.[facility].[place].[facility]/diseases-and-conditions/should-i-take-blood-pressure-medications-at-night  8-Ainsley TEJADA et al 2018 Prevalence of biotin supplement usage in outpatients and plasma biotin concentrations in patients presenting to the emergency department. Clin Biochem. Epub 2018 Jul 20. PMID: [id number].

## 2022-04-18 ENCOUNTER — TELEPHONE (OUTPATIENT)
Dept: FAMILY MEDICINE | Facility: CLINIC | Age: 87
End: 2022-04-18
Payer: MEDICARE

## 2022-04-18 DIAGNOSIS — N39.0 URINARY TRACT INFECTION WITHOUT HEMATURIA, SITE UNSPECIFIED: Primary | ICD-10-CM

## 2022-04-18 NOTE — TELEPHONE ENCOUNTER
Called and spoke with pt. Pt states she went to the ER on 3/26 and was told she had a UTI. PT states she wasn't given anything for the UTI. Pt states she is not having any symptoms but would like to know if she still has a UTI. Order placed. Please sign.

## 2022-04-18 NOTE — TELEPHONE ENCOUNTER
----- Message from Elif Littlejohn sent at 4/18/2022  1:54 PM CDT -----  Contact: pt  Type: Needs Medical Advice  Who Called:  pt  Best Call Back Number: [phone]   Additional Information: pt needs to talk to nurse about a urine test

## 2022-04-19 RX ORDER — NITROFURANTOIN 25; 75 MG/1; MG/1
100 CAPSULE ORAL 2 TIMES DAILY
Qty: 10 CAPSULE | Refills: 0 | Status: SHIPPED | OUTPATIENT
Start: 2022-04-19 | End: 2022-04-24

## 2022-04-19 NOTE — TELEPHONE ENCOUNTER
Called and spoke with pt. Pt informed of message from provider and of medication sent to pharmacy. Pt verbalized understanding.

## 2022-05-18 ENCOUNTER — TELEPHONE (OUTPATIENT)
Dept: FAMILY MEDICINE | Facility: CLINIC | Age: 87
End: 2022-05-18
Payer: MEDICARE

## 2022-05-18 NOTE — TELEPHONE ENCOUNTER
----- Message from Vince Lassiter MA sent at 5/18/2022 11:53 AM CDT -----  Type: Patient Call Back    Who called:self    What is the request in detail:pt. Is asking for a nurse to give her a call     Can the clinic reply by [facility]?no    Would the patient rather a call back or a response via [facility]? yes    Best call back number:[phone]

## 2022-05-18 NOTE — TELEPHONE ENCOUNTER
Pt would like orders placed for a urinalysis to see if her UTI is gone. Please place and sign, if advised. Will help pt schedule.

## 2022-09-21 ENCOUNTER — OFFICE VISIT (OUTPATIENT)
Dept: FAMILY MEDICINE | Facility: CLINIC | Age: 87
End: 2022-09-21
Payer: MEDICARE

## 2022-09-21 VITALS
SYSTOLIC BLOOD PRESSURE: 140 MMHG | TEMPERATURE: 98 F | DIASTOLIC BLOOD PRESSURE: 60 MMHG | WEIGHT: 134.25 LBS | BODY MASS INDEX: 27.06 KG/M2 | HEIGHT: 59 IN | OXYGEN SATURATION: 98 % | HEART RATE: 81 BPM

## 2022-09-21 DIAGNOSIS — R30.0 DYSURIA: Primary | ICD-10-CM

## 2022-09-21 LAB
BILIRUB UR QL STRIP: NEGATIVE
CLARITY UR REFRACT.AUTO: CLEAR
COLOR UR AUTO: YELLOW
GLUCOSE UR QL STRIP: NEGATIVE
HGB UR QL STRIP: NEGATIVE
KETONES UR QL STRIP: NEGATIVE
LEUKOCYTE ESTERASE UR QL STRIP: ABNORMAL
MICROSCOPIC COMMENT: NORMAL
NITRITE UR QL STRIP: NEGATIVE
PH UR STRIP: 6 [PH] (ref 5–8)
PROT UR QL STRIP: NEGATIVE
RBC #/AREA URNS AUTO: 1 /HPF (ref 0–4)
SP GR UR STRIP: 1.02 (ref 1–1.03)
SQUAMOUS #/AREA URNS AUTO: 0 /HPF
URN SPEC COLLECT METH UR: ABNORMAL
WBC #/AREA URNS AUTO: 2 /HPF (ref 0–5)

## 2022-09-21 PROCEDURE — 99214 OFFICE O/P EST MOD 30 MIN: CPT | Mod: PBBFAC,PO | Performed by: NURSE PRACTITIONER

## 2022-09-21 PROCEDURE — 99213 PR OFFICE/OUTPT VISIT, EST, LEVL III, 20-29 MIN: ICD-10-PCS | Mod: S$PBB,,, | Performed by: NURSE PRACTITIONER

## 2022-09-21 PROCEDURE — 99999 PR PBB SHADOW E&M-EST. PATIENT-LVL IV: ICD-10-PCS | Mod: PBBFAC,,, | Performed by: NURSE PRACTITIONER

## 2022-09-21 PROCEDURE — 87088 URINE BACTERIA CULTURE: CPT | Performed by: NURSE PRACTITIONER

## 2022-09-21 PROCEDURE — 99213 OFFICE O/P EST LOW 20 MIN: CPT | Mod: S$PBB,,, | Performed by: NURSE PRACTITIONER

## 2022-09-21 PROCEDURE — 99999 PR PBB SHADOW E&M-EST. PATIENT-LVL IV: CPT | Mod: PBBFAC,,, | Performed by: NURSE PRACTITIONER

## 2022-09-21 PROCEDURE — 81001 URINALYSIS AUTO W/SCOPE: CPT | Performed by: NURSE PRACTITIONER

## 2022-09-21 PROCEDURE — 87077 CULTURE AEROBIC IDENTIFY: CPT | Performed by: NURSE PRACTITIONER

## 2022-09-21 PROCEDURE — 87086 URINE CULTURE/COLONY COUNT: CPT | Performed by: NURSE PRACTITIONER

## 2022-09-21 PROCEDURE — 87186 SC STD MICRODIL/AGAR DIL: CPT | Performed by: NURSE PRACTITIONER

## 2022-09-21 NOTE — PROGRESS NOTES
Subjective:       Patient ID: Arleen Zarate is a 89 y.o. female.    Chief Complaint: Urinary Tract Infection     HPI   89 y/o female patient with medical problems listed below presents for follow up of UTI. Patient states had UTI a few months ago and treated with antibiotic. Patient states has had off and on strong odor but denies nausea, abdominal pain, dysuria, hematuria, fever, chills.     Patient Active Problem List   Diagnosis    Essential hypertension    Gastroesophageal reflux disease    Chronic seasonal allergic rhinitis    Nocturnal muscle spasm    DDD (degenerative disc disease), lumbar    Lumbar spondylosis    Mixed hyperlipidemia    Anxiety    Hypovitaminosis D    Diverticulosis    Carpal tunnel syndrome, bilateral    Stage 3 chronic kidney disease    Varicose vein of leg    Atherosclerosis of aorta    Primary osteoarthritis involving multiple joints    Osteopenia determined by x-ray    ANYA (generalized anxiety disorder)        Review of patient's allergies indicates:   Allergen Reactions    Buspar [buspirone] Other (See Comments)     Syncope and nausea     Doxy      Other reaction(s): Unknown    Effexor [venlafaxine] Other (See Comments)     shaking    Biaxin [clarithromycin] Other (See Comments)     Unknown    Codeine Other (See Comments)     Syncope, and nausea.    Statins-hmg-coa reductase inhibitors      myalgia    Doxycycline Other (See Comments)     Made very weak       Past Surgical History:   Procedure Laterality Date    CHOLECYSTECTOMY      EYE SURGERY Bilateral     cataracts and implants    TONSILLECTOMY            Current Outpatient Medications:     lisinopriL (PRINIVIL,ZESTRIL) 30 MG tablet, TAKE 1 TABLET EVERY EVENING, Disp: 90 tablet, Rfl: 1    multivitamin capsule, Take 1 capsule by mouth once daily., Disp: , Rfl:     nisoldipine (SULAR) 8.5 MG Tb24, TAKE 1 TABLET DAILY, Disp: 90 tablet, Rfl: 1    omeprazole (PRILOSEC) 40 MG capsule, Take 1 capsule (40 mg total) by mouth once daily.,  "Disp: 90 capsule, Rfl: 3    fluticasone propionate (FLONASE) 50 mcg/actuation nasal spray, 1 spray (50 mcg total) by Each Nostril route 2 (two) times daily as needed for Rhinitis. (Patient not taking: Reported on 9/21/2022), Disp: 15 g, Rfl: 0    Lab Results   Component Value Date    WBC 10.20 03/26/2022    HGB 12.4 03/26/2022    HCT 37.9 03/26/2022     03/26/2022    CHOL 220 (H) 10/12/2021    TRIG 141 10/12/2021    HDL 57 10/12/2021    ALT 16 03/26/2022    AST 20 03/26/2022     03/26/2022    K 4.1 03/26/2022     03/26/2022    CREATININE 1.1 03/26/2022    BUN 14 03/26/2022    CO2 29 03/26/2022    TSH 1.147 10/12/2021    HGBA1C 5.0 10/12/2021     Above labs reviewed     Review of Systems   Constitutional:  Negative for chills and fever.   Respiratory:  Negative for cough, chest tightness and shortness of breath.    Cardiovascular:  Negative for chest pain and palpitations.   Gastrointestinal:  Negative for abdominal pain and nausea.   Genitourinary:  Negative for dysuria, flank pain and hematuria.   Neurological:  Negative for dizziness and headaches.       Objective:   BP (!) 140/60 (BP Location: Left arm, Patient Position: Sitting, BP Method: Medium (Manual))   Pulse 81   Temp 98.4 °F (36.9 °C) (Oral)   Ht 4' 11" (1.499 m)   Wt 60.9 kg (134 lb 4.2 oz)   SpO2 98%   BMI 27.12 kg/m²       Physical Exam  Constitutional:       General: She is not in acute distress.     Appearance: Normal appearance.   HENT:      Head: Atraumatic.   Cardiovascular:      Rate and Rhythm: Normal rate and regular rhythm.      Pulses: Normal pulses.      Heart sounds: Normal heart sounds.   Pulmonary:      Effort: Pulmonary effort is normal.      Breath sounds: Normal breath sounds.   Abdominal:      General: Abdomen is flat. Bowel sounds are normal.      Palpations: Abdomen is soft.   Skin:     General: Skin is warm and dry.   Neurological:      General: No focal deficit present.      Mental Status: She is alert and " oriented to person, place, and time.   Psychiatric:         Mood and Affect: Mood normal.       Assessment:       1. Dysuria        Plan:       1. Dysuria  - Urinalysis  - Urine culture   - encouraged to stay well hydrated      Patient with be reevaluated in  as scheduled  or sooner eduardo Dunn NP

## 2022-09-24 DIAGNOSIS — N39.0 URINARY TRACT INFECTION WITHOUT HEMATURIA, SITE UNSPECIFIED: Primary | ICD-10-CM

## 2022-09-24 LAB — BACTERIA UR CULT: ABNORMAL

## 2022-09-24 RX ORDER — NITROFURANTOIN 25; 75 MG/1; MG/1
100 CAPSULE ORAL 2 TIMES DAILY
Qty: 14 CAPSULE | Refills: 0 | Status: SHIPPED | OUTPATIENT
Start: 2022-09-24 | End: 2022-10-01

## 2022-10-03 DIAGNOSIS — Z71.89 COMPLEX CARE COORDINATION: ICD-10-CM

## 2022-10-11 ENCOUNTER — LAB VISIT (OUTPATIENT)
Dept: LAB | Facility: HOSPITAL | Age: 87
End: 2022-10-11
Attending: STUDENT IN AN ORGANIZED HEALTH CARE EDUCATION/TRAINING PROGRAM
Payer: MEDICARE

## 2022-10-11 ENCOUNTER — OFFICE VISIT (OUTPATIENT)
Dept: FAMILY MEDICINE | Facility: CLINIC | Age: 87
End: 2022-10-11
Payer: MEDICARE

## 2022-10-11 VITALS
HEART RATE: 81 BPM | WEIGHT: 134.69 LBS | TEMPERATURE: 99 F | DIASTOLIC BLOOD PRESSURE: 72 MMHG | BODY MASS INDEX: 27.15 KG/M2 | HEIGHT: 59 IN | OXYGEN SATURATION: 98 % | SYSTOLIC BLOOD PRESSURE: 134 MMHG | RESPIRATION RATE: 16 BRPM

## 2022-10-11 DIAGNOSIS — N18.31 STAGE 3A CHRONIC KIDNEY DISEASE: ICD-10-CM

## 2022-10-11 DIAGNOSIS — K21.9 GASTROESOPHAGEAL REFLUX DISEASE WITHOUT ESOPHAGITIS: ICD-10-CM

## 2022-10-11 DIAGNOSIS — K21.9 GASTROESOPHAGEAL REFLUX DISEASE: ICD-10-CM

## 2022-10-11 DIAGNOSIS — E78.2 MIXED HYPERLIPIDEMIA: ICD-10-CM

## 2022-10-11 DIAGNOSIS — R79.9 ABNORMAL FINDING OF BLOOD CHEMISTRY, UNSPECIFIED: ICD-10-CM

## 2022-10-11 DIAGNOSIS — Z00.00 HEALTHCARE MAINTENANCE: Primary | ICD-10-CM

## 2022-10-11 DIAGNOSIS — R41.3 MEMORY LOSS: ICD-10-CM

## 2022-10-11 DIAGNOSIS — I10 ESSENTIAL HYPERTENSION: ICD-10-CM

## 2022-10-11 LAB
ALBUMIN SERPL BCP-MCNC: 3.9 G/DL (ref 3.5–5.2)
ANION GAP SERPL CALC-SCNC: 9 MMOL/L (ref 8–16)
BUN SERPL-MCNC: 15 MG/DL (ref 10–30)
CALCIUM SERPL-MCNC: 9.6 MG/DL (ref 8.7–10.5)
CHLORIDE SERPL-SCNC: 107 MMOL/L (ref 95–110)
CHOLEST SERPL-MCNC: 205 MG/DL (ref 120–199)
CHOLEST/HDLC SERPL: 3.2 {RATIO} (ref 2–5)
CO2 SERPL-SCNC: 27 MMOL/L (ref 23–29)
CREAT SERPL-MCNC: 0.8 MG/DL (ref 0.5–1.4)
EST. GFR  (NO RACE VARIABLE): >60 ML/MIN/1.73 M^2
ESTIMATED AVG GLUCOSE: 100 MG/DL (ref 68–131)
GLUCOSE SERPL-MCNC: 83 MG/DL (ref 70–110)
HBA1C MFR BLD: 5.1 % (ref 4–5.6)
HDLC SERPL-MCNC: 64 MG/DL (ref 40–75)
HDLC SERPL: 31.2 % (ref 20–50)
LDLC SERPL CALC-MCNC: 121.2 MG/DL (ref 63–159)
NONHDLC SERPL-MCNC: 141 MG/DL
PHOSPHATE SERPL-MCNC: 2.8 MG/DL (ref 2.7–4.5)
POTASSIUM SERPL-SCNC: 5.4 MMOL/L (ref 3.5–5.1)
SODIUM SERPL-SCNC: 143 MMOL/L (ref 136–145)
TRIGL SERPL-MCNC: 99 MG/DL (ref 30–150)
TSH SERPL DL<=0.005 MIU/L-ACNC: 1.13 UIU/ML (ref 0.4–4)

## 2022-10-11 PROCEDURE — 83735 ASSAY OF MAGNESIUM: CPT | Performed by: STUDENT IN AN ORGANIZED HEALTH CARE EDUCATION/TRAINING PROGRAM

## 2022-10-11 PROCEDURE — 99999 PR PBB SHADOW E&M-EST. PATIENT-LVL IV: ICD-10-PCS | Mod: PBBFAC,,, | Performed by: STUDENT IN AN ORGANIZED HEALTH CARE EDUCATION/TRAINING PROGRAM

## 2022-10-11 PROCEDURE — 99999 PR PBB SHADOW E&M-EST. PATIENT-LVL IV: CPT | Mod: PBBFAC,,, | Performed by: STUDENT IN AN ORGANIZED HEALTH CARE EDUCATION/TRAINING PROGRAM

## 2022-10-11 PROCEDURE — 80061 LIPID PANEL: CPT | Performed by: STUDENT IN AN ORGANIZED HEALTH CARE EDUCATION/TRAINING PROGRAM

## 2022-10-11 PROCEDURE — 99214 OFFICE O/P EST MOD 30 MIN: CPT | Mod: S$PBB,,, | Performed by: STUDENT IN AN ORGANIZED HEALTH CARE EDUCATION/TRAINING PROGRAM

## 2022-10-11 PROCEDURE — 83036 HEMOGLOBIN GLYCOSYLATED A1C: CPT | Performed by: STUDENT IN AN ORGANIZED HEALTH CARE EDUCATION/TRAINING PROGRAM

## 2022-10-11 PROCEDURE — 99214 OFFICE O/P EST MOD 30 MIN: CPT | Mod: PBBFAC,PO | Performed by: STUDENT IN AN ORGANIZED HEALTH CARE EDUCATION/TRAINING PROGRAM

## 2022-10-11 PROCEDURE — 84443 ASSAY THYROID STIM HORMONE: CPT | Performed by: STUDENT IN AN ORGANIZED HEALTH CARE EDUCATION/TRAINING PROGRAM

## 2022-10-11 PROCEDURE — 80069 RENAL FUNCTION PANEL: CPT | Performed by: STUDENT IN AN ORGANIZED HEALTH CARE EDUCATION/TRAINING PROGRAM

## 2022-10-11 PROCEDURE — 36415 COLL VENOUS BLD VENIPUNCTURE: CPT | Mod: PO | Performed by: STUDENT IN AN ORGANIZED HEALTH CARE EDUCATION/TRAINING PROGRAM

## 2022-10-11 PROCEDURE — 99214 PR OFFICE/OUTPT VISIT, EST, LEVL IV, 30-39 MIN: ICD-10-PCS | Mod: S$PBB,,, | Performed by: STUDENT IN AN ORGANIZED HEALTH CARE EDUCATION/TRAINING PROGRAM

## 2022-10-11 RX ORDER — OMEPRAZOLE 40 MG/1
40 CAPSULE, DELAYED RELEASE ORAL DAILY
Qty: 90 CAPSULE | Refills: 3 | Status: SHIPPED | OUTPATIENT
Start: 2022-10-11 | End: 2023-11-08

## 2022-10-11 NOTE — PROGRESS NOTES
[facility] Note    Subjective:    The HPI and pertinent ROS is included in the Diagnostic Impression Remarks section at the end of the note. Please see below for further details. Chief complaint is at end of note.     Arleen is a pleasant intelligent patient who is here for evaluation.     Modified Medications    Modified Medication Previous Medication    OMEPRAZOLE (PRILOSEC) 40 MG CAPSULE omeprazole (PRILOSEC) 40 MG capsule       Take 1 capsule (40 mg total) by mouth once daily.    Take 1 capsule (40 mg total) by mouth once daily.       Data reviewed 274}  Previous medical records reviewed and summarized in plan section at end of note.      If you are due for any health screening(s) below please notify me so we can arrange them to be ordered and scheduled to maintain your health. Most healthy patients at your age complete them, but you are free to accept or refuse.      All of your core healthy metrics are met.      The following portions of the patient's history were reviewed and updated as appropriate: allergies, current medications, past family history, past medical history, past social history, past surgical history and problem list.    She  has a past medical history of Anxiety, Arthritis, Depression, Disorder of kidney and ureter, Foot pain, left (9/18/2013), GERD (gastroesophageal reflux disease), Hiatal hernia, Hyperlipidemia, Hypertension, Reflux, Tendon tear (8/21/2013), and Tendonitis (8/21/2013).  She  has a past surgical history that includes Cholecystectomy; Tonsillectomy; and Eye surgery (Bilateral).    She  reports that she has never smoked. She has never been exposed to tobacco smoke. She has never used smokeless tobacco. She reports current alcohol use of about 1.0 standard drink per week. She reports that she does not use drugs.  She family history includes Hearing loss in her son.    Review of patient's allergies indicates:   Allergen Reactions    Buspar [buspirone] Other (See  "Comments)     Syncope and nausea     Doxy      Other reaction(s): Unknown    Effexor [venlafaxine] Other (See Comments)     shaking    Biaxin [clarithromycin] Other (See Comments)     Unknown    Codeine Other (See Comments)     Syncope, and nausea.    Statins-hmg-coa reductase inhibitors      myalgia    Doxycycline Other (See Comments)     Made very weak       Tobacco Use: Low Risk     Smoking Tobacco Use: Never    Smokeless Tobacco Use: Never    Passive Exposure: Never     Physical Examination  General appearance: alert, cooperative, no distress  Neck: no thyromegaly, no neck stiffness  Lungs: clear to auscultation, no wheezes, rales or rhonchi, symmetric air entry  Heart: normal rate, regular rhythm, normal S1, S2, no murmurs, rubs, clicks or gallops  Abdomen: soft, nontender, nondistended, no rigidity, rebound, or guarding.   Back: no point tenderness over spine  Extremities: peripheral pulses normal, no unilateral leg swelling or calf tenderness   Neurological:alert, oriented, normal speech, no new focal findings or movement disorder noted from baseline    BP Readings from Last 3 Encounters:   10/11/22 134/72   09/21/22 (!) 140/60   04/12/22 132/64     Wt Readings from Last 3 Encounters:   10/11/22 61.1 kg (134 lb 11.2 oz)   09/21/22 60.9 kg (134 lb 4.2 oz)   04/12/22 61 kg (134 lb 7.7 oz)     /72 (BP Location: Right arm, Patient Position: Sitting, BP Method: Medium (Manual))   Pulse 81   Temp 98.8 °F (37.1 °C) (Oral)   Resp 16   Ht 4' 11" (1.499 m)   Wt 61.1 kg (134 lb 11.2 oz)   SpO2 98%   BMI 27.21 kg/m²    274}  Laboratory: I have reviewed old labs below:    274}    Lab Results   Component Value Date    WBC 10.20 03/26/2022    HGB 12.4 03/26/2022    HCT 37.9 03/26/2022    MCV 90 03/26/2022     03/26/2022     03/26/2022    K 4.1 03/26/2022     03/26/2022    CALCIUM 9.6 03/26/2022    CO2 29 03/26/2022     (H) 03/26/2022    BUN 14 03/26/2022    CREATININE 1.1 03/26/2022    " "ANIONGAP 10 03/26/2022    PROT 6.7 03/26/2022    ALBUMIN 3.9 03/26/2022    BILITOT 0.7 03/26/2022    ALKPHOS 69 03/26/2022    ALT 16 03/26/2022    AST 20 03/26/2022    CHOL 220 (H) 10/12/2021    TRIG 141 10/12/2021    HDL 57 10/12/2021    LDLCALC 134.8 10/12/2021    TSH 1.147 10/12/2021    HGBA1C 5.0 10/12/2021     Lab reviewed by me: Particular labs of significance that I will monitor, workup, or treat to improve are mentioned below in diagnostic impression remarks.    Imaging/EKG: I have reviewed the pertinent results and my findings are noted in remarks.  274}    CC:   Chief Complaint   Patient presents with    Annual Exam    Hypertension        274}    Assessment/Plan  Arleen Zarate is a 89 y.o. female who presents to clinic with:  1. Healthcare maintenance    2. Essential hypertension    3. Gastroesophageal reflux disease without esophagitis    4. Mixed hyperlipidemia    5. Stage 3a chronic kidney disease    6. Gastroesophageal reflux disease    7. Memory loss    8. Abnormal finding of blood chemistry, unspecified       274}  Diagnostic Impression Remarks + HPI     Documentation entered by me for this encounter may have been done in part using speech-recognition technology. Although I have made an effort to ensure accuracy, "sound like" errors may exist and should be interpreted in context.     Hypertension well controlled continue current meds  GERD well controlled continue current medications monitor did discuss the side effects. A discussion was made going over the risks and benefits of the medication and after this discussion they decided to continue their medication. They understood the choice, were able to express a  choice, appreciated the risks associated with it, and they had logical reasoning for their choice and demonstrated capacity.   Hyperlipidemia-control uncertain repeat blood work recommend healthy diet exercise      The patient's chronic kidney disease stage 3 was monitored, evaluated, " "addressed and/or treated.    Regarding CKD, the patient was educated on etiology (including infections, diabetes, high blood pressure, kidney stones, circulation problems, and reactions to medications) and the importance on making healthy changes and complying with treatment plan to prevent kidney failure.   Patient was advised to limit sodium intake by:  eating 1,500 mg or less of sodium daily; limiting processed foods (i.e., frozen dinners and packaged meals, canned fish and meats, pickled foods, salted snacks, lunch meats, most cheeses, and fast foods); not adding salt to food while cooking or before eating at the table; eating unprocessed foods to lower the sodium intake (i.e., fresh turkey and chicken, lean beef, unsalted tuna, fresh fish, and fresh vegetables and fruits); seasoning foods with fresh herbs, garlic, onions, citrus, flavored vinegar, and sodium-free spice blends instead of salt when cooking;  avoiding drinking "softened" water, because of the sodium content; and avoiding over-the-counter medicines that contain sodium bicarbonate or sodium carbonate.  Patient was also instructed to: avoid becoming overly fatigued; getting plenty of rest and more sleep at night; moving around and bending legs to avoid getting blood clots when resting for long periods of time; taking medications as directed; keeping all medical appointments; taking steps to control high blood pressure and diabetes; and by obtaining a daily weight and keeping a record of those weights for healthcare provider to review.  The patient was instructed to contact primary care provider or seek medical care at the closest emergency department if they experience: difficulty eating or drinking; weight loss of more than 2 pounds in 24 hours or more than 5 pounds in 7 days; little or no urine output; trouble breathing; muscle aches;  fever of 100.4°F or higher, or chills; blood in urine or stool; bloody discharge from the nose, mouth, or ears; " severe headache or a seizure; vomiting; swelling of legs or ankles, and/or chest pain.      This is the extent of this pleasant patient's concerns at this present time. She did not feel chest pain upon exertion, dyspnea, nausea, vomiting, diaphoresis, or syncope. No pleuritic chest pain, unilateral leg swelling, calf tenderness, or calf pain. Negative for unintentional weight loss night sweats and fevers. Arleen will return to clinic in a few months for further workup and reassessment or sooner as needed. She was instructed to call the clinic or go to the emergency department if her symptoms do not improve, worsens, or if new symptoms develop. As we discussed that symptoms could worsen over the next 24 hours she was advised that if any increased swelling, pain, or numbness arise to go immediately to the ED. Patient knows to call any time if an emergency arises. Shared decision making occurred and she verbalized understanding in agreement with this plan. I discussed imaging findings, diagnosis, possibilities, treatment options, medications, risks, and benefits. She had many questions regarding the options and long-term effects. All questions were answered. She expressed understanding after counseling regarding the diagnosis and recommendations. She was capable and demonstrated competence with understanding of these options. Shared decision making was performed resulting in her choosing the current treatment plan. Patient handout was given with instructions and recommendations. Advised the patient that if they become pregnant to alert us immediately to assess for medication changes. I also discussed the importance of close follow up to discuss labs, change or modify her medications if needed, monitor side effects, and further evaluation of medical problems. Discussed chronic PPI use, and increased risk of fractures, pneumonia, Clostridium difficile diarrhea, hypomagnesemia, vitamin B12 deficiency, and chronic kidney  disease. While PPIs do carry risk of these complications, I feel that because of the severe daily discomfort from her reflux, that the benefits exceed the risk in this patient. After understanding of the risks and shared decision making, the patient decided to pursue taking a PPI to treat her reflux.       Additional workup planned: see labs ordered below.    See below for labs and meds ordered with associated diagnosis      1. Healthcare maintenance    2. Essential hypertension    3. Gastroesophageal reflux disease without esophagitis    4. Mixed hyperlipidemia  - Lipid Panel; Future    5. Stage 3a chronic kidney disease  - Renal Function Panel; Future    6. Gastroesophageal reflux disease  - omeprazole (PRILOSEC) 40 MG capsule; Take 1 capsule (40 mg total) by mouth once daily.  Dispense: 90 capsule; Refill: 3    7. Memory loss  - TSH; Future  - Magnesium, RBC; Future    8. Abnormal finding of blood chemistry, unspecified  - Hemoglobin A1C; Future    Perry Jorge MD   274}    If you are due for any health screening(s) below please notify me so we can arrange them to be ordered and scheduled to maintain your health. Most healthy patients at your age complete them, but you are free to accept or refuse.   All of your core healthy metrics are met.

## 2022-10-11 NOTE — PATIENT INSTRUCTIONS
"  D-Mannose is a supplement that many studies have shown can help prevent urinary tract infections. There is some disagreement on the best dose to take daily but many studies have shown that 2 grams daily of D-Mannose has good results.  Please see study below.       Role of D-Mannose in the Prevention of Recurrent Urinary Tract Infections: Evidence from a Systematic Review of the Literature     "A potential approach is administration of D-mannose, an inert monosaccharide that is metabolised and excreted in urine and acts by inhibiting bacterial adhesion to the urothelium."     "PATIENT SUMMARY: D-Mannose is a sugar that seems to reduce the incidence of recurrent urinary tract infections and associated bothersome symptoms. It also leads to a longer duration between episodes of recurrences and consequently improves patient quality of life. D-Mannose can be used as a supplementary or alternate treatment for recurrent urinary tract infections."    Tha R, Maciej P, Lily GRAFF. Role of D-Mannose in the Prevention of Recurrent Urinary Tract Infections: Evidence from a Systematic Review of the Literature. Eur Urol Focus. 2021 Sep;7(5):5366-3693. doi: [email or web address]. Epub 2020 Sep 22. PMID: [id number].        Amrit Bacon, Please read below to learn more on healthy habits.  Most of my patients read it.       If you are due for any health screening(s) below please notify me so we can arrange them to be ordered and scheduled to maintain your health. Most healthy patients at your age complete them, but you are free to accept or refuse.     All of your core healthy metrics are met.                              Table of Contents:     Cancer prevention  Explanation on the different components of your blood work and interpretation  Frequently asked questions   Blood pressure log   Cancer Prevention    Why should you choose to get screened for cancer? One simple reason is because you are important. You matter and deserve " "to have the best health so you can fulfill your great potential.     Colon Cancer screening - Most colon cancers can be prevented by screening. In most cases a polyp in the colon can grow and is not cancerous at first, but it can become cancerous years later. A polyp is like a seed that can grow into a weed if it is left in the soil. If the seed is detected and removed, no weed sprouts. A FIT test/Cologuard test and colonoscopy can detect precancerous polyps and lead to the prevention of cancer. A polyp is just like a seed that can be removed before the roots take hold. A colonoscopy can remove these polyps and eliminate the chance that these polyps turn into cancer.     Cervical Cancer screening- A PAP smear can detect precancerous cells that can become cervical cancer and can lead to procedures to remove them. It is very important to get a PAP smear as investing these few minutes can help prevent a lot of trouble down the road. If you want to do the most you can do to spend more time with your family and friends', cancer screenings can help with that goal.     Breast Cancer screening- Mammograms can detect breast cancer before it has spread and early detection allows the ability to remove the lesion prior to any spread. It is similar to a small rotten spot on fruit. If the spoiled part is removed quickly it can preserve the rest of the fruit, but if it is left alone it will corrupt the whole peach.     Smoking Cessation- "Smoking is like a chimney. The tar builds up and causes a back draft which leads to a cough. Smoking is like sitting in a car with a blocked exhaust system." Smoking can increase your risk for lung, mouth, throat, nose, esophagus, bladder, kidney, ureter, pancreas, stomach, liver, cervix, ovary, bowel, and leukemia [2]. If you have smoked in the past, you might meet the criteria for a CT lung cancer screening.     Lung Cancer Screening - "The U.S. Preventive Services Task Force (USPSTF) " "recommendsexternal icon yearly lung cancer screening with LDCT for people who--have a 20 pack-year or more smoking history, and smoke now or have quit within the past 15 years, and are between 50 and 80 years old. A pack-year is smoking an average of one pack of cigarettes per day for one year. For example, a person could have a 20 pack-year history by smoking one pack a day for 20 years or two packs a day for 10 years." [3]    Hypertension - Common high blood pressure meds may lower colorectal cancer risk-JULITA, July 6, 2020 - Hypertension Journal Report Medications commonly prescribed to treat high blood pressure may also reduce patients' colorectal cancer risk, according to new research published today in Hypertension, an American Heart Association journal." [4].     Low HbA1c - "Higher HbA1c levels (within both the non?diabetic and diabetic ranges) were associated with the risk of colorectal cancer (Model 2; P linear?=?0.009), especially for colon cancer." [5].    A healthy diet, exercise, limitation of alcohol use, certain infections, avoidance of radiation/environmental toxins, and staying lean lowers your cancer risk [6].     I want to empower you to make informed choices for your health. I have a listed below the most common blood components that we check for when you get blood work. I discuss what each component measures along with common reasons for why they can be abnormal. If you are interested in understanding your blood work better, please read the lab explanation attached below.     Explanation of Lab Results    Please note: This information is included as a reference to help you better understand your lab results and is not be used for diagnosis.     Lipid Panel:  Cholesterol is a measure of cardiac risk and stroke risk. A lipid panel measures total cholesterol and provides readings which are broken down into 3 subsections: Triglycerides, HDL and LDL.    Total Cholesterol: Your total blood " cholesterol is a measure of LDL cholesterol, HDL cholesterol, and other lipid components.  If your individual lipid level components are in the normal range and you have an elevated total cholesterol level we are generally not to concerned since we primarily look at the LDL cholesterol which is considered the bad cholesterol which can lead to heart attacks and strokes.  Your calculated cardiac risk is the most important factor in deciding if you would benefit from a cholesterol-lowering medication that the total cholesterol level.    Triglycerides are most diet and weight sensitive. They are affected easily by lifestyle changes. Ideally, this reading should be less than 150, although if the remainder of the cholesterol panel is within normal limits, we will tolerate upwards of 250-300. For the most part, if triglycerides are the only part of your cholesterol panel reading as 'abnormal', it is best to lose weight and modify your diet, including less saturated fat and less simple sugars. We only start medication to lower this is the level is greater than 500 since this can increase ones risk for pancreatitis. This is not the cholesterol type that leads to heart attacks.     HDL is your 'good cholesterol.' Ideally, the reading should be over 40 and is particularly helpful when it is greater than 60. Research indicates that high HDL is extremely protective; and if your HDL level is greater than 60, we tolerate far worse LDL or triglyceride levels. For the most part, HDL is responsive to aerobic activity and ideal weight. We do not have medicines that increase the HDL reading very easily.    LDL is your 'bad cholesterol.' Our goals depend on how many cardiac risk factors you have.     High LDL: If you are diabetic or have had a heart attack, we like the LDL level to be less than 100. If you have 2 cardiac risk factors (such as diabetes, hypertension, family history, a low HDL and smoking), we like your LDL to be less  than 130. If you have 0-2 cardiac risk factors, we like your LDL level to be less than 160, but we may not necessarily initiate medications to 190 unless you cannot lower it. The latest guidelines recommend to consider taking a statin only if your ASCVD cardiac risk score is at least greater than 7.5% and a strong recommendation if your risk score is greater than 10%.     Low LDL: Normal or low LDL is considered good and having an LDL below the normal range is not of a concern.     Complete Blood Count (CBC):    White blood cells: These are infection fighting cells. Mild fluctuations may represent minor illness at the time of blood draw. Marked fluctuations can represent immune diseases. This can also be elevated from smoking.     High WBC: Elevation in wbc can commonly be caused by an infection, steroid use, or any cause of inflammation such as many rheumatologic diseases, surgery, or trauma.      Low WBC: Many different things can cause this such as infections, medications, rheumatologic disorders, malignancies, nutritional deficiencies, and a normal variant in some individuals. If this occurs it is common practice to rule out a few viruses such as HIV, Hep C, B12, folate along with a a peripheral blood smear to look for abnormalities. If you are otherwise healthy with no concerning symptoms and the workup is negative we usually monitor it with yearly blood work.  If there are other abnormal cell line abnormalities sometimes we refer to hematology to further evaluate.    Hemoglobin: A key indicator for anemia. Ranges depend on age. If you are significantly out of this range, we may need to talk with you.    High Hemoglobin: This can be elevated in some blood disorders, sleep apnea, chronic lung disease, kidney disease, testosterone use, dehydration, smoking, along with some other rare causes.     Low Hemoglobin: Many things can lead to this but the most common cause is an iron deficiency in females who lose blood  during their periods, decreased absorption due to antacids, poor diet, sickle cell, anemia of chronic disease, malignancy, bleeding from the gut, along with some other less common causes. If you are a young female who has periods it is usually assumed that this is from that blood loss unless other symptoms or abnormal blood work is present. If you are above 45 and have an iron deficiency it is always recommended to get a colonoscopy to ensure that there is no lesion causing blood loss and to exclude malignancy.     Hematocrit: Another way of looking at anemia, much like your hemoglobin. If you are significantly out of this range, we may need to talk with you.    MCV: This looks at the size of your red blood cells.     High MCV:  A large number may indicate a B-12 deficiency, folate deficiency, alcohol use, liver disease, medication-induced phenomenon, or a need for further workup.    Low MCV: A small number may imply iron anemia or genetic disorder such as alpha-thalassemia. We may check iron studies called to see if you are low.    MCH: Much like MCV above.    Platelets: These are clotting factors. We tolerate a broad range in this. If your numbers are less than 100, we may need to work it up.     High Platelet Count: If your numbers are elevated, this could represent ongoing inflammation within the body which can be caused by any infection, illness, iron deficiency, or other malignancy. We usually recheck it to monitor for improvement and if it does not resolve will work it up with more blood work.     Low Platelet Count: Many things can cause this such as infections, alcohol use, medications, liver disease, vitamin deficiencies, aspleenia, and some other rare blood disorders. If it persists many times we refer to hematology if a cause is not identified.     Iron:  This is not a reliable blood marker since this fluctuates throughout the day and if you are fasting many times your iron level in your blood is low but  this does not necessarily mean you have a deficiency.  There are more reliable ways to measure your iron stores and these are discussed below.    Transferrin:  Many things can cause an abnormal result and this is not as important as some other labs mentioned below.    TIBC:  This is the total iron-binding capacity and this measures the total amount of iron that can be bound by proteins in the blood.  If you have an elevated TIBC this is a good marker that you could be low on iron and this is useful blood marker.  A simple way to think of this is seats in a car. The TIBC is the number of open seats that iron can sit in. If you have a high TIBC (number of open seats) that means you have a low iron level.     Ferritin:  A low ferritin is almost always caused from an iron deficiency.  A normal ferritin level does not need necessarily exclude an iron deficiency since many inflammatory conditions such as kidney disease, diabetes, arthritis, and obesity can raise this level hiding an iron deficiency.  If you are healthy with no inflammatory conditions this is a very reliable test for detecting an iron deficiency since nothing else lowers your ferritin level except a low iron level. Keep in mind that you can have an iron deficiency if your ferritin level is normal but your TIBC is elevated.  If you have a low iron level the next step is always to identify why you have a low iron level.    CMP (Comprehensive Metabolic Panel):  Broadly, this is a test of organ function including the kidneys, liver, and electrolyte levels.    Glucose: This is primarily looking for diabetes. We like your blood glucose level to be less than 100 when fasting. Readings of 100-125 indicate what we call 'pre-diabetes' or 'glucose intolerance.' This does not necessarily indicate diabetes, but we may check another test called a hemoglobin A1C for confirmation. This level puts you at great risk for becoming a true diabetic and we would encourage the  reduction of simple sugars and processed white flour as well as appropriate weight loss. If this number is greater than 125, it is likely you are diabetic; we will get an additional hemoglobin A1C test and will likely schedule you for an appointment. If you notice that your blood glucose is above 125 and we have not scheduled a follow-up appointment, please call us. Patients who are known diabetics can have readings greater than 125.    Urea Nitrogen: This is a kidney function and maybe elevated because of mild dehydration or because of excessive muscle breakdown from aggressive exercise habits.    Creatinine: This also is a kidney function test. It may be mildly elevated if you have particularly large muscle bulk or taking a supplement like creatine. It is related to the GFR. It is a muscle product that we track to look at your kidney function. If this is elevated and new, we may need to talk with you. If you have had this mildly elevated in the past, it is likely that we will just track it to ensure that it does not worsen quickly. Some medications may gently worsen this, namely blood pressure pills called ace inhibitors. To some degree, we will permit levels of up to 1.6.    Sodium: This is essentially the concentration of salt within the body. This may be mildly low because of dehydration, overhydration, diuretics, .    Potassium: This is an electrolyte that can cause muscular cramping or cardiac difficulties. It is sometimes lowered by diuretic medications.    Chloride: For the most part, this is only relevant if the other electrolytes are abnormal.    CO2: This is a function of acid balance within the body. For the most part, mild abnormalities are not important and may represent a starvation or dehydration state when blood was drawn. Many medications can change this level as well such as diuretics, acetazolamide, calcium carbonate, laxatives, aspirin, and many others.    High CO2: Many things can cause this  which includes dehydration, sleep apnea, and COPD.     Low CO2: Many things can lead to a low level and if you are generally healthy we are usually  not concerned. Diarrhea, renal disease, diabetes, and many medications can cause this.     Anion Gap: Only relevant if your CO2 is abnormal.    Calcium: This is not related to dietary intake of calcium. It may fluctuate gently based on the amount of protein within your body. If it is above 10.9, we may need to do additional testing. Many times if low the albumin level is low and once the corrected calcium level is calculated the calcium is in a normal range.     Total protein: This looks at protein within the body. Markedly elevated levels can represent an immune response and may require further workup.    Albumin: This may be elevated because of particularly high level of fitness. If it is markedly suppressed, it may represent organ dysfunction, particularly in the liver or kidneys.    AST and ALT: These are enzymes in the liver and if elevated can indicate liver damage.     Elevated AST/ALT: Many things can caused elevated liver enzymes and the most common reason is viral infections, alcohol use, medications, supplements, autoimmune, along with some other rare causes. One of the most common reasons for a mild elevation in liver enzymes (less than 3 times the upper limit) is fatty liver disease. Many individuals who have extra fat in their diet store this in the liver and this can build up and cause a mild elevation. This is usually diagnosed with a liver ultrasound and exclusion of other causes. The only treatment for this is diet and exercise along with avoidance of liver toxic medications and alcohol. It is standard of care to rule our viral hepatitis and get imaging of the liver if elevated. This is monitored and if I feel concerned will refer to hepatology.     Alk Phos: Part of liver function or bones    High Alk Phos: elevated levels may indicate a liver injury  or obstruction of bile flow. Elevated levels can also be seen in vitamin D deficiency, drug induced, or bone disorders.     Low Alk Phos: In most cases having a low Alkaline Phosphatase enzyme activity is not due to any disease and is simply a normal variant.  Having an elevated Alk Phos is more concerning and associated with many diseases and thus I would not be overly concerned with a low level which is seen in many health individuals. The reasons for a low serum alkaline phosphatase activity were reviewed in a 1-yr retrospective study and in this study it was found that no cause was found in most cases.  Low activity values were recorded in several individuals in the absence of any obvious cause. This would suggest that the definition of the lower limit of the reference range for alkaline phosphatase is arbitrary, thus limiting the use of low serum activity as a marker of disease.  In some cases micronutrients like Zinc (Zn) and Magnesium (Mg) are causes of low ALP activity and you can take zinc or magnesium supplements. Unfortunately, the blood work to measure zinc and magnesium levels are unreliable and not very accurate since it does not test for the intracellular zinc or magnesium levels.     Irene HELTOND, Chris JH, Anh OCAMPO. Clinical significance of a low serum alkaline phosphatase. Neth J Med. 1992 Feb;40(1-2):9-14. PMID: [id number].  Nic ANGULO. S, Tobin B, Candace I, Behera S, Nic S. Low Alkaline Phosphatase (ALP) In Adult Population an Indicator of Zinc (Zn) and Magnesium (Mg) Deficiency. Curr Res Nutr Food Sci 2017;5(3). doi : http://[email or web address]    Total Bilirubin: This is also part of liver function.    High T Bili: If you have right upper quadrant pain an elevation in total bilirubin can be caused by a gallbladder stone that is blocking the biliary tract that leads to your gastrointestinal tract. If you have fever and right upper quadrant pain this can sometimes be elevated when  your gallbladder is infected and most individuals have nausea with vomiting associated with it. If you have no symptoms and are otherwise healthy this can be caused by Gilbert syndrome which is a benign normal variant. There are other causes such as some anemias but there would be abnormal blood counts.     Low T Bili: Nothing to be concerned about.     Thyroid Stimulating Hormone (TSH): This reading is an indicator of your thyroid function. The thyroid regulates energy levels throughout the entire body, affecting almost every organ system. This is an inverse relationship so a high number actually presents a low thyroid. If this is abnormal, we will often check an additional lab called a Free T4 to evaluate this more carefully. Borderline elevations, those of 5-10, can be watched or worked up further. Please do not take the supplement Biotin for at least a week prior to getting your TSH checked since this can lead to false measurement levels.     Prostate Specific Antigen (PSA): (Men only.) This is a prostate cancer screening test, and is no longer a routine screening test. Levels are truly a function of age. Being less than 4 is typical for someone more in their 60s. If you are young, it should probably be less than 3. A higher PSA result does not necessarily mean that you have cancer, but may indicate a need for a discussion with your provider. Options include observation to look at rate of rise or prostate biopsy. Not only is the absolute value important, but how much it has changed from previous years. Please ensure that there is not a dramatic rise from previous years.    Please Note: This information is included as a reference to help you better understand your lab results and is not be used for diagnosis.    Frequently asked questions    When should I take my blood pressure medication?    The latest studies show that taking your blood pressure medication at night is the best time. A recent study showed that  "this prevents more heart attacks and strokes. See the answer below from [place].     "Q. I've taken blood pressure medicines every morning for many years, and they keep my pressure under control. Recently, my doctor recommended taking them at bedtime, instead. Does that make sense?    A. It actually does make sense -- based on recent research. For many years, there have been at least three theoretical reasons for taking blood pressure medicines before bedtime. First, a body system that strongly affects blood pressure, called the renin-angiotensin system, has its peak activity during sleep. Second, circadian rhythms cause differences in the body chemistry at night compared with daytime. Third, most heart attacks occur in the morning, before medicines taken in the morning have a chance to "kick in."" [6].     When should I take my cholesterol medication?    It used to be recommended to take your cholesterol medication at night since the original statins that lowered cholesterol did not last 24 hours and most cholesterol synthesis is done at night. The long acting statins such as atorvastatin and rosuvastatin last 24 hours so they can be taken any time during the day. Simvastatin, pravastatin, fluvastatin, and lovastatin are shorter acting and should be taken at bed time.     Can supplements affect my blood work?    Yes they can. A very important supplement to not take for at least a week prior to your blood work is biotin. "Biotin supplement use is common and can lead to the false measurement of thyroid hormone in commonly used assays." [8.]    What are conditions that should not be addressed during a virtual visit?    There are some conditions that should not be evaluated via a virtual visit since optimal care is impossible. Chest pain, shortness of breath, lung conditions, abdominal pain, and any neurological complaint such as headache, dizziness, numbness ect.         When will I get commentary of my blood " work?    I review all blood work that you get and I will send out commentary on this via [employer] within 72 hours. In most cases you will get a message from me sooner, but many times not all of the blood work is completed thus I usually wait until all results have returned. If there is a critical abnormality you should be contacted the same day you got blood work.     How frequently do I need to have visits to get controlled substances?    It is standard protocol to have a visit every 3 months if you are taking scheduled substances such as ADHD medications, psychiatric medications, and pain medications. This is to ensure your safety and monitor for any side effects.     When should I bring prior to a visit if I want to lose weight?    I recommend that you make a food diary for a week and fill out what you ate each day. You can bring this form in to your visit and I can look over it to suggest changes that you can make.     Which over the counter medications should I avoid if I have decreased kidney function?    NSAIDs which includes ibuprofen (Advil, Motrin, Nuprin), naproxen (Aleve), meloxicam (Mobic) and diclofenac(Zorvolex, Voltaren) and ketorolac (Toradol) can damage your kidneys if you take this long term.Tylenol does not affect your kidney and thus is safe as long as you don't have liver disease.     Is there an [facility]?     Yes! The [facility] is located on the first floor of the [facility]. The address is listed below. You can get curbside pickup if you call their number at [phone]. One of the many benefits of using the [facility] pharmacy is that the pharmacists can contact me directly if a cheaper alternative is available to save you money. They also see your note to know more about what the medication was prescribed for. I recommend this pharmacy since communication with me is quick in case any confusion arises on your medications.     [address]  [address]  [place]  [address]  Phone: [phone]    Hours:  Monday - Friday  8:00 a.m. - 5:30 a.m.    Why is it not in my best interest to call in order to get an antibiotic?    Medicine is a complex field and many times the correct diagnosis is critical in order to provide the correct care. One of the most important goals of a healthcare provider is to ensure that no dangerous condition is masquerading as a mild illness. Specific questions are very important to obtain during an examination that provide a wealth of information to understand your illness. Health care providers are trained to investigate for signs that can be dangerous to your health. Messaging or calling the office in order to get an antibiotic can be very dangerous.     For example, many urinary tract infections can lead to an infection in the kidney that can result in a serious blood stream infection that can lead to hospitalization if not recognized. A cough can be caused by many different things and not necessarily an infection. It is not uncommon that one assumes a cough is from an infection when it is actually caused by a blood clot in the lungs. This can lead to death. Determining your risk can only be performed after a thorough history and examination. A few sentences through e-mail is not enough.     What are some common symptoms that should be evaluated by the emergency department and not by phone or e-mail?    This does not include every symptom, but common examples of symptoms that should prompt one to go to the emergency department are chest pain, chest pressure, shortness of breath, difficulty breathing, abdominal pain, weakness or numbness or an extremity, sudden weakness or drooping on one side of the body, speaking difficulty, unusual or bad headache (particularly if it started suddenly), head injury, confusion, seizure, passing out, lightheaded, pain in arm or jaw, suddenly not able to speak, see, walk, or move, dizziness, neck stiffness, suicidal  thoughts, testicular pain, cuts and wounds, severe pain, along with many others. This is not an inclusive list.     Outside Records    It is common to have an colonoscopy, mammogram, PAP smear, or eye exam done outside the [facility] system. Many times we do not get the records automatically sent to us. Please call your provider's office to notify them to fax us your records so that we can have the most up to date information. Your provider will review your outside results in order to provide you with the complete care that you deserve. We appreciate that you decided to choose us to be serving on your healthcare team and the more information we have about your health is essential.     If I have a psychiatric crisis what should I do?    If you ever feel that there is a risk of a harm to yourself we recommend to go to the emergency room. There is a National Suicide Prevention lifeline number 7-561-830-TALK which route you to the nearest crisis center. There is also a suicide hotline 8-978-LMPYPJZ (1-399.972.8724).    988 has been designated as the new three-digit dialing code that will route callers to the National Suicide Prevention Lifeline (now known as the 988 Suicide & Crisis Lifeline), and is now active across the [place].    When people call, text, or chat 988, they will be connected to trained counselors that are part of the existing Lifeline network. These trained counselors will listen, understand how their problems are affecting them, provide support, and connect them to resources if necessary.    The previous Lifeline phone number (1-285.852.5470) will always remain available to people in emotional distress or suicidal crisis.    The [facility] network of over 200 crisis centers has been in operation since 2005, and has been proven to be effective. Its the counselors at these local crisis centers who answer the contacts the Lifeline receives every day. Numerous studies have shown that callers feel less  "suicidal, less depressed, less overwhelmed and more hopeful after speaking with a Lifeline counselor.     Patient Education       Checking Your Blood Pressure at Home   The Basics   Written by the doctors and editors at [facility]   How is blood pressure measured? -- Blood pressure is usually measured with a device that goes around your upper arm. This is often done in a doctor's office. But some people also check their blood pressure themselves, at home or at work.  Blood pressure is explained with 2 numbers. For instance, your blood pressure might be "140 over 90." The first (top) number is the pressure inside your arteries when your heart is jarvis. The second (bottom) number is the pressure inside your arteries when your heart is relaxed. The table shows how doctors and nurses define high and normal blood pressure (table 1).  If your blood pressure gets too high, it puts you at risk for heart attack, stroke, and kidney disease. High blood pressure does not usually cause symptoms. But it can be serious.  What is a home blood pressure meter? -- A home blood pressure meter (or "monitor") is a device you can use to check your blood pressure yourself. It has a cuff that goes around your upper arm (figure 1). Some devices have a cuff that goes around your wrist instead. But doctors aren't sure if these work as well. The meter also has a small screen, or dial, that shows your blood pressure numbers.  There are also special meters you can wear for a day or 2. These are different because they automatically check your blood pressure throughout the day and night, even while you are sleeping. If your doctor thinks you should use one of these devices, they will talk to you about how to wear it.  Why do I need to check my blood pressure at home? -- If your doctor knows or suspects that you have high blood pressure, they might want you to check it at home. There are a few reasons for this. Your doctor might want to look " at:  Whether your blood pressure measures the same at home as it did in the doctor's office  How well your blood pressure medicines are working  Changes in your blood pressure, for example, if it goes up and down  People who check their own blood pressure at home usually do better at keeping it low.  How do I choose a home blood pressure meter? -- When choosing a home blood pressure meter, you will probably want to think about:  Cost - Some devices cost more than others. You should also check to see if your insurance will help pay for your device.  Size - It's important to make sure the cuff fits your arm comfortably. Your doctor or nurse can help you with this.  How easy it is to use - You should make sure you understand how to use the device. You also need to be able to read the numbers on the screen.  You do not need a prescription to buy a home blood pressure meter. You can buy them at most pharmacies or over the internet. Your doctor or nurse can help you choose the right device for you.  How do I check my blood pressure at home? -- Once you have a home blood pressure meter, your doctor or nurse should check it to make sure it fits you and works correctly.  When it's time to check your blood pressure:  Go to the bathroom and empty your bladder first. Having a full bladder can temporarily increase your blood pressure, making the results inaccurate.  Sit in a chair with your feet flat on the ground.  Try to breathe normally and stay calm.  Attach the cuff to your arm. Place the cuff directly on your skin, not over your clothing. The cuff should be tight enough to not slip down, but not uncomfortably tight.  Sit and relax for about 3 to 5 minutes with the cuff on.  Follow the directions that came with your device to start measuring your blood pressure. This might involve squeezing the bulb at the end of the tube to inflate the cuff (fill it with air). With some monitors, you just need to press a button to inflate  the cuff. When the cuff fills with air, it feels like someone is squeezing your arm, but it should not hurt. Then you will slowly deflate the cuff (let the air out of it), or it will deflate by itself. The screen or dial will show your blood pressure numbers.  Stay seated and relax for 1 minute, then measure your blood pressure again.  How often should I check my blood pressure? -- It depends. Different people need to follow different schedules. Your doctor or nurse will tell you how often to check your blood pressure, and when. Some people need to check their blood pressure twice a day, in the morning and evening.  Your doctor or nurse will probably tell you to keep track of your blood pressure for at least a few days (table 2). Then they will look at the numbers. The reason for this is that it's normal for your blood pressure to change a bit from day to day. For example, the numbers might change depending on whether you recently had caffeine, just exercised, or feel stressed. Checking your blood pressure over several days - or longer - will give your doctor or nurse a better idea of what is average for you.  How should I keep track of my blood pressure? -- Some blood pressure meters will record your numbers for you, or send them to your computer or smartphone. If yours does not do this, you will need to write them down. Your doctor or nurse can help you figure out the best way to keep track of the numbers.  What if my blood pressure is high? -- Your doctor or nurse will tell you what to do if your blood pressure is high when you check it at home. If you get a number that is higher than normal, measure it again to see if it is still high. If it is very high (above a certain number, which your doctor or nurse will tell you to watch out for), you should call your doctor right away.  If your blood pressure is only a little high, your doctor or nurse might tell you to keep checking it for a few more days or weeks, and  "then call if it does not go back down. Then they can help you decide what to do next.  All topics are updated as new evidence becomes available and our peer review process is complete.  This topic retrieved from [employer] on: Sep 21, 2021.  Topic 179262 Version 4.0  Release: 29.4.2 - C29.263  © 2021 UpToDate, Inc. and/or its affiliates. All rights reserved.  table 1: Definition of normal and high blood pressure  Level  Top number  Bottom number    High 130 or above 80 or above   Elevated 120 to 129 79 or below   Normal 119 or below 79 or below   These definitions are from the American College of Cardiology/American Heart Association. Other expert groups might use slightly different definitions.  "Elevated blood pressure" is a term doctor or nurses use as a warning. It means you do not yet have high blood pressure, but your blood pressure is not as low as it should be for good health.  Graphic 59543 Version 6.0  figure 1: Using a home blood pressure meter     This is an example of a person using a home blood pressure meter.  Graphic 584060 Version 1.0    Consumer Information Use and Disclaimer   This information is not specific medical advice and does not replace information you receive from your health care provider. This is only a brief summary of general information. It does NOT include all information about conditions, illnesses, injuries, tests, procedures, treatments, therapies, discharge instructions or life-style choices that may apply to you. You must talk with your health care provider for complete information about your health and treatment options. This information should not be used to decide whether or not to accept your health care provider's advice, instructions or recommendations. Only your health care provider has the knowledge and training to provide advice that is right for you. The use of this information is governed by the [employer] End User License Agreement, available at " https://www.[employer].com/en/solutions/lexicomp/about/juan.The use of [facility] content is governed by the [facility] Terms of Use. ©2021 UpToDate, Inc. All rights reserved.  Copyright   © 2021 UpToDate, Inc. and/or its affiliates. All rights reserved.      Daily Blood Pressure Log    Please print this form to assist you in keeping track of your blood pressure at home.      Name:  Date of Birth:       Average Blood Pressure:           Date: Time  (a.m.) Blood  Pressure: Pulse  Rate: Time  (p.m.) Blood  Pressure : Pulse  Rate: Comments:   Sample 8:37 127/83 84    Stressful morning                                                                                                                                                                                                     Wishing you good health,     Perry Jorge MD     References:  1-https://www.[employer]/speakers/angeles_denny  2-https://www.[employer].com.au/news/moutk-dwh-62-cancers-that-can-xm-rcbxsz-vl-smoking/  3-https://www.cdc.gov/cancer/lung/basic_info/screening.htm  4-https://newsroom.heart.org/news/common-hypertension-medications-may-reduce-colorectal-cancer-risk  5-Goto A, Darling M, Sawada N, et al. High hemoglobin A1c levels within the non-diabetic range are associated with the risk of all cancers. Int J Cancer. 2016;138(7):8939-3820. doi:10.1002/ijc.85619  6-https://www.health.[place].edu/newsletter_article/the-10-commandments-of-cancer-prevention  7-https://www.health.[place].edu/diseases-and-conditions/should-i-take-blood-pressure-medications-at-night  8-Ainsley TEJADA et al 2018 Prevalence of biotin supplement usage in outpatients and plasma biotin concentrations in patients presenting to the emergency department. Clin Biochem. Epub 2018 Jul 20. PMID: [id number].

## 2022-10-12 ENCOUNTER — TELEPHONE (OUTPATIENT)
Dept: FAMILY MEDICINE | Facility: CLINIC | Age: 87
End: 2022-10-12
Payer: MEDICARE

## 2022-10-12 DIAGNOSIS — E87.5 HYPERKALEMIA: Primary | ICD-10-CM

## 2022-10-20 LAB — MAGNESIUM RBC-MCNC: 6 MG/DL (ref 4–6.4)

## 2022-10-21 ENCOUNTER — LAB VISIT (OUTPATIENT)
Dept: LAB | Facility: HOSPITAL | Age: 87
End: 2022-10-21
Attending: STUDENT IN AN ORGANIZED HEALTH CARE EDUCATION/TRAINING PROGRAM
Payer: MEDICARE

## 2022-10-21 DIAGNOSIS — E87.5 HYPERKALEMIA: ICD-10-CM

## 2022-10-21 LAB
ANION GAP SERPL CALC-SCNC: 7 MMOL/L (ref 8–16)
BUN SERPL-MCNC: 16 MG/DL (ref 10–30)
CALCIUM SERPL-MCNC: 9.1 MG/DL (ref 8.7–10.5)
CHLORIDE SERPL-SCNC: 106 MMOL/L (ref 95–110)
CO2 SERPL-SCNC: 28 MMOL/L (ref 23–29)
CREAT SERPL-MCNC: 0.9 MG/DL (ref 0.5–1.4)
EST. GFR  (NO RACE VARIABLE): 58.1 ML/MIN/1.73 M^2
GLUCOSE SERPL-MCNC: 85 MG/DL (ref 70–110)
POTASSIUM SERPL-SCNC: 4.8 MMOL/L (ref 3.5–5.1)
SODIUM SERPL-SCNC: 141 MMOL/L (ref 136–145)

## 2022-10-21 PROCEDURE — 80048 BASIC METABOLIC PNL TOTAL CA: CPT | Performed by: STUDENT IN AN ORGANIZED HEALTH CARE EDUCATION/TRAINING PROGRAM

## 2022-10-21 PROCEDURE — 36415 COLL VENOUS BLD VENIPUNCTURE: CPT | Mod: PO | Performed by: STUDENT IN AN ORGANIZED HEALTH CARE EDUCATION/TRAINING PROGRAM

## 2022-12-01 ENCOUNTER — TELEPHONE (OUTPATIENT)
Dept: FAMILY MEDICINE | Facility: CLINIC | Age: 87
End: 2022-12-01
Payer: MEDICARE

## 2022-12-14 ENCOUNTER — TELEPHONE (OUTPATIENT)
Dept: FAMILY MEDICINE | Facility: CLINIC | Age: 87
End: 2022-12-14
Payer: MEDICARE

## 2022-12-14 NOTE — TELEPHONE ENCOUNTER
----- Message from Thien Tracey sent at 12/14/2022  2:10 PM CST -----  Contact: self  Type: Sooner Appointment Request        Caller is requesting a sooner appointment. Caller declined first available appointment listed below. Caller will not accept being placed on the waitlist and is requesting a message be sent to doctor.        Name of Caller: Patient   Best Call Back Number: [phone]  Additional Information: Possible sinus infection/ nasal drip/ and raw cough/ Pt feels really sick. Plz call to get scheduled for tomorrow.  Thanks

## 2022-12-15 ENCOUNTER — OFFICE VISIT (OUTPATIENT)
Dept: FAMILY MEDICINE | Facility: CLINIC | Age: 87
End: 2022-12-15
Payer: MEDICARE

## 2022-12-15 VITALS
OXYGEN SATURATION: 95 % | SYSTOLIC BLOOD PRESSURE: 128 MMHG | WEIGHT: 135.38 LBS | HEART RATE: 102 BPM | HEIGHT: 59 IN | TEMPERATURE: 97 F | BODY MASS INDEX: 27.29 KG/M2 | DIASTOLIC BLOOD PRESSURE: 60 MMHG

## 2022-12-15 DIAGNOSIS — U07.1 COVID-19 VIRUS DETECTED: ICD-10-CM

## 2022-12-15 DIAGNOSIS — J10.1 INFLUENZA B: ICD-10-CM

## 2022-12-15 DIAGNOSIS — U07.1 COVID-19: ICD-10-CM

## 2022-12-15 DIAGNOSIS — J06.9 ACUTE URI: Primary | ICD-10-CM

## 2022-12-15 LAB
CTP QC/QA: YES
CTP QC/QA: YES
POC MOLECULAR INFLUENZA A AGN: NEGATIVE
POC MOLECULAR INFLUENZA B AGN: POSITIVE
SARS-COV-2 RDRP RESP QL NAA+PROBE: POSITIVE

## 2022-12-15 PROCEDURE — 87502 INFLUENZA DNA AMP PROBE: CPT | Mod: PBBFAC,PO | Performed by: FAMILY MEDICINE

## 2022-12-15 PROCEDURE — 99999 PR PBB SHADOW E&M-EST. PATIENT-LVL III: ICD-10-PCS | Mod: PBBFAC,CR,, | Performed by: FAMILY MEDICINE

## 2022-12-15 PROCEDURE — 99214 PR OFFICE/OUTPT VISIT, EST, LEVL IV, 30-39 MIN: ICD-10-PCS | Mod: S$PBB,CR,, | Performed by: FAMILY MEDICINE

## 2022-12-15 PROCEDURE — 87635 SARS-COV-2 COVID-19 AMP PRB: CPT | Mod: PBBFAC,PO | Performed by: FAMILY MEDICINE

## 2022-12-15 PROCEDURE — 99213 OFFICE O/P EST LOW 20 MIN: CPT | Mod: PBBFAC,PO | Performed by: FAMILY MEDICINE

## 2022-12-15 PROCEDURE — 99214 OFFICE O/P EST MOD 30 MIN: CPT | Mod: S$PBB,CR,, | Performed by: FAMILY MEDICINE

## 2022-12-15 PROCEDURE — 99999 PR PBB SHADOW E&M-EST. PATIENT-LVL III: CPT | Mod: PBBFAC,CR,, | Performed by: FAMILY MEDICINE

## 2022-12-15 RX ORDER — OSELTAMIVIR PHOSPHATE 75 MG/1
75 CAPSULE ORAL 2 TIMES DAILY
Qty: 10 CAPSULE | Refills: 0 | Status: SHIPPED | OUTPATIENT
Start: 2022-12-15 | End: 2022-12-20

## 2022-12-15 NOTE — PATIENT INSTRUCTIONS
COVID vitamins:  Vitamin C 1,000 mg three times a day  Vitamin D3 5,000 IU once a day  (you should take this for life as well)  Zinc 50 mg twice a day    Aspirin 81 mg a day (clot prevention)      Push fluids intake.  Drink plenty of water.     Zyrtec - one a day for runny nose.    Contact your PCP if any worsening or for any new concerns as we discussed.

## 2022-12-15 NOTE — PROGRESS NOTES
Subjective:       Patient ID: Arleen Zarate is a 89 y.o. female.    Chief Complaint: URI (Nausea and weak this morning and fell out this morning)    New to me patient here for UC visit.  Onset 2-3 days ago of viral uri type symptoms.  This AM after Ibuprofen on empty stomach, had nausea and felt weak and sank to floor.  No LOOand no V and no further N.  No apparent fever, no SOB or pl pain.  Positive here today for Covid and Flu B    URI   Associated symptoms include congestion, coughing, nausea and a sore throat. Pertinent negatives include no abdominal pain, chest pain, rash or vomiting.   Review of Systems   Constitutional:  Positive for appetite change and fatigue. Negative for fever.   HENT:  Positive for congestion and sore throat.    Respiratory:  Positive for cough. Negative for shortness of breath.    Cardiovascular:  Negative for chest pain.   Gastrointestinal:  Positive for nausea. Negative for abdominal pain and vomiting.   Skin:  Negative for rash.   All other systems reviewed and are negative.    Objective:      Physical Exam  Vitals reviewed.   Constitutional:       General: She is not in acute distress.     Appearance: She is well-developed. She is not ill-appearing.   HENT:      Right Ear: Tympanic membrane normal. Tympanic membrane is not erythematous.      Left Ear: Tympanic membrane normal. Tympanic membrane is not erythematous.      Nose: Mucosal edema present.      Right Sinus: No maxillary sinus tenderness.      Left Sinus: No maxillary sinus tenderness.      Mouth/Throat:      Pharynx: Posterior oropharyngeal erythema present.   Cardiovascular:      Rate and Rhythm: Normal rate and regular rhythm.      Heart sounds: No murmur heard.  Pulmonary:      Effort: Pulmonary effort is normal.      Breath sounds: Normal breath sounds. No wheezing or rales.   Musculoskeletal:      Cervical back: Neck supple.      Right lower leg: No edema.      Left lower leg: No edema.   Lymphadenopathy:       Cervical: No cervical adenopathy.   Skin:     General: Skin is warm and dry.   Neurological:      Mental Status: She is alert.       Assessment:       1. Acute URI    2. COVID-19    3. Influenza B          Plan:       Acute URI  -     POCT COVID-19 Rapid Screening  -     POCT Influenza A/B Molecular    COVID-19  -     nirmatrelvir-ritonavir 150-100 mg DsPk; Take 2 tablets by mouth 2 (two) times daily. Take 2 tablets by mouth 2 (two) times daily for 5 days. Each dose contains 1 nirmatrelvir (pink tablet) and 1 ritonavir (white tablet). Take both tablets together for 5 days  Dispense: 20 tablet; Refill: 0    Influenza B  -     oseltamivir (TAMIFLU) 75 MG capsule; Take 1 capsule (75 mg total) by mouth 2 (two) times daily. for 10 doses  Dispense: 10 capsule; Refill: 0      Patient Instructions   COVID vitamins:  Vitamin C 1,000 mg three times a day  Vitamin D3 5,000 IU once a day  (you should take this for life as well)  Zinc 50 mg twice a day    Aspirin 81 mg a day (clot prevention)      Push fluids intake.  Drink plenty of water.     Zyrtec - one a day for runny nose.    Contact your PCP if any worsening or for any new concerns as we discussed.

## 2022-12-16 ENCOUNTER — TELEPHONE (OUTPATIENT)
Dept: FAMILY MEDICINE | Facility: CLINIC | Age: 87
End: 2022-12-16
Payer: MEDICARE

## 2022-12-16 NOTE — TELEPHONE ENCOUNTER
Returned patient call. Patient was unsure of the reason for call as she does not check VM. States she is feeling a little better but feels she is somewhat [place] as a result of congestion. States she will f/u w/PCP if not resolved after prescribed medications. Went over instructions from Dr. Solorzano OV per her request. Verbalized understanding.

## 2022-12-16 NOTE — TELEPHONE ENCOUNTER
----- Message from Penelope Okeefe sent at 12/16/2022 10:21 AM CST -----  Contact: Patient  Type:  Patient Call          Who Called: patient         Does the patient know what this is regarding?: Requesting a call back from a miss call ;please advise           Would the patient rather a call back or a response via [facility]? Call           Best Call Back Number: [phone]             Additional Information:

## 2022-12-27 ENCOUNTER — TELEPHONE (OUTPATIENT)
Dept: FAMILY MEDICINE | Facility: CLINIC | Age: 87
End: 2022-12-27
Payer: MEDICARE

## 2022-12-27 NOTE — TELEPHONE ENCOUNTER
----- Message from Frank Ferro sent at 12/27/2022  9:35 AM CST -----  Regarding: wants cultures to see if over flu/cv19 pt asymptomactic  Type:  Sooner Appointment Request    Caller is requesting a sooner appointment.      Name of Caller:  Arleen    When is the first available appointment?  1/5 w/Shannon    Symptoms:  wants cultures to see if over flu/cv19 pt asymptomactic    Best Call Back Number:  [phone]    Additional Information:

## 2022-12-29 ENCOUNTER — TELEPHONE (OUTPATIENT)
Dept: FAMILY MEDICINE | Facility: CLINIC | Age: 87
End: 2022-12-29
Payer: MEDICARE

## 2022-12-29 NOTE — TELEPHONE ENCOUNTER
----- Message from Shae Stoddard sent at 12/29/2022  2:58 PM CST -----  Type:  Sooner Apoointment Request    Caller is requesting a sooner appointment.  Caller declined first available appointment listed below.  Caller will not accept being placed on the waitlist and is requesting a message be sent to doctor.  Name of Caller: Yvette Zarate     When is the first available appointment?1/17/23    Symptoms: Not feeling well      Would the patient rather a call back or a response via [facility]?  Call back     Best Call Back Number: [phone] (mobile) or [phone]    Additional Information: Hoping for an appointment for next week if possible

## 2023-01-04 ENCOUNTER — OFFICE VISIT (OUTPATIENT)
Dept: FAMILY MEDICINE | Facility: CLINIC | Age: 88
End: 2023-01-04
Payer: MEDICARE

## 2023-01-04 VITALS
TEMPERATURE: 98 F | DIASTOLIC BLOOD PRESSURE: 62 MMHG | OXYGEN SATURATION: 97 % | SYSTOLIC BLOOD PRESSURE: 126 MMHG | HEIGHT: 59 IN | RESPIRATION RATE: 14 BRPM | WEIGHT: 134.25 LBS | HEART RATE: 90 BPM | BODY MASS INDEX: 27.06 KG/M2

## 2023-01-04 DIAGNOSIS — J10.1 INFLUENZA B: ICD-10-CM

## 2023-01-04 DIAGNOSIS — U07.1 COVID-19: ICD-10-CM

## 2023-01-04 DIAGNOSIS — H91.13 PRESBYCUSIS OF BOTH EARS: Primary | ICD-10-CM

## 2023-01-04 PROCEDURE — 99213 OFFICE O/P EST LOW 20 MIN: CPT | Mod: S$PBB,CR,,

## 2023-01-04 PROCEDURE — 99999 PR PBB SHADOW E&M-EST. PATIENT-LVL V: CPT | Mod: PBBFAC,CR,,

## 2023-01-04 PROCEDURE — 99999 PR PBB SHADOW E&M-EST. PATIENT-LVL V: ICD-10-PCS | Mod: PBBFAC,CR,,

## 2023-01-04 PROCEDURE — 99215 OFFICE O/P EST HI 40 MIN: CPT | Mod: PBBFAC,PO

## 2023-01-04 PROCEDURE — 99213 PR OFFICE/OUTPT VISIT, EST, LEVL III, 20-29 MIN: ICD-10-PCS | Mod: S$PBB,CR,,

## 2023-01-04 NOTE — PATIENT INSTRUCTIONS
Amrit Bacon,     If you are due for any health screening(s) below please notify me so we can arrange them to be ordered and scheduled to maintain your health. Most healthy patients complete it. Don't lose out on improving your health.     All of your core healthy metrics are met.

## 2023-01-04 NOTE — PROGRESS NOTES
Subjective:       Patient ID: Arleen Zarate is a 89 y.o. female.    Chief Complaint: Follow-up (Covid and flu )    Presents to the clinic for UC follow up after being diagnosed with COVID and Influenza B simultaneously. Symptoms mild for the most part. Only URI symptoms. Denies any acute health concerns at this time in regards to this.     Feels like her ear is clogged or wonders if its related to her loss of hearing. Previously saw Dr. Grissom ENT and recommended hearing aids but not interested at the time. She is open to reevaluating this now since it has been a few years since she initially discussed this.       Past Medical History:   Diagnosis Date    Anxiety     Arthritis     fingers and back    Depression     Disorder of kidney and ureter     Foot pain, left 9/18/2013    GERD (gastroesophageal reflux disease)     Hiatal hernia     Hyperlipidemia     Hypertension     Reflux     on meds    Tendon tear 8/21/2013    Tendonitis 8/21/2013       Review of patient's allergies indicates:   Allergen Reactions    Buspar [buspirone] Other (See Comments)     Syncope and nausea     Doxy      Other reaction(s): Unknown    Effexor [venlafaxine] Other (See Comments)     shaking    Biaxin [clarithromycin] Other (See Comments)     Unknown    Codeine Other (See Comments)     Syncope, and nausea.    Statins-hmg-coa reductase inhibitors      myalgia    Doxycycline Other (See Comments)     Made very weak         Current Outpatient Medications:     fluticasone propionate (FLONASE) 50 mcg/actuation nasal spray, 1 spray (50 mcg total) by Each Nostril route 2 (two) times daily as needed for Rhinitis., Disp: 15 g, Rfl: 0    lisinopriL (PRINIVIL,ZESTRIL) 30 MG tablet, TAKE 1 TABLET EVERY EVENING, Disp: 90 tablet, Rfl: 1    multivitamin capsule, Take 1 capsule by mouth once daily., Disp: , Rfl:     nisoldipine (SULAR) 8.5 MG Tb24, TAKE 1 TABLET DAILY, Disp: 90 tablet, Rfl: 3    omeprazole (PRILOSEC) 40 MG capsule, Take 1 capsule (40 mg  "total) by mouth once daily., Disp: 90 capsule, Rfl: 3    Review of Systems   HENT:  Positive for hearing loss.      Objective:      /62 (BP Location: Right arm, Patient Position: Sitting, BP Method: Medium (Manual))   Pulse 90   Temp 98.4 °F (36.9 °C) (Oral)   Resp 14   Ht 4' 11" (1.499 m)   Wt 60.9 kg (134 lb 4.2 oz)   SpO2 97%   BMI 27.12 kg/m²   Physical Exam  Vitals reviewed.   Constitutional:       General: She is not in acute distress.     Appearance: Normal appearance. She is normal weight. She is not ill-appearing, toxic-appearing or diaphoretic.   HENT:      Head: Normocephalic.      Right Ear: Tympanic membrane, ear canal and external ear normal. There is no impacted cerumen.      Left Ear: Tympanic membrane, ear canal and external ear normal. There is no impacted cerumen.      Ears:      Comments: Hard of hearing       Nose: Nose normal. No congestion or rhinorrhea.      Mouth/Throat:      Mouth: Mucous membranes are moist.      Pharynx: Oropharynx is clear.   Eyes:      General: No scleral icterus.        Right eye: No discharge.         Left eye: No discharge.      Extraocular Movements: Extraocular movements intact.      Conjunctiva/sclera: Conjunctivae normal.   Cardiovascular:      Rate and Rhythm: Normal rate and regular rhythm.      Pulses: Normal pulses.      Heart sounds: Normal heart sounds. No murmur heard.    No friction rub. No gallop.   Pulmonary:      Effort: Pulmonary effort is normal. No respiratory distress.      Breath sounds: Normal breath sounds. No wheezing, rhonchi or rales.   Chest:      Chest wall: No tenderness.   Musculoskeletal:         General: No swelling, tenderness or deformity. Normal range of motion.      Cervical back: Normal range of motion.      Right lower leg: No edema.      Left lower leg: No edema.   Skin:     General: Skin is warm and dry.      Capillary Refill: Capillary refill takes less than 2 seconds.      Coloration: Skin is not jaundiced.      " Findings: No bruising, erythema, lesion or rash.   Neurological:      Mental Status: She is alert and oriented to person, place, and time.      Gait: Gait abnormal.   Psychiatric:         Mood and Affect: Mood normal.         Behavior: Behavior normal.         Thought Content: Thought content normal.         Judgment: Judgment normal.       Assessment:       1. Presbycusis of both ears    2. COVID-19    3. Influenza B          Plan:       Presbycusis of both ears  -     Ambulatory referral/consult to Audiology; Future; Expected date: 01/11/2023  -     Ambulatory referral/consult to ENT; Future; Expected date: 01/11/2023    COVID-19        -    Resolved    Influenza B        -     Resolved        Has follow up with Susie Calix PA-C  Family Medicine Physician Assistant       I spent a total of 20 minutes on the day of the visit.This includes face to face time and non-face to face time preparing to see the patient (eg, review of tests), obtaining and/or reviewing separately obtained history, documenting clinical information in the electronic or other health record, independently interpreting results and communicating results to the patient/family/caregiver, or care coordinator.      We have addressed [3] Low: 2 or more self-limited or minor problems / 1 stable chronic illness / 1 acute, uncomplicated illness or injury  The complexity of the data reviewed and analyzed for this visit was [2] Minimal or None  The risk of complications and/or morbidity or mortality are [3] Low risk   The level of Medical Decision Making for this visit is [3] Low

## 2023-01-24 ENCOUNTER — TELEPHONE (OUTPATIENT)
Dept: ADMINISTRATIVE | Facility: CLINIC | Age: 88
End: 2023-01-24
Payer: MEDICARE

## 2023-01-24 NOTE — TELEPHONE ENCOUNTER
Called pt, informed pt I was calling to remind pt of her in office EAWV on 1/26/23; clinic location provided to patient; pt confirmed appointment

## 2023-01-26 ENCOUNTER — OFFICE VISIT (OUTPATIENT)
Dept: FAMILY MEDICINE | Facility: CLINIC | Age: 88
End: 2023-01-26
Payer: MEDICARE

## 2023-01-26 VITALS
HEIGHT: 59 IN | TEMPERATURE: 98 F | SYSTOLIC BLOOD PRESSURE: 122 MMHG | BODY MASS INDEX: 27.6 KG/M2 | OXYGEN SATURATION: 98 % | HEART RATE: 83 BPM | DIASTOLIC BLOOD PRESSURE: 60 MMHG | WEIGHT: 136.88 LBS

## 2023-01-26 DIAGNOSIS — N18.31 STAGE 3A CHRONIC KIDNEY DISEASE: ICD-10-CM

## 2023-01-26 DIAGNOSIS — I70.0 ATHEROSCLEROSIS OF AORTA: ICD-10-CM

## 2023-01-26 DIAGNOSIS — I10 ESSENTIAL HYPERTENSION: ICD-10-CM

## 2023-01-26 DIAGNOSIS — Z00.00 ENCOUNTER FOR PREVENTIVE HEALTH EXAMINATION: Primary | ICD-10-CM

## 2023-01-26 PROCEDURE — 99999 PR PBB SHADOW E&M-EST. PATIENT-LVL IV: CPT | Mod: PBBFAC,,, | Performed by: NURSE PRACTITIONER

## 2023-01-26 PROCEDURE — G0439 PPPS, SUBSEQ VISIT: HCPCS | Mod: ,,, | Performed by: NURSE PRACTITIONER

## 2023-01-26 PROCEDURE — 99999 PR PBB SHADOW E&M-EST. PATIENT-LVL IV: ICD-10-PCS | Mod: PBBFAC,,, | Performed by: NURSE PRACTITIONER

## 2023-01-26 PROCEDURE — G0439 PR MEDICARE ANNUAL WELLNESS SUBSEQUENT VISIT: ICD-10-PCS | Mod: ,,, | Performed by: NURSE PRACTITIONER

## 2023-01-26 PROCEDURE — 99214 OFFICE O/P EST MOD 30 MIN: CPT | Mod: PBBFAC,PO | Performed by: NURSE PRACTITIONER

## 2023-01-26 NOTE — PATIENT INSTRUCTIONS
Counseling and Referral of Other Preventative  (Italic type indicates deductible and co-insurance are waived)    Patient Name: Arleen Zarate  Today's Date: 1/26/2023    Health Maintenance       Date Due Completion Date    TETANUS VACCINE Never done ---    Shingles Vaccine (1 of 2) Never done ---    Pneumococcal Vaccines (Age 65+) (1 - PCV) Never done ---    DEXA Scan 05/02/2016 5/2/2014    COVID-19 Vaccine (4 - Booster for Pfizer series) 02/12/2022 12/18/2021    Influenza Vaccine (1) 09/01/2022 12/18/2021    Lipid Panel 10/11/2027 10/11/2022        No orders of the defined types were placed in this encounter.    The following information is provided to all patients.  This information is to help you find resources for any of the problems found today that may be affecting your health:                Living healthy guide: www.[facility].[place].[facility]      Understanding Diabetes: www.diabetes.org      Eating healthy: www.cdc.gov/healthyweight      [facility] home safety checklist: www.cdc.gov/steadi/patient.html      Agency on Aging: www.goea.[place]      Alcoholics anonymous (AA): www.aa.org      Physical Activity: www.sulaiman.nih.gov/bg0fkjn      Tobacco use: www.quitwithusla.org

## 2023-01-26 NOTE — PROGRESS NOTES
"  Arleen Zarate presented for a  Medicare AWV and comprehensive Health Risk Assessment today. The following components were reviewed and updated:    Medical history  Family History  Social history  Allergies and Current Medications  Health Risk Assessment  Health Maintenance  Care Team         ** See Completed Assessments for Annual Wellness Visit within the encounter summary.**         The following assessments were completed:  Living Situation  CAGE  Depression Screening  Timed Get Up and Go  Whisper Test  Cognitive Function Screening  Nutrition Screening  ADL Screening  PAQ Screening          Vitals:    01/26/23 1055   BP: 122/60   Pulse: 83   Temp: 98.4 °F (36.9 °C)   TempSrc: Oral   SpO2: 98%   Weight: 62.1 kg (136 lb 14.5 oz)   Height: 4' 11" (1.499 m)     Body mass index is 27.65 kg/m².  Physical Exam  Constitutional:       Appearance: She is well-developed.   HENT:      Head: Normocephalic and atraumatic.      Nose: Nose normal.   Eyes:      General: Lids are normal.      Conjunctiva/sclera: Conjunctivae normal.      Pupils: Pupils are equal, round, and reactive to light.   Cardiovascular:      Rate and Rhythm: Normal rate.   Pulmonary:      Effort: Pulmonary effort is normal.   Musculoskeletal:      Cervical back: Full passive range of motion without pain.   Skin:     General: Skin is warm and dry.   Neurological:      Mental Status: She is alert and oriented to person, place, and time.   Psychiatric:         Speech: Speech normal.         Behavior: Behavior normal.             Diagnoses and health risks identified today and associated recommendations/orders:    1. Encounter for preventive health examination  Discussed health maintenance guidelines appropriate for age.    Review for Opioid Screening: Patient does not have rx for Opioids.    Review for Substance Use Disorders: Patient does not use substance.      2. Atherosclerosis of aorta  Stable continue to monitor  Bp well controlled\  Followed by " pcp    3. Stage 3a chronic kidney disease  Stable, continue to monitor  Followed by pcp    4. Essential hypertension  Controlled, continue current medication regimen  Low salt diet  Increase physical activity  Followed by pcp        Provided Arleen with a 5-10 year written screening schedule and personal prevention plan. Recommendations were developed using the USPSTF age appropriate recommendations. Education, counseling, and referrals were provided as needed. After Visit Summary printed and given to patient which includes a list of additional screenings\tests needed.    Follow up for One year for Annual Wellness Visit.    Patricia Munoz NP      I offered to discuss advanced care planning, including how to pick a person who would make decisions for you if you were unable to make them for yourself, called a health care power of , and what kind of decisions you might make such as use of life sustaining treatments such as ventilators and tube feeding when faced with a life limiting illness recorded on a living will that they will need to know. (How you want to be cared for as you near the end of your natural life)     X  Patient is unwilling to engage in a discussion regarding advance directives at this time.

## 2023-03-20 DIAGNOSIS — I10 ESSENTIAL HYPERTENSION: ICD-10-CM

## 2023-03-20 RX ORDER — LISINOPRIL 30 MG/1
TABLET ORAL
Qty: 90 TABLET | Refills: 1 | Status: SHIPPED | OUTPATIENT
Start: 2023-03-20 | End: 2023-09-18

## 2023-03-20 NOTE — TELEPHONE ENCOUNTER
No new care gaps identified.  [facility] Embedded Care Gaps. Reference number: [id number]. 3/20/2023   2:09:41 AM CDT

## 2023-03-20 NOTE — TELEPHONE ENCOUNTER
Refill Decision Note   Arleen Zarate  is requesting a refill authorization.  Brief Assessment and Rationale for Refill:  Approve     Medication Therapy Plan:       Medication Reconciliation Completed: No   Comments:     No Care Gaps recommended.     Note composed:6:31 AM 03/20/2023

## 2023-03-28 ENCOUNTER — HOSPITAL ENCOUNTER (OUTPATIENT)
Dept: RADIOLOGY | Facility: HOSPITAL | Age: 88
Discharge: HOME | End: 2023-03-28
Attending: ORTHOPAEDIC SURGERY
Payer: MEDICARE

## 2023-03-28 ENCOUNTER — OFFICE VISIT (OUTPATIENT)
Dept: ORTHOPEDICS | Facility: CLINIC | Age: 88
End: 2023-03-28
Payer: MEDICARE

## 2023-03-28 DIAGNOSIS — M25.561 PAIN IN BOTH KNEES, UNSPECIFIED CHRONICITY: ICD-10-CM

## 2023-03-28 DIAGNOSIS — M17.0 BILATERAL PRIMARY OSTEOARTHRITIS OF KNEE: Primary | ICD-10-CM

## 2023-03-28 DIAGNOSIS — M25.562 PAIN IN BOTH KNEES, UNSPECIFIED CHRONICITY: Primary | ICD-10-CM

## 2023-03-28 DIAGNOSIS — M25.561 PAIN IN BOTH KNEES, UNSPECIFIED CHRONICITY: Primary | ICD-10-CM

## 2023-03-28 DIAGNOSIS — M25.562 PAIN IN BOTH KNEES, UNSPECIFIED CHRONICITY: ICD-10-CM

## 2023-03-28 PROCEDURE — 99204 PR OFFICE/OUTPT VISIT, NEW, LEVL IV, 45-59 MIN: ICD-10-PCS | Mod: S$PBB,25,, | Performed by: ORTHOPAEDIC SURGERY

## 2023-03-28 PROCEDURE — 99204 OFFICE O/P NEW MOD 45 MIN: CPT | Mod: S$PBB,25,, | Performed by: ORTHOPAEDIC SURGERY

## 2023-03-28 PROCEDURE — 20610 LARGE JOINT ASPIRATION/INJECTION: BILATERAL KNEE: ICD-10-PCS | Mod: 50,S$PBB,, | Performed by: ORTHOPAEDIC SURGERY

## 2023-03-28 PROCEDURE — 20610 DRAIN/INJ JOINT/BURSA W/O US: CPT | Mod: 50,PBBFAC,PN | Performed by: ORTHOPAEDIC SURGERY

## 2023-03-28 PROCEDURE — 73564 X-RAY EXAM KNEE 4 OR MORE: CPT | Mod: TC,50

## 2023-03-28 PROCEDURE — 99999 PR PBB SHADOW E&M-EST. PATIENT-LVL I: CPT | Mod: PBBFAC,,, | Performed by: ORTHOPAEDIC SURGERY

## 2023-03-28 PROCEDURE — 99999 PR PBB SHADOW E&M-EST. PATIENT-LVL I: ICD-10-PCS | Mod: PBBFAC,,, | Performed by: ORTHOPAEDIC SURGERY

## 2023-03-28 PROCEDURE — 99211 OFF/OP EST MAY X REQ PHY/QHP: CPT | Mod: PBBFAC,PN,25 | Performed by: ORTHOPAEDIC SURGERY

## 2023-03-28 RX ORDER — METHYLPREDNISOLONE ACETATE 80 MG/ML
80 INJECTION, SUSPENSION INTRA-ARTICULAR; INTRALESIONAL; INTRAMUSCULAR; SOFT TISSUE
Status: DISCONTINUED | OUTPATIENT
Start: 2023-03-28 | End: 2023-03-28 | Stop reason: HOSPADM

## 2023-03-28 RX ADMIN — METHYLPREDNISOLONE ACETATE 80 MG: 80 INJECTION, SUSPENSION INTRA-ARTICULAR; INTRALESIONAL; INTRAMUSCULAR; SOFT TISSUE at 03:03

## 2023-03-28 NOTE — PROCEDURES
Large Joint Aspiration/Injection: bilateral knee    Date/Time: 3/28/2023 3:00 PM  Performed by: Bola Lange II, MD  Authorized by: Bola Lange II, MD     Consent Done?:  Yes (Verbal)  Indications:  Arthritis and pain  Timeout: prior to procedure the correct patient, procedure, and site was verified      Local anesthesia used?: Yes    Local anesthetic:  Topical anesthetic    Details:  Needle Size:  22 G  Approach:  Anteromedial  Location:  Knee  Laterality:  Bilateral  Site:  Bilateral knee  Medications (Right):  80 mg methylPREDNISolone acetate 80 mg/mL  Medications (Left):  80 mg methylPREDNISolone acetate 80 mg/mL  Patient tolerance:  Patient tolerated the procedure well with no immediate complications

## 2023-03-28 NOTE — PROGRESS NOTES
CC:  89-year-old female presents for evaluation of bilateral knee pain.  The patient has had pain for quite some time.  It is progressively gotten worse over the years.  The pain comes and goes but it has been pretty bad for the last couple of weeks.  It increases with movement.  She currently rates the pain as a 5/10.  She is tried some over-the-counter medications including Aleve and Tylenol.  She is also tried some topical icy hot and she reports she is used Voltaren gel in the past but is currently out of that medication.  Those give her some relief.    Past Medical History:   Diagnosis Date    Anxiety     Arthritis     fingers and back    Depression     Disorder of kidney and ureter     Foot pain, left 9/18/2013    GERD (gastroesophageal reflux disease)     Hiatal hernia     Hyperlipidemia     Hypertension     Reflux     on meds    Tendon tear 8/21/2013    Tendonitis 8/21/2013       Past Surgical History:   Procedure Laterality Date    CHOLECYSTECTOMY      EYE SURGERY Bilateral     cataracts and implants    TONSILLECTOMY         Current Outpatient Medications on File Prior to Visit   Medication Sig Dispense Refill    fluticasone propionate (FLONASE) 50 mcg/actuation nasal spray 1 spray (50 mcg total) by Each Nostril route 2 (two) times daily as needed for Rhinitis. 15 g 0    lisinopriL (PRINIVIL,ZESTRIL) 30 MG tablet TAKE 1 TABLET EVERY EVENING 90 tablet 1    multivitamin capsule Take 1 capsule by mouth once daily.      nisoldipine (SULAR) 8.5 MG Tb24 TAKE 1 TABLET DAILY 90 tablet 3    omeprazole (PRILOSEC) 40 MG capsule Take 1 capsule (40 mg total) by mouth once daily. 90 capsule 3    [DISCONTINUED] buPROPion (WELLBUTRIN) 75 MG tablet Take 1 tablet (75 mg total) by mouth once daily. 30 tablet 1     No current facility-administered medications on file prior to visit.       ROS:    Constitution: Denies chills, fever, and sweats.  HENT: Denies headaches or blurry vision.  Cardiovascular: Denies chest pain or  irregular heart beat.  Respiratory: Denies cough or shortness of breath.  Gastrointestinal: Denies abdominal pain, nausea, or vomiting.  Genitourinary:  Denies urinary incontinence, bladder and kidney issues  Musculoskeletal:  Denies muscle cramps.  Positive for bilateral knee pain  Neurological: Denies dizziness or focal weakness.  Psychiatric/Behavioral: Normal mental status.  Hematologic/Lymphatic: Denies bleeding problem or easy bruising/bleeding.  Skin: Denies rash or suspicious lesions.    Physical examination     Gen - No acute distress, well nourished, well groomed   Eyes - Extraoccular motions intact, pupils equally round and reactive to light and accommodation   ENT - normocephalic, atruamtic, oropharynx clear   Neck - Supple, no abnormal masses   Cardiovascular - regular rate and rhythm   Pulmonary - clear to auscultation bilaterally, no wheezes, ronchi, or rales   Abdomen - soft, non-tender, non-distended, positive bowel sounds   Psych - The patient is alert and oriented x3 with normal mood and affect    Examination of the Right Lower Extremity:     Skin intact throughout.  Motor function is intact distally EHL/FHL/TA/willie   +2 dorsalis pedis and posterior tibial pulses   Sensation to light touch intact distally dorsal, plantar, and first web space     Examination of the Right knee:    ROM 0 - 150   Effusion positive  Tenderness to palpation at the joint line positive  Pain during range of motion positive  Crepitation during range of motion positive     positive increased pain noted with flexion past 90   positive antalgic gait noted   negative Lachman's Test   negative Anterior Drawer Test   negative Posterior Drawer Test   negative McMurrays Test   negative Disco Test   negative Varus/Valgus instability    Examination of the Left Lower Extremity:     Skin intact throughout.  Motor function is intact distally EHL/FHL/TA/willie   +2 dorsalis pedis and posterior tibial pulses   Sensation to light touch  intact distally dorsal, plantar, and first web space     Examination of the Left knee:    ROM 0 - 150   Effusion positive  Tenderness to palpation at the joint line positive  Pain during range of motion positive  Crepitation during range of motion positive     positive increased pain noted with flexion past 90   positive antalgic gait noted   negative Lachman's Test   negative Anterior Drawer Test   negative Posterior Drawer Test   negative McMurrays Test   negative Disco Test   negative Varus/Valgus instability    X-ray images were examined and personally interpreted by me.  Four views of bilateral knees dated 03/28/2023 show moderate osteoarthritis of both knees mostly in the medial compartment with loss of joint space, periarticular osteophytes, and subchondral sclerosis.    Dx:  Bilateral knee osteoarthritis    Plan:  We discussed treatment options.  Offered the patient an injection and she agreed.  I injected both the right and left knee with a mixture of 2, 2, 1.  She tolerated that well.  I recommended she go get another tubal Voltaren gel and we will have her follow up in 2 weeks for re-evaluation.

## 2023-04-11 ENCOUNTER — OFFICE VISIT (OUTPATIENT)
Dept: ORTHOPEDICS | Facility: CLINIC | Age: 88
End: 2023-04-11
Payer: MEDICARE

## 2023-04-11 VITALS — WEIGHT: 136.88 LBS | HEIGHT: 59 IN | BODY MASS INDEX: 27.6 KG/M2

## 2023-04-11 DIAGNOSIS — M17.0 BILATERAL PRIMARY OSTEOARTHRITIS OF KNEE: Primary | ICD-10-CM

## 2023-04-11 PROCEDURE — 99999 PR PBB SHADOW E&M-EST. PATIENT-LVL III: CPT | Mod: PBBFAC,,, | Performed by: ORTHOPAEDIC SURGERY

## 2023-04-11 PROCEDURE — 99213 OFFICE O/P EST LOW 20 MIN: CPT | Mod: PBBFAC,PN | Performed by: ORTHOPAEDIC SURGERY

## 2023-04-11 PROCEDURE — 99213 PR OFFICE/OUTPT VISIT, EST, LEVL III, 20-29 MIN: ICD-10-PCS | Mod: S$PBB,,, | Performed by: ORTHOPAEDIC SURGERY

## 2023-04-11 PROCEDURE — 99999 PR PBB SHADOW E&M-EST. PATIENT-LVL III: ICD-10-PCS | Mod: PBBFAC,,, | Performed by: ORTHOPAEDIC SURGERY

## 2023-04-11 PROCEDURE — 99213 OFFICE O/P EST LOW 20 MIN: CPT | Mod: S$PBB,,, | Performed by: ORTHOPAEDIC SURGERY

## 2023-04-11 NOTE — PROGRESS NOTES
CC:  89-year-old female follows up with osteoarthritis of both of her knees.  On her last visit we injected both knees with a steroid mixture.  We also instructed her to get a tube of Voltaren gel to try and use on her knees.  She states that between the injection and the Voltaren gel she is gotten pretty good relief.  Her biggest complaint today is that she is having muscle cramps at night and wanted to discuss that.    Past Medical History:   Diagnosis Date    Anxiety     Arthritis     fingers and back    Depression     Disorder of kidney and ureter     Foot pain, left 9/18/2013    GERD (gastroesophageal reflux disease)     Hiatal hernia     Hyperlipidemia     Hypertension     Reflux     on meds    Tendon tear 8/21/2013    Tendonitis 8/21/2013       Past Surgical History:   Procedure Laterality Date    CHOLECYSTECTOMY      EYE SURGERY Bilateral     cataracts and implants    TONSILLECTOMY         Current Outpatient Medications on File Prior to Visit   Medication Sig Dispense Refill    fluticasone propionate (FLONASE) 50 mcg/actuation nasal spray 1 spray (50 mcg total) by Each Nostril route 2 (two) times daily as needed for Rhinitis. 15 g 0    lisinopriL (PRINIVIL,ZESTRIL) 30 MG tablet TAKE 1 TABLET EVERY EVENING 90 tablet 1    multivitamin capsule Take 1 capsule by mouth once daily.      nisoldipine (SULAR) 8.5 MG Tb24 TAKE 1 TABLET DAILY 90 tablet 3    omeprazole (PRILOSEC) 40 MG capsule Take 1 capsule (40 mg total) by mouth once daily. 90 capsule 3    [DISCONTINUED] buPROPion (WELLBUTRIN) 75 MG tablet Take 1 tablet (75 mg total) by mouth once daily. 30 tablet 1     No current facility-administered medications on file prior to visit.       ROS:    Constitution: Denies chills, fever, and sweats.  HENT: Denies headaches or blurry vision.  Cardiovascular: Denies chest pain or irregular heart beat.  Respiratory: Denies cough or shortness of breath.  Gastrointestinal: Denies abdominal pain, nausea, or  vomiting.  Genitourinary:  Denies urinary incontinence, bladder and kidney issues  Musculoskeletal:  Denies muscle cramps.  Neurological: Denies dizziness or focal weakness.  Psychiatric/Behavioral: Normal mental status.  Hematologic/Lymphatic: Denies bleeding problem or easy bruising/bleeding.  Skin: Denies rash or suspicious lesions.    Physical examination     Gen - No acute distress, well nourished, well groomed   Eyes - Extraoccular motions intact, pupils equally round and reactive to light and accommodation   ENT - normocephalic, atruamtic, oropharynx clear   Neck - Supple, no abnormal masses   Cardiovascular - regular rate and rhythm   Pulmonary - clear to auscultation bilaterally, no wheezes, ronchi, or rales   Abdomen - soft, non-tender, non-distended, positive bowel sounds   Psych - The patient is alert and oriented x3 with normal mood and affect    Examination of the Right Lower Extremity:     Skin intact throughout.  Motor function is intact distally EHL/FHL/TA/willie   +2 dorsalis pedis and posterior tibial pulses   Sensation to light touch intact distally dorsal, plantar, and first web space     Examination of the Right knee:    ROM 0 - 130   Effusion negative  Tenderness to palpation at the joint line positive  Pain during range of motion negative  Crepitation during range of motion negative     positive increased pain noted with flexion past 90   positive antalgic gait noted   negative Lachman's Test   negative Anterior Drawer Test   negative Posterior Drawer Test   negative McMurrays Test   negative Disco Test   negative Varus/Valgus instability    Examination of the Left Lower Extremity:     Skin intact throughout.  Motor function is intact distally EHL/FHL/TA/willie   +2 dorsalis pedis and posterior tibial pulses   Sensation to light touch intact distally dorsal, plantar, and first web space     Examination of the Left knee:    ROM 0 - 130   Effusion negative  Tenderness to palpation at the joint line  positive  Pain during range of motion positive  Crepitation during range of motion positive     positive increased pain noted with flexion past 90   positive antalgic gait noted   negative Lachman's Test   negative Anterior Drawer Test   negative Posterior Drawer Test   negative McMurrays Test   negative Disco Test   negative Varus/Valgus instability    X-ray images were examined and personally interpreted by me.  Three views of bilateral knees dated 03/28/2023 show osteoarthritis with loss of joint space, periarticular osteophytes, and subchondral sclerosis.    Dx:  Osteoarthritis bilateral knees.  The patient also complains of some cramping in her calves at night    Plan:  We discussed that we can repeat the injections for the arthritis about every 3 months.  We will make her three-month follow-up and re-evaluate her at that time.  For the cramping we discussed using a vinegar based dietary supplement like pickle juice or mustard taken night before bed and that should help with the cramping.

## 2023-04-12 ENCOUNTER — LAB VISIT (OUTPATIENT)
Dept: LAB | Facility: HOSPITAL | Age: 88
End: 2023-04-12
Attending: STUDENT IN AN ORGANIZED HEALTH CARE EDUCATION/TRAINING PROGRAM
Payer: MEDICARE

## 2023-04-12 ENCOUNTER — OFFICE VISIT (OUTPATIENT)
Dept: FAMILY MEDICINE | Facility: CLINIC | Age: 88
End: 2023-04-12
Payer: MEDICARE

## 2023-04-12 VITALS
OXYGEN SATURATION: 99 % | BODY MASS INDEX: 26.66 KG/M2 | TEMPERATURE: 98 F | HEART RATE: 82 BPM | DIASTOLIC BLOOD PRESSURE: 64 MMHG | SYSTOLIC BLOOD PRESSURE: 122 MMHG | RESPIRATION RATE: 16 BRPM | HEIGHT: 59 IN | WEIGHT: 132.25 LBS

## 2023-04-12 DIAGNOSIS — M54.2 NECK PAIN: ICD-10-CM

## 2023-04-12 DIAGNOSIS — Z00.00 HEALTHCARE MAINTENANCE: Primary | ICD-10-CM

## 2023-04-12 DIAGNOSIS — I10 ESSENTIAL HYPERTENSION: ICD-10-CM

## 2023-04-12 DIAGNOSIS — M15.9 PRIMARY OSTEOARTHRITIS INVOLVING MULTIPLE JOINTS: ICD-10-CM

## 2023-04-12 DIAGNOSIS — R79.9 ABNORMAL FINDING OF BLOOD CHEMISTRY, UNSPECIFIED: ICD-10-CM

## 2023-04-12 DIAGNOSIS — E78.2 MIXED HYPERLIPIDEMIA: ICD-10-CM

## 2023-04-12 DIAGNOSIS — R41.3 MEMORY LOSS: ICD-10-CM

## 2023-04-12 DIAGNOSIS — R21 RASH: ICD-10-CM

## 2023-04-12 DIAGNOSIS — K21.9 GASTROESOPHAGEAL REFLUX DISEASE WITHOUT ESOPHAGITIS: ICD-10-CM

## 2023-04-12 LAB
ALBUMIN SERPL BCP-MCNC: 4.1 G/DL (ref 3.5–5.2)
ALP SERPL-CCNC: 89 U/L (ref 55–135)
ALT SERPL W/O P-5'-P-CCNC: 13 U/L (ref 10–44)
ANION GAP SERPL CALC-SCNC: 10 MMOL/L (ref 8–16)
AST SERPL-CCNC: 20 U/L (ref 10–40)
BASOPHILS # BLD AUTO: 0.05 K/UL (ref 0–0.2)
BASOPHILS NFR BLD: 0.7 % (ref 0–1.9)
BILIRUB SERPL-MCNC: 0.5 MG/DL (ref 0.1–1)
BUN SERPL-MCNC: 16 MG/DL (ref 10–30)
CALCIUM SERPL-MCNC: 10 MG/DL (ref 8.7–10.5)
CHLORIDE SERPL-SCNC: 107 MMOL/L (ref 95–110)
CHOLEST SERPL-MCNC: 238 MG/DL (ref 120–199)
CHOLEST/HDLC SERPL: 3.7 {RATIO} (ref 2–5)
CO2 SERPL-SCNC: 24 MMOL/L (ref 23–29)
CREAT SERPL-MCNC: 0.9 MG/DL (ref 0.5–1.4)
DIFFERENTIAL METHOD: ABNORMAL
EOSINOPHIL # BLD AUTO: 0.1 K/UL (ref 0–0.5)
EOSINOPHIL NFR BLD: 1.7 % (ref 0–8)
ERYTHROCYTE [DISTWIDTH] IN BLOOD BY AUTOMATED COUNT: 13.1 % (ref 11.5–14.5)
EST. GFR  (NO RACE VARIABLE): 58.1 ML/MIN/1.73 M^2
ESTIMATED AVG GLUCOSE: 103 MG/DL (ref 68–131)
FOLATE SERPL-MCNC: 16.9 NG/ML (ref 4–24)
GLUCOSE SERPL-MCNC: 88 MG/DL (ref 70–110)
HBA1C MFR BLD: 5.2 % (ref 4–5.6)
HCT VFR BLD AUTO: 42.6 % (ref 37–48.5)
HDLC SERPL-MCNC: 64 MG/DL (ref 40–75)
HDLC SERPL: 26.9 % (ref 20–50)
HGB BLD-MCNC: 13.1 G/DL (ref 12–16)
IMM GRANULOCYTES # BLD AUTO: 0.01 K/UL (ref 0–0.04)
IMM GRANULOCYTES NFR BLD AUTO: 0.1 % (ref 0–0.5)
LDLC SERPL CALC-MCNC: 148 MG/DL (ref 63–159)
LYMPHOCYTES # BLD AUTO: 1.4 K/UL (ref 1–4.8)
LYMPHOCYTES NFR BLD: 19.4 % (ref 18–48)
MCH RBC QN AUTO: 29.1 PG (ref 27–31)
MCHC RBC AUTO-ENTMCNC: 30.8 G/DL (ref 32–36)
MCV RBC AUTO: 95 FL (ref 82–98)
MONOCYTES # BLD AUTO: 0.5 K/UL (ref 0.3–1)
MONOCYTES NFR BLD: 6.5 % (ref 4–15)
NEUTROPHILS # BLD AUTO: 5.2 K/UL (ref 1.8–7.7)
NEUTROPHILS NFR BLD: 71.6 % (ref 38–73)
NONHDLC SERPL-MCNC: 174 MG/DL
NRBC BLD-RTO: 0 /100 WBC
PLATELET # BLD AUTO: 213 K/UL (ref 150–450)
PMV BLD AUTO: 11.1 FL (ref 9.2–12.9)
POTASSIUM SERPL-SCNC: 4.4 MMOL/L (ref 3.5–5.1)
PROT SERPL-MCNC: 7.4 G/DL (ref 6–8.4)
RBC # BLD AUTO: 4.5 M/UL (ref 4–5.4)
SODIUM SERPL-SCNC: 141 MMOL/L (ref 136–145)
TRIGL SERPL-MCNC: 130 MG/DL (ref 30–150)
TSH SERPL DL<=0.005 MIU/L-ACNC: 1.22 UIU/ML (ref 0.4–4)
WBC # BLD AUTO: 7.22 K/UL (ref 3.9–12.7)

## 2023-04-12 PROCEDURE — 99999 PR PBB SHADOW E&M-EST. PATIENT-LVL III: ICD-10-PCS | Mod: PBBFAC,,, | Performed by: STUDENT IN AN ORGANIZED HEALTH CARE EDUCATION/TRAINING PROGRAM

## 2023-04-12 PROCEDURE — 83036 HEMOGLOBIN GLYCOSYLATED A1C: CPT | Performed by: STUDENT IN AN ORGANIZED HEALTH CARE EDUCATION/TRAINING PROGRAM

## 2023-04-12 PROCEDURE — 99214 OFFICE O/P EST MOD 30 MIN: CPT | Mod: S$PBB,,, | Performed by: STUDENT IN AN ORGANIZED HEALTH CARE EDUCATION/TRAINING PROGRAM

## 2023-04-12 PROCEDURE — 99999 PR PBB SHADOW E&M-EST. PATIENT-LVL III: CPT | Mod: PBBFAC,,, | Performed by: STUDENT IN AN ORGANIZED HEALTH CARE EDUCATION/TRAINING PROGRAM

## 2023-04-12 PROCEDURE — 84443 ASSAY THYROID STIM HORMONE: CPT | Performed by: STUDENT IN AN ORGANIZED HEALTH CARE EDUCATION/TRAINING PROGRAM

## 2023-04-12 PROCEDURE — 85025 COMPLETE CBC W/AUTO DIFF WBC: CPT | Performed by: STUDENT IN AN ORGANIZED HEALTH CARE EDUCATION/TRAINING PROGRAM

## 2023-04-12 PROCEDURE — 82746 ASSAY OF FOLIC ACID SERUM: CPT | Performed by: STUDENT IN AN ORGANIZED HEALTH CARE EDUCATION/TRAINING PROGRAM

## 2023-04-12 PROCEDURE — 99214 PR OFFICE/OUTPT VISIT, EST, LEVL IV, 30-39 MIN: ICD-10-PCS | Mod: S$PBB,,, | Performed by: STUDENT IN AN ORGANIZED HEALTH CARE EDUCATION/TRAINING PROGRAM

## 2023-04-12 PROCEDURE — 83921 ORGANIC ACID SINGLE QUANT: CPT | Performed by: STUDENT IN AN ORGANIZED HEALTH CARE EDUCATION/TRAINING PROGRAM

## 2023-04-12 PROCEDURE — 99213 OFFICE O/P EST LOW 20 MIN: CPT | Mod: PBBFAC,PO | Performed by: STUDENT IN AN ORGANIZED HEALTH CARE EDUCATION/TRAINING PROGRAM

## 2023-04-12 PROCEDURE — 80053 COMPREHEN METABOLIC PANEL: CPT | Performed by: STUDENT IN AN ORGANIZED HEALTH CARE EDUCATION/TRAINING PROGRAM

## 2023-04-12 PROCEDURE — 80061 LIPID PANEL: CPT | Performed by: STUDENT IN AN ORGANIZED HEALTH CARE EDUCATION/TRAINING PROGRAM

## 2023-04-12 PROCEDURE — 36415 COLL VENOUS BLD VENIPUNCTURE: CPT | Mod: PO | Performed by: STUDENT IN AN ORGANIZED HEALTH CARE EDUCATION/TRAINING PROGRAM

## 2023-04-12 RX ORDER — LIDOCAINE 50 MG/G
1 PATCH TOPICAL DAILY
Qty: 15 PATCH | Refills: 2 | Status: SHIPPED | OUTPATIENT
Start: 2023-04-12 | End: 2024-01-29

## 2023-04-12 RX ORDER — CLOTRIMAZOLE AND BETAMETHASONE DIPROPIONATE 10; .64 MG/G; MG/G
CREAM TOPICAL 2 TIMES DAILY
Qty: 15 G | Refills: 3 | Status: SHIPPED | OUTPATIENT
Start: 2023-04-12 | End: 2023-04-19

## 2023-04-12 RX ORDER — TRIAMCINOLONE ACETONIDE 0.25 MG/G
CREAM TOPICAL
COMMUNITY
Start: 2023-02-04 | End: 2024-01-29 | Stop reason: SDUPTHER

## 2023-04-12 NOTE — PROGRESS NOTES
[facility] Note    Subjective:    The HPI and pertinent ROS is included in the Diagnostic Impression Remarks section at the end of the note. Please see below for further details. Chief complaint is at end of note.     Arleen is a pleasant intelligent patient who is here for evaluation.     Modified Medications    No medications on file       Data reviewed 274}  Previous medical records reviewed and summarized in plan section at end of note.      If you are due for any health screening(s) below please notify me so we can arrange them to be ordered and scheduled. Most healthy patients at your age complete them, but you are free to accept or refuse. If you can't do it, I'll definitely understand. If you can, I'd certainly appreciate it!     All of your core healthy metrics are met.      The following portions of the patient's history were reviewed and updated as appropriate: allergies, current medications, past family history, past medical history, past social history, past surgical history and problem list.    She  has a past medical history of Anxiety, Arthritis, Depression, Disorder of kidney and ureter, Foot pain, left (9/18/2013), GERD (gastroesophageal reflux disease), Hiatal hernia, Hyperlipidemia, Hypertension, Reflux, Tendon tear (8/21/2013), and Tendonitis (8/21/2013).  She  has a past surgical history that includes Cholecystectomy; Tonsillectomy; and Eye surgery (Bilateral).    She  reports that she has never smoked. She has never been exposed to tobacco smoke. She has never used smokeless tobacco. She reports current alcohol use of about 1.0 standard drink per week. She reports that she does not use drugs.  She family history includes Hearing loss in her son.    Review of patient's allergies indicates:   Allergen Reactions    Buspar [buspirone] Other (See Comments)     Syncope and nausea     Doxy      Other reaction(s): Unknown    Effexor [venlafaxine] Other (See Comments)     shaking     "Biaxin [clarithromycin] Other (See Comments)     Unknown    Codeine Other (See Comments)     Syncope, and nausea.    Statins-hmg-coa reductase inhibitors      myalgia    Doxycycline Other (See Comments)     Made very weak       Tobacco Use: Low Risk     Smoking Tobacco Use: Never    Smokeless Tobacco Use: Never    Passive Exposure: Never     Physical Examination  General appearance: alert, cooperative, no distress  Neck: no thyromegaly, no neck stiffness  Lungs: clear to auscultation, no wheezes, rales or rhonchi, symmetric air entry  Heart: normal rate, regular rhythm, normal S1, S2, no murmurs, rubs, clicks or gallops  Abdomen: soft, nontender, nondistended, no rigidity, rebound, or guarding.   Back: no point tenderness over spine  Extremities: peripheral pulses normal, no unilateral leg swelling or calf tenderness   Neurological:alert, oriented, normal speech, no new focal findings or movement disorder noted from baseline    BP Readings from Last 3 Encounters:   04/12/23 122/64   01/26/23 122/60   01/04/23 126/62     Wt Readings from Last 3 Encounters:   04/12/23 60 kg (132 lb 4.4 oz)   04/11/23 62.1 kg (136 lb 14.5 oz)   01/26/23 62.1 kg (136 lb 14.5 oz)     /64 (BP Location: Right arm, Patient Position: Sitting, BP Method: Large (Manual))   Pulse 82   Temp 98.4 °F (36.9 °C) (Oral)   Resp 16   Ht 4' 11" (1.499 m)   Wt 60 kg (132 lb 4.4 oz)   SpO2 99%   BMI 26.72 kg/m²    274}  Laboratory: I have reviewed old labs below:    274}    Lab Results   Component Value Date    WBC 10.20 03/26/2022    HGB 12.4 03/26/2022    HCT 37.9 03/26/2022    MCV 90 03/26/2022     03/26/2022     10/21/2022    K 4.8 10/21/2022     10/21/2022    CALCIUM 9.1 10/21/2022    PHOS 2.8 10/11/2022    CO2 28 10/21/2022    GLU 85 10/21/2022    BUN 16 10/21/2022    CREATININE 0.9 10/21/2022    ANIONGAP 7 (L) 10/21/2022    PROT 6.7 03/26/2022    ALBUMIN 3.9 10/11/2022    BILITOT 0.7 03/26/2022    ALKPHOS 69 " "03/26/2022    ALT 16 03/26/2022    AST 20 03/26/2022    CHOL 205 (H) 10/11/2022    TRIG 99 10/11/2022    HDL 64 10/11/2022    LDLCALC 121.2 10/11/2022    TSH 1.126 10/11/2022    HGBA1C 5.1 10/11/2022     Lab reviewed by me: Particular labs of significance that I will monitor, workup, or treat to improve are mentioned below in diagnostic impression remarks.    Imaging/EKG: I have reviewed the pertinent results and my findings are noted in remarks.  274}    CC:   Chief Complaint   Patient presents with    Follow-up    Memory Loss    Neck Pain        274}    Assessment/Plan  Arleen Zarate is a 89 y.o. female who presents to clinic with:  1. Healthcare maintenance    2. Essential hypertension    3. Mixed hyperlipidemia    4. Gastroesophageal reflux disease without esophagitis    5. Primary osteoarthritis involving multiple joints       274}  Diagnostic Impression Remarks + HPI     Documentation entered by me for this encounter may have been done in part using speech-recognition technology. Although I have made an effort to ensure accuracy, "sound like" errors may exist and should be interpreted in context.     Hypertension well controlled continue current meds     Hyperlipidemia-control uncertain repeat blood work recommend healthy diet exercise   GERD stable continue diet modifications try to avoid using PPI   Memory loss-patient reports some minor memory loss and do major activities such as doing her finances and driving without any issues not want any further testing such as Neurology testing.  Went over some modifications will check blood work does not want any medications to help with this either           This is the extent of this pleasant patient's concerns at this present time. She did not feel chest pain upon exertion, dyspnea, nausea, vomiting, diaphoresis, or syncope. No pleuritic chest pain, unilateral leg swelling, calf tenderness, or calf pain. Negative for unintentional weight loss night sweats and " fevers. Arleen will return to clinic in a few months for further workup and reassessment or sooner as needed. She was instructed to call the clinic or go to the emergency department or urgent care immediately if her symptoms do not improve, worsens, or if any new symptoms develop. As we discussed that symptoms could worsen over the next 24 hours she was advised that if any increased swelling, pain, or numbness arise to go immediately to the ED. Patient knows to call any time if an emergency arises. Shared decision making occurred and she verbalized understanding in agreement with this plan. I discussed imaging findings, diagnosis, possibilities, treatment options, medications, risks, and benefits. She had many questions regarding the options and long-term effects. All questions were answered. She expressed understanding after counseling regarding the diagnosis and recommendations. She was capable and demonstrated competence with understanding of these options. Shared decision making was performed resulting in her choosing the current treatment plan. Patient handout was given with instructions and recommendations. Advised the patient that if they become pregnant to alert us immediately to assess for medication changes. I also discussed the importance of close follow up to discuss labs, change or modify her medications if needed, monitor side effects, and further evaluation of medical problems.     Additional workup planned: see labs ordered below.    See below for labs and meds ordered with associated diagnosis      1. Healthcare maintenance    2. Essential hypertension    3. Mixed hyperlipidemia    4. Gastroesophageal reflux disease without esophagitis    5. Primary osteoarthritis involving multiple joints      Perry Jorge MD   274}    If you are due for any health screening(s) below please notify me so we can arrange them to be ordered and scheduled. Most healthy patients at your age complete them, but you are  free to accept or refuse.     If you can't do it, I'll definitely understand. If you can, I'd certainly appreciate it!   All of your core healthy metrics are met.

## 2023-04-14 LAB — VIT B12 SERPL-MCNC: 327 NG/L (ref 180–914)

## 2023-04-18 LAB — METHYLMALONATE SERPL-SCNC: 0.19 NMOL/ML

## 2023-05-03 DIAGNOSIS — Z71.89 COMPLEX CARE COORDINATION: ICD-10-CM

## 2023-07-11 ENCOUNTER — OFFICE VISIT (OUTPATIENT)
Dept: ORTHOPEDICS | Facility: CLINIC | Age: 88
End: 2023-07-11
Payer: MEDICARE

## 2023-07-11 DIAGNOSIS — M17.0 BILATERAL PRIMARY OSTEOARTHRITIS OF KNEE: Primary | ICD-10-CM

## 2023-07-11 PROCEDURE — 99214 PR OFFICE/OUTPT VISIT, EST, LEVL IV, 30-39 MIN: ICD-10-PCS | Mod: S$PBB,25,, | Performed by: ORTHOPAEDIC SURGERY

## 2023-07-11 PROCEDURE — 20610 LARGE JOINT ASPIRATION/INJECTION: BILATERAL KNEE: ICD-10-PCS | Mod: 50,S$PBB,, | Performed by: ORTHOPAEDIC SURGERY

## 2023-07-11 PROCEDURE — 99999 PR PBB SHADOW E&M-EST. PATIENT-LVL I: CPT | Mod: PBBFAC,,, | Performed by: ORTHOPAEDIC SURGERY

## 2023-07-11 PROCEDURE — 99999 PR PBB SHADOW E&M-EST. PATIENT-LVL I: ICD-10-PCS | Mod: PBBFAC,,, | Performed by: ORTHOPAEDIC SURGERY

## 2023-07-11 PROCEDURE — 99211 OFF/OP EST MAY X REQ PHY/QHP: CPT | Mod: PBBFAC,PO,25 | Performed by: ORTHOPAEDIC SURGERY

## 2023-07-11 PROCEDURE — 99214 OFFICE O/P EST MOD 30 MIN: CPT | Mod: S$PBB,25,, | Performed by: ORTHOPAEDIC SURGERY

## 2023-07-11 PROCEDURE — 20610 DRAIN/INJ JOINT/BURSA W/O US: CPT | Mod: 50,PBBFAC,PO | Performed by: ORTHOPAEDIC SURGERY

## 2023-07-11 RX ORDER — METHYLPREDNISOLONE ACETATE 80 MG/ML
80 INJECTION, SUSPENSION INTRA-ARTICULAR; INTRALESIONAL; INTRAMUSCULAR; SOFT TISSUE
Status: DISCONTINUED | OUTPATIENT
Start: 2023-07-11 | End: 2023-07-11 | Stop reason: HOSPADM

## 2023-07-11 RX ORDER — MELOXICAM 15 MG/1
15 TABLET ORAL DAILY
Qty: 30 TABLET | Refills: 11 | Status: ON HOLD | OUTPATIENT
Start: 2023-07-11 | End: 2024-03-04 | Stop reason: HOSPADM

## 2023-07-11 RX ADMIN — METHYLPREDNISOLONE ACETATE 80 MG: 80 INJECTION, SUSPENSION INTRA-ARTICULAR; INTRALESIONAL; INTRAMUSCULAR; SOFT TISSUE at 02:07

## 2023-07-11 NOTE — PROGRESS NOTES
CC:  89-year-old female follows up with bilateral knee osteoarthritis.  She previously received an injection in March of this year and got good relief with that until recently when she is had an insidious return of pain in both of her knees.    Past Medical History:   Diagnosis Date    Anxiety     Arthritis     fingers and back    Depression     Disorder of kidney and ureter     Foot pain, left 9/18/2013    GERD (gastroesophageal reflux disease)     Hiatal hernia     Hyperlipidemia     Hypertension     Reflux     on meds    Tendon tear 8/21/2013    Tendonitis 8/21/2013       Past Surgical History:   Procedure Laterality Date    CHOLECYSTECTOMY      EYE SURGERY Bilateral     cataracts and implants    TONSILLECTOMY         Current Outpatient Medications on File Prior to Visit   Medication Sig Dispense Refill    fluticasone propionate (FLONASE) 50 mcg/actuation nasal spray 1 spray (50 mcg total) by Each Nostril route 2 (two) times daily as needed for Rhinitis. 15 g 0    LIDOcaine (LIDODERM) 5 % Place 1 patch onto the skin once daily. Remove & Discard patch within 12 hours or as directed by MD 15 patch 2    lisinopriL (PRINIVIL,ZESTRIL) 30 MG tablet TAKE 1 TABLET EVERY EVENING 90 tablet 1    multivitamin capsule Take 1 capsule by mouth once daily.      nisoldipine (SULAR) 8.5 MG Tb24 TAKE 1 TABLET DAILY 90 tablet 3    omeprazole (PRILOSEC) 40 MG capsule Take 1 capsule (40 mg total) by mouth once daily. 90 capsule 3    triamcinolone acetonide 0.025% (KENALOG) 0.025 % cream SMARTSIG:In Ear(s)      [DISCONTINUED] buPROPion (WELLBUTRIN) 75 MG tablet Take 1 tablet (75 mg total) by mouth once daily. 30 tablet 1     No current facility-administered medications on file prior to visit.       ROS:    Constitution: Denies chills, fever, and sweats.  HENT: Denies headaches or blurry vision.  Cardiovascular: Denies chest pain or irregular heart beat.  Respiratory: Denies cough or shortness of breath.  Gastrointestinal: Denies  abdominal pain, nausea, or vomiting.  Genitourinary:  Denies urinary incontinence, bladder and kidney issues  Musculoskeletal:  Denies muscle cramps.  Neurological: Denies dizziness or focal weakness.  Psychiatric/Behavioral: Normal mental status.  Hematologic/Lymphatic: Denies bleeding problem or easy bruising/bleeding.  Skin: Denies rash or suspicious lesions.    Physical examination     Gen - No acute distress, well nourished, well groomed   Eyes - Extraoccular motions intact, pupils equally round and reactive to light and accommodation   ENT - normocephalic, atruamtic, oropharynx clear   Neck - Supple, no abnormal masses   Cardiovascular - regular rate and rhythm   Pulmonary - clear to auscultation bilaterally, no wheezes, ronchi, or rales   Abdomen - soft, non-tender, non-distended, positive bowel sounds   Psych - The patient is alert and oriented x3 with normal mood and affect    Examination of the Right Lower Extremity:     Skin intact throughout.  Motor function is intact distally EHL/FHL/TA/willie   +2 dorsalis pedis and posterior tibial pulses   Sensation to light touch intact distally dorsal, plantar, and first web space     Examination of the Right knee:    ROM 0 - 150   Effusion positive  Tenderness to palpation at the joint line positive  Pain during range of motion positive  Crepitation during range of motion positive     positive increased pain noted with flexion past 90   positive antalgic gait noted   negative Lachman's Test   negative Anterior Drawer Test   negative Posterior Drawer Test   negative McMurrays Test   negative Disco Test   negative Varus/Valgus instability    Examination of the Left Lower Extremity:     Skin intact throughout.  Motor function is intact distally EHL/FHL/TA/willie   +2 dorsalis pedis and posterior tibial pulses   Sensation to light touch intact distally dorsal, plantar, and first web space     Examination of the Left knee:    ROM 0 - 150   Effusion positive  Tenderness to  palpation at the joint line positive  Pain during range of motion positive  Crepitation during range of motion positive     positive increased pain noted with flexion past 90   positive antalgic gait noted   negative Lachman's Test   negative Anterior Drawer Test   negative Posterior Drawer Test   negative McMurrays Test   negative Disco Test   negative Varus/Valgus instability    X-ray images were examined and personally interpreted by me.  X-rays of bilateral knees dated 03/28/2023 were once again reviewed.  The patient has osteoarthritis of both knees with narrowing of the joint space, early periarticular osteophyte formation, and subchondral sclerosis.    Dx:  Bilateral knee osteoarthritis    Plan:  I did offer to inject both the patient's knees and she agreed.  We injected both the right and left knee with a mixture of 2, 2, 1.  She tolerated that well.  Follow up in 3 months for re-evaluation.  I am also going to start her on a once a day arthritis medication.

## 2023-07-11 NOTE — PROCEDURES
Large Joint Aspiration/Injection: bilateral knee    Date/Time: 7/11/2023 2:00 PM  Performed by: Bola Lange II, MD  Authorized by: Bola Lange II, MD     Consent Done?:  Yes (Verbal)  Indications:  Arthritis and pain  Timeout: prior to procedure the correct patient, procedure, and site was verified      Local anesthesia used?: Yes    Local anesthetic:  Topical anesthetic    Details:  Needle Size:  22 G  Approach:  Anterolateral  Location:  Knee  Laterality:  Bilateral  Site:  Bilateral knee  Medications (Right):  80 mg methylPREDNISolone acetate 80 mg/mL  Medications (Left):  80 mg methylPREDNISolone acetate 80 mg/mL  Patient tolerance:  Patient tolerated the procedure well with no immediate complications

## 2023-09-18 DIAGNOSIS — I10 ESSENTIAL HYPERTENSION: ICD-10-CM

## 2023-09-18 RX ORDER — LISINOPRIL 30 MG/1
TABLET ORAL
Qty: 90 TABLET | Refills: 2 | Status: SHIPPED | OUTPATIENT
Start: 2023-09-18

## 2023-09-18 NOTE — TELEPHONE ENCOUNTER
Refill Decision Note   Arleen Zarate  is requesting a refill authorization.  Brief Assessment and Rationale for Refill:  Approve     Medication Therapy Plan:         Comments:     Note composed:2:44 PM 09/18/2023

## 2023-09-18 NOTE — TELEPHONE ENCOUNTER
No care due was identified.  [facility] Embedded Care Due Messages. Reference number: [id number].   9/18/2023 2:13:34 AM CDT

## 2023-10-05 DIAGNOSIS — I10 ESSENTIAL HYPERTENSION: ICD-10-CM

## 2023-10-05 RX ORDER — NISOLDIPINE 8.5 MG/1
TABLET, FILM COATED, EXTENDED RELEASE ORAL
Qty: 90 TABLET | Refills: 1 | Status: SHIPPED | OUTPATIENT
Start: 2023-10-05 | End: 2024-04-02

## 2023-10-05 NOTE — TELEPHONE ENCOUNTER
Refill Decision Note   Arleen Zarate  is requesting a refill authorization.  Brief Assessment and Rationale for Refill:  Approve     Medication Therapy Plan:         Comments:     Note composed:3:51 AM 10/05/2023

## 2023-10-05 NOTE — TELEPHONE ENCOUNTER
No care due was identified.  [facility] Embedded Care Due Messages. Reference number: [id number].   10/05/2023 2:03:18 AM CDT

## 2023-10-17 ENCOUNTER — OFFICE VISIT (OUTPATIENT)
Dept: FAMILY MEDICINE | Facility: CLINIC | Age: 88
End: 2023-10-17
Payer: MEDICARE

## 2023-10-17 ENCOUNTER — LAB VISIT (OUTPATIENT)
Dept: LAB | Facility: HOSPITAL | Age: 88
End: 2023-10-17
Attending: PHYSICIAN ASSISTANT
Payer: MEDICARE

## 2023-10-17 VITALS
HEIGHT: 59 IN | BODY MASS INDEX: 27.82 KG/M2 | SYSTOLIC BLOOD PRESSURE: 132 MMHG | DIASTOLIC BLOOD PRESSURE: 72 MMHG | OXYGEN SATURATION: 97 % | RESPIRATION RATE: 18 BRPM | WEIGHT: 138 LBS | HEART RATE: 94 BPM | TEMPERATURE: 98 F

## 2023-10-17 DIAGNOSIS — E78.2 MIXED HYPERLIPIDEMIA: ICD-10-CM

## 2023-10-17 DIAGNOSIS — I10 ESSENTIAL HYPERTENSION: Primary | ICD-10-CM

## 2023-10-17 DIAGNOSIS — R25.2 MUSCLE CRAMPS: ICD-10-CM

## 2023-10-17 DIAGNOSIS — K21.9 GASTROESOPHAGEAL REFLUX DISEASE WITHOUT ESOPHAGITIS: ICD-10-CM

## 2023-10-17 DIAGNOSIS — I10 ESSENTIAL HYPERTENSION: ICD-10-CM

## 2023-10-17 DIAGNOSIS — D64.9 MILD ANEMIA: ICD-10-CM

## 2023-10-17 DIAGNOSIS — R39.9 LOWER URINARY TRACT SYMPTOMS: ICD-10-CM

## 2023-10-17 LAB
ALBUMIN SERPL BCP-MCNC: 3.7 G/DL (ref 3.5–5.2)
ALP SERPL-CCNC: 86 U/L (ref 55–135)
ALT SERPL W/O P-5'-P-CCNC: 12 U/L (ref 10–44)
ANION GAP SERPL CALC-SCNC: 7 MMOL/L (ref 8–16)
AST SERPL-CCNC: 18 U/L (ref 10–40)
BASOPHILS # BLD AUTO: 0.05 K/UL (ref 0–0.2)
BASOPHILS NFR BLD: 0.8 % (ref 0–1.9)
BILIRUB SERPL-MCNC: 0.4 MG/DL (ref 0.1–1)
BUN SERPL-MCNC: 17 MG/DL (ref 10–30)
CALCIUM SERPL-MCNC: 9.3 MG/DL (ref 8.7–10.5)
CHLORIDE SERPL-SCNC: 107 MMOL/L (ref 95–110)
CHOLEST SERPL-MCNC: 201 MG/DL (ref 120–199)
CHOLEST/HDLC SERPL: 3.4 {RATIO} (ref 2–5)
CO2 SERPL-SCNC: 26 MMOL/L (ref 23–29)
CREAT SERPL-MCNC: 0.8 MG/DL (ref 0.5–1.4)
DIFFERENTIAL METHOD: ABNORMAL
EOSINOPHIL # BLD AUTO: 0.1 K/UL (ref 0–0.5)
EOSINOPHIL NFR BLD: 0.8 % (ref 0–8)
ERYTHROCYTE [DISTWIDTH] IN BLOOD BY AUTOMATED COUNT: 13.1 % (ref 11.5–14.5)
EST. GFR  (NO RACE VARIABLE): >60 ML/MIN/1.73 M^2
FERRITIN SERPL-MCNC: 29 NG/ML (ref 20–300)
GLUCOSE SERPL-MCNC: 92 MG/DL (ref 70–110)
HCT VFR BLD AUTO: 37.3 % (ref 37–48.5)
HDLC SERPL-MCNC: 59 MG/DL (ref 40–75)
HDLC SERPL: 29.4 % (ref 20–50)
HGB BLD-MCNC: 11.6 G/DL (ref 12–16)
IMM GRANULOCYTES # BLD AUTO: 0.01 K/UL (ref 0–0.04)
IMM GRANULOCYTES NFR BLD AUTO: 0.2 % (ref 0–0.5)
IRON SERPL-MCNC: 100 UG/DL (ref 30–160)
LDLC SERPL CALC-MCNC: 120 MG/DL (ref 63–159)
LYMPHOCYTES # BLD AUTO: 1.2 K/UL (ref 1–4.8)
LYMPHOCYTES NFR BLD: 19.5 % (ref 18–48)
MAGNESIUM SERPL-MCNC: 1.9 MG/DL (ref 1.6–2.6)
MCH RBC QN AUTO: 30.1 PG (ref 27–31)
MCHC RBC AUTO-ENTMCNC: 31.1 G/DL (ref 32–36)
MCV RBC AUTO: 97 FL (ref 82–98)
MONOCYTES # BLD AUTO: 0.5 K/UL (ref 0.3–1)
MONOCYTES NFR BLD: 8.1 % (ref 4–15)
NEUTROPHILS # BLD AUTO: 4.4 K/UL (ref 1.8–7.7)
NEUTROPHILS NFR BLD: 70.6 % (ref 38–73)
NONHDLC SERPL-MCNC: 142 MG/DL
NRBC BLD-RTO: 0 /100 WBC
PLATELET # BLD AUTO: 209 K/UL (ref 150–450)
PMV BLD AUTO: 11.5 FL (ref 9.2–12.9)
POTASSIUM SERPL-SCNC: 4.5 MMOL/L (ref 3.5–5.1)
PROT SERPL-MCNC: 6.5 G/DL (ref 6–8.4)
RBC # BLD AUTO: 3.85 M/UL (ref 4–5.4)
SATURATED IRON: 27 % (ref 20–50)
SODIUM SERPL-SCNC: 140 MMOL/L (ref 136–145)
TOTAL IRON BINDING CAPACITY: 373 UG/DL (ref 250–450)
TRANSFERRIN SERPL-MCNC: 252 MG/DL (ref 200–375)
TRIGL SERPL-MCNC: 110 MG/DL (ref 30–150)
WBC # BLD AUTO: 6.21 K/UL (ref 3.9–12.7)

## 2023-10-17 PROCEDURE — 36415 COLL VENOUS BLD VENIPUNCTURE: CPT | Mod: PO | Performed by: PHYSICIAN ASSISTANT

## 2023-10-17 PROCEDURE — 99999 PR PBB SHADOW E&M-EST. PATIENT-LVL IV: ICD-10-PCS | Mod: PBBFAC,,, | Performed by: PHYSICIAN ASSISTANT

## 2023-10-17 PROCEDURE — 80061 LIPID PANEL: CPT | Performed by: PHYSICIAN ASSISTANT

## 2023-10-17 PROCEDURE — 99999 PR PBB SHADOW E&M-EST. PATIENT-LVL IV: CPT | Mod: PBBFAC,,, | Performed by: PHYSICIAN ASSISTANT

## 2023-10-17 PROCEDURE — 82728 ASSAY OF FERRITIN: CPT | Performed by: PHYSICIAN ASSISTANT

## 2023-10-17 PROCEDURE — 85025 COMPLETE CBC W/AUTO DIFF WBC: CPT | Performed by: PHYSICIAN ASSISTANT

## 2023-10-17 PROCEDURE — 99214 PR OFFICE/OUTPT VISIT, EST, LEVL IV, 30-39 MIN: ICD-10-PCS | Mod: S$PBB,,, | Performed by: PHYSICIAN ASSISTANT

## 2023-10-17 PROCEDURE — 83735 ASSAY OF MAGNESIUM: CPT | Performed by: PHYSICIAN ASSISTANT

## 2023-10-17 PROCEDURE — 84466 ASSAY OF TRANSFERRIN: CPT | Performed by: PHYSICIAN ASSISTANT

## 2023-10-17 PROCEDURE — 80053 COMPREHEN METABOLIC PANEL: CPT | Performed by: PHYSICIAN ASSISTANT

## 2023-10-17 PROCEDURE — 99214 OFFICE O/P EST MOD 30 MIN: CPT | Mod: PBBFAC,PO | Performed by: PHYSICIAN ASSISTANT

## 2023-10-17 PROCEDURE — 83540 ASSAY OF IRON: CPT | Performed by: PHYSICIAN ASSISTANT

## 2023-10-17 PROCEDURE — 99214 OFFICE O/P EST MOD 30 MIN: CPT | Mod: S$PBB,,, | Performed by: PHYSICIAN ASSISTANT

## 2023-10-17 NOTE — PROGRESS NOTES
Subjective:       Patient ID: Arleen Zarate is a 89 y.o. female.    Chief Complaint: Follow-up (6 month f/u) and Urinary Frequency (Frequent at night, little output)    Ms. Zarate is a 89 year old female with HTN and GERD. She is here today for routine follow up. She reports overall she is feeling well. She admits to urinary frequency and requests a urine test. She denies dysuria, fever, or chills. She complains of nighttime leg cramps. She would like this evaluated also.       Review of patient's allergies indicates:   Allergen Reactions    Buspar [buspirone] Other (See Comments)     Syncope and nausea     Doxy      Other reaction(s): Unknown    Effexor [venlafaxine] Other (See Comments)     shaking    Biaxin [clarithromycin] Other (See Comments)     Unknown    Codeine Other (See Comments)     Syncope, and nausea.    Statins-hmg-coa reductase inhibitors      myalgia    Doxycycline Other (See Comments)     Made very weak         Current Outpatient Medications:     lisinopriL (PRINIVIL,ZESTRIL) 30 MG tablet, TAKE 1 TABLET EVERY EVENING, Disp: 90 tablet, Rfl: 2    multivitamin capsule, Take 1 capsule by mouth once daily., Disp: , Rfl:     nisoldipine (SULAR) 8.5 MG Tb24, TAKE 1 TABLET DAILY, Disp: 90 tablet, Rfl: 1    omeprazole (PRILOSEC) 40 MG capsule, Take 1 capsule (40 mg total) by mouth once daily., Disp: 90 capsule, Rfl: 3    triamcinolone acetonide 0.025% (KENALOG) 0.025 % cream, SMARTSIG:In Ear(s), Disp: , Rfl:     fluticasone propionate (FLONASE) 50 mcg/actuation nasal spray, 1 spray (50 mcg total) by Each Nostril route 2 (two) times daily as needed for Rhinitis. (Patient not taking: Reported on 10/17/2023), Disp: 15 g, Rfl: 0    LIDOcaine (LIDODERM) 5 %, Place 1 patch onto the skin once daily. Remove & Discard patch within 12 hours or as directed by MD (Patient not taking: Reported on 10/17/2023), Disp: 15 patch, Rfl: 2    meloxicam (MOBIC) 15 MG tablet, Take 1 tablet (15 mg total) by mouth once daily.  (Patient not taking: Reported on 10/17/2023), Disp: 30 tablet, Rfl: 11    Lab Results   Component Value Date    WBC 7.22 04/12/2023    HGB 13.1 04/12/2023    HCT 42.6 04/12/2023     04/12/2023    CHOL 238 (H) 04/12/2023    TRIG 130 04/12/2023    HDL 64 04/12/2023    ALT 13 04/12/2023    AST 20 04/12/2023     04/12/2023    K 4.4 04/12/2023     04/12/2023    CREATININE 0.9 04/12/2023    BUN 16 04/12/2023    CO2 24 04/12/2023    TSH 1.222 04/12/2023    HGBA1C 5.2 04/12/2023       Review of Systems   Constitutional:  Negative for activity change, appetite change and fever.   HENT:  Negative for postnasal drip, rhinorrhea and sinus pressure.    Eyes:  Negative for visual disturbance.   Respiratory:  Negative for cough and shortness of breath.    Cardiovascular:  Negative for chest pain.   Gastrointestinal:  Negative for abdominal distention and abdominal pain.   Genitourinary:  Positive for frequency. Negative for difficulty urinating and dysuria.   Musculoskeletal:  Positive for arthralgias. Negative for myalgias.   Neurological:  Negative for headaches.   Hematological:  Negative for adenopathy.   Psychiatric/Behavioral:  The patient is not nervous/anxious.        Objective:      Physical Exam  Constitutional:       Appearance: Normal appearance.   HENT:      Head: Normocephalic and atraumatic.   Eyes:      Conjunctiva/sclera: Conjunctivae normal.   Cardiovascular:      Rate and Rhythm: Normal rate and regular rhythm.   Pulmonary:      Effort: Pulmonary effort is normal. No respiratory distress.      Breath sounds: Normal breath sounds. No wheezing.   Abdominal:      General: There is no distension.      Palpations: There is no mass.      Tenderness: There is no abdominal tenderness.   Musculoskeletal:      Right lower leg: No edema.      Left lower leg: No edema.   Lymphadenopathy:      Cervical: No cervical adenopathy.   Skin:     Findings: No erythema.   Neurological:      Mental Status: She is  alert and oriented to person, place, and time.   Psychiatric:         Behavior: Behavior normal.         Assessment:       1. Essential hypertension    2. Gastroesophageal reflux disease without esophagitis    3. Mixed hyperlipidemia    4. Muscle cramps    5. Mild anemia    6. Lower urinary tract symptoms        Plan:         Arleen was seen today for follow-up and urinary frequency.    Diagnoses and all orders for this visit:    Essential hypertension  -     Comprehensive Metabolic Panel; Future  -     CBC Auto Differential; Future  -     Magnesium; Future  -     Iron and TIBC; Future  -     Ferritin; Future  -     LIPID PANEL; Future  Controlled  Low salt diet  Continue current medication  Gastroesophageal reflux disease without esophagitis  -     Comprehensive Metabolic Panel; Future  -     CBC Auto Differential; Future  -     Magnesium; Future  -     Iron and TIBC; Future  -     Ferritin; Future  -     LIPID PANEL; Future    Mixed hyperlipidemia  -     Comprehensive Metabolic Panel; Future  -     CBC Auto Differential; Future  -     Magnesium; Future  -     Iron and TIBC; Future  -     Ferritin; Future  -     LIPID PANEL; Future    Muscle cramps  -     Comprehensive Metabolic Panel; Future  -     CBC Auto Differential; Future  -     Magnesium; Future  -     Iron and TIBC; Future  -     Ferritin; Future  -     LIPID PANEL; Future    Mild anemia  -     Iron and TIBC; Future  -     Ferritin; Future    Lower urinary tract symptoms  -     Urinalysis; Future  -     Urine culture; Future      Patient will be notified when labs return and further recommendations will be given at that time.

## 2023-10-18 DIAGNOSIS — D64.9 MILD ANEMIA: Primary | ICD-10-CM

## 2023-10-23 DIAGNOSIS — N39.0 URINARY TRACT INFECTION WITHOUT HEMATURIA, SITE UNSPECIFIED: Primary | ICD-10-CM

## 2023-10-23 RX ORDER — AMOXICILLIN AND CLAVULANATE POTASSIUM 875; 125 MG/1; MG/1
1 TABLET, FILM COATED ORAL 2 TIMES DAILY
Qty: 10 TABLET | Refills: 0 | Status: SHIPPED | OUTPATIENT
Start: 2023-10-23 | End: 2023-10-28

## 2023-11-08 DIAGNOSIS — K21.9 GASTROESOPHAGEAL REFLUX DISEASE: ICD-10-CM

## 2023-11-08 RX ORDER — OMEPRAZOLE 40 MG/1
40 CAPSULE, DELAYED RELEASE ORAL DAILY
Qty: 90 CAPSULE | Refills: 1 | Status: SHIPPED | OUTPATIENT
Start: 2023-11-08

## 2023-11-08 NOTE — TELEPHONE ENCOUNTER
Refill Decision Note   Arleen Zarate  is requesting a refill authorization.  Brief Assessment and Rationale for Refill:  Approve     Medication Therapy Plan:         Comments:     Note composed:4:44 PM 11/08/2023             Appointments     Last Visit   4/12/2023 Perry Jorge MD   Next Visit   4/18/2024 Perry Jorge MD

## 2023-11-08 NOTE — TELEPHONE ENCOUNTER
No care due was identified.  [facility] Embedded Care Due Messages. Reference number: [id number].   11/08/2023 12:55:59 PM CST

## 2023-11-20 ENCOUNTER — LAB VISIT (OUTPATIENT)
Dept: LAB | Facility: HOSPITAL | Age: 88
End: 2023-11-20
Attending: PHYSICIAN ASSISTANT
Payer: MEDICARE

## 2023-11-20 DIAGNOSIS — D64.9 MILD ANEMIA: ICD-10-CM

## 2023-11-20 LAB
BASOPHILS # BLD AUTO: 0.04 K/UL (ref 0–0.2)
BASOPHILS NFR BLD: 0.6 % (ref 0–1.9)
DIFFERENTIAL METHOD: NORMAL
EOSINOPHIL # BLD AUTO: 0.1 K/UL (ref 0–0.5)
EOSINOPHIL NFR BLD: 0.8 % (ref 0–8)
ERYTHROCYTE [DISTWIDTH] IN BLOOD BY AUTOMATED COUNT: 12.5 % (ref 11.5–14.5)
HCT VFR BLD AUTO: 38.2 % (ref 37–48.5)
HGB BLD-MCNC: 12.3 G/DL (ref 12–16)
IMM GRANULOCYTES # BLD AUTO: 0.01 K/UL (ref 0–0.04)
IMM GRANULOCYTES NFR BLD AUTO: 0.1 % (ref 0–0.5)
LYMPHOCYTES # BLD AUTO: 1.7 K/UL (ref 1–4.8)
LYMPHOCYTES NFR BLD: 24.1 % (ref 18–48)
MCH RBC QN AUTO: 30.3 PG (ref 27–31)
MCHC RBC AUTO-ENTMCNC: 32.2 G/DL (ref 32–36)
MCV RBC AUTO: 94 FL (ref 82–98)
MONOCYTES # BLD AUTO: 0.6 K/UL (ref 0.3–1)
MONOCYTES NFR BLD: 8.5 % (ref 4–15)
NEUTROPHILS # BLD AUTO: 4.7 K/UL (ref 1.8–7.7)
NEUTROPHILS NFR BLD: 65.9 % (ref 38–73)
NRBC BLD-RTO: 0 /100 WBC
PLATELET # BLD AUTO: 229 K/UL (ref 150–450)
PMV BLD AUTO: 11.3 FL (ref 9.2–12.9)
RBC # BLD AUTO: 4.06 M/UL (ref 4–5.4)
WBC # BLD AUTO: 7.09 K/UL (ref 3.9–12.7)

## 2023-11-20 PROCEDURE — 85025 COMPLETE CBC W/AUTO DIFF WBC: CPT | Performed by: PHYSICIAN ASSISTANT

## 2023-11-20 PROCEDURE — 36415 COLL VENOUS BLD VENIPUNCTURE: CPT | Mod: PO | Performed by: PHYSICIAN ASSISTANT

## 2023-11-22 ENCOUNTER — TELEPHONE (OUTPATIENT)
Dept: FAMILY MEDICINE | Facility: CLINIC | Age: 88
End: 2023-11-22
Payer: MEDICARE

## 2023-11-22 NOTE — TELEPHONE ENCOUNTER
----- Message from Lynsey Rodriguez sent at 11/22/2023  9:20 AM CST -----  Contact: Patient  Type:  Test Results    Who Called:   Patient  Name of Test (Lab/Mammo/Etc):   Labs  Date of Test:   11/20  Ordering Provider:   Dr Jorge  Where the test was performed:   [facility]    Would the patient rather a call back or a response via [facility]?   Call back  Best Call Back Number:   [phone]    Additional Information:    States she would like to speak with someone to review her lab results - please call - thank you

## 2023-12-03 DIAGNOSIS — Z71.89 COMPLEX CARE COORDINATION: ICD-10-CM

## 2024-01-08 ENCOUNTER — OFFICE VISIT (OUTPATIENT)
Dept: ORTHOPEDICS | Facility: CLINIC | Age: 89
End: 2024-01-08
Payer: MEDICARE

## 2024-01-08 VITALS — BODY MASS INDEX: 27.82 KG/M2 | HEIGHT: 59 IN | WEIGHT: 138 LBS

## 2024-01-08 DIAGNOSIS — M17.0 BILATERAL PRIMARY OSTEOARTHRITIS OF KNEE: Primary | ICD-10-CM

## 2024-01-08 PROCEDURE — 99999 PR PBB SHADOW E&M-EST. PATIENT-LVL III: CPT | Mod: PBBFAC,,, | Performed by: ORTHOPAEDIC SURGERY

## 2024-01-08 PROCEDURE — 20610 DRAIN/INJ JOINT/BURSA W/O US: CPT | Mod: 50,PBBFAC,PO | Performed by: ORTHOPAEDIC SURGERY

## 2024-01-08 PROCEDURE — 99213 OFFICE O/P EST LOW 20 MIN: CPT | Mod: PBBFAC,PO | Performed by: ORTHOPAEDIC SURGERY

## 2024-01-08 PROCEDURE — 99999PBSHW PR PBB SHADOW TECHNICAL ONLY FILED TO HB: Mod: PBBFAC,,,

## 2024-01-08 PROCEDURE — 99214 OFFICE O/P EST MOD 30 MIN: CPT | Mod: S$PBB,25,, | Performed by: ORTHOPAEDIC SURGERY

## 2024-01-08 RX ORDER — METHYLPREDNISOLONE ACETATE 80 MG/ML
80 INJECTION, SUSPENSION INTRA-ARTICULAR; INTRALESIONAL; INTRAMUSCULAR; SOFT TISSUE
Status: DISCONTINUED | OUTPATIENT
Start: 2024-01-08 | End: 2024-01-08 | Stop reason: HOSPADM

## 2024-01-08 RX ADMIN — METHYLPREDNISOLONE ACETATE 80 MG: 80 INJECTION, SUSPENSION INTRA-ARTICULAR; INTRALESIONAL; INTRAMUSCULAR; SOFT TISSUE at 10:01

## 2024-01-08 NOTE — PROGRESS NOTES
CC:  89-year-old female follows up with bilateral knee osteoarthritis.  We last saw her about 6 months ago on 07/11/2023 that she received injections in both of her knees.  Those worked well up until the last few weeks when she has had insidious return of pain in both of her knees.    Past Medical History:   Diagnosis Date    Anxiety     Arthritis     fingers and back    Depression     Disorder of kidney and ureter     Foot pain, left 9/18/2013    GERD (gastroesophageal reflux disease)     Hiatal hernia     Hyperlipidemia     Hypertension     Reflux     on meds    Tendon tear 8/21/2013    Tendonitis 8/21/2013       Past Surgical History:   Procedure Laterality Date    CHOLECYSTECTOMY      EYE SURGERY Bilateral     cataracts and implants    TONSILLECTOMY         Current Outpatient Medications on File Prior to Visit   Medication Sig Dispense Refill    fluticasone propionate (FLONASE) 50 mcg/actuation nasal spray 1 spray (50 mcg total) by Each Nostril route 2 (two) times daily as needed for Rhinitis. (Patient not taking: Reported on 10/17/2023) 15 g 0    LIDOcaine (LIDODERM) 5 % Place 1 patch onto the skin once daily. Remove & Discard patch within 12 hours or as directed by MD (Patient not taking: Reported on 10/17/2023) 15 patch 2    lisinopriL (PRINIVIL,ZESTRIL) 30 MG tablet TAKE 1 TABLET EVERY EVENING 90 tablet 2    meloxicam (MOBIC) 15 MG tablet Take 1 tablet (15 mg total) by mouth once daily. (Patient not taking: Reported on 10/17/2023) 30 tablet 11    multivitamin capsule Take 1 capsule by mouth once daily.      nisoldipine (SULAR) 8.5 MG Tb24 TAKE 1 TABLET DAILY 90 tablet 1    omeprazole (PRILOSEC) 40 MG capsule Take 1 capsule (40 mg total) by mouth Daily. 90 capsule 1    triamcinolone acetonide 0.025% (KENALOG) 0.025 % cream SMARTSIG:In Ear(s)      [DISCONTINUED] buPROPion (WELLBUTRIN) 75 MG tablet Take 1 tablet (75 mg total) by mouth once daily. 30 tablet 1     No current facility-administered medications on  file prior to visit.       ROS:    Constitution: Denies chills, fever, and sweats.  HENT: Denies headaches or blurry vision.  Cardiovascular: Denies chest pain or irregular heart beat.  Respiratory: Denies cough or shortness of breath.  Gastrointestinal: Denies abdominal pain, nausea, or vomiting.  Genitourinary:  Denies urinary incontinence, bladder and kidney issues  Musculoskeletal:  Denies muscle cramps.  Positive bilateral knee pain  Neurological: Denies dizziness or focal weakness.  Psychiatric/Behavioral: Normal mental status.  Hematologic/Lymphatic: Denies bleeding problem or easy bruising/bleeding.  Skin: Denies rash or suspicious lesions.    Physical examination     Gen - No acute distress, well nourished, well groomed   Eyes - Extraoccular motions intact, pupils equally round and reactive to light and accommodation   ENT - normocephalic, atruamtic, oropharynx clear   Neck - Supple, no abnormal masses   Cardiovascular - regular rate and rhythm   Pulmonary - clear to auscultation bilaterally, no wheezes, ronchi, or rales   Abdomen - soft, non-tender, non-distended, positive bowel sounds   Psych - The patient is alert and oriented x3 with normal mood and affect    Examination of the Right Lower Extremity:     Skin intact throughout.  Motor function is intact distally EHL/FHL/TA/willie   +2 dorsalis pedis and posterior tibial pulses   Sensation to light touch intact distally dorsal, plantar, and first web space     Examination of the Right knee:    ROM 0 - 150   Effusion positive  Tenderness to palpation at the joint line positive  Pain during range of motion positive  Crepitation during range of motion positive     positive increased pain noted with flexion past 90   positive antalgic gait noted   negative Lachman's Test   negative Anterior Drawer Test   negative Posterior Drawer Test   negative McMurrays Test   negative Disco Test   negative Varus/Valgus instability    Examination of the Left Lower Extremity:      Skin intact throughout.  Motor function is intact distally EHL/FHL/TA/willie   +2 dorsalis pedis and posterior tibial pulses   Sensation to light touch intact distally dorsal, plantar, and first web space     Examination of the Left knee:    ROM 0 - 150   Effusion positive  Tenderness to palpation at the joint line positive  Pain during range of motion positive  Crepitation during range of motion positive     positive increased pain noted with flexion past 90   positive antalgic gait noted   negative Lachman's Test   negative Anterior Drawer Test   negative Posterior Drawer Test   negative McMurrays Test   negative Disco Test   negative Varus/Valgus instability    X-ray images were examined and personally interpreted by me.  Three views of bilateral knees dated 03/28/2023 show osteoarthritis of both knees with loss of joint space, periarticular osteophytes, and subchondral sclerosis.    Dx:  Osteoarthritis bilateral knees    Plan:  I did offer the patient injections in both of her knees she agreed.  We injected both the right and left knee with a mixture of 2, 2, 1.  She tolerated that well.  Follow up p.r.n..

## 2024-01-08 NOTE — PROCEDURES
Large Joint Aspiration/Injection: bilateral knee    Date/Time: 1/8/2024 10:00 AM    Performed by: Bola Lange II, MD  Authorized by: Bola Lange II, MD    Consent Done?:  Yes (Verbal)  Indications:  Arthritis and pain    Local anesthesia used?: Yes    Local anesthetic:  Topical anesthetic    Details:  Needle Size:  22 G  Approach:  Anterolateral  Location:  Knee  Laterality:  Bilateral  Site:  Bilateral knee  Medications (Right):  80 mg methylPREDNISolone acetate 80 mg/mL  Medications (Left):  80 mg methylPREDNISolone acetate 80 mg/mL  Patient tolerance:  Patient tolerated the procedure well with no immediate complications

## 2024-01-26 ENCOUNTER — TELEPHONE (OUTPATIENT)
Dept: ADMINISTRATIVE | Facility: CLINIC | Age: 89
End: 2024-01-26
Payer: MEDICARE

## 2024-01-29 ENCOUNTER — OFFICE VISIT (OUTPATIENT)
Dept: FAMILY MEDICINE | Facility: CLINIC | Age: 89
End: 2024-01-29
Payer: MEDICARE

## 2024-01-29 VITALS
SYSTOLIC BLOOD PRESSURE: 128 MMHG | BODY MASS INDEX: 27.69 KG/M2 | WEIGHT: 137.38 LBS | OXYGEN SATURATION: 98 % | HEART RATE: 78 BPM | TEMPERATURE: 98 F | DIASTOLIC BLOOD PRESSURE: 70 MMHG | HEIGHT: 59 IN

## 2024-01-29 DIAGNOSIS — Z00.00 ENCOUNTER FOR PREVENTIVE HEALTH EXAMINATION: Primary | ICD-10-CM

## 2024-01-29 DIAGNOSIS — I10 ESSENTIAL HYPERTENSION: ICD-10-CM

## 2024-01-29 DIAGNOSIS — R21 RASH: Primary | ICD-10-CM

## 2024-01-29 DIAGNOSIS — I70.0 ATHEROSCLEROSIS OF AORTA: ICD-10-CM

## 2024-01-29 PROBLEM — N18.31 STAGE 3A CHRONIC KIDNEY DISEASE: Status: RESOLVED | Noted: 2018-01-02 | Resolved: 2024-01-29

## 2024-01-29 PROBLEM — N18.31 STAGE 3A CHRONIC KIDNEY DISEASE: Status: ACTIVE | Noted: 2018-01-02

## 2024-01-29 PROCEDURE — 99214 OFFICE O/P EST MOD 30 MIN: CPT | Mod: PBBFAC,PO | Performed by: NURSE PRACTITIONER

## 2024-01-29 PROCEDURE — 99999 PR PBB SHADOW E&M-EST. PATIENT-LVL IV: CPT | Mod: PBBFAC,,, | Performed by: NURSE PRACTITIONER

## 2024-01-29 PROCEDURE — G0439 PPPS, SUBSEQ VISIT: HCPCS | Mod: ,,, | Performed by: NURSE PRACTITIONER

## 2024-01-29 RX ORDER — TRIAMCINOLONE ACETONIDE 0.25 MG/G
CREAM TOPICAL
Qty: 80 G | Refills: 3 | Status: ON HOLD | OUTPATIENT
Start: 2024-01-29 | End: 2024-03-04 | Stop reason: HOSPADM

## 2024-01-29 RX ORDER — TRIAMCINOLONE ACETONIDE 0.25 MG/G
CREAM TOPICAL
Qty: 80 G | Refills: 6 | Status: CANCELLED | OUTPATIENT
Start: 2024-01-29

## 2024-01-29 RX ORDER — TRIAMCINOLONE ACETONIDE 0.25 MG/G
CREAM TOPICAL
Qty: 80 G | Refills: 6 | Status: SHIPPED | OUTPATIENT
Start: 2024-01-29 | End: 2024-01-29

## 2024-01-29 NOTE — TELEPHONE ENCOUNTER
No care due was identified.  Health [facility] Embedded Care Due Messages. Reference number: [id number].   1/29/2024 11:35:39 AM CST

## 2024-01-29 NOTE — PATIENT INSTRUCTIONS
Counseling and Referral of Other Preventative  (Italic type indicates deductible and co-insurance are waived)    Patient Name: Arleen Zarate  Today's Date: 1/29/2024    Health Maintenance       Date Due Completion Date    TETANUS VACCINE Never done ---    Shingles Vaccine (1 of 2) Never done ---    RSV Vaccine (Age 60+ and Pregnant patients) (1 - 1-dose 60+ series) Never done ---    Pneumococcal Vaccines (Age 65+) (1 of 1 - PCV) Never done ---    DEXA Scan 05/02/2016 5/2/2014    Lipid Panel 10/17/2028 10/17/2023        No orders of the defined types were placed in this encounter.    The following information is provided to all patients.  This information is to help you find resources for any of the problems found today that may be affecting your health:                  Living healthy guide: www.[facility].[place].gov      Understanding Diabetes: www.diabetes.org      Eating healthy: www.cdc.gov/healthyweight      [facility] home safety checklist: www.cdc.gov/steadi/patient.html      Agency on Aging: www.goea.[place].[facility]      Alcoholics anonymous (AA): www.aa.org      Physical Activity: www.sulaiman.nih.gov/mw0bcxo      Tobacco use: www.quitwithusla.org

## 2024-01-29 NOTE — TELEPHONE ENCOUNTER
----- Message from Alayna Schneider sent at 1/29/2024 12:20 PM CST -----  Contact: [facility] [phone]  Type:  Pharmacy Calling to Clarify an RX    Name of Caller:  [facility]   Pharmacy Name:  [facility]   Prescription Name:  triamcinolone acetonide 0.025% (KENALOG) 0.025 % cream   What do they need to clarify?:  Directions   Best Call Back Number:  [phone]  Additional Information:      [id number] - [place] - [address] AT [facility] [address]  [address] [address]  Phone: [phone] Fax: [phone]

## 2024-01-29 NOTE — PROGRESS NOTES
"  Arleen Zarate presented for a  Medicare AWV and comprehensive Health Risk Assessment today. The following components were reviewed and updated:    Medical history  Family History  Social history  Allergies and Current Medications  Health Risk Assessment  Health Maintenance  Care Team         ** See Completed Assessments for Annual Wellness Visit within the encounter summary.**         The following assessments were completed:  Living Situation  CAGE  Depression Screening  Timed Get Up and Go  Whisper Test  Cognitive Function Screening  Nutrition Screening  ADL Screening  PAQ Screening    Clock in media     Vitals:    01/29/24 1039   BP: 128/70   Pulse: 78   Temp: 97.8 °F (36.6 °C)   TempSrc: Oral   SpO2: 98%   Weight: 62.3 kg (137 lb 5.6 oz)   Height: 4' 11" (1.499 m)     Body mass index is 27.74 kg/m².  Physical Exam  Constitutional:       Appearance: She is well-developed.   HENT:      Head: Normocephalic and atraumatic.      Nose: Nose normal.   Eyes:      General: Lids are normal.      Conjunctiva/sclera: Conjunctivae normal.   Cardiovascular:      Rate and Rhythm: Normal rate.   Pulmonary:      Effort: Pulmonary effort is normal.   Neurological:      Mental Status: She is alert and oriented to person, place, and time.   Psychiatric:         Speech: Speech normal.         Behavior: Behavior normal.               Diagnoses and health risks identified today and associated recommendations/orders:    1. Encounter for preventive health examination  Discussed health maintenance guidelines appropriate for age.    Review for Opioid Screening: Patient does not have rx for Opioids.    Review for Substance Use Disorders: Patient does not use substance.      2. Atherosclerosis of aorta  Stable, continue to monitor  Followed by pcp     3. Essential hypertension  Controlled, continue current medication regimen  Low salt diet  Increase physical activity  Followed by pcp        Provided Arleen with a 5-10 year written " screening schedule and personal prevention plan. Recommendations were developed using the USPSTF age appropriate recommendations. Education, counseling, and referrals were provided as needed. After Visit Summary printed and given to patient which includes a list of additional screenings\tests needed.    Follow up for One year for Annual Wellness Visit.    Patricia Munoz, NP      I offered to discuss advanced care planning, including how to pick a person who would make decisions for you if you were unable to make them for yourself, called a health care power of , and what kind of decisions you might make such as use of life sustaining treatments such as ventilators and tube feeding when faced with a life limiting illness recorded on a living will that they will need to know. (How you want to be cared for as you near the end of your natural life)     X  Patient is unwilling to engage in a discussion regarding advance directives at this time.

## 2024-02-28 ENCOUNTER — HOSPITAL ENCOUNTER (INPATIENT)
Facility: HOSPITAL | Age: 89
LOS: 5 days | Discharge: NURSING, REHAB OR LONG-TERM CARE | DRG: 522 | End: 2024-03-04
Attending: EMERGENCY MEDICINE | Admitting: STUDENT IN AN ORGANIZED HEALTH CARE EDUCATION/TRAINING PROGRAM
Payer: MEDICARE

## 2024-02-28 DIAGNOSIS — S79.819A: ICD-10-CM

## 2024-02-28 DIAGNOSIS — S72.001A CLOSED FRACTURE OF RIGHT HIP, INITIAL ENCOUNTER: Primary | ICD-10-CM

## 2024-02-28 LAB
ALBUMIN SERPL BCP-MCNC: 3.8 G/DL (ref 3.5–5.2)
ALP SERPL-CCNC: 73 U/L (ref 55–135)
ALT SERPL W/O P-5'-P-CCNC: 12 U/L (ref 10–44)
ANION GAP SERPL CALC-SCNC: 8 MMOL/L (ref 8–16)
AST SERPL-CCNC: 17 U/L (ref 10–40)
BASOPHILS # BLD AUTO: 0.04 K/UL (ref 0–0.2)
BASOPHILS NFR BLD: 0.6 % (ref 0–1.9)
BILIRUB SERPL-MCNC: 0.6 MG/DL (ref 0.1–1)
BILIRUB UR QL STRIP: NEGATIVE
BUN SERPL-MCNC: 21 MG/DL (ref 10–30)
CALCIUM SERPL-MCNC: 9.1 MG/DL (ref 8.7–10.5)
CHLORIDE SERPL-SCNC: 107 MMOL/L (ref 95–110)
CK SERPL-CCNC: 80 U/L (ref 20–180)
CLARITY UR: CLEAR
CO2 SERPL-SCNC: 25 MMOL/L (ref 23–29)
COLOR UR: YELLOW
CREAT SERPL-MCNC: 0.9 MG/DL (ref 0.5–1.4)
DIFFERENTIAL METHOD BLD: ABNORMAL
EOSINOPHIL # BLD AUTO: 0.1 K/UL (ref 0–0.5)
EOSINOPHIL NFR BLD: 1.4 % (ref 0–8)
ERYTHROCYTE [DISTWIDTH] IN BLOOD BY AUTOMATED COUNT: 13.2 % (ref 11.5–14.5)
EST. GFR  (NO RACE VARIABLE): 57.8 ML/MIN/1.73 M^2
GLUCOSE SERPL-MCNC: 110 MG/DL (ref 70–110)
GLUCOSE UR QL STRIP: NEGATIVE
HCT VFR BLD AUTO: 33.8 % (ref 37–48.5)
HGB BLD-MCNC: 11.1 G/DL (ref 12–16)
HGB UR QL STRIP: NEGATIVE
IMM GRANULOCYTES # BLD AUTO: 0.03 K/UL (ref 0–0.04)
IMM GRANULOCYTES NFR BLD AUTO: 0.4 % (ref 0–0.5)
INR PPP: 1 (ref 0.8–1.2)
KETONES UR QL STRIP: ABNORMAL
LEUKOCYTE ESTERASE UR QL STRIP: NEGATIVE
LYMPHOCYTES # BLD AUTO: 2.2 K/UL (ref 1–4.8)
LYMPHOCYTES NFR BLD: 31 % (ref 18–48)
MAGNESIUM SERPL-MCNC: 1.8 MG/DL (ref 1.6–2.6)
MCH RBC QN AUTO: 30.1 PG (ref 27–31)
MCHC RBC AUTO-ENTMCNC: 32.8 G/DL (ref 32–36)
MCV RBC AUTO: 92 FL (ref 82–98)
MONOCYTES # BLD AUTO: 0.6 K/UL (ref 0.3–1)
MONOCYTES NFR BLD: 7.8 % (ref 4–15)
NEUTROPHILS # BLD AUTO: 4.2 K/UL (ref 1.8–7.7)
NEUTROPHILS NFR BLD: 58.8 % (ref 38–73)
NITRITE UR QL STRIP: NEGATIVE
NRBC BLD-RTO: 0 /100 WBC
OHS QRS DURATION: 92 MS
OHS QTC CALCULATION: 446 MS
PH UR STRIP: 8 [PH] (ref 5–8)
PLATELET # BLD AUTO: 180 K/UL (ref 150–450)
PMV BLD AUTO: 10.6 FL (ref 9.2–12.9)
POTASSIUM SERPL-SCNC: 4.2 MMOL/L (ref 3.5–5.1)
PROT SERPL-MCNC: 6.2 G/DL (ref 6–8.4)
PROT UR QL STRIP: NEGATIVE
PROTHROMBIN TIME: 10.9 SEC (ref 9–12.5)
RBC # BLD AUTO: 3.69 M/UL (ref 4–5.4)
SODIUM SERPL-SCNC: 140 MMOL/L (ref 136–145)
SP GR UR STRIP: 1.01 (ref 1–1.03)
TSH SERPL DL<=0.005 MIU/L-ACNC: 1.8 UIU/ML (ref 0.34–5.6)
URN SPEC COLLECT METH UR: ABNORMAL
UROBILINOGEN UR STRIP-ACNC: NEGATIVE EU/DL
WBC # BLD AUTO: 7.06 K/UL (ref 3.9–12.7)

## 2024-02-28 PROCEDURE — 83735 ASSAY OF MAGNESIUM: CPT | Performed by: EMERGENCY MEDICINE

## 2024-02-28 PROCEDURE — 25000242 PHARM REV CODE 250 ALT 637 W/ HCPCS: Performed by: EMERGENCY MEDICINE

## 2024-02-28 PROCEDURE — 63600175 PHARM REV CODE 636 W HCPCS: Performed by: NURSE PRACTITIONER

## 2024-02-28 PROCEDURE — 85025 COMPLETE CBC W/AUTO DIFF WBC: CPT | Performed by: EMERGENCY MEDICINE

## 2024-02-28 PROCEDURE — 27000221 HC OXYGEN, UP TO 24 HOURS

## 2024-02-28 PROCEDURE — 82550 ASSAY OF CK (CPK): CPT | Performed by: EMERGENCY MEDICINE

## 2024-02-28 PROCEDURE — 96374 THER/PROPH/DIAG INJ IV PUSH: CPT

## 2024-02-28 PROCEDURE — 85610 PROTHROMBIN TIME: CPT | Performed by: EMERGENCY MEDICINE

## 2024-02-28 PROCEDURE — 81003 URINALYSIS AUTO W/O SCOPE: CPT | Performed by: EMERGENCY MEDICINE

## 2024-02-28 PROCEDURE — 99900031 HC PATIENT EDUCATION (STAT)

## 2024-02-28 PROCEDURE — 99285 EMERGENCY DEPT VISIT HI MDM: CPT | Mod: 25

## 2024-02-28 PROCEDURE — 93010 ELECTROCARDIOGRAM REPORT: CPT | Mod: ,,, | Performed by: GENERAL PRACTICE

## 2024-02-28 PROCEDURE — 93005 ELECTROCARDIOGRAM TRACING: CPT | Performed by: GENERAL PRACTICE

## 2024-02-28 PROCEDURE — 94640 AIRWAY INHALATION TREATMENT: CPT

## 2024-02-28 PROCEDURE — 0SRR0JZ REPLACEMENT OF RIGHT HIP JOINT, FEMORAL SURFACE WITH SYNTHETIC SUBSTITUTE, OPEN APPROACH: ICD-10-PCS | Performed by: ORTHOPAEDIC SURGERY

## 2024-02-28 PROCEDURE — 80053 COMPREHEN METABOLIC PANEL: CPT | Performed by: EMERGENCY MEDICINE

## 2024-02-28 PROCEDURE — 84443 ASSAY THYROID STIM HORMONE: CPT | Performed by: EMERGENCY MEDICINE

## 2024-02-28 PROCEDURE — 25000003 PHARM REV CODE 250: Performed by: NURSE PRACTITIONER

## 2024-02-28 PROCEDURE — 63600175 PHARM REV CODE 636 W HCPCS: Performed by: EMERGENCY MEDICINE

## 2024-02-28 PROCEDURE — 11000001 HC ACUTE MED/SURG PRIVATE ROOM

## 2024-02-28 PROCEDURE — 94761 N-INVAS EAR/PLS OXIMETRY MLT: CPT

## 2024-02-28 PROCEDURE — 25500020 PHARM REV CODE 255: Performed by: EMERGENCY MEDICINE

## 2024-02-28 PROCEDURE — 96375 TX/PRO/DX INJ NEW DRUG ADDON: CPT

## 2024-02-28 RX ORDER — ONDANSETRON HYDROCHLORIDE 2 MG/ML
4 INJECTION, SOLUTION INTRAVENOUS
Status: COMPLETED | OUTPATIENT
Start: 2024-02-28 | End: 2024-02-28

## 2024-02-28 RX ORDER — IBUPROFEN 200 MG
16 TABLET ORAL
Status: DISCONTINUED | OUTPATIENT
Start: 2024-02-28 | End: 2024-02-29

## 2024-02-28 RX ORDER — MORPHINE SULFATE 2 MG/ML
1 INJECTION, SOLUTION INTRAMUSCULAR; INTRAVENOUS
Status: COMPLETED | OUTPATIENT
Start: 2024-02-28 | End: 2024-02-28

## 2024-02-28 RX ORDER — LANOLIN ALCOHOL/MO/W.PET/CERES
800 CREAM (GRAM) TOPICAL
Status: DISCONTINUED | OUTPATIENT
Start: 2024-02-28 | End: 2024-03-04 | Stop reason: HOSPADM

## 2024-02-28 RX ORDER — LEVALBUTEROL INHALATION SOLUTION 0.63 MG/3ML
0.63 SOLUTION RESPIRATORY (INHALATION)
Status: COMPLETED | OUTPATIENT
Start: 2024-02-28 | End: 2024-02-28

## 2024-02-28 RX ORDER — ACETAMINOPHEN 325 MG/1
650 TABLET ORAL EVERY 6 HOURS PRN
Status: DISCONTINUED | OUTPATIENT
Start: 2024-02-28 | End: 2024-02-29

## 2024-02-28 RX ORDER — ALUMINUM HYDROXIDE, MAGNESIUM HYDROXIDE, AND SIMETHICONE 1200; 120; 1200 MG/30ML; MG/30ML; MG/30ML
30 SUSPENSION ORAL 4 TIMES DAILY PRN
Status: DISCONTINUED | OUTPATIENT
Start: 2024-02-28 | End: 2024-03-04 | Stop reason: HOSPADM

## 2024-02-28 RX ORDER — SODIUM CHLORIDE 0.9 % (FLUSH) 0.9 %
10 SYRINGE (ML) INJECTION EVERY 8 HOURS PRN
Status: DISCONTINUED | OUTPATIENT
Start: 2024-02-28 | End: 2024-03-04 | Stop reason: HOSPADM

## 2024-02-28 RX ORDER — ONDANSETRON HYDROCHLORIDE 2 MG/ML
4 INJECTION, SOLUTION INTRAVENOUS EVERY 8 HOURS PRN
Status: DISCONTINUED | OUTPATIENT
Start: 2024-02-28 | End: 2024-03-04 | Stop reason: HOSPADM

## 2024-02-28 RX ORDER — MORPHINE SULFATE 2 MG/ML
2 INJECTION, SOLUTION INTRAMUSCULAR; INTRAVENOUS EVERY 4 HOURS PRN
Status: DISCONTINUED | OUTPATIENT
Start: 2024-02-28 | End: 2024-03-04 | Stop reason: HOSPADM

## 2024-02-28 RX ORDER — ACETAMINOPHEN 325 MG/1
650 TABLET ORAL EVERY 4 HOURS PRN
Status: DISCONTINUED | OUTPATIENT
Start: 2024-02-28 | End: 2024-03-04 | Stop reason: HOSPADM

## 2024-02-28 RX ORDER — GLUCAGON 1 MG
1 KIT INJECTION
Status: DISCONTINUED | OUTPATIENT
Start: 2024-02-28 | End: 2024-02-29

## 2024-02-28 RX ORDER — SODIUM,POTASSIUM PHOSPHATES 280-250MG
2 POWDER IN PACKET (EA) ORAL
Status: DISCONTINUED | OUTPATIENT
Start: 2024-02-28 | End: 2024-03-04 | Stop reason: HOSPADM

## 2024-02-28 RX ORDER — SIMETHICONE 80 MG
1 TABLET,CHEWABLE ORAL 4 TIMES DAILY PRN
Status: DISCONTINUED | OUTPATIENT
Start: 2024-02-28 | End: 2024-03-04 | Stop reason: HOSPADM

## 2024-02-28 RX ORDER — BUDESONIDE 0.5 MG/2ML
0.5 INHALANT ORAL ONCE
Status: COMPLETED | OUTPATIENT
Start: 2024-02-28 | End: 2024-02-28

## 2024-02-28 RX ORDER — NALOXONE HCL 0.4 MG/ML
0.02 VIAL (ML) INJECTION
Status: DISCONTINUED | OUTPATIENT
Start: 2024-02-28 | End: 2024-03-04 | Stop reason: HOSPADM

## 2024-02-28 RX ORDER — IPRATROPIUM BROMIDE 0.5 MG/2.5ML
0.5 SOLUTION RESPIRATORY (INHALATION) ONCE
Status: COMPLETED | OUTPATIENT
Start: 2024-02-28 | End: 2024-02-28

## 2024-02-28 RX ORDER — IPRATROPIUM BROMIDE AND ALBUTEROL SULFATE 2.5; .5 MG/3ML; MG/3ML
3 SOLUTION RESPIRATORY (INHALATION) EVERY 4 HOURS PRN
Status: DISCONTINUED | OUTPATIENT
Start: 2024-02-28 | End: 2024-03-04 | Stop reason: HOSPADM

## 2024-02-28 RX ORDER — TALC
9 POWDER (GRAM) TOPICAL NIGHTLY PRN
Status: DISCONTINUED | OUTPATIENT
Start: 2024-02-28 | End: 2024-03-04 | Stop reason: HOSPADM

## 2024-02-28 RX ORDER — FENTANYL CITRATE 50 UG/ML
25 INJECTION, SOLUTION INTRAMUSCULAR; INTRAVENOUS
Status: COMPLETED | OUTPATIENT
Start: 2024-02-28 | End: 2024-02-28

## 2024-02-28 RX ORDER — IBUPROFEN 200 MG
24 TABLET ORAL
Status: DISCONTINUED | OUTPATIENT
Start: 2024-02-28 | End: 2024-02-29

## 2024-02-28 RX ADMIN — IPRATROPIUM BROMIDE 0.5 MG: 0.5 SOLUTION RESPIRATORY (INHALATION) at 02:02

## 2024-02-28 RX ADMIN — FENTANYL CITRATE 25 MCG: 50 INJECTION, SOLUTION INTRAMUSCULAR; INTRAVENOUS at 12:02

## 2024-02-28 RX ADMIN — BUDESONIDE 0.5 MG: 0.5 INHALANT RESPIRATORY (INHALATION) at 02:02

## 2024-02-28 RX ADMIN — MORPHINE SULFATE 1 MG: 2 INJECTION, SOLUTION INTRAMUSCULAR; INTRAVENOUS at 11:02

## 2024-02-28 RX ADMIN — MORPHINE SULFATE 2 MG: 2 INJECTION, SOLUTION INTRAMUSCULAR; INTRAVENOUS at 08:02

## 2024-02-28 RX ADMIN — MELATONIN TAB 3 MG 9 MG: 3 TAB at 08:02

## 2024-02-28 RX ADMIN — LEVALBUTEROL HYDROCHLORIDE 0.63 MG: 0.63 SOLUTION RESPIRATORY (INHALATION) at 02:02

## 2024-02-28 RX ADMIN — ONDANSETRON 4 MG: 2 INJECTION INTRAMUSCULAR; INTRAVENOUS at 11:02

## 2024-02-28 RX ADMIN — IOHEXOL 100 ML: 350 INJECTION, SOLUTION INTRAVENOUS at 02:02

## 2024-02-28 NOTE — PHARMACY MED REC
"Admission Medication History     The home medication history was taken by James Chatman.    You may go to "Admission" then "Reconcile Home Medications" tabs to review and/or act upon these items.     The home medication list has been updated by the Pharmacy department.   Please read ALL comments highlighted in yellow.   Please address this information as you see fit.    Feel free to contact us if you have any questions or require assistance.        Medications listed below were obtained from: Patient/family and Analytic software- [employer]  No current facility-administered medications on file prior to encounter.     Current Outpatient Medications on File Prior to Encounter   Medication Sig Dispense Refill    lisinopriL (PRINIVIL,ZESTRIL) 30 MG tablet TAKE 1 TABLET EVERY EVENING (Patient taking differently: Take 30 mg by mouth once daily.) 90 tablet 2    multivitamin capsule Take 1 capsule by mouth once daily.      nisoldipine (SULAR) 8.5 MG Tb24 TAKE 1 TABLET DAILY (Patient taking differently: Take 8.5 mg by mouth once daily.) 90 tablet 1    omeprazole (PRILOSEC) 40 MG capsule Take 1 capsule (40 mg total) by mouth Daily. 90 capsule 1    meloxicam (MOBIC) 15 MG tablet Take 1 tablet (15 mg total) by mouth once daily. (Patient not taking: Reported on 1/29/2024) 30 tablet 11    triamcinolone acetonide 0.025% (KENALOG) 0.025 % cream AAA bid (Patient not taking: Reported on 2/28/2024) 80 g 3    [DISCONTINUED] buPROPion (WELLBUTRIN) 75 MG tablet Take 1 tablet (75 mg total) by mouth once daily. 30 tablet 1           James Chatman  EXT 1924                .          "

## 2024-02-28 NOTE — ED PROVIDER NOTES
Encounter Date: 2/28/2024       History     Chief Complaint   Patient presents with    Fall    Hip Pain     EMS reports mechanical fall at home. Pt complaining of falling directly onto her right hip. Denies hitting head or Loc. Denies any further injuries. No rotation noted at this time. EMS gave 50 mcg fentanyl and 4 mg zofran     This is a pleasant 89-year-old female who presents secondary to hip pain that occurred after a mechanical fall.  The patient states she was making up her bed.  She tripped on overhanging bedding during making up the bed and landed on her right hip with subsequent pain with palpation and pain with range of motion.  She did strike her head but denies loss of consciousness headache or neck pain.  She denies any recent illnesses.  Fall was mechanical and not syncopal in nature.  She denies any extremity weakness numbness tingling or paresthesias.  She denies any recent illnesses.  She denies any other problems or complaints.    The history is provided by the patient.     Review of patient's allergies indicates:   Allergen Reactions    Buspar [buspirone] Other (See Comments)     Syncope and nausea     Doxy      Other reaction(s): Unknown    Effexor [venlafaxine] Other (See Comments)     shaking    Biaxin [clarithromycin] Other (See Comments)     Unknown    Codeine Other (See Comments)     Syncope, and nausea.    Statins-hmg-coa reductase inhibitors      myalgia    Doxycycline Other (See Comments)     Made very weak     Past Medical History:   Diagnosis Date    Anxiety     Arthritis     fingers and back    Depression     Disorder of kidney and ureter     Foot pain, left 9/18/2013    GERD (gastroesophageal reflux disease)     Hiatal hernia     Hyperlipidemia     Hypertension     Reflux     on meds    Tendon tear 8/21/2013    Tendonitis 8/21/2013     Past Surgical History:   Procedure Laterality Date    CHOLECYSTECTOMY      EYE SURGERY Bilateral     cataracts and implants    TONSILLECTOMY        Family History   Problem Relation Age of Onset    Hearing loss Son      Social History     Tobacco Use    Smoking status: Never     Passive exposure: Never    Smokeless tobacco: Never   Substance Use Topics    Alcohol use: Yes     Alcohol/week: 1.0 standard drink of alcohol     Types: 1 Glasses of wine per week     Comment: rarely    Drug use: No     Review of Systems   Constitutional: Negative.  Negative for activity change, appetite change, chills, fatigue and fever.   HENT: Negative.  Negative for congestion, ear pain, rhinorrhea, sore throat and trouble swallowing.    Eyes: Negative.  Negative for photophobia, pain, redness and visual disturbance.   Respiratory: Negative.  Negative for cough, chest tightness, shortness of breath and wheezing.    Cardiovascular: Negative.  Negative for chest pain, palpitations and leg swelling.   Gastrointestinal: Negative.  Negative for abdominal distention, abdominal pain, blood in stool, constipation, diarrhea, nausea and vomiting.   Endocrine: Negative.    Genitourinary: Negative.  Negative for decreased urine volume, difficulty urinating, dysuria, flank pain, frequency, pelvic pain and urgency.   Musculoskeletal:  Positive for arthralgias. Negative for back pain, gait problem, myalgias, neck pain and neck stiffness.   Skin: Negative.  Negative for rash.   Neurological: Negative.  Negative for dizziness, syncope, facial asymmetry, speech difficulty, weakness, light-headedness, numbness and headaches.   Psychiatric/Behavioral: Negative.  Negative for confusion.    All other systems reviewed and are negative.      Physical Exam     Initial Vitals [02/28/24 1007]   BP Pulse Resp Temp SpO2   132/63 79 16 98.3 °F (36.8 °C) 95 %      MAP       --         Physical Exam    Nursing note and vitals reviewed.  Constitutional: She is cooperative. She does not have a sickly appearance. She does not appear ill. No distress.   HENT:   Head: Normocephalic and atraumatic.   Nose: Nose  normal.   Mouth/Throat: Uvula is midline, oropharynx is clear and moist and mucous membranes are normal. No oral lesions. No uvula swelling. No oropharyngeal exudate, posterior oropharyngeal edema or posterior oropharyngeal erythema.   Eyes: Conjunctivae, EOM and lids are normal. Pupils are equal, round, and reactive to light. No scleral icterus.   Neck: Trachea normal and phonation normal. Neck supple. No thyroid mass and no thyromegaly present. No stridor present. No JVD present.   Normal range of motion.   Full passive range of motion without pain.     Cardiovascular:  Normal rate, regular rhythm, normal heart sounds, intact distal pulses and normal pulses.     Exam reveals no gallop, no distant heart sounds, no friction rub and no decreased pulses.       No murmur heard.  Pulmonary/Chest: Effort normal and breath sounds normal. No accessory muscle usage or stridor. No tachypnea. No respiratory distress. She has no decreased breath sounds. She has no wheezes. She has no rhonchi. She has no rales.   Abdominal: Abdomen is soft. Bowel sounds are normal. She exhibits no distension, no pulsatile midline mass and no mass. There is no abdominal tenderness.   No right CVA tenderness.  No left CVA tenderness. There is no rigidity and no guarding.   Musculoskeletal:         General: No edema. Normal range of motion.      Right hand: Normal. Normal range of motion. Normal strength. Normal sensation. Normal capillary refill. Normal pulse.      Left hand: Normal. Normal range of motion. Normal strength. Normal sensation. Normal capillary refill. Normal pulse.      Cervical back: Normal, full passive range of motion without pain, normal range of motion and neck supple. No edema, erythema, rigidity, tenderness or bony tenderness. No pain with movement, spinous process tenderness or muscular tenderness. Normal range of motion.      Thoracic back: Normal. No tenderness or bony tenderness. Normal range of motion.      Lumbar back:  Normal. No tenderness or bony tenderness. Normal range of motion.      Right upper leg: Tenderness present.      Left upper leg: Normal.      Right knee: Normal. Normal range of motion. No tenderness.      Left knee: Normal. Normal range of motion. No tenderness.      Right lower leg: Normal. No tenderness.      Left lower leg: Normal. No tenderness.      Right ankle: Normal.      Left ankle: Normal.      Right foot: Normal. Normal range of motion and normal capillary refill. No tenderness or bony tenderness. Normal pulse.      Left foot: Normal. Normal range of motion and normal capillary refill. No tenderness or bony tenderness. Normal pulse.      Comments: Pulses are 2+ throughout, cap refill is less than 2 sec throughout.  There is tenderness to palpation to the right lateral hip/proximal femur and  pain with any attempted range of motion of the right hip.  Patient is neurovascularly intact throughout all extremities.  She is intact distal to area palpitation of the right hip.  There is no spinal tenderness to palpation.  No other areas of extremity tenderness to palpation     Neurological: She is alert and oriented to person, place, and time. She has normal strength. She displays normal reflexes. No cranial nerve deficit or sensory deficit. Gait normal.   No focal deficits.   Skin: Skin is warm, dry and intact. Capillary refill takes less than 2 seconds. No ecchymosis, no petechiae and no rash noted. No erythema. No pallor.   Psychiatric: She has a normal mood and affect. Her speech is normal and behavior is normal. Judgment and thought content normal. Cognition and memory are normal.         ED Course   Procedures  Labs Reviewed   CBC W/ AUTO DIFFERENTIAL - Abnormal; Notable for the following components:       Result Value    RBC 3.69 (*)     Hemoglobin 11.1 (*)     Hematocrit 33.8 (*)     All other components within normal limits   COMPREHENSIVE METABOLIC PANEL - Abnormal; Notable for the following  components:    eGFR 57.8 (*)     All other components within normal limits   URINALYSIS, REFLEX TO URINE CULTURE - Abnormal; Notable for the following components:    Ketones, UA Trace (*)     All other components within normal limits    Narrative:     Specimen Source->Urine   PROTIME-INR   CK   MAGNESIUM   TSH        ECG Results              EKG 12-lead (In process)        Collection Time Result Time QRS Duration OHS QTC Calculation    02/28/24 10:17:56 02/28/24 10:20:55 92 446                     In process by Interface, Lab In [facility] (02/28/24 10:21:03)                   Narrative:    Test Reason : S79.819A,    Vent. Rate : 072 BPM     Atrial Rate : 072 BPM     P-R Int : 154 ms          QRS Dur : 092 ms      QT Int : 408 ms       P-R-T Axes : 033 003 027 degrees     QTc Int : 446 ms    Normal sinus rhythm  Cannot rule out Anterior infarct (cited on or before 26-MAR-2022)  Abnormal ECG  When compared with ECG of 26-MAR-2022 09:09,  No significant change was found    Referred By: [email or web address]   SELF           Confirmed By:                                   Imaging Results              CTA Chest Non-Coronary (PE Studies) (Final result)  Result time 02/28/24 14:50:49      Final result by Syed Harris MD (02/28/24 14:50:49)                   Narrative:    CMS MANDATED QUALITY DATA-CT RADIATION DOSE-436  All CT scans at this facility use dose modulation, iterative reconstruction, and or weight-based dosing when appropriate to reduce radiation dose to as low as reasonably achievable.    HISTORY: Pulmonary embolism (PE) suspected, high prob  dyspnea.    FINDINGS: Thin axial imaging through the chest was performed with 100 mL Omnipaque 350 IV contrast, with sagittal and coronal reformatted images and MIP reconstructions performed, and images stored in the patient's permanent electronic medical record.    Comparison to prior exams. There are no pulmonary arterial filling defects to suggest pulmonary thromboembolism. The  central pulmonary arteries are normal in caliber. Scattered calcified and soft plaque involves normal caliber thoracic aorta, with no aortic dissection or evidence of aortic intramural hemorrhage. 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    The heart is enlarged, with no pericardial effusion. There are a few scattered coronary arterial calcifications. There is a large sliding-type hiatal hernia, with no enlarged mediastinal or hilar lymph nodes.    Mild scattered linear and reticular densities in the lower lungs reflecting subsegmental atelectasis, with no consolidation, pleural effusion, evidence of pulmonary edema, or pneumothorax. There is mild scattered lower lobe bronchial wall thickening, with no central mucous plugging.    Images of the upper abdomen show cholecystectomy clips, and are otherwise unremarkable. There are no acute fractures or destructive osseous lesions, with intervertebral disc space narrowing in the spine with osteophytes.    IMPRESSION:  1. Negative for pulmonary thromboembolism.  2. Additional observations as described.    Electronically signed by:  Syed Harris MD  02/28/2024 02:50 PM CST Workstation: [id number]                                     CT Cervical Spine Without Contrast (Final result)  Result time 02/28/24 11:34:58      Final result by Sami Nino MD (02/28/24 11:34:58)                   Narrative:    CT CERVICAL SPINE    CMS MANDATED QUALITY DATA - CT RADIATION - 436    HISTORY: Trauma.    FINDINGS: Thin axial imaging was performed without contrast, with sagittal and coronal reformatted images reviewed. All CT exams at this facility use dose modulation, iterative reconstruction, and or weight based dosing when appropriate to reduce radiation dose to as low as reasonably achievable.    There is no evidence of cervical fracture or subluxation. Prevertebral soft  tissues are normal. The odontoid is intact.    Changes of multilevel cervical degenerative disc and facet disease are noted, with uncinate process and facet hypertrophy combining to result in moderate foraminal stenosis at C4-5, C5-6, and C6-7.    IMPRESSION:      1. No evidence of cervical fracture or subluxation.  2. Multilevel cervical degenerative disc/facet disease.    Electronically signed by:  Sami Nino MD  02/28/2024 11:34 AM [facility] Workstation: [id number]                                     CT Head Without Contrast (Final result)  Result time 02/28/24 11:18:40      Final result by Sami Nino MD (02/28/24 11:18:40)                   Narrative:    CT HEAD WITHOUT CONTRAST    CMS MANDATED QUALITY DATA - CT RADIATION  436    All CT scans at this facility utilize dose modulation, iterative reconstruction, and/or weight based dosing when appropriate to reduce radiation dose to as low as reasonably achievable    Clinical data: Trauma. Comparison to March 2022.    FINDINGS: Noninfusion images were obtained from the skull base to the vertex. There is no intracranial mass, hemorrhage, or midline shift. Ventricles and sulci are mildly prominent. There are no pathologic extra-axial fluid collections.    There is no evidence of cortical ischemic change. Changes of mild chronic microvascular white matter disease are noted. Cerebellum and brainstem are normal. There is no evidence of calvarial fracture or acute osseous destructive process.  IMPRESSION:    1. No acute intracranial abnormalities. Chronic findings as discussed above.        Electronically signed by:  Sami Nino MD  02/28/2024 11:18 AM CST Workstation: [id number]                                     X-Ray Hip 2 or 3 views Right (with Pelvis when performed) (Final result)  Result time 02/28/24 10:41:39      Final result by Minda Fry MD (02/28/24 10:41:39)                   Narrative:    AP pelvis and 2 views of the right  hip    Clinical history is pain after fall    There is a subcapital fracture of the right femoral neck in various deformity. There is no dislocation or significant angulation. There is osteopenia.    There are no additional fractures. There is facet hypertrophy at L4-5 and L5-S1.    IMPRESSION: Subcapital fracture of the right femoral neck in varus deformity    Electronically signed by:  Minda Fry MD  02/28/2024 10:41 AM CST Workstation: [id number]                                     X-Ray Chest AP Portable (Final result)  Result time 02/28/24 10:40:32      Final result by Minda Fry MD (02/28/24 10:40:32)                   Narrative:    Portable chest x-ray at 10:19 AM is compared to prior study dated 3/26/2022    Clinical history is preoperative study for right hip fracture    The heart is mildly enlarged. The lungs are clear. There are no acute osseous abnormalities.    IMPRESSION: Mild cardiomegaly with no acute pulmonary process    Electronically signed by:  Minda Fry MD  02/28/2024 10:40 AM CST Workstation: [id number]                                     Medications   morphine injection 1 mg (1 mg Intravenous Given 2/28/24 1146)   ondansetron injection 4 mg (4 mg Intravenous Given 2/28/24 1145)   fentaNYL 50 mcg/mL injection 25 mcg (25 mcg Intravenous Given 2/28/24 1213)   levalbuterol nebulizer solution 0.63 mg (0.63 mg Nebulization Given 2/28/24 1438)   ipratropium 0.02 % nebulizer solution 0.5 mg (0.5 mg Nebulization Given 2/28/24 1438)   budesonide nebulizer solution 0.5 mg (0.5 mg Nebulization Given 2/28/24 1438)   iohexoL (OMNIPAQUE 350) injection 100 mL (100 mLs Intravenous Given 2/28/24 1419)     Medical Decision Making  Emergent evaluation of a 89-year-old female who presents for evaluation after a mechanical fall landing on her right hip with subsequent right subcapital hip fracture.  Fall mechanical and not syncopal in nature.  Denies recent illnesses.  Neurovascularly intact  throughout.  Had received IV pain medications prior to my evaluation.  Is endorsing some pain now.  Have ordered a does of morphine as well.  Patient denies any other injuries or complaints.  Have discussed the case with Dr. Carmona on-call for Orthopedics.  He does requested the patient get transferred to [facility].  He anticipates operative repair in 2 days.  I have discussed the case with the hospitalist provider at [facility] who has accepted the patient.  Labs reviewed, x-ray and CT findings reviewed.  Reassessment--patient awaiting transfer.  Called to bedside to evaluate new complaints of shortness of breath.  The patient's daughter is now at bedside.  She states the patient has been becoming anxious about the fact that she is in a hallway.  Have explained to both the patient and the daughter several times that the ER as excessively visiting and has multiple boarding admitted patients in the ER which is causing problems with ER bed availability.  Have assured her that when either an ER bed or transfer to the other facility is available this will occur.  Patient tachypneic and anxious appearing on exam.  Pulse ox 96% on room air.  Some rhonchi noted.  Patient much calmer after my evaluation and assessment.  The patient's daughter also reports that she had an episode of emesis after she was given IV pain medications by EMS.  The daughter is not certain if she could have aspirated during this time.  No emesis in the ER.  Reassessment--shortness of breath resolved after a breathing treatment.  CTA of the chest with no pulmonary embolism.  Awaiting transfer to [facility].    Amount and/or Complexity of Data Reviewed  Labs: ordered. Decision-making details documented in ED Course.  Radiology: ordered.    Risk  Prescription drug management.  Decision regarding hospitalization.               ED Course as of 02/28/24 1822 Wed Feb 28, 2024   1206 TSH: 1.798 [AR]   1206 CPK: 80 [AR]   1206 WBC: 7.06 [AR]   1206  Hematocrit(!): 33.8 [AR]   1206 Sodium: 140 [AR]   1206 Potassium: 4.2 [AR]   1206 Magnesium : 1.8 [AR]   1206 BUN: 21 [AR]   1206 Creatinine: 0.9 [AR]      ED Course User Index  [AR] Carol Holloway MD                           Clinical Impression:  Final diagnoses:  [S79.811C] Blunt trauma of hip  [S72.001A] Closed fracture of right hip, initial encounter (Primary)          ED Disposition Condition    Admit Stable                Carol Holloway MD  02/28/24 1214       Carol Holloway MD  02/28/24 6061

## 2024-02-28 NOTE — PLAN OF CARE
[facility] - Emergency Dept  Initial Discharge Assessment       Primary Care Provider: Perry Jorge MD    Admission Diagnosis: Closed fracture of right hip, initial encounter [S72.001A]    Admission Date: 2/28/2024  Expected Discharge Date:    completed discharge assessment with Pt and daughter in law. Pt AAOx4s. Pt lives at home with son and daughter in law. Demographics, PCP, and insurance verified. No home health. No dialysis. Pt completes ADLs without assistance. Pt to discharge home via family transport. Pt agreeable to Rehab placement at discharge. Pt has no other needs to be addressed at this time.  Transition of Care Barriers: None    Payor: MEDICARE / Plan: MEDICARE PART A & B / Product Type: Government /     Extended Emergency Contact Information  Primary Emergency Contact: TessaGriselda clay  Address: [address] [place], [place] [id number] [place] [address]  Home Phone: [phone]  Mobile Phone: [phone]  Relation: Relative  Preferred language: English   needed? No    Discharge Plan A: Rehab  Discharge Plan B: Home Health      [facility] - [address] [address]  Phone: [phone] Fax: [phone]    [id number] [address] [facility] [address]  [facility] [address]  Phone: [phone] Fax: [phone]      Initial Assessment (most recent)       Adult Discharge Assessment - 02/28/24 1613          Discharge Assessment    Assessment Type Discharge Planning Assessment     Confirmed/corrected address, phone number and insurance Yes     Confirmed Demographics Correct on Facesheet     Source of Information patient;family     Does patient/caregiver understand observation status Yes     Communicated NICK with patient/caregiver Date not available/Unable to determine     Reason For Admission Closed Hip  Fracture     People in Home child(betina), adult;other relative(s)     Facility Arrived From: Home     Do you expect to return to your current living situation? Yes     Do you have help at home or someone to help you manage your care at home? Yes     Prior to hospitilization cognitive status: Alert/Oriented     Current cognitive status: Alert/Oriented     Walking or Climbing Stairs Difficulty no     Dressing/Bathing Difficulty no     Home Accessibility wheelchair accessible     Home Layout Able to live on 1st floor     Equipment Currently Used at Home none     Readmission within 30 days? No     Patient currently being followed by outpatient case management? No     Do you currently have service(s) that help you manage your care at home? No     Do you take prescription medications? Yes     Do you have prescription coverage? Yes     Coverage Payor:   MEDICARE - MEDICARE PART A & B -     Do you have any problems affording any of your prescribed medications? No     Is the patient taking medications as prescribed? yes     Who is going to help you get home at discharge? Griselda Zarate (Relative)  [phone] (Mobile)     How do you get to doctors appointments? family or friend will provide     Are you on dialysis? No     Do you take coumadin? No     Discharge Plan A Rehab     Discharge Plan B Home Health     DME Needed Upon Discharge  none     Discharge Plan discussed with: Patient;Caregiver     Name(s) and Number(s) Griselda Zarate (Relative)  [phone] (Mobile)     Transition of Care Barriers None

## 2024-02-28 NOTE — ED NOTES
Pt. Lying supine on stretcher, awake and alert, breathing adequately with minimal effort, daughter at the bedside

## 2024-02-29 ENCOUNTER — ANESTHESIA EVENT (OUTPATIENT)
Dept: SURGERY | Facility: HOSPITAL | Age: 89
DRG: 522 | End: 2024-02-29
Payer: MEDICARE

## 2024-02-29 LAB
ABO + RH BLD: NORMAL
ALBUMIN SERPL BCP-MCNC: 3.4 G/DL (ref 3.5–5.2)
ALP SERPL-CCNC: 86 U/L (ref 55–135)
ALT SERPL W/O P-5'-P-CCNC: 42 U/L (ref 10–44)
ANION GAP SERPL CALC-SCNC: 9 MMOL/L (ref 8–16)
AST SERPL-CCNC: 58 U/L (ref 10–40)
BASOPHILS # BLD AUTO: 0.03 K/UL (ref 0–0.2)
BASOPHILS NFR BLD: 0.3 % (ref 0–1.9)
BILIRUB SERPL-MCNC: 0.8 MG/DL (ref 0.1–1)
BLD GP AB SCN CELLS X3 SERPL QL: NORMAL
BUN SERPL-MCNC: 21 MG/DL (ref 10–30)
CALCIUM SERPL-MCNC: 9 MG/DL (ref 8.7–10.5)
CHLORIDE SERPL-SCNC: 108 MMOL/L (ref 95–110)
CO2 SERPL-SCNC: 24 MMOL/L (ref 23–29)
CREAT SERPL-MCNC: 0.9 MG/DL (ref 0.5–1.4)
DIFFERENTIAL METHOD BLD: ABNORMAL
EOSINOPHIL # BLD AUTO: 0.1 K/UL (ref 0–0.5)
EOSINOPHIL NFR BLD: 1.3 % (ref 0–8)
ERYTHROCYTE [DISTWIDTH] IN BLOOD BY AUTOMATED COUNT: 13.2 % (ref 11.5–14.5)
EST. GFR  (NO RACE VARIABLE): 58 ML/MIN/1.73 M^2
GLUCOSE SERPL-MCNC: 100 MG/DL (ref 70–110)
HCT VFR BLD AUTO: 34.8 % (ref 37–48.5)
HGB BLD-MCNC: 11.3 G/DL (ref 12–16)
IMM GRANULOCYTES # BLD AUTO: 0.04 K/UL (ref 0–0.04)
IMM GRANULOCYTES NFR BLD AUTO: 0.4 % (ref 0–0.5)
LYMPHOCYTES # BLD AUTO: 0.9 K/UL (ref 1–4.8)
LYMPHOCYTES NFR BLD: 9.7 % (ref 18–48)
MAGNESIUM SERPL-MCNC: 1.8 MG/DL (ref 1.6–2.6)
MCH RBC QN AUTO: 30.1 PG (ref 27–31)
MCHC RBC AUTO-ENTMCNC: 32.5 G/DL (ref 32–36)
MCV RBC AUTO: 93 FL (ref 82–98)
MONOCYTES # BLD AUTO: 0.9 K/UL (ref 0.3–1)
MONOCYTES NFR BLD: 9 % (ref 4–15)
NEUTROPHILS # BLD AUTO: 7.5 K/UL (ref 1.8–7.7)
NEUTROPHILS NFR BLD: 79.3 % (ref 38–73)
NRBC BLD-RTO: 0 /100 WBC
PHOSPHATE SERPL-MCNC: 2.9 MG/DL (ref 2.7–4.5)
PLATELET # BLD AUTO: 153 K/UL (ref 150–450)
PMV BLD AUTO: 10.6 FL (ref 9.2–12.9)
POTASSIUM SERPL-SCNC: 4.1 MMOL/L (ref 3.5–5.1)
PROT SERPL-MCNC: 6.1 G/DL (ref 6–8.4)
RBC # BLD AUTO: 3.76 M/UL (ref 4–5.4)
SODIUM SERPL-SCNC: 141 MMOL/L (ref 136–145)
SPECIMEN OUTDATE: NORMAL
WBC # BLD AUTO: 9.45 K/UL (ref 3.9–12.7)

## 2024-02-29 PROCEDURE — 83735 ASSAY OF MAGNESIUM: CPT | Performed by: NURSE PRACTITIONER

## 2024-02-29 PROCEDURE — 85025 COMPLETE CBC W/AUTO DIFF WBC: CPT | Performed by: NURSE PRACTITIONER

## 2024-02-29 PROCEDURE — 99900035 HC TECH TIME PER 15 MIN (STAT)

## 2024-02-29 PROCEDURE — 86850 RBC ANTIBODY SCREEN: CPT | Performed by: NURSE PRACTITIONER

## 2024-02-29 PROCEDURE — 25000003 PHARM REV CODE 250: Performed by: STUDENT IN AN ORGANIZED HEALTH CARE EDUCATION/TRAINING PROGRAM

## 2024-02-29 PROCEDURE — 27000221 HC OXYGEN, UP TO 24 HOURS

## 2024-02-29 PROCEDURE — 94761 N-INVAS EAR/PLS OXIMETRY MLT: CPT

## 2024-02-29 PROCEDURE — 80053 COMPREHEN METABOLIC PANEL: CPT | Performed by: NURSE PRACTITIONER

## 2024-02-29 PROCEDURE — 25000003 PHARM REV CODE 250: Performed by: NURSE PRACTITIONER

## 2024-02-29 PROCEDURE — 11000001 HC ACUTE MED/SURG PRIVATE ROOM

## 2024-02-29 PROCEDURE — 84100 ASSAY OF PHOSPHORUS: CPT | Performed by: NURSE PRACTITIONER

## 2024-02-29 PROCEDURE — 36415 COLL VENOUS BLD VENIPUNCTURE: CPT | Performed by: NURSE PRACTITIONER

## 2024-02-29 PROCEDURE — 63600175 PHARM REV CODE 636 W HCPCS: Performed by: NURSE PRACTITIONER

## 2024-02-29 RX ORDER — LISINOPRIL 10 MG/1
30 TABLET ORAL DAILY
Status: DISCONTINUED | OUTPATIENT
Start: 2024-02-29 | End: 2024-03-04 | Stop reason: HOSPADM

## 2024-02-29 RX ORDER — PANTOPRAZOLE SODIUM 40 MG/1
40 TABLET, DELAYED RELEASE ORAL DAILY
Status: DISCONTINUED | OUTPATIENT
Start: 2024-02-29 | End: 2024-03-04 | Stop reason: HOSPADM

## 2024-02-29 RX ADMIN — Medication 800 MG: at 08:02

## 2024-02-29 RX ADMIN — MORPHINE SULFATE 2 MG: 2 INJECTION, SOLUTION INTRAMUSCULAR; INTRAVENOUS at 05:02

## 2024-02-29 RX ADMIN — THERA TABS 1 TABLET: TAB at 12:02

## 2024-02-29 RX ADMIN — MORPHINE SULFATE 2 MG: 2 INJECTION, SOLUTION INTRAMUSCULAR; INTRAVENOUS at 08:02

## 2024-02-29 RX ADMIN — LISINOPRIL 30 MG: 10 TABLET ORAL at 12:02

## 2024-02-29 RX ADMIN — Medication 800 MG: at 02:02

## 2024-02-29 RX ADMIN — PANTOPRAZOLE SODIUM 40 MG: 40 TABLET, DELAYED RELEASE ORAL at 12:02

## 2024-02-29 NOTE — PLAN OF CARE
Per Lori with [facility], she is following patient pending medical clearance.       02/29/24 0944   Post-Acute Status   Post-Acute Authorization Placement   Post-Acute Placement Status Pending medical clearance/testing

## 2024-02-29 NOTE — PT/OT/SLP PROGRESS
Physical Therapy      Patient Name:  Arleen Zarate   MRN:  [id number]    Patient not seen today secondary to patient with subcapital fracture of the right femoral neck with H&P noting plans to take patient to OR on Friday. PT orders discontinued. Patient will need new therapy orders post-operatively.

## 2024-02-29 NOTE — PLAN OF CARE
Recommendations  Advance diet to [facility] post-op  Monitor intake during meal rounds  Collaborate with health care providers as needed     Goal: as diet advances intake > 75% of meals prior to RD F/U    Pt lives with family and has no food insecurity issues.    Assessment and Plan   No nutritional problems identified at present

## 2024-02-29 NOTE — ASSESSMENT & PLAN NOTE
-Fall precautions  -NWB RLE  -Neurovascular checks  -PRN analgesics  -Orthopedic Surgery consulted; NPO at midnight for 3/1 OR

## 2024-02-29 NOTE — ASSESSMENT & PLAN NOTE
Acute problem    Morphine p.r.n. pain  Zofran p.r.n. nausea   Musa catheter p.r.n.   PT/ OT consult   Orthopedics consulted   Plans to take patient to OR on 03/01/2024  Preop workup in progress

## 2024-02-29 NOTE — H&P
[facility] Medicine  History & Physical    Patient Name: Arleen Zarate  MRN: [id number]  Patient Class: IP- Inpatient  Admission Date: 2/28/2024  Attending Physician: Jacoby Multani MD   Primary Care Provider: Perry Jorge MD         Patient information was obtained from patient, relative(s), past medical records, and ER records.     Subjective:     Principal Problem:Closed fracture of neck of right femur    Chief Complaint:   Chief Complaint   Patient presents with    Fall    Hip Pain     EMS reports mechanical fall at home. Pt complaining of falling directly onto her right hip. Denies hitting head or Loc. Denies any further injuries. No rotation noted at this time. EMS gave 50 mcg fentanyl and 4 mg zofran        HPI: Arleen Zarate is a 89-year-old female who was transferred from [facility] for right hip fracture.  Patient reports she sustained a mechanical fall this morning while making a per bed.  She reports falling striking her right hip on the floor also with injury to head.  She denies any loss of consciousness or cervical pain.  She describes the pain to the right hip is intense.  She rates the pain at 10/10 at worst.  The pain is worse with any movement of the right lower extremity.  The pain is minimally relieved with remaining still and positioning. Previous medical history includes hypertension, GERD, arthritis, hyperlipidemia, osteopenia.  ER workup at outside facility:  CBC and CMP were unremarkable.  CT of the brain was negative for any acute findings.  CT of the cervical spine were negative for any acute findings.  CTA of the chest was negative for pulmonary embolism or acute findings.  X-ray of the right hip demonstrates a sub cap fracture of the right femoral neck.  Patient admitted to Hospital Medicine for treatment and management.  Orthopedics was consulted and plans to take patient to OR on Friday.              Past Medical History:   Diagnosis Date     Anxiety     Arthritis     fingers and back    Depression     Disorder of kidney and ureter     Foot pain, left 9/18/2013    GERD (gastroesophageal reflux disease)     Hiatal hernia     Hyperlipidemia     Hypertension     Reflux     on meds    Tendon tear 8/21/2013    Tendonitis 8/21/2013       Past Surgical History:   Procedure Laterality Date    CHOLECYSTECTOMY      EYE SURGERY Bilateral     cataracts and implants    TONSILLECTOMY         Review of patient's allergies indicates:   Allergen Reactions    Buspar [buspirone] Other (See Comments)     Syncope and nausea     Doxy      Other reaction(s): Unknown    Effexor [venlafaxine] Other (See Comments)     shaking    Biaxin [clarithromycin] Other (See Comments)     Unknown    Codeine Other (See Comments)     Syncope, and nausea.    Statins-hmg-coa reductase inhibitors      myalgia    Doxycycline Other (See Comments)     Made very weak       No current facility-administered medications on file prior to encounter.     Current Outpatient Medications on File Prior to Encounter   Medication Sig    lisinopriL (PRINIVIL,ZESTRIL) 30 MG tablet TAKE 1 TABLET EVERY EVENING (Patient taking differently: Take 30 mg by mouth once daily.)    multivitamin capsule Take 1 capsule by mouth once daily.    nisoldipine (SULAR) 8.5 MG Tb24 TAKE 1 TABLET DAILY (Patient taking differently: Take 8.5 mg by mouth once daily.)    omeprazole (PRILOSEC) 40 MG capsule Take 1 capsule (40 mg total) by mouth Daily.    meloxicam (MOBIC) 15 MG tablet Take 1 tablet (15 mg total) by mouth once daily. (Patient not taking: Reported on 1/29/2024)    triamcinolone acetonide 0.025% (KENALOG) 0.025 % cream AAA bid (Patient not taking: Reported on 2/28/2024)    [DISCONTINUED] buPROPion (WELLBUTRIN) 75 MG tablet Take 1 tablet (75 mg total) by mouth once daily.     Family History       Problem Relation (Age of Onset)    Hearing loss Son          Tobacco Use    Smoking status: Never     Passive exposure: Never     Smokeless tobacco: Never   Substance and Sexual Activity    Alcohol use: Yes     Alcohol/week: 1.0 standard drink of alcohol     Types: 1 Glasses of wine per week     Comment: rarely    Drug use: No    Sexual activity: Not Currently     Review of Systems   Constitutional:  Positive for activity change. Negative for chills, diaphoresis and fever.   HENT:  Negative for congestion, nosebleeds and tinnitus.    Eyes:  Negative for photophobia and visual disturbance.   Respiratory:  Negative for cough, chest tightness, shortness of breath and wheezing.    Cardiovascular:  Negative for chest pain, palpitations and leg swelling.   Gastrointestinal:  Negative for abdominal distention, abdominal pain, constipation, diarrhea, nausea and vomiting.   Endocrine: Negative for cold intolerance and heat intolerance.   Genitourinary:  Negative for difficulty urinating, dysuria, frequency, hematuria and urgency.   Musculoskeletal:  Positive for arthralgias. Negative for back pain and myalgias.   Skin:  Negative for pallor, rash and wound.   Allergic/Immunologic: Negative for immunocompromised state.   Neurological:  Negative for dizziness, tremors, facial asymmetry, speech difficulty and weakness.   Hematological:  Negative for adenopathy. Does not bruise/bleed easily.   Psychiatric/Behavioral:  Negative for confusion and sleep disturbance. The patient is not nervous/anxious.      Objective:     Vital Signs (Most Recent):  Temp: 98.2 °F (36.8 °C) (02/28/24 1825)  Pulse: 91 (02/28/24 1825)  Resp: 18 (02/28/24 1825)  BP: (!) 151/73 (02/28/24 1825)  SpO2: 97 % (02/28/24 1825) Vital Signs (24h Range):  Temp:  [98.2 °F (36.8 °C)-98.3 °F (36.8 °C)] 98.2 °F (36.8 °C)  Pulse:  [75-91] 91  Resp:  [16-18] 18  SpO2:  [95 %-98 %] 97 %  BP: (132-173)/(60-73) 151/73     Weight: 63.5 kg (140 lb)  Body mass index is 28.28 kg/m².     Physical Exam  Vitals and nursing note reviewed.   Constitutional:       General: She is not in acute distress.      Appearance: She is well-developed. She is not diaphoretic.   HENT:      Head: Normocephalic.      Mouth/Throat:      Mouth: Mucous membranes are moist.      Pharynx: Oropharynx is clear.   Eyes:      General: No scleral icterus.     Conjunctiva/sclera: Conjunctivae normal.      Pupils: Pupils are equal, round, and reactive to light.   Neck:      Vascular: No JVD.   Cardiovascular:      Rate and Rhythm: Normal rate and regular rhythm.      Heart sounds: Normal heart sounds. No murmur heard.     No friction rub. No gallop.   Pulmonary:      Effort: Pulmonary effort is normal. No respiratory distress.      Breath sounds: Normal breath sounds. No wheezing or rales.   Abdominal:      General: Bowel sounds are normal. There is no distension.      Palpations: Abdomen is soft.      Tenderness: There is no abdominal tenderness. There is no guarding or rebound.   Musculoskeletal:         General: Tenderness and signs of injury present.      Cervical back: Normal range of motion and neck supple.      Comments:   Right leg shortened and rotated outward.  Neurovascularly intact   Lymphadenopathy:      Cervical: No cervical adenopathy.   Skin:     General: Skin is warm and dry.      Capillary Refill: Capillary refill takes less than 2 seconds.      Coloration: Skin is not pale.      Findings: No erythema or rash.   Neurological:      Mental Status: She is alert and oriented to person, place, and time.      Cranial Nerves: No cranial nerve deficit.      Sensory: No sensory deficit.      Coordination: Coordination normal.      Deep Tendon Reflexes: Reflexes normal.   Psychiatric:         Behavior: Behavior normal.         Thought Content: Thought content normal.         Judgment: Judgment normal.              CRANIAL NERVES     CN III, IV, VI   Pupils are equal, round, and reactive to light.       Significant Labs: All pertinent labs within the past 24 hours have been reviewed.  CBC:   Recent Labs   Lab 02/28/24  1017   WBC 7.06    HGB 11.1*   HCT 33.8*        CMP:   Recent Labs   Lab 02/28/24  1017      K 4.2      CO2 25      BUN 21   CREATININE 0.9   CALCIUM 9.1   PROT 6.2   ALBUMIN 3.8   BILITOT 0.6   ALKPHOS 73   AST 17   ALT 12   ANIONGAP 8       Significant Imaging: I have reviewed all pertinent imaging results/findings within the past 24 hours.    CT Head  There is no evidence of cortical ischemic change. Changes of mild chronic microvascular white matter disease are noted. Cerebellum and brainstem are normal. There is no evidence of calvarial fracture or acute osseous destructive process.  IMPRESSION:     1. No acute intracranial abnormalities      CT Cervical    IMPRESSION:        1. No evidence of cervical fracture or subluxation.  2. Multilevel cervical degenerative disc/facet disease.    CTA chest:     FINDINGS: Thin axial imaging through the chest was performed with 100 mL Omnipaque 350 IV contrast, with sagittal and coronal reformatted images and MIP reconstructions performed, and images stored in the patient's permanent electronic medical record.     Comparison to prior exams. There are no pulmonary arterial filling defects to suggest pulmonary thromboembolism. The central pulmonary arteries are normal in caliber. Scattered calcified and soft plaque involves normal caliber thoracic aorta, with no aortic dissection or evidence of aortic intramural hemorrhage. 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     The heart is enlarged, with no pericardial effusion. There are a few scattered coronary arterial calcifications. There is a large sliding-type hiatal hernia, with no enlarged mediastinal or hilar lymph nodes.     Mild scattered linear and reticular densities in the lower lungs reflecting subsegmental atelectasis, with no consolidation, pleural effusion, evidence of pulmonary edema, or pneumothorax.  There is mild scattered lower lobe bronchial wall thickening, with no central mucous plugging.     Images of the upper abdomen show cholecystectomy clips, and are otherwise unremarkable. There are no acute fractures or destructive osseous lesions, with intervertebral disc space narrowing in the spine with osteophytes.     IMPRESSION:  1. Negative for pulmonary thromboembolism.  2. Additional observations as described.    Right hip  x-ray:     IMPRESSION: Subcapital fracture of the right femoral neck in varus deformity       Assessment/Plan:     * Closed fracture of neck of right femur   Acute problem    Morphine p.r.n. pain  Zofran p.r.n. nausea   Musa catheter p.r.n.   PT/ OT consult   Orthopedics consulted   Plans to take patient to OR on 03/01/2024  Preop workup in progress      Essential hypertension  Chronic, controlled. Latest blood pressure and vitals reviewed-     Temp:  [98.2 °F (36.8 °C)-98.3 °F (36.8 °C)]   Pulse:  [75-91]   Resp:  [16-18]   BP: (132-173)/(60-73)   SpO2:  [95 %-98 %] .   Home meds for hypertension were reviewed and noted below.   Hypertension Medications               lisinopriL (PRINIVIL,ZESTRIL) 30 MG tablet TAKE 1 TABLET EVERY EVENING    nisoldipine (SULAR) 8.5 MG Tb24 TAKE 1 TABLET DAILY            While in the hospital, will manage blood pressure as follows; Continue home antihypertensive regimen    Will utilize p.r.n. blood pressure medication only if patient's blood pressure greater than 160/100 and she develops symptoms such as worsening chest pain or shortness of breath.      VTE Risk Mitigation (From admission, onward)           Ordered     Place MAURICIO hose  Until discontinued         02/28/24 1917     IP VTE HIGH RISK PATIENT  Once         02/28/24 1917     Place sequential compression device  Until discontinued         02/28/24 1917                                    Dimas Bone NP  Department of Hospital Medicine  [facility]/Surg

## 2024-02-29 NOTE — SUBJECTIVE & OBJECTIVE
"Interval History: see "Hospital Course"    Review of Systems   Musculoskeletal:  Positive for arthralgias, gait problem and myalgias.     Objective:     Vital Signs (Most Recent):  Temp: 98.2 °F (36.8 °C) (02/29/24 0731)  Pulse: 92 (02/29/24 0731)  Resp: 17 (02/29/24 0817)  BP: (!) 155/66 (02/29/24 0731)  SpO2: 96 % (02/29/24 0732) Vital Signs (24h Range):  Temp:  [97.7 °F (36.5 °C)-98.6 °F (37 °C)] 98.2 °F (36.8 °C)  Pulse:  [75-92] 92  Resp:  [16-18] 17  SpO2:  [95 %-98 %] 96 %  BP: (151-173)/(60-73) 155/66     Weight: 63.5 kg (139 lb 15.9 oz)  Body mass index is 28.27 kg/m².    Intake/Output Summary (Last 24 hours) at 2/29/2024 1038  Last data filed at 2/29/2024 0614  Gross per 24 hour   Intake 220 ml   Output 350 ml   Net -130 ml         Physical Exam  Vitals and nursing note reviewed.   HENT:      Head: Normocephalic and atraumatic.      Right Ear: External ear normal.      Left Ear: External ear normal.      Nose: Nose normal.      Mouth/Throat:      Mouth: Mucous membranes are moist.      Pharynx: Oropharynx is clear.   Eyes:      Extraocular Movements: Extraocular movements intact.      Conjunctiva/sclera: Conjunctivae normal.   Cardiovascular:      Rate and Rhythm: Normal rate and regular rhythm.      Pulses: Normal pulses.      Heart sounds: Normal heart sounds.   Pulmonary:      Effort: Pulmonary effort is normal.      Breath sounds: Normal breath sounds.   Abdominal:      General: Bowel sounds are normal.      Palpations: Abdomen is soft.   Musculoskeletal:         General: Signs of injury present.      Cervical back: Normal range of motion and neck supple.      Right lower leg: No edema.      Left lower leg: No edema.      Comments: RLE shortened and externally rotated.   Neurological:      Mental Status: She is alert. Mental status is at baseline.   Psychiatric:         Mood and Affect: Mood normal.         Behavior: Behavior normal.             Significant Labs: All pertinent labs within the past 24 " hours have been reviewed.    Significant Imaging: I have reviewed all pertinent imaging results/findings within the past 24 hours.

## 2024-02-29 NOTE — PLAN OF CARE
SW sent patient information to [facility] via [employer] system for review.       02/29/24 0943   Post-Acute Status   Post-Acute Authorization Placement   Post-Acute Placement Status Referrals Sent   Patient choice form signed by patient/caregiver List with quality metrics by geographic area provided

## 2024-02-29 NOTE — NURSING
Nurses Note -- 4 Eyes      2/28/2024   6:24 PM      Skin assessed during: Admit      [x] No Altered Skin Integrity Present    []Prevention Measures Documented      [] Yes- Altered Skin Integrity Present or Discovered   [] LDA Added if Not in Epic (Describe Wound)   [] New Altered Skin Integrity was Present on Admit and Documented in LDA   [] Wound Image Taken    Wound Care Consulted? No    Attending Nurse:   Marie Corbin RN    Second RN/Staff Member:     Marni Morgan Rn

## 2024-02-29 NOTE — NURSING
Upon receiving report at shift-handoff, patient noted to be without orders in chart. At 1856, contact made with [facility] provider, RAHUL Bone NP, regarding this. Notified provider of admitting dx and that patient transferred to this facility. Notified no orders present. Upon contacting, NP to come see patient.   1903: RAHUL Bone NP at bedside at this time. Spoke with provider regarding orders and provider vocalizes order for indwelling nicholson catheter will be placed but does not need to be placed at this time unless patient requests for immobilization and will be needed for procedure.   2007: Clarified glucose monitoring order as no hx of DM or clear reason for monitoring is noted. NP DC's order upon contacting.

## 2024-02-29 NOTE — HPI
Arleen Zarate is a 89-year-old female who was transferred from [facility] for right hip fracture.  Patient reports she sustained a mechanical fall this morning while making a per bed.  She reports falling striking her right hip on the floor also with injury to head.  She denies any loss of consciousness or cervical pain.  She describes the pain to the right hip is intense.  She rates the pain at 10/10 at worst.  The pain is worse with any movement of the right lower extremity.  The pain is minimally relieved with remaining still and positioning. Previous medical history includes hypertension, GERD, arthritis, hyperlipidemia, osteopenia.  ER workup at outside facility:  CBC and CMP were unremarkable.  CT of the brain was negative for any acute findings.  CT of the cervical spine were negative for any acute findings.  CTA of the chest was negative for pulmonary embolism or acute findings.  X-ray of the right hip demonstrates a sub cap fracture of the right femoral neck.  Patient admitted to Hospital Medicine for treatment and management.  Orthopedics was consulted and plans to take patient to OR on Friday.

## 2024-02-29 NOTE — ASSESSMENT & PLAN NOTE
Chronic, controlled. Latest blood pressure and vitals reviewed-     Temp:  [98.2 °F (36.8 °C)-98.3 °F (36.8 °C)]   Pulse:  [75-91]   Resp:  [16-18]   BP: (132-173)/(60-73)   SpO2:  [95 %-98 %] .   Home meds for hypertension were reviewed and noted below.   Hypertension Medications               lisinopriL (PRINIVIL,ZESTRIL) 30 MG tablet TAKE 1 TABLET EVERY EVENING    nisoldipine (SULAR) 8.5 MG Tb24 TAKE 1 TABLET DAILY            While in the hospital, will manage blood pressure as follows; Continue home antihypertensive regimen    Will utilize p.r.n. blood pressure medication only if patient's blood pressure greater than 160/100 and she develops symptoms such as worsening chest pain or shortness of breath.

## 2024-02-29 NOTE — PROGRESS NOTES
"[facility] Medicine  Progress Note    Patient Name: Arleen Zarate  MRN: [id number]  Patient Class: IP- Inpatient   Admission Date: 2/28/2024  Length of Stay: 1 days  Attending Physician: Jacoby Multani MD  Primary Care Provider: Perry Jorge MD        Subjective:     Principal Problem:Closed fracture of neck of right femur        HPI:  Arleen Zarate is a 89-year-old female who was transferred from [facility] for right hip fracture.  Patient reports she sustained a mechanical fall this morning while making a per bed.  She reports falling striking her right hip on the floor also with injury to head.  She denies any loss of consciousness or cervical pain.  She describes the pain to the right hip is intense.  She rates the pain at 10/10 at worst.  The pain is worse with any movement of the right lower extremity.  The pain is minimally relieved with remaining still and positioning. Previous medical history includes hypertension, GERD, arthritis, hyperlipidemia, osteopenia.  ER workup at outside facility:  CBC and CMP were unremarkable.  CT of the brain was negative for any acute findings.  CT of the cervical spine were negative for any acute findings.  CTA of the chest was negative for pulmonary embolism or acute findings.  X-ray of the right hip demonstrates a sub cap fracture of the right femoral neck.  Patient admitted to Hospital Medicine for treatment and management.  Orthopedics was consulted and plans to take patient to OR on Friday.    Overview/Hospital Course:  Arleen Zarate is a 89 year old female with a past medical history of HTN and GERD who presented with a R femoral neck fracture after a mechanical fall. Orthopedic Surgery will take the patient to the OR 3/1. PT/OT has been consulted.    Interval History: see "Hospital Course"    Review of Systems   Musculoskeletal:  Positive for arthralgias, gait problem and myalgias.     Objective:     Vital Signs (Most " Recent):  Temp: 98.2 °F (36.8 °C) (02/29/24 0731)  Pulse: 92 (02/29/24 0731)  Resp: 17 (02/29/24 0817)  BP: (!) 155/66 (02/29/24 0731)  SpO2: 96 % (02/29/24 0732) Vital Signs (24h Range):  Temp:  [97.7 °F (36.5 °C)-98.6 °F (37 °C)] 98.2 °F (36.8 °C)  Pulse:  [75-92] 92  Resp:  [16-18] 17  SpO2:  [95 %-98 %] 96 %  BP: (151-173)/(60-73) 155/66     Weight: 63.5 kg (139 lb 15.9 oz)  Body mass index is 28.27 kg/m².    Intake/Output Summary (Last 24 hours) at 2/29/2024 1038  Last data filed at 2/29/2024 0614  Gross per 24 hour   Intake 220 ml   Output 350 ml   Net -130 ml         Physical Exam  Vitals and nursing note reviewed.   HENT:      Head: Normocephalic and atraumatic.      Right Ear: External ear normal.      Left Ear: External ear normal.      Nose: Nose normal.      Mouth/Throat:      Mouth: Mucous membranes are moist.      Pharynx: Oropharynx is clear.   Eyes:      Extraocular Movements: Extraocular movements intact.      Conjunctiva/sclera: Conjunctivae normal.   Cardiovascular:      Rate and Rhythm: Normal rate and regular rhythm.      Pulses: Normal pulses.      Heart sounds: Normal heart sounds.   Pulmonary:      Effort: Pulmonary effort is normal.      Breath sounds: Normal breath sounds.   Abdominal:      General: Bowel sounds are normal.      Palpations: Abdomen is soft.   Musculoskeletal:         General: Signs of injury present.      Cervical back: Normal range of motion and neck supple.      Right lower leg: No edema.      Left lower leg: No edema.      Comments: RLE shortened and externally rotated.   Neurological:      Mental Status: She is alert. Mental status is at baseline.   Psychiatric:         Mood and Affect: Mood normal.         Behavior: Behavior normal.             Significant Labs: All pertinent labs within the past 24 hours have been reviewed.    Significant Imaging: I have reviewed all pertinent imaging results/findings within the past 24 hours.    Assessment/Plan:      * Closed  fracture of neck of right femur  -Fall precautions  -NWB RLE  -Neurovascular checks  -PRN analgesics  -Orthopedic Surgery consulted; NPO at midnight for 3/1 OR      Gastroesophageal reflux disease  -PPI      Essential hypertension  -Lisinopril  -Hold CCB  -Continue to monitor      VTE Risk Mitigation (From admission, onward)           Ordered     Place MAURICIO hose  Until discontinued         02/28/24 1917     IP VTE HIGH RISK PATIENT  Once         02/28/24 1917     Place sequential compression device  Until discontinued         02/28/24 1917                    Discharge Planning   NICK: 3/4/2024     Code Status: Full Code   Is the patient medically ready for discharge?:     Reason for patient still in hospital (select all that apply): Patient trending condition, Treatment, Consult recommendations, PT / OT recommendations, and Pending disposition  Discharge Plan A: Rehab                  Jacoby Multani MD  Department of Hospital Medicine   [facility]/Surg

## 2024-02-29 NOTE — PLAN OF CARE
Problem: Adult Inpatient Plan of Care  Goal: Plan of Care Review  Outcome: Ongoing, Progressing  Goal: Patient-Specific Goal (Individualized)  Outcome: Ongoing, Progressing  Goal: Optimal Comfort and Wellbeing  Outcome: Ongoing, Progressing     Problem: Fall Injury Risk  Goal: Absence of Fall and Fall-Related Injury  Outcome: Ongoing, Progressing     Problem: Skin Injury Risk Increased  Goal: Skin Health and Integrity  Outcome: Ongoing, Progressing     Problem: Infection  Goal: Absence of Infection Signs and Symptoms  Outcome: Ongoing, Progressing     Problem: Pain Acute  Goal: Acceptable Pain Control and Functional Ability  Outcome: Ongoing, Progressing     Problem: Embolism (Orthopaedic Fracture)  Goal: Absence of Embolism Signs and Symptoms  Outcome: Ongoing, Progressing     Problem: Fracture Stabilization and Management (Orthopaedic Fracture)  Goal: Fracture Stability  Outcome: Ongoing, Progressing     Problem: Infection (Orthopaedic Fracture)  Goal: Absence of Infection Signs and Symptoms  Outcome: Ongoing, Progressing     Problem: Neurovascular Compromise (Orthopaedic Fracture)  Goal: Effective Tissue Perfusion  Outcome: Ongoing, Progressing     Problem: Pain (Orthopaedic Fracture)  Goal: Acceptable Pain Control  Outcome: Ongoing, Progressing     Problem: Respiratory Compromise (Orthopaedic Fracture)  Goal: Effective Oxygenation and Ventilation  Outcome: Ongoing, Progressing

## 2024-02-29 NOTE — SUBJECTIVE & OBJECTIVE
Past Medical History:   Diagnosis Date    Anxiety     Arthritis     fingers and back    Depression     Disorder of kidney and ureter     Foot pain, left 9/18/2013    GERD (gastroesophageal reflux disease)     Hiatal hernia     Hyperlipidemia     Hypertension     Reflux     on meds    Tendon tear 8/21/2013    Tendonitis 8/21/2013       Past Surgical History:   Procedure Laterality Date    CHOLECYSTECTOMY      EYE SURGERY Bilateral     cataracts and implants    TONSILLECTOMY         Review of patient's allergies indicates:   Allergen Reactions    Buspar [buspirone] Other (See Comments)     Syncope and nausea     Doxy      Other reaction(s): Unknown    Effexor [venlafaxine] Other (See Comments)     shaking    Biaxin [clarithromycin] Other (See Comments)     Unknown    Codeine Other (See Comments)     Syncope, and nausea.    Statins-hmg-coa reductase inhibitors      myalgia    Doxycycline Other (See Comments)     Made very weak       No current facility-administered medications on file prior to encounter.     Current Outpatient Medications on File Prior to Encounter   Medication Sig    lisinopriL (PRINIVIL,ZESTRIL) 30 MG tablet TAKE 1 TABLET EVERY EVENING (Patient taking differently: Take 30 mg by mouth once daily.)    multivitamin capsule Take 1 capsule by mouth once daily.    nisoldipine (SULAR) 8.5 MG Tb24 TAKE 1 TABLET DAILY (Patient taking differently: Take 8.5 mg by mouth once daily.)    omeprazole (PRILOSEC) 40 MG capsule Take 1 capsule (40 mg total) by mouth Daily.    meloxicam (MOBIC) 15 MG tablet Take 1 tablet (15 mg total) by mouth once daily. (Patient not taking: Reported on 1/29/2024)    triamcinolone acetonide 0.025% (KENALOG) 0.025 % cream AAA bid (Patient not taking: Reported on 2/28/2024)    [DISCONTINUED] buPROPion (WELLBUTRIN) 75 MG tablet Take 1 tablet (75 mg total) by mouth once daily.     Family History       Problem Relation (Age of Onset)    Hearing loss Son          Tobacco Use    Smoking  status: Never     Passive exposure: Never    Smokeless tobacco: Never   Substance and Sexual Activity    Alcohol use: Yes     Alcohol/week: 1.0 standard drink of alcohol     Types: 1 Glasses of wine per week     Comment: rarely    Drug use: No    Sexual activity: Not Currently     Review of Systems   Constitutional:  Positive for activity change. Negative for chills, diaphoresis and fever.   HENT:  Negative for congestion, nosebleeds and tinnitus.    Eyes:  Negative for photophobia and visual disturbance.   Respiratory:  Negative for cough, chest tightness, shortness of breath and wheezing.    Cardiovascular:  Negative for chest pain, palpitations and leg swelling.   Gastrointestinal:  Negative for abdominal distention, abdominal pain, constipation, diarrhea, nausea and vomiting.   Endocrine: Negative for cold intolerance and heat intolerance.   Genitourinary:  Negative for difficulty urinating, dysuria, frequency, hematuria and urgency.   Musculoskeletal:  Positive for arthralgias. Negative for back pain and myalgias.   Skin:  Negative for pallor, rash and wound.   Allergic/Immunologic: Negative for immunocompromised state.   Neurological:  Negative for dizziness, tremors, facial asymmetry, speech difficulty and weakness.   Hematological:  Negative for adenopathy. Does not bruise/bleed easily.   Psychiatric/Behavioral:  Negative for confusion and sleep disturbance. The patient is not nervous/anxious.      Objective:     Vital Signs (Most Recent):  Temp: 98.2 °F (36.8 °C) (02/28/24 1825)  Pulse: 91 (02/28/24 1825)  Resp: 18 (02/28/24 1825)  BP: (!) 151/73 (02/28/24 1825)  SpO2: 97 % (02/28/24 1825) Vital Signs (24h Range):  Temp:  [98.2 °F (36.8 °C)-98.3 °F (36.8 °C)] 98.2 °F (36.8 °C)  Pulse:  [75-91] 91  Resp:  [16-18] 18  SpO2:  [95 %-98 %] 97 %  BP: (132-173)/(60-73) 151/73     Weight: 63.5 kg (140 lb)  Body mass index is 28.28 kg/m².     Physical Exam  Vitals and nursing note reviewed.   Constitutional:        General: She is not in acute distress.     Appearance: She is well-developed. She is not diaphoretic.   HENT:      Head: Normocephalic.      Mouth/Throat:      Mouth: Mucous membranes are moist.      Pharynx: Oropharynx is clear.   Eyes:      General: No scleral icterus.     Conjunctiva/sclera: Conjunctivae normal.      Pupils: Pupils are equal, round, and reactive to light.   Neck:      Vascular: No JVD.   Cardiovascular:      Rate and Rhythm: Normal rate and regular rhythm.      Heart sounds: Normal heart sounds. No murmur heard.     No friction rub. No gallop.   Pulmonary:      Effort: Pulmonary effort is normal. No respiratory distress.      Breath sounds: Normal breath sounds. No wheezing or rales.   Abdominal:      General: Bowel sounds are normal. There is no distension.      Palpations: Abdomen is soft.      Tenderness: There is no abdominal tenderness. There is no guarding or rebound.   Musculoskeletal:         General: Tenderness and signs of injury present.      Cervical back: Normal range of motion and neck supple.      Comments:   Right leg shortened and rotated outward.  Neurovascularly intact   Lymphadenopathy:      Cervical: No cervical adenopathy.   Skin:     General: Skin is warm and dry.      Capillary Refill: Capillary refill takes less than 2 seconds.      Coloration: Skin is not pale.      Findings: No erythema or rash.   Neurological:      Mental Status: She is alert and oriented to person, place, and time.      Cranial Nerves: No cranial nerve deficit.      Sensory: No sensory deficit.      Coordination: Coordination normal.      Deep Tendon Reflexes: Reflexes normal.   Psychiatric:         Behavior: Behavior normal.         Thought Content: Thought content normal.         Judgment: Judgment normal.              CRANIAL NERVES     CN III, IV, VI   Pupils are equal, round, and reactive to light.       Significant Labs: All pertinent labs within the past 24 hours have been reviewed.  CBC:    Recent Labs   Lab 02/28/24  1017   WBC 7.06   HGB 11.1*   HCT 33.8*        CMP:   Recent Labs   Lab 02/28/24  1017      K 4.2      CO2 25      BUN 21   CREATININE 0.9   CALCIUM 9.1   PROT 6.2   ALBUMIN 3.8   BILITOT 0.6   ALKPHOS 73   AST 17   ALT 12   ANIONGAP 8       Significant Imaging: I have reviewed all pertinent imaging results/findings within the past 24 hours.    CT Head  There is no evidence of cortical ischemic change. Changes of mild chronic microvascular white matter disease are noted. Cerebellum and brainstem are normal. There is no evidence of calvarial fracture or acute osseous destructive process.  IMPRESSION:     1. No acute intracranial abnormalities      CT Cervical    IMPRESSION:        1. No evidence of cervical fracture or subluxation.  2. Multilevel cervical degenerative disc/facet disease.    CTA chest:     FINDINGS: Thin axial imaging through the chest was performed with 100 mL Omnipaque 350 IV contrast, with sagittal and coronal reformatted images and MIP reconstructions performed, and images stored in the patient's permanent electronic medical record.     Comparison to prior exams. There are no pulmonary arterial filling defects to suggest pulmonary thromboembolism. The central pulmonary arteries are normal in caliber. Scattered calcified and soft plaque involves normal caliber thoracic aorta, with no aortic dissection or evidence of aortic intramural hemorrhage. 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     The heart is enlarged, with no pericardial effusion. There are a few scattered coronary arterial calcifications. There is a large sliding-type hiatal hernia, with no enlarged mediastinal or hilar lymph nodes.     Mild scattered linear and reticular densities in the lower lungs reflecting subsegmental atelectasis, with no consolidation, pleural effusion,  evidence of pulmonary edema, or pneumothorax. There is mild scattered lower lobe bronchial wall thickening, with no central mucous plugging.     Images of the upper abdomen show cholecystectomy clips, and are otherwise unremarkable. There are no acute fractures or destructive osseous lesions, with intervertebral disc space narrowing in the spine with osteophytes.     IMPRESSION:  1. Negative for pulmonary thromboembolism.  2. Additional observations as described.    Right hip  x-ray:     IMPRESSION: Subcapital fracture of the right femoral neck in varus deformity

## 2024-02-29 NOTE — HOSPITAL COURSE
Arleen Zarate is a 89 year old female with a past medical history of HTN and GERD who presented with a R femoral neck fracture after a mechanical fall. Orthopedic Surgery took the patient to the OR 3/1. PT/OT evaluated patient and recommended rehab placement.  Patient was discharged to inpatient rehab once arranged.  She will follow-up with PCP and ortho.

## 2024-02-29 NOTE — PLAN OF CARE
Problem: Adult Inpatient Plan of Care  Goal: Plan of Care Review  Outcome: Ongoing, Progressing   Supine with HOB up 35. POC and medications reviewed with pt. Verbalized understanding. Saline loc in left ac with DDI. No redness or swelling at site. Call light in reach. Bed in lowest position with brakes locked. Will continue to monitor. .  Problem: Adult Inpatient Plan of Care  Goal: Optimal Comfort and Wellbeing  Outcome: Ongoing, Progressing   Q 2 hourly rounds made through out shift and Prn meds given per MD order.

## 2024-02-29 NOTE — NURSING
Patient kept NPO, starting at midnight, per nursing judgement in case procedure should take place today.    [Absent] : Adnexa(ae): Absent [Normal] : Recto-Vaginal Exam: Normal [Fully active, able to carry on all pre-disease performance without restriction] : Status 0 - Fully active, able to carry on all pre-disease performance without restriction [de-identified] : well healed vertical midline scar

## 2024-02-29 NOTE — PT/OT/SLP PROGRESS
Occupational Therapy      Patient Name:  Arleen Zarate   MRN:  [id number]    OT order received and chart reviewed. Patient has a subcapital fracture of the right femoral neck and is awaiting surgical repair. Patient will need new therapy orders post-operatively.     2/29/2024

## 2024-02-29 NOTE — CONSULTS
"  [facility]/Surg  Adult Nutrition  Consult Note    SUMMARY     Recommendations  Advance diet to [place] [facility] post-op  Monitor intake during meal rounds  Collaborate with health care providers as needed    Goal: as diet advances intake > 75% of meals prior to RD F/U    Nutrition Goal Status: new  Communication of RD Recs: reviewed with physician    Assessment and Plan  No problems identified at present.  General Comments: Pt stated she has a good appetite and her weight is stable 135-140#  Denies any N/V; difficulty chewing/swallowing.  She lives with family.  LBM:  2/27  Nutrition Related Social Determinants of Health: SDOH: Adequate food in home environment     Malnutrition Assessment       Pt does not currently meet criteria for Malnutrition.               Reason for Assessment    Reason For Assessment: consult  Diagnosis: trauma, gastrointestinal disease, cardiac disease, other (see comments) (Closed right femur/hip Fracture awaiting surgical repair; Hx GERD; HTN; Arthritis; HLD; Osteopenia)  Nutrition Discharge Planning: going to Rehab facility; Cardiac [place] diet recommended    Nutrition Risk Screen    Nutrition Risk Screen: no indicators present    Nutrition/Diet History    Patient Reported Diet/Restrictions/Preferences: other (see comments) (bland no spicy or acidic foods)  Spiritual, Cultural Beliefs, Hindu Practices, Values that Affect Care: no  Food Allergies: NKFA  Factors Affecting Nutritional Intake: None identified at this time    Anthropometrics    Temp: 97.9 °F (36.6 °C)  Height Method: Stated  Height: 4' 11" (149.9 cm)  Height (inches): 59 in  Weight Method: Bed Scale  Weight: 63.5 kg (139 lb 15.9 oz)  Weight (lb): 139.99 lb  Ideal Body Weight (IBW), Female: 95 lb  % Ideal Body Weight, Female (lb): 147.36 %  BMI (Calculated): 28.3  BMI Grade: 25 - 29.9 - overweight     Lab/Procedures/Meds   Latest Reference Range & Units Most Recent   Hemoglobin 12.0 - 16.0 g/dL 11.3 " (L)  2/29/24 04:23   Hematocrit 37.0 - 48.5 % 34.8 (L)  2/29/24 04:23   (L): Data is abnormally low   Latest Reference Range & Units Most Recent   Albumin 3.5 - 5.2 g/dL 3.4 (L)  2/29/24 04:23   (L): Data is abnormally low  Pertinent Labs Reviewed: reviewed  Pertinent Medications Reviewed: reviewed; lisinopril; multivitamin; protonix    Physical Findings/Assessment  Closed Right hip fracture     Estimated/Assessed Needs    Weight Used For Calorie Calculations: 63.5 kg (139 lb 15.9 oz)  Energy Calorie Requirements (kcal): 5428-5149 calories daily;  25-30/kg  Energy Need Method: Kcal/kg  Protein Requirements: 75 gm protein daily;  1.2/kg  Weight Used For Protein Calculations: 63.5 kg (139 lb 15.9 oz)     Estimated Fluid Requirement Method: RDA Method  RDA Method (mL): 1600     Nutrition Prescription Ordered    Current Diet Order: NPO at present d/t pending surgery  Oral Nutrition Supplement: none    Evaluation of Received Nutrient/Fluid Intake    Energy Calories Required: not meeting needs  Protein Required: not meeting needs  Fluid Required: not meeting needs  Tolerance: tolerating  % Intake of Estimated Energy Needs: Other: NPO at present ; surgery scheduled 3/1  % Meal Intake: NPO    Nutrition Risk    Level of Risk/Frequency of Follow-up: low 1 x week    Monitor and Evaluation    Food and Nutrient Intake: food and beverage intake  Food and Nutrient Adminstration: diet order  Knowledge/Beliefs/Attitudes: beliefs and attitudes  Physical Activity and Function: nutrition-related ADLs and IADLs  Anthropometric Measurements: weight change  Biochemical Data, Medical Tests and Procedures: gastrointestinal profile, electrolyte and renal panel, glucose/endocrine profile, other (specify)  Nutrition-Focused Physical Findings: overall appearance     Nutrition Follow-Up  yes

## 2024-03-01 ENCOUNTER — ANESTHESIA (OUTPATIENT)
Dept: SURGERY | Facility: HOSPITAL | Age: 89
DRG: 522 | End: 2024-03-01
Payer: MEDICARE

## 2024-03-01 PROBLEM — D69.6 THROMBOCYTOPENIA: Status: ACTIVE | Noted: 2024-03-01

## 2024-03-01 PROBLEM — D64.9 ANEMIA: Status: ACTIVE | Noted: 2024-03-01

## 2024-03-01 LAB
ALBUMIN SERPL BCP-MCNC: 3.1 G/DL (ref 3.5–5.2)
ALP SERPL-CCNC: 84 U/L (ref 55–135)
ALT SERPL W/O P-5'-P-CCNC: 44 U/L (ref 10–44)
ANION GAP SERPL CALC-SCNC: 8 MMOL/L (ref 8–16)
AST SERPL-CCNC: 42 U/L (ref 10–40)
BASOPHILS # BLD AUTO: 0.03 K/UL (ref 0–0.2)
BASOPHILS NFR BLD: 0.3 % (ref 0–1.9)
BILIRUB SERPL-MCNC: 0.5 MG/DL (ref 0.1–1)
BUN SERPL-MCNC: 17 MG/DL (ref 10–30)
CALCIUM SERPL-MCNC: 8.6 MG/DL (ref 8.7–10.5)
CHLORIDE SERPL-SCNC: 104 MMOL/L (ref 95–110)
CO2 SERPL-SCNC: 24 MMOL/L (ref 23–29)
CREAT SERPL-MCNC: 0.8 MG/DL (ref 0.5–1.4)
DIFFERENTIAL METHOD BLD: ABNORMAL
EOSINOPHIL # BLD AUTO: 0.3 K/UL (ref 0–0.5)
EOSINOPHIL NFR BLD: 2.8 % (ref 0–8)
ERYTHROCYTE [DISTWIDTH] IN BLOOD BY AUTOMATED COUNT: 13.2 % (ref 11.5–14.5)
EST. GFR  (NO RACE VARIABLE): >60 ML/MIN/1.73 M^2
GLUCOSE SERPL-MCNC: 108 MG/DL (ref 70–110)
HCT VFR BLD AUTO: 33.5 % (ref 37–48.5)
HGB BLD-MCNC: 11 G/DL (ref 12–16)
IMM GRANULOCYTES # BLD AUTO: 0.03 K/UL (ref 0–0.04)
IMM GRANULOCYTES NFR BLD AUTO: 0.3 % (ref 0–0.5)
LYMPHOCYTES # BLD AUTO: 1.1 K/UL (ref 1–4.8)
LYMPHOCYTES NFR BLD: 12.1 % (ref 18–48)
MAGNESIUM SERPL-MCNC: 1.8 MG/DL (ref 1.6–2.6)
MCH RBC QN AUTO: 30.4 PG (ref 27–31)
MCHC RBC AUTO-ENTMCNC: 32.8 G/DL (ref 32–36)
MCV RBC AUTO: 93 FL (ref 82–98)
MONOCYTES # BLD AUTO: 1.1 K/UL (ref 0.3–1)
MONOCYTES NFR BLD: 11.9 % (ref 4–15)
NEUTROPHILS # BLD AUTO: 6.4 K/UL (ref 1.8–7.7)
NEUTROPHILS NFR BLD: 72.6 % (ref 38–73)
NRBC BLD-RTO: 0 /100 WBC
PHOSPHATE SERPL-MCNC: 2 MG/DL (ref 2.7–4.5)
PLATELET # BLD AUTO: 126 K/UL (ref 150–450)
PMV BLD AUTO: 11 FL (ref 9.2–12.9)
POTASSIUM SERPL-SCNC: 4 MMOL/L (ref 3.5–5.1)
PROT SERPL-MCNC: 5.7 G/DL (ref 6–8.4)
RBC # BLD AUTO: 3.62 M/UL (ref 4–5.4)
SODIUM SERPL-SCNC: 136 MMOL/L (ref 136–145)
WBC # BLD AUTO: 8.79 K/UL (ref 3.9–12.7)

## 2024-03-01 PROCEDURE — 36000711: Performed by: ORTHOPAEDIC SURGERY

## 2024-03-01 PROCEDURE — 11000001 HC ACUTE MED/SURG PRIVATE ROOM

## 2024-03-01 PROCEDURE — 71000033 HC RECOVERY, INTIAL HOUR: Performed by: ORTHOPAEDIC SURGERY

## 2024-03-01 PROCEDURE — 63600175 PHARM REV CODE 636 W HCPCS: Performed by: NURSE PRACTITIONER

## 2024-03-01 PROCEDURE — 71000039 HC RECOVERY, EACH ADD'L HOUR: Performed by: ORTHOPAEDIC SURGERY

## 2024-03-01 PROCEDURE — 37000009 HC ANESTHESIA EA ADD 15 MINS: Performed by: ORTHOPAEDIC SURGERY

## 2024-03-01 PROCEDURE — 25000003 PHARM REV CODE 250: Performed by: ANESTHESIOLOGY

## 2024-03-01 PROCEDURE — 27000221 HC OXYGEN, UP TO 24 HOURS

## 2024-03-01 PROCEDURE — 25000003 PHARM REV CODE 250: Performed by: NURSE ANESTHETIST, CERTIFIED REGISTERED

## 2024-03-01 PROCEDURE — 63600175 PHARM REV CODE 636 W HCPCS: Performed by: NURSE ANESTHETIST, CERTIFIED REGISTERED

## 2024-03-01 PROCEDURE — 83735 ASSAY OF MAGNESIUM: CPT | Performed by: NURSE PRACTITIONER

## 2024-03-01 PROCEDURE — 36000710: Performed by: ORTHOPAEDIC SURGERY

## 2024-03-01 PROCEDURE — C1776 JOINT DEVICE (IMPLANTABLE): HCPCS | Performed by: ORTHOPAEDIC SURGERY

## 2024-03-01 PROCEDURE — 37000008 HC ANESTHESIA 1ST 15 MINUTES: Performed by: ORTHOPAEDIC SURGERY

## 2024-03-01 PROCEDURE — 99900035 HC TECH TIME PER 15 MIN (STAT)

## 2024-03-01 PROCEDURE — 27201423 OPTIME MED/SURG SUP & DEVICES STERILE SUPPLY: Performed by: ORTHOPAEDIC SURGERY

## 2024-03-01 PROCEDURE — 36415 COLL VENOUS BLD VENIPUNCTURE: CPT | Performed by: NURSE PRACTITIONER

## 2024-03-01 PROCEDURE — 63600175 PHARM REV CODE 636 W HCPCS: Performed by: ORTHOPAEDIC SURGERY

## 2024-03-01 PROCEDURE — 63600175 PHARM REV CODE 636 W HCPCS: Performed by: ANESTHESIOLOGY

## 2024-03-01 PROCEDURE — 25000003 PHARM REV CODE 250: Performed by: ORTHOPAEDIC SURGERY

## 2024-03-01 PROCEDURE — 85025 COMPLETE CBC W/AUTO DIFF WBC: CPT | Performed by: NURSE PRACTITIONER

## 2024-03-01 PROCEDURE — 84100 ASSAY OF PHOSPHORUS: CPT | Performed by: NURSE PRACTITIONER

## 2024-03-01 PROCEDURE — 25000242 PHARM REV CODE 250 ALT 637 W/ HCPCS: Performed by: NURSE PRACTITIONER

## 2024-03-01 PROCEDURE — 80053 COMPREHEN METABOLIC PANEL: CPT | Performed by: NURSE PRACTITIONER

## 2024-03-01 PROCEDURE — 27236 TREAT THIGH FRACTURE: CPT | Mod: RT,,, | Performed by: ORTHOPAEDIC SURGERY

## 2024-03-01 PROCEDURE — 94761 N-INVAS EAR/PLS OXIMETRY MLT: CPT

## 2024-03-01 DEVICE — HEAD FEM V40 LIFT COCR +4MM: Type: IMPLANTABLE DEVICE | Site: HIP | Status: FUNCTIONAL

## 2024-03-01 RX ORDER — SUCCINYLCHOLINE CHLORIDE 20 MG/ML
INJECTION INTRAMUSCULAR; INTRAVENOUS
Status: DISCONTINUED | OUTPATIENT
Start: 2024-03-01 | End: 2024-03-01

## 2024-03-01 RX ORDER — FENTANYL CITRATE 50 UG/ML
INJECTION, SOLUTION INTRAMUSCULAR; INTRAVENOUS
Status: DISCONTINUED | OUTPATIENT
Start: 2024-03-01 | End: 2024-03-01

## 2024-03-01 RX ORDER — PROPOFOL 10 MG/ML
VIAL (ML) INTRAVENOUS
Status: DISCONTINUED | OUTPATIENT
Start: 2024-03-01 | End: 2024-03-01

## 2024-03-01 RX ORDER — LIDOCAINE HYDROCHLORIDE 10 MG/ML
0.5 INJECTION, SOLUTION EPIDURAL; INFILTRATION; INTRACAUDAL; PERINEURAL ONCE
Status: DISCONTINUED | OUTPATIENT
Start: 2024-03-01 | End: 2024-03-01 | Stop reason: HOSPADM

## 2024-03-01 RX ORDER — LIDOCAINE HYDROCHLORIDE 20 MG/ML
INJECTION INTRAVENOUS
Status: DISCONTINUED | OUTPATIENT
Start: 2024-03-01 | End: 2024-03-01

## 2024-03-01 RX ORDER — ENOXAPARIN SODIUM 100 MG/ML
40 INJECTION SUBCUTANEOUS EVERY 24 HOURS
Status: DISCONTINUED | OUTPATIENT
Start: 2024-03-02 | End: 2024-03-04 | Stop reason: HOSPADM

## 2024-03-01 RX ORDER — OXYCODONE HYDROCHLORIDE 5 MG/1
5 TABLET ORAL
Status: DISCONTINUED | OUTPATIENT
Start: 2024-03-01 | End: 2024-03-01 | Stop reason: HOSPADM

## 2024-03-01 RX ORDER — PHENYLEPHRINE HYDROCHLORIDE 10 MG/ML
INJECTION INTRAVENOUS
Status: DISCONTINUED | OUTPATIENT
Start: 2024-03-01 | End: 2024-03-01

## 2024-03-01 RX ORDER — SODIUM CHLORIDE, SODIUM LACTATE, POTASSIUM CHLORIDE, CALCIUM CHLORIDE 600; 310; 30; 20 MG/100ML; MG/100ML; MG/100ML; MG/100ML
INJECTION, SOLUTION INTRAVENOUS CONTINUOUS
Status: DISCONTINUED | OUTPATIENT
Start: 2024-03-01 | End: 2024-03-02

## 2024-03-01 RX ORDER — HYDROMORPHONE HYDROCHLORIDE 2 MG/ML
0.2 INJECTION, SOLUTION INTRAMUSCULAR; INTRAVENOUS; SUBCUTANEOUS EVERY 5 MIN PRN
Status: DISCONTINUED | OUTPATIENT
Start: 2024-03-01 | End: 2024-03-01 | Stop reason: HOSPADM

## 2024-03-01 RX ORDER — ONDANSETRON HYDROCHLORIDE 2 MG/ML
INJECTION, SOLUTION INTRAVENOUS
Status: DISCONTINUED | OUTPATIENT
Start: 2024-03-01 | End: 2024-03-01

## 2024-03-01 RX ORDER — SODIUM CHLORIDE 9 MG/ML
INJECTION, SOLUTION INTRAVENOUS CONTINUOUS
Status: DISCONTINUED | OUTPATIENT
Start: 2024-03-01 | End: 2024-03-02

## 2024-03-01 RX ORDER — MUPIROCIN 20 MG/G
OINTMENT TOPICAL 2 TIMES DAILY
Status: COMPLETED | OUTPATIENT
Start: 2024-03-01 | End: 2024-03-03

## 2024-03-01 RX ORDER — ROCURONIUM BROMIDE 10 MG/ML
INJECTION, SOLUTION INTRAVENOUS
Status: DISCONTINUED | OUTPATIENT
Start: 2024-03-01 | End: 2024-03-01

## 2024-03-01 RX ORDER — MEPERIDINE HYDROCHLORIDE 50 MG/ML
12.5 INJECTION INTRAMUSCULAR; INTRAVENOUS; SUBCUTANEOUS ONCE
Status: DISCONTINUED | OUTPATIENT
Start: 2024-03-01 | End: 2024-03-01 | Stop reason: HOSPADM

## 2024-03-01 RX ORDER — ONDANSETRON HYDROCHLORIDE 2 MG/ML
4 INJECTION, SOLUTION INTRAVENOUS ONCE
Status: DISCONTINUED | OUTPATIENT
Start: 2024-03-01 | End: 2024-03-01 | Stop reason: HOSPADM

## 2024-03-01 RX ORDER — DEXAMETHASONE SODIUM PHOSPHATE 4 MG/ML
INJECTION, SOLUTION INTRA-ARTICULAR; INTRALESIONAL; INTRAMUSCULAR; INTRAVENOUS; SOFT TISSUE
Status: DISCONTINUED | OUTPATIENT
Start: 2024-03-01 | End: 2024-03-01

## 2024-03-01 RX ORDER — PROCHLORPERAZINE EDISYLATE 5 MG/ML
5 INJECTION INTRAMUSCULAR; INTRAVENOUS EVERY 4 HOURS PRN
Status: DISCONTINUED | OUTPATIENT
Start: 2024-03-01 | End: 2024-03-01 | Stop reason: HOSPADM

## 2024-03-01 RX ORDER — FENTANYL CITRATE 50 UG/ML
25 INJECTION, SOLUTION INTRAMUSCULAR; INTRAVENOUS EVERY 5 MIN PRN
Status: DISCONTINUED | OUTPATIENT
Start: 2024-03-01 | End: 2024-03-01 | Stop reason: HOSPADM

## 2024-03-01 RX ORDER — DIPHENHYDRAMINE HYDROCHLORIDE 50 MG/ML
25 INJECTION INTRAMUSCULAR; INTRAVENOUS EVERY 6 HOURS PRN
Status: DISCONTINUED | OUTPATIENT
Start: 2024-03-01 | End: 2024-03-01 | Stop reason: HOSPADM

## 2024-03-01 RX ORDER — ACETAMINOPHEN 650 MG/1
650 SUPPOSITORY RECTAL EVERY 4 HOURS PRN
Status: DISCONTINUED | OUTPATIENT
Start: 2024-03-01 | End: 2024-03-04 | Stop reason: HOSPADM

## 2024-03-01 RX ORDER — ACETAMINOPHEN 10 MG/ML
INJECTION, SOLUTION INTRAVENOUS
Status: DISCONTINUED | OUTPATIENT
Start: 2024-03-01 | End: 2024-03-01

## 2024-03-01 RX ADMIN — OXYCODONE 5 MG: 5 TABLET ORAL at 04:03

## 2024-03-01 RX ADMIN — HYDROMORPHONE HYDROCHLORIDE 0.2 MG: 2 INJECTION INTRAMUSCULAR; INTRAVENOUS; SUBCUTANEOUS at 04:03

## 2024-03-01 RX ADMIN — ACETAMINOPHEN 1000 MG: 10 INJECTION, SOLUTION INTRAVENOUS at 02:03

## 2024-03-01 RX ADMIN — FENTANYL CITRATE 50 MCG: 50 INJECTION, SOLUTION INTRAMUSCULAR; INTRAVENOUS at 03:03

## 2024-03-01 RX ADMIN — SODIUM CHLORIDE, SODIUM GLUCONATE, SODIUM ACETATE, POTASSIUM CHLORIDE AND MAGNESIUM CHLORIDE: 526; 502; 368; 37; 30 INJECTION, SOLUTION INTRAVENOUS at 01:03

## 2024-03-01 RX ADMIN — ONDANSETRON 4 MG: 2 INJECTION INTRAMUSCULAR; INTRAVENOUS at 02:03

## 2024-03-01 RX ADMIN — PHENYLEPHRINE HYDROCHLORIDE 200 MCG: 10 INJECTION INTRAVENOUS at 02:03

## 2024-03-01 RX ADMIN — IPRATROPIUM BROMIDE AND ALBUTEROL SULFATE 3 ML: 2.5; .5 SOLUTION RESPIRATORY (INHALATION) at 04:03

## 2024-03-01 RX ADMIN — PROCHLORPERAZINE EDISYLATE 5 MG: 5 INJECTION INTRAMUSCULAR; INTRAVENOUS at 04:03

## 2024-03-01 RX ADMIN — CEFAZOLIN 2 G: 2 INJECTION, POWDER, FOR SOLUTION INTRAMUSCULAR; INTRAVENOUS at 02:03

## 2024-03-01 RX ADMIN — FENTANYL CITRATE 50 MCG: 50 INJECTION, SOLUTION INTRAMUSCULAR; INTRAVENOUS at 02:03

## 2024-03-01 RX ADMIN — SUCCINYLCHOLINE CHLORIDE 100 MG: 20 INJECTION, SOLUTION INTRAMUSCULAR; INTRAVENOUS at 02:03

## 2024-03-01 RX ADMIN — SODIUM CHLORIDE: 9 INJECTION, SOLUTION INTRAVENOUS at 08:03

## 2024-03-01 RX ADMIN — LIDOCAINE HYDROCHLORIDE 60 MG: 20 INJECTION, SOLUTION INTRAVENOUS at 02:03

## 2024-03-01 RX ADMIN — SODIUM CHLORIDE, SODIUM GLUCONATE, SODIUM ACETATE, POTASSIUM CHLORIDE AND MAGNESIUM CHLORIDE: 526; 502; 368; 37; 30 INJECTION, SOLUTION INTRAVENOUS at 03:03

## 2024-03-01 RX ADMIN — CEFAZOLIN 2 G: 2 INJECTION, POWDER, FOR SOLUTION INTRAMUSCULAR; INTRAVENOUS at 11:03

## 2024-03-01 RX ADMIN — DEXAMETHASONE SODIUM PHOSPHATE 4 MG: 4 INJECTION, SOLUTION INTRA-ARTICULAR; INTRALESIONAL; INTRAMUSCULAR; INTRAVENOUS; SOFT TISSUE at 02:03

## 2024-03-01 RX ADMIN — MORPHINE SULFATE 2 MG: 2 INJECTION, SOLUTION INTRAMUSCULAR; INTRAVENOUS at 07:03

## 2024-03-01 RX ADMIN — MUPIROCIN: 20 OINTMENT TOPICAL at 08:03

## 2024-03-01 RX ADMIN — PROPOFOL 100 MG: 10 INJECTION, EMULSION INTRAVENOUS at 02:03

## 2024-03-01 RX ADMIN — ROCURONIUM BROMIDE 10 MG: 10 INJECTION, SOLUTION INTRAVENOUS at 02:03

## 2024-03-01 NOTE — ANESTHESIA POSTPROCEDURE EVALUATION
Anesthesia Post Evaluation    Patient: Arleen Zarate    Procedure(s) Performed: Procedure(s) (LRB):  HEMIARTHROPLASTY, HIP (Right)    Final Anesthesia Type: general      Patient location during evaluation: PACU  Patient participation: Yes- Able to Participate  Level of consciousness: sedated and awake  Post-procedure vital signs: reviewed and stable  Pain management: adequate  Airway patency: patent    PONV status at discharge: No PONV  Anesthetic complications: no    Vika-operative Events Comments: Breathing tx in pacu.  Stable course   Cardiovascular status: blood pressure returned to baseline, hypertensive and hemodynamically stable  Respiratory status: spontaneous ventilation  Hydration status: euvolemic  Follow-up not needed.              Vitals Value Taken Time   /68 03/01/24 1645   Temp 36.2 °C (97.2 °F) 03/01/24 1605   Pulse 76 03/01/24 1650   Resp 14 03/01/24 1650   SpO2 100 % 03/01/24 1650         Event Time   Out of Recovery 03/01/2024 16:50:00         Pain/Amee Score: Pain Rating Prior to Med Admin: 6 (3/1/2024  4:35 PM)  Pain Rating Post Med Admin: 2 (3/1/2024  4:48 PM)  Amee Score: 9 (3/1/2024  4:40 PM)

## 2024-03-01 NOTE — PROGRESS NOTES
"[facility] Medicine  Progress Note    Patient Name: Arleen Zarate  MRN: [id number]  Patient Class: IP- Inpatient   Admission Date: 2/28/2024  Length of Stay: 2 days  Attending Physician: Jacoby Multani MD  Primary Care Provider: Perry Jorge MD        Subjective:     Principal Problem:Closed fracture of neck of right femur        HPI:  Arleen Zarate is a 89-year-old female who was transferred from [facility] for right hip fracture.  Patient reports she sustained a mechanical fall this morning while making a per bed.  She reports falling striking her right hip on the floor also with injury to head.  She denies any loss of consciousness or cervical pain.  She describes the pain to the right hip is intense.  She rates the pain at 10/10 at worst.  The pain is worse with any movement of the right lower extremity.  The pain is minimally relieved with remaining still and positioning. Previous medical history includes hypertension, GERD, arthritis, hyperlipidemia, osteopenia.  ER workup at outside facility:  CBC and CMP were unremarkable.  CT of the brain was negative for any acute findings.  CT of the cervical spine were negative for any acute findings.  CTA of the chest was negative for pulmonary embolism or acute findings.  X-ray of the right hip demonstrates a sub cap fracture of the right femoral neck.  Patient admitted to Hospital Medicine for treatment and management.  Orthopedics was consulted and plans to take patient to OR on Friday.    Overview/Hospital Course:  Arleen Zarate is a 89 year old female with a past medical history of HTN and GERD who presented with a R femoral neck fracture after a mechanical fall. Orthopedic Surgery will take the patient to the OR 3/1. PT/OT has been consulted.    Interval History: see "Hospital Course"    Review of Systems   Musculoskeletal:  Positive for arthralgias, gait problem and myalgias.     Objective:     Vital Signs (Most " Recent):  Temp: 98.1 °F (36.7 °C) (03/01/24 0806)  Pulse: 96 (03/01/24 0806)  Resp: 14 (03/01/24 0806)  BP: (!) 146/65 (03/01/24 0806)  SpO2: 95 % (03/01/24 0806) Vital Signs (24h Range):  Temp:  [97.8 °F (36.6 °C)-99 °F (37.2 °C)] 98.1 °F (36.7 °C)  Pulse:  [] 96  Resp:  [14-18] 14  SpO2:  [93 %-99 %] 95 %  BP: (128-170)/(59-85) 146/65     Weight: 63.5 kg (139 lb 15.9 oz)  Body mass index is 28.27 kg/m².    Intake/Output Summary (Last 24 hours) at 3/1/2024 1037  Last data filed at 3/1/2024 0410  Gross per 24 hour   Intake 320 ml   Output 800 ml   Net -480 ml         Physical Exam  Vitals and nursing note reviewed.   HENT:      Head: Normocephalic and atraumatic.      Right Ear: External ear normal.      Left Ear: External ear normal.      Nose: Nose normal.      Mouth/Throat:      Mouth: Mucous membranes are moist.      Pharynx: Oropharynx is clear.   Eyes:      Extraocular Movements: Extraocular movements intact.      Conjunctiva/sclera: Conjunctivae normal.   Cardiovascular:      Rate and Rhythm: Normal rate and regular rhythm.      Pulses: Normal pulses.      Heart sounds: Normal heart sounds.   Pulmonary:      Effort: Pulmonary effort is normal.      Breath sounds: Normal breath sounds.   Abdominal:      General: Bowel sounds are normal.      Palpations: Abdomen is soft.   Musculoskeletal:         General: Signs of injury present.      Cervical back: Normal range of motion and neck supple.      Right lower leg: No edema.      Left lower leg: No edema.      Comments: RLE shortened and externally rotated.   Neurological:      Mental Status: She is alert. Mental status is at baseline.   Psychiatric:         Mood and Affect: Mood normal.         Behavior: Behavior normal.             Significant Labs: All pertinent labs within the past 24 hours have been reviewed.    Significant Imaging: I have reviewed all pertinent imaging results/findings within the past 24 hours.    Assessment/Plan:      * Closed fracture  of neck of right femur  -Fall precautions  -NWB RLE  -Neurovascular checks  -PRN analgesics  -Orthopedic Surgery consulted; 3/1 OR  -PT/OT      Thrombocytopenia  -Trend platelets with CBC    Anemia  Stable.  -Trend Hgb with CBC    Gastroesophageal reflux disease  -PPI      Essential hypertension  -Lisinopril  -Hold CCB  -Continue to monitor      VTE Risk Mitigation (From admission, onward)           Ordered     enoxaparin injection 40 mg  Every 24 hours         03/01/24 1036     Place MAURICIO hose  Until discontinued         02/28/24 1917     IP VTE HIGH RISK PATIENT  Once         02/28/24 1917     Place sequential compression device  Until discontinued         02/28/24 1917                    Discharge Planning   NICK: 3/4/2024     Code Status: Full Code   Is the patient medically ready for discharge?:     Reason for patient still in hospital (select all that apply): Patient trending condition, Treatment, Consult recommendations, PT / OT recommendations, and Pending disposition  Discharge Plan A: Rehab                  Jacoby Multani MD  Department of Hospital Medicine   [facility]/Surg

## 2024-03-01 NOTE — CONSULTS
Past Medical History:   Diagnosis Date    Anemia 3/1/2024    Anxiety     Arthritis     fingers and back    Depression     Disorder of kidney and ureter     Foot pain, left 9/18/2013    GERD (gastroesophageal reflux disease)     Hiatal hernia     Hyperlipidemia     Hypertension     Reflux     on meds    Tendon tear 8/21/2013    Tendonitis 8/21/2013       Past Surgical History:   Procedure Laterality Date    CHOLECYSTECTOMY      EYE SURGERY Bilateral     cataracts and implants    TONSILLECTOMY           Current Facility-Administered Medications:     acetaminophen tablet 650 mg, 650 mg, Oral, Q4H PRN, Dimas Bone NP    albuterol-ipratropium 2.5 mg-0.5 mg/3 mL nebulizer solution 3 mL, 3 mL, Nebulization, Q4H PRN, Dimas Bone NP    aluminum-magnesium hydroxide-simethicone 200-200-20 mg/5 mL suspension 30 mL, 30 mL, Oral, QID PRN, Dimas Bone NP    electrolyte-S (ISOLYTE), , Intravenous, Continuous, Oracio Bowen MD, Last Rate: 10 mL/hr at 03/01/24 1352, New Bag at 03/01/24 1352    [START ON 3/2/2024] enoxaparin injection 40 mg, 40 mg, Subcutaneous, Q24H (prophylaxis, 1700), Jacoby Multani MD    lactated ringers infusion, , Intravenous, Continuous, Oracio Bowen MD    LIDOcaine (PF) 10 mg/ml (1%) injection 5 mg, 0.5 mL, Intradermal, Once, Oracio Bowen MD    lisinopriL tablet 30 mg, 30 mg, Oral, Daily, Jacoby Multani MD, 30 mg at 02/29/24 1225    magnesium oxide tablet 800 mg, 800 mg, Oral, PRN, Dimas Bone NP, 800 mg at 02/29/24 1449    magnesium oxide tablet 800 mg, 800 mg, Oral, PRN, Dimas Bone NP    melatonin tablet 9 mg, 9 mg, Oral, Nightly PRN, Dimas Bone NP, 9 mg at 02/28/24 2015    morphine injection 2 mg, 2 mg, Intravenous, Q4H PRN, Dimas Bone, NP, 2 mg at 03/01/24 0739    multivitamin tablet, 1 tablet, Oral, Daily, Jacoby Multani MD, 1 tablet at 02/29/24 1225    naloxone 0.4 mg/mL injection 0.02 mg, 0.02 mg, Intravenous, PRN,  Dimas Bone, NP    ondansetron injection 4 mg, 4 mg, Intravenous, Q8H PRN, Dimas Bone, JEYSON    pantoprazole EC tablet 40 mg, 40 mg, Oral, Daily, Jacoby Multani MD, 40 mg at 02/29/24 1225    potassium bicarbonate disintegrating tablet 35 mEq, 35 mEq, Oral, PRN, Dimas Bone, NP    potassium bicarbonate disintegrating tablet 50 mEq, 50 mEq, Oral, PRN, Dimas Bone, NP    potassium bicarbonate disintegrating tablet 60 mEq, 60 mEq, Oral, PRN, Dimas Bone, NP    potassium, sodium phosphates 280-160-250 mg packet 2 packet, 2 packet, Oral, PRN, Dimas Bone, NP    potassium, sodium phosphates 280-160-250 mg packet 2 packet, 2 packet, Oral, PRN, Dimas Bone, NP    potassium, sodium phosphates 280-160-250 mg packet 2 packet, 2 packet, Oral, PRN, Dimas Bone, NP    simethicone chewable tablet 80 mg, 1 tablet, Oral, QID PRN, Dimas Bone, JEYSON    sodium chloride 0.9% flush 10 mL, 10 mL, Intravenous, Q8H PRN, Dimas Bone, NP    Allergies as of 02/28/2024 - Reviewed 02/28/2024   Allergen Reaction Noted    Buspar [buspirone] Other (See Comments) 11/04/2021    Doxy  01/18/2012    Effexor [venlafaxine] Other (See Comments) 05/08/2014    Biaxin [clarithromycin] Other (See Comments) 04/08/2013    Codeine Other (See Comments) 05/11/2015    Statins-hmg-coa reductase inhibitors  06/16/2017    Doxycycline Other (See Comments) 07/29/2013       Family History   Problem Relation Age of Onset    Hearing loss Son        Social History     Socioeconomic History    Marital status:    Tobacco Use    Smoking status: Never     Passive exposure: Never    Smokeless tobacco: Never   Substance and Sexual Activity    Alcohol use: Yes     Alcohol/week: 1.0 standard drink of alcohol     Types: 1 Glasses of wine per week     Comment: rarely    Drug use: No    Sexual activity: Not Currently     Social Determinants of Health     Financial Resource Strain: Low Risk  (1/29/2024)     Overall Financial Resource Strain (CARDIA)     Difficulty of Paying Living Expenses: Not hard at all   Food Insecurity: No Food Insecurity (1/29/2024)    Hunger Vital Sign     Worried About Running Out of Food in the Last Year: Never true     Ran Out of Food in the Last Year: Never true   Transportation Needs: No Transportation Needs (1/29/2024)    PRAPARE - Transportation     Lack of Transportation (Medical): No     Lack of Transportation (Non-Medical): No   Physical Activity: Sufficiently Active (1/29/2024)    Exercise Vital Sign     Days of Exercise per Week: 7 days     Minutes of Exercise per Session: 30 min   Stress: No Stress Concern Present (1/29/2024)    [employer] - Occupational Stress Questionnaire     Feeling of Stress : Not at all   Social Connections: Moderately Isolated (1/29/2024)    Social Connection and Isolation Panel [NHANES]     Frequency of Communication with Friends and Family: Twice a week     Frequency of Social Gatherings with Friends and Family: More than three times a week     Attends [employer] Services: More than 4 times per year     Active Member of Clubs or Organizations: No     Attends Club or Organization Meetings: Never     Marital Status:    Housing Stability: Low Risk  (1/29/2024)    Housing Stability Vital Sign     Unable to Pay for Housing in the Last Year: No     Number of Places Lived in the Last Year: 1     Unstable Housing in the Last Year: No         CC:  Right hip pain    HPI:  Patient is a 89-year-old female status post a same-level fall admitted by hospitalist services I was consulted further management of a femoral neck fracture.  Her only complaints at the present time her hip pain.  It has improved with relative rest.    Review of Systems   Constitution: Negative for chills and fever.   HENT: Negative for headaches and blurry vision.   Cardiovascular: Negative for chest pain, irregular heartbeat, leg swelling and palpitations.    Respiratory: Negative for cough and shortness of breath.   Endocrine: Negative for polyuria.   Hematologic/Lymphatic: Negative for bleeding problem. Does not bruise/bleed easily.   Skin: Negative for poor wound healing and rash.   Musculoskeletal: Negative for joint pain. Negative for arthritis, joint swelling, muscle weakness and myalgias.   Gastrointestinal: Negative for abdominal pain, heartburn, melena, nausea and vomiting.   Genitourinary: Negative for bladder incontinence and dysuria.   Neurological: Negative for numbness.   Psychiatric/Behavioral: Negative for depression. The patient is not nervous/anxious.     PE:  Temp:  [97.7 °F (36.5 °C)-99 °F (37.2 °C)] 98.1 °F (36.7 °C)  Pulse:  [] 92  Resp:  [14-18] 18  SpO2:  [93 %-99 %] 98 %  BP: (135-170)/(63-85) 135/63    Constitutional:   Patient is alert  and oriented in no acute distress  HEENT:  normocephalic atraumatic; PERRL EOMI  Neck:  Supple without adenopathy  Cardiovascular:  Normal rate and rhythm  Pulmonary:  Normal respiratory effort normal chest wall expansion  Abdominal:  Nonprotuberant nondistended  Musculoskeletal:  Patient has mild tenderness over the right greater trochanter  Limited range of motion of her hip secondary to pain.  Moves foot toes and ankles without pain she has brisk capillary refill of her digits.  Neurological:  No focal defect; cranial nerves 2-12 grossly intact  Psychiatric/behavioral:  Mood and behavior normal    Xrays:  Radiographs right hip consistent with a varus angulated femoral neck fracture on the right side    Labs:    Recent Results (from the past 24 hour(s))   Comprehensive Metabolic Panel (CMP)    Collection Time: 03/01/24  3:21 AM   Result Value Ref Range    Sodium 136 136 - 145 mmol/L    Potassium 4.0 3.5 - 5.1 mmol/L    Chloride 104 95 - 110 mmol/L    CO2 24 23 - 29 mmol/L    Glucose 108 70 - 110 mg/dL    BUN 17 10 - 30 mg/dL    Creatinine 0.8 0.5 - 1.4 mg/dL    Calcium 8.6 (L) 8.7 - 10.5 mg/dL    Total  Protein 5.7 (L) 6.0 - 8.4 g/dL    Albumin 3.1 (L) 3.5 - 5.2 g/dL    Total Bilirubin 0.5 0.1 - 1.0 mg/dL    Alkaline Phosphatase 84 55 - 135 U/L    AST 42 (H) 10 - 40 U/L    ALT 44 10 - 44 U/L    eGFR >60 >60 mL/min/1.73 m^2    Anion Gap 8 8 - 16 mmol/L   Magnesium    Collection Time: 03/01/24  3:21 AM   Result Value Ref Range    Magnesium 1.8 1.6 - 2.6 mg/dL   Phosphorus    Collection Time: 03/01/24  3:21 AM   Result Value Ref Range    Phosphorus 2.0 (L) 2.7 - 4.5 mg/dL   CBC with Automated Differential    Collection Time: 03/01/24  3:21 AM   Result Value Ref Range    WBC 8.79 3.90 - 12.70 K/uL    RBC 3.62 (L) 4.00 - 5.40 M/uL    Hemoglobin 11.0 (L) 12.0 - 16.0 g/dL    Hematocrit 33.5 (L) 37.0 - 48.5 %    MCV 93 82 - 98 fL    MCH 30.4 27.0 - 31.0 pg    MCHC 32.8 32.0 - 36.0 g/dL    RDW 13.2 11.5 - 14.5 %    Platelets 126 (L) 150 - 450 K/uL    MPV 11.0 9.2 - 12.9 fL    Immature Granulocytes 0.3 0.0 - 0.5 %    Gran # (ANC) 6.4 1.8 - 7.7 K/uL    Immature Grans (Abs) 0.03 0.00 - 0.04 K/uL    Lymph # 1.1 1.0 - 4.8 K/uL    Mono # 1.1 (H) 0.3 - 1.0 K/uL    Eos # 0.3 0.0 - 0.5 K/uL    Baso # 0.03 0.00 - 0.20 K/uL    nRBC 0 0 /100 WBC    Gran % 72.6 38.0 - 73.0 %    Lymph % 12.1 (L) 18.0 - 48.0 %    Mono % 11.9 4.0 - 15.0 %    Eosinophil % 2.8 0.0 - 8.0 %    Basophil % 0.3 0.0 - 1.9 %    Differential Method Automated        A:  Right femoral neck fracture      P:  I have discussed medical condition treatment options with her and her family at length.  I have discussed the indications f alternatives and potential complications of the planned right hip hemiarthroplasty/bipolar to include but not limited to bleeding infection damage to neurovascular structures leg length discrepancy periprosthetic fracture joint instability or joint stiffness malunion nonunion ongoing pain and need for further surgery.  Patient expresses understanding and agrees to proceed.  We will proceed when medically an anesthetic was cleared.  Maintain  her NPO.

## 2024-03-01 NOTE — OP NOTE
OPERATIVE NOTE     DATE: 3/1/2024   TIME:  3:51 p.m.    PATIENT NAME:  Arleen Zarate    PRE-OPERATIVE DIAGNOSIS: Rightdisplaced femoral neck fracture.     POST-OPERATIVE DIAGNOSIS: Rightdisplaced femoral neck fracture.    PROCEDURE: Right hip hemiarthroplasty    SURGEON: Wan Carmona MD    ANESTHESIA TYPE: Spinal    SPECIMENS SENT: NONE     COMPLICATIONS: NONE     BLOOD LOSS: 300 cc     ASSISTANT: None    The mobility limitation cannot be sufficiently resolved by the use of a cane. Patient's functional mobility deficit can be sufficiently resolved with the use of a (Rolling Walker or Walker). Patient's mobility limitation significantly impairs their ability to participate in one of more activities of daily living. The use of a (RW or Walker) will significantly improve the patient's ability to participate in MRADLS and the patient will use it on regular basis in the home.     PROCEDURE IN DETAIL: After appropriate informed consent was obtained the patient was taken to the OR and placed in the lateral position on the beanbag.  The Right lower extremity was prepped and draped in the usual sterile fashion.  An approximately 15-cm incision was made in line with the tip of the greater trochanter overlying the shaft of the femur using a #10 blade through the skin and subcutaneous tissue.  The flaps were raised and a lap was used to clear the bursal tissue from the lateral aspect of the greater trochanter.  A 1-cm incision was made through the tensor fascia marleny and this was transected in line with the skin incision using a curved wall scissors proximally and distally.  A Charnley retractor was placed deep to this and the gluteus medius muscle was split approximately 2 cm in line with its fibers. Bovie cautery was used to raise the fascia of the gluteus medius and part of the vastus lateralis from the lateral border of the greater trochanter with the soft tissue attachments left intact for repair at the end of  the case.  The Gluteus minimus tendon was identified and transected  and a #1 Ethibond suture was placed for repair at the end of the case as well.  The capsule was incised in an H-type fashion and the capsule was tagged using #1 Ethibond suture for repair at the end of the case.    The leg was externally rotated with the knee bent. The femoral neck fracture was well visualized and a saw blade used to make a 1 cm oblique cut 1 fingerbreath above the lesser trochanter.  The femoral canal finder was placed and then the canal was broached up to a #4 stem.  The neck cut was freshened with a calcar planar with the broach in place.  Xray showed very good position of the stem and fill of the canal within the femur.  The broach was removed.  The femoral head was removed using a corkscrew and the ligamentum was excised from the acetabulum using bovie cautery.  The canal was prepped for the implant using tampon suction.    A size 4 stem  was placed in approximately 15 degrees of anteversion.   The joint and surrounding tissues were irrigated with normal saline using pulsivac lavage.  A 0 offset neck and a  appropriate size -mm bipolar head were trialed and found to have good fit without any shuck.  The joint showed good reduction with good leg length compared to the other extremity.  Therefore, a 0 offset neck and a appropriate size -mm bipolar head final implants were placed an gently malleted onto the stem.  The hip was then reduced.    The incision was irrigated with normal saline.  The capsule was re-approximated using #1 Ethibond, which had previously been placed.  The deep fascia layer was  re-approximated using #1 vicryl in a running fashion.  The deep fatty layer was re-approximated using 0-monocryl in an interrupted fashion. The subcutaneous layer was re-approximated using 2.0-monocryl in an interrupted fashion.  The skin was re-approximated using a running 3-0 Monocryl  Sterile dressing using xeroform, , 4x8s, and  Microfoam tape was placed.  The patient tolerated the procedure well without complications.  I was present and scrubbed for the entire case.

## 2024-03-01 NOTE — ANESTHESIA PROCEDURE NOTES
Intubation    Date/Time: 3/1/2024 2:36 PM    Performed by: Wan Lindo CRNA  Authorized by: Frank Rodriguez MD    Intubation:     Induction:  Intravenous    Intubated:  Postinduction    Mask Ventilation:  Easy mask    Attempts:  1    Attempted By:  CRNA    Method of Intubation:  Video laryngoscopy    Laryngeal View Grade: Grade I - full view of cords      Difficult Airway Encountered?: No      Complications:  None    Airway Device:  Oral endotracheal tube    Airway Device Size:  7.0    Style/Cuff Inflation:  Cuffed (inflated to minimal occlusive pressure)    Inflation Amount (mL):  5    Tube secured:  21    Secured at:  The lips    Placement Verified By:  Capnometry    Complicating Factors:  None    Findings Post-Intubation:  BS equal bilateral

## 2024-03-01 NOTE — TRANSFER OF CARE
"Anesthesia Transfer of Care Note    Patient: Arleen Zarate    Procedure(s) Performed: Procedure(s) (LRB):  HEMIARTHROPLASTY, HIP (Right)    Patient location: PACU    Anesthesia Type: general    Transport from OR: Transported from OR on 2-3 L/min O2 by NC with adequate spontaneous ventilation    Post pain: adequate analgesia    Post assessment: no apparent anesthetic complications    Post vital signs: stable    Level of consciousness: awake    Nausea/Vomiting: no nausea/vomiting    Complications: none    Transfer of care protocol was followed      Last vitals: Visit Vitals  /63 (BP Location: Right arm, Patient Position: Lying)   Pulse 92   Temp 36.7 °C (98.1 °F) (Skin)   Resp 18   Ht 4' 11" (1.499 m)   Wt 63.5 kg (139 lb 15.9 oz)   SpO2 98%   Breastfeeding No   BMI 28.27 kg/m²     "

## 2024-03-01 NOTE — SUBJECTIVE & OBJECTIVE
"Interval History: see "Hospital Course"    Review of Systems   Musculoskeletal:  Positive for arthralgias, gait problem and myalgias.     Objective:     Vital Signs (Most Recent):  Temp: 98.1 °F (36.7 °C) (03/01/24 0806)  Pulse: 96 (03/01/24 0806)  Resp: 14 (03/01/24 0806)  BP: (!) 146/65 (03/01/24 0806)  SpO2: 95 % (03/01/24 0806) Vital Signs (24h Range):  Temp:  [97.8 °F (36.6 °C)-99 °F (37.2 °C)] 98.1 °F (36.7 °C)  Pulse:  [] 96  Resp:  [14-18] 14  SpO2:  [93 %-99 %] 95 %  BP: (128-170)/(59-85) 146/65     Weight: 63.5 kg (139 lb 15.9 oz)  Body mass index is 28.27 kg/m².    Intake/Output Summary (Last 24 hours) at 3/1/2024 1037  Last data filed at 3/1/2024 0410  Gross per 24 hour   Intake 320 ml   Output 800 ml   Net -480 ml         Physical Exam  Vitals and nursing note reviewed.   HENT:      Head: Normocephalic and atraumatic.      Right Ear: External ear normal.      Left Ear: External ear normal.      Nose: Nose normal.      Mouth/Throat:      Mouth: Mucous membranes are moist.      Pharynx: Oropharynx is clear.   Eyes:      Extraocular Movements: Extraocular movements intact.      Conjunctiva/sclera: Conjunctivae normal.   Cardiovascular:      Rate and Rhythm: Normal rate and regular rhythm.      Pulses: Normal pulses.      Heart sounds: Normal heart sounds.   Pulmonary:      Effort: Pulmonary effort is normal.      Breath sounds: Normal breath sounds.   Abdominal:      General: Bowel sounds are normal.      Palpations: Abdomen is soft.   Musculoskeletal:         General: Signs of injury present.      Cervical back: Normal range of motion and neck supple.      Right lower leg: No edema.      Left lower leg: No edema.      Comments: RLE shortened and externally rotated.   Neurological:      Mental Status: She is alert. Mental status is at baseline.   Psychiatric:         Mood and Affect: Mood normal.         Behavior: Behavior normal.             Significant Labs: All pertinent labs within the past 24 " hours have been reviewed.    Significant Imaging: I have reviewed all pertinent imaging results/findings within the past 24 hours.

## 2024-03-01 NOTE — PLAN OF CARE
Problem: Adult Inpatient Plan of Care  Goal: Plan of Care Review  Outcome: Ongoing, Progressing  Goal: Patient-Specific Goal (Individualized)  Outcome: Ongoing, Progressing  Goal: Absence of Hospital-Acquired Illness or Injury  Outcome: Ongoing, Progressing  Goal: Optimal Comfort and Wellbeing  Outcome: Ongoing, Progressing  Goal: Readiness for Transition of Care  Outcome: Ongoing, Progressing     Problem: Fall Injury Risk  Goal: Absence of Fall and Fall-Related Injury  Outcome: Ongoing, Progressing     Problem: Skin Injury Risk Increased  Goal: Skin Health and Integrity  Outcome: Ongoing, Progressing     Problem: Infection  Goal: Absence of Infection Signs and Symptoms  Outcome: Ongoing, Progressing     Problem: Pain Acute  Goal: Acceptable Pain Control and Functional Ability  Outcome: Ongoing, Progressing     Problem: Bleeding (Orthopaedic Fracture)  Goal: Absence of Bleeding  Outcome: Ongoing, Progressing     Problem: Embolism (Orthopaedic Fracture)  Goal: Absence of Embolism Signs and Symptoms  Outcome: Ongoing, Progressing     Problem: Fracture Stabilization and Management (Orthopaedic Fracture)  Goal: Fracture Stability  Outcome: Ongoing, Progressing     Problem: Functional Ability Impaired (Orthopaedic Fracture)  Goal: Optimal Functional Ability  Outcome: Ongoing, Progressing     Problem: Infection (Orthopaedic Fracture)  Goal: Absence of Infection Signs and Symptoms  Outcome: Ongoing, Progressing     Problem: Neurovascular Compromise (Orthopaedic Fracture)  Goal: Effective Tissue Perfusion  Outcome: Ongoing, Progressing     Problem: Pain (Orthopaedic Fracture)  Goal: Acceptable Pain Control  Outcome: Ongoing, Progressing     Problem: Respiratory Compromise (Orthopaedic Fracture)  Goal: Effective Oxygenation and Ventilation  Outcome: Ongoing, Progressing

## 2024-03-01 NOTE — ANESTHESIA PREPROCEDURE EVALUATION
03/01/2024  Arleen Zarate is a 89 y.o., female.      Pre-op Assessment    I have reviewed the Patient Summary Reports.     I have reviewed the Nursing Notes. I have reviewed the NPO Status.   I have reviewed the Medications.     Review of Systems  Hematology/Oncology:       -- Anemia:               Hematology Comments: H/H 11/33; plt 126k                    Cardiovascular:     Hypertension                                        Pulmonary:  Pulmonary Normal                       Renal/:  Chronic Renal Disease, CKD                Hepatic/GI:    Hiatal Hernia, GERD, well controlled             Musculoskeletal:  Arthritis   Right hip fxr            Neurological:    Neuromuscular Disease,                                   Endocrine:  Endocrine Normal            Psych:  Psychiatric History anxiety                 Physical Exam  General: Well nourished    Airway:  Mallampati: II   Neck ROM: Normal ROM    Dental:  Intact    Chest/Lungs:  Clear to auscultation, Normal Respiratory Rate    Heart:  Rate: Normal  Rhythm: Regular Rhythm        Anesthesia Plan  Type of Anesthesia, risks & benefits discussed:    Anesthesia Type: Gen Supraglottic Airway, Gen ETT  Intra-op Monitoring Plan: Standard ASA Monitors  Post Op Pain Control Plan: multimodal analgesia and IV/PO Opioids PRN  Induction:  IV and Inhalation  Informed Consent: Informed consent signed with the Patient and all parties understand the risks and agree with anesthesia plan.  All questions answered.   ASA Score: 3    Ready For Surgery From Anesthesia Perspective.     .

## 2024-03-01 NOTE — CARE UPDATE
02/29/24 2006   Patient Assessment/Suction   Level of Consciousness (AVPU) alert   Respiratory Effort Unlabored   PRE-TX-O2   Device (Oxygen Therapy) nasal cannula   $ Is the patient on Low Flow Oxygen? Yes   Flow (L/min) 1   SpO2 96 %   Pulse Oximetry Type Intermittent   $ Pulse Oximetry - Multiple Charge Pulse Oximetry - Multiple   Pulse 74   Resp 18   Aerosol Therapy   $ Aerosol Therapy Charges PRN treatment not required

## 2024-03-01 NOTE — ASSESSMENT & PLAN NOTE
-Fall precautions  -NWB RLE  -Neurovascular checks  -PRN analgesics  -Orthopedic Surgery consulted; 3/1 OR  -PT/OT

## 2024-03-02 LAB
ALBUMIN SERPL BCP-MCNC: 2.8 G/DL (ref 3.5–5.2)
ALP SERPL-CCNC: 76 U/L (ref 55–135)
ALT SERPL W/O P-5'-P-CCNC: 30 U/L (ref 10–44)
ANION GAP SERPL CALC-SCNC: 10 MMOL/L (ref 8–16)
AST SERPL-CCNC: 32 U/L (ref 10–40)
BASOPHILS # BLD AUTO: 0.01 K/UL (ref 0–0.2)
BASOPHILS NFR BLD: 0.1 % (ref 0–1.9)
BILIRUB SERPL-MCNC: 0.3 MG/DL (ref 0.1–1)
BUN SERPL-MCNC: 19 MG/DL (ref 10–30)
CALCIUM SERPL-MCNC: 8.3 MG/DL (ref 8.7–10.5)
CHLORIDE SERPL-SCNC: 105 MMOL/L (ref 95–110)
CO2 SERPL-SCNC: 23 MMOL/L (ref 23–29)
CREAT SERPL-MCNC: 0.8 MG/DL (ref 0.5–1.4)
DIFFERENTIAL METHOD BLD: ABNORMAL
EOSINOPHIL # BLD AUTO: 0 K/UL (ref 0–0.5)
EOSINOPHIL NFR BLD: 0.1 % (ref 0–8)
ERYTHROCYTE [DISTWIDTH] IN BLOOD BY AUTOMATED COUNT: 13 % (ref 11.5–14.5)
EST. GFR  (NO RACE VARIABLE): >60 ML/MIN/1.73 M^2
GLUCOSE SERPL-MCNC: 112 MG/DL (ref 70–110)
HCT VFR BLD AUTO: 31.1 % (ref 37–48.5)
HGB BLD-MCNC: 9.9 G/DL (ref 12–16)
IMM GRANULOCYTES # BLD AUTO: 0.05 K/UL (ref 0–0.04)
IMM GRANULOCYTES NFR BLD AUTO: 0.6 % (ref 0–0.5)
LYMPHOCYTES # BLD AUTO: 0.6 K/UL (ref 1–4.8)
LYMPHOCYTES NFR BLD: 7.2 % (ref 18–48)
MAGNESIUM SERPL-MCNC: 2.1 MG/DL (ref 1.6–2.6)
MCH RBC QN AUTO: 30.3 PG (ref 27–31)
MCHC RBC AUTO-ENTMCNC: 31.8 G/DL (ref 32–36)
MCV RBC AUTO: 95 FL (ref 82–98)
MONOCYTES # BLD AUTO: 1.1 K/UL (ref 0.3–1)
MONOCYTES NFR BLD: 12 % (ref 4–15)
NEUTROPHILS # BLD AUTO: 7.1 K/UL (ref 1.8–7.7)
NEUTROPHILS NFR BLD: 80 % (ref 38–73)
NRBC BLD-RTO: 0 /100 WBC
PHOSPHATE SERPL-MCNC: 3.5 MG/DL (ref 2.7–4.5)
PLATELET # BLD AUTO: 126 K/UL (ref 150–450)
PMV BLD AUTO: 10.5 FL (ref 9.2–12.9)
POTASSIUM SERPL-SCNC: 4.5 MMOL/L (ref 3.5–5.1)
PROT SERPL-MCNC: 5.5 G/DL (ref 6–8.4)
RBC # BLD AUTO: 3.27 M/UL (ref 4–5.4)
SODIUM SERPL-SCNC: 138 MMOL/L (ref 136–145)
WBC # BLD AUTO: 8.85 K/UL (ref 3.9–12.7)

## 2024-03-02 PROCEDURE — 63600175 PHARM REV CODE 636 W HCPCS: Performed by: ORTHOPAEDIC SURGERY

## 2024-03-02 PROCEDURE — 25000003 PHARM REV CODE 250: Performed by: STUDENT IN AN ORGANIZED HEALTH CARE EDUCATION/TRAINING PROGRAM

## 2024-03-02 PROCEDURE — 83735 ASSAY OF MAGNESIUM: CPT | Performed by: NURSE PRACTITIONER

## 2024-03-02 PROCEDURE — 25000003 PHARM REV CODE 250: Performed by: ORTHOPAEDIC SURGERY

## 2024-03-02 PROCEDURE — 27000221 HC OXYGEN, UP TO 24 HOURS

## 2024-03-02 PROCEDURE — 25000003 PHARM REV CODE 250: Performed by: NURSE PRACTITIONER

## 2024-03-02 PROCEDURE — 97162 PT EVAL MOD COMPLEX 30 MIN: CPT

## 2024-03-02 PROCEDURE — 80053 COMPREHEN METABOLIC PANEL: CPT | Performed by: NURSE PRACTITIONER

## 2024-03-02 PROCEDURE — 97110 THERAPEUTIC EXERCISES: CPT

## 2024-03-02 PROCEDURE — 63600175 PHARM REV CODE 636 W HCPCS: Performed by: NURSE PRACTITIONER

## 2024-03-02 PROCEDURE — 94761 N-INVAS EAR/PLS OXIMETRY MLT: CPT

## 2024-03-02 PROCEDURE — 36415 COLL VENOUS BLD VENIPUNCTURE: CPT | Performed by: NURSE PRACTITIONER

## 2024-03-02 PROCEDURE — 85025 COMPLETE CBC W/AUTO DIFF WBC: CPT | Performed by: NURSE PRACTITIONER

## 2024-03-02 PROCEDURE — 84100 ASSAY OF PHOSPHORUS: CPT | Performed by: NURSE PRACTITIONER

## 2024-03-02 PROCEDURE — 97530 THERAPEUTIC ACTIVITIES: CPT

## 2024-03-02 PROCEDURE — 99900035 HC TECH TIME PER 15 MIN (STAT)

## 2024-03-02 PROCEDURE — 11000001 HC ACUTE MED/SURG PRIVATE ROOM

## 2024-03-02 PROCEDURE — 63600175 PHARM REV CODE 636 W HCPCS: Performed by: STUDENT IN AN ORGANIZED HEALTH CARE EDUCATION/TRAINING PROGRAM

## 2024-03-02 RX ORDER — FLUTICASONE PROPIONATE 50 MCG
2 SPRAY, SUSPENSION (ML) NASAL DAILY
Status: DISCONTINUED | OUTPATIENT
Start: 2024-03-03 | End: 2024-03-04 | Stop reason: HOSPADM

## 2024-03-02 RX ADMIN — MUPIROCIN: 20 OINTMENT TOPICAL at 08:03

## 2024-03-02 RX ADMIN — MELATONIN TAB 3 MG 9 MG: 3 TAB at 08:03

## 2024-03-02 RX ADMIN — THERA TABS 1 TABLET: TAB at 08:03

## 2024-03-02 RX ADMIN — MORPHINE SULFATE 2 MG: 2 INJECTION, SOLUTION INTRAMUSCULAR; INTRAVENOUS at 05:03

## 2024-03-02 RX ADMIN — PANTOPRAZOLE SODIUM 40 MG: 40 TABLET, DELAYED RELEASE ORAL at 08:03

## 2024-03-02 RX ADMIN — LISINOPRIL 30 MG: 10 TABLET ORAL at 08:03

## 2024-03-02 RX ADMIN — CEFAZOLIN 2 G: 2 INJECTION, POWDER, FOR SOLUTION INTRAMUSCULAR; INTRAVENOUS at 06:03

## 2024-03-02 RX ADMIN — MORPHINE SULFATE 2 MG: 2 INJECTION, SOLUTION INTRAMUSCULAR; INTRAVENOUS at 07:03

## 2024-03-02 RX ADMIN — MORPHINE SULFATE 2 MG: 2 INJECTION, SOLUTION INTRAMUSCULAR; INTRAVENOUS at 12:03

## 2024-03-02 RX ADMIN — ENOXAPARIN SODIUM 40 MG: 40 INJECTION SUBCUTANEOUS at 04:03

## 2024-03-02 RX ADMIN — ACETAMINOPHEN 650 MG: 325 TABLET ORAL at 08:03

## 2024-03-02 NOTE — PT/OT/SLP EVAL
"Physical Therapy Evaluation    Patient Name:  Arleen Zarate   MRN:  [id number]    Recommendations:     Discharge Recommendations: High Intensity Therapy   Discharge Equipment Recommendations: none   Barriers to discharge: Decreased caregiver support    Assessment:     Arleen Zarate is a 89 y.o. female admitted with a medical diagnosis of Closed fracture of neck of right femur.  She presents with the following impairments/functional limitations: weakness, impaired endurance, impaired functional mobility, gait instability, impaired balance, decreased lower extremity function, decreased safety awareness, pain, impaired cardiopulmonary response to activity, orthopedic precautions .    Pt seen supine in bed, alert/motivated. Daughter in law arrived. Pt seen for thera ex in supine with good participation. Pt educated on R posterior hip precautions. A rolled blanket was made and taped and educated on its use and purpose to keep legs apart. Pt requiring mod assist x2 to sit EOB and maintaining balance- stated felt good to be off back. Pt stood with mod assist with cueing for technique and weight bearing. 2 side steps with onset of weakness/nausea. Pt then returned supine and repositioned. /59  .  Pt to benefit from IPR. Pt was functional and indep and lived at home with daughter in law and her new . .    Rehab Prognosis: Fair; patient would benefit from acute skilled PT services to address these deficits and reach maximum level of function.    Recent Surgery: Procedure(s) (LRB):  HEMIARTHROPLASTY, HIP (Right) 1 Day Post-Op    Plan:     During this hospitalization, patient to be seen daily to address the identified rehab impairments via gait training, therapeutic activities, therapeutic exercises and progress toward the following goals:    Plan of Care Expires:  24    Subjective   Stated her son  and her" daughter in law and her new  still takes care of me"  Pt was ambulatory, cleans her " bathroom and make her bed and does own laundry  Stated her mouth is parched- thankful for the frosty that her daughter in law brought  Chief Complaint: pain RH  Patient/Family Comments/goals: get well  Pain/Comfort:  Pain Rating 1:  (not rated)  Location - Side 1: Right  Location 1: hip  Pain Addressed 1: Reposition, Distraction, Cessation of Activity, Nurse notified    Patients cultural, spiritual, [employer] conflicts given the current situation:      Living Environment:  Home with family  Prior to admission, patients level of function was indep and ambulatory.  Equipment used at home: none.  DME owned (not currently used): none.  Upon discharge, patient will have assistance from family.    Objective:     Communicated with nurse Quinteros prior to session.  Patient found HOB elevated with oxygen, PureWick, telemetry, peripheral IV, bed alarm  upon PT entry to room.    General Precautions: Standard, fall, hearing impaired  Orthopedic Precautions:RLE weight bearing as tolerated, RLE posterior precautions   Braces: N/A  Respiratory Status: Room air    Exams:  Postural Exam:  Patient presented with the following abnormalities:    -       Rounded shoulders  -       Forward head  -       BMI 28.27  RLE ROM: Deficits: within R posterior hip precautions  RLE Strength: Deficits: 3-/5  LLE ROM: WFL  LLE Strength: Deficits: 3+/5    Functional Mobility:  Bed Mobility:     Scooting: maximal assistance  Supine to Sit: maximal assistance  Sit to Supine: maximal assistance and of 2 persons  Transfers:     Sit to Stand:  moderate assistance and of 2 persons with rolling walker 2 side steps . Onset of nausea and weakness      AM-PAC 6 CLICK MOBILITY  Total Score:9       Treatment & Education:  Patient was educated on the importance of OOB activity and functional mobility to negate negative effects of prolonged bed rest during hospitalization, safe transfers and ambulation, and D/C planning   Thera ex with AP,QS/GS, abd/add and SLR  within R posterior hip precautions  EOB/standing with RW   Pt returned to bed and repositioned. A fabricated rolled blanket in between thighs    Patient left HOB elevated with all lines intact, call button in reach, and daughter in law present.    GOALS:   Multidisciplinary Problems       Physical Therapy Goals          Problem: Physical Therapy    Goal Priority Disciplines Outcome Goal Variances Interventions   Physical Therapy Goal     PT, PT/OT Ongoing, Progressing     Description: Goals to be met by: 2024     Patient will increase functional independence with mobility by performin. Supine to sit with MInimal Assistance  2. Sit to stand transfer with Minimal Assistance  3. Bed to chair transfer with Minimal Assistance using Rolling Walker  4. Gait  x 50 feet with Minimal Assistance using Rolling Walker.   5. Lower extremity exercise program x20 reps     R posterior hip precautions and WBAT post RH Hemiarthroplasty 2024                         History:     Past Medical History:   Diagnosis Date    Anemia 3/1/2024    Anxiety     Arthritis     fingers and back    Depression     Disorder of kidney and ureter     Foot pain, left 2013    GERD (gastroesophageal reflux disease)     Hiatal hernia     Hyperlipidemia     Hypertension     Reflux     on meds    Tendon tear 2013    Tendonitis 2013       Past Surgical History:   Procedure Laterality Date    CHOLECYSTECTOMY      EYE SURGERY Bilateral     cataracts and implants    TONSILLECTOMY         Time Tracking:     PT Received On: 24  PT Start Time: 1034     PT Stop Time: 1114  PT Total Time (min): 40 min     Billable Minutes: Evaluation 10, Therapeutic Activity 20, and Therapeutic Exercise 10      2024

## 2024-03-02 NOTE — CARE UPDATE
03/01/24 1914   Patient Assessment/Suction   Level of Consciousness (AVPU) alert   Respiratory Effort Unlabored   Expansion/Accessory Muscles/Retractions no retractions;no use of accessory muscles   PRE-TX-O2   Device (Oxygen Therapy) nasal cannula   $ Is the patient on Low Flow Oxygen? Yes   Flow (L/min) 2   SpO2 96 %   Pulse Oximetry Type Intermittent   $ Pulse Oximetry - Multiple Charge Pulse Oximetry - Multiple   Pulse 80   Resp 18   Aerosol Therapy   $ Aerosol Therapy Charges PRN treatment not required

## 2024-03-02 NOTE — ASSESSMENT & PLAN NOTE
-Fall precautions  -NWB RLE  -Neurovascular checks  -PRN analgesics  -Orthopedic Surgery consulted;   Underwent surgery on 03/01  -PT/OT

## 2024-03-02 NOTE — PLAN OF CARE
Problem: Physical Therapy  Goal: Physical Therapy Goal  Description: Goals to be met by: 2024     Patient will increase functional independence with mobility by performin. Supine to sit with MInimal Assistance  2. Sit to stand transfer with Minimal Assistance  3. Bed to chair transfer with Minimal Assistance using Rolling Walker  4. Gait  x 50 feet with Minimal Assistance using Rolling Walker.   5. Lower extremity exercise program x20 reps     R posterior hip precautions and WBAT post RH Hemiarthroplasty 2024    Outcome: Ongoing, Progressing   PT eval and treat. Thera ex in supine with good participation. Education on R posterior hip precautions with WBAT. Pt  requiring mod assist x2 sit EOB and to stand with RW WBAT. 2 steps with c/o weakness,nausea, back to bed with /59. High complexity

## 2024-03-02 NOTE — SUBJECTIVE & OBJECTIVE
Interval History:  Patient seen and examined.  Daughter-in-law at bedside.  Patient underwent right hip hemiarthroplasty with Dr. Carmona yesterday.  PT/OT evaluations ordered.  Patient complains of nasal congestion without rhinorrhea.  Flonase ordered.  Hemoglobin 9.9.  Creatinine 0.8.    Review of Systems   Musculoskeletal:  Positive for arthralgias, gait problem and myalgias.     Objective:     Vital Signs (Most Recent):  Temp: 99.7 °F (37.6 °C) (03/02/24 1134)  Pulse: (!) 124 (03/02/24 1134)  Resp: 16 (03/02/24 1255)  BP: (!) 113/54 (03/02/24 1134)  SpO2: (!) 93 % (03/02/24 1134) Vital Signs (24h Range):  Temp:  [97.1 °F (36.2 °C)-99.7 °F (37.6 °C)] 99.7 °F (37.6 °C)  Pulse:  [] 124  Resp:  [13-20] 16  SpO2:  [93 %-100 %] 93 %  BP: (113-159)/() 113/54     Weight: 63.5 kg (139 lb 15.9 oz)  Body mass index is 28.27 kg/m².    Intake/Output Summary (Last 24 hours) at 3/2/2024 1311  Last data filed at 3/2/2024 0733  Gross per 24 hour   Intake 2116.34 ml   Output 870 ml   Net 1246.34 ml           Physical Exam  Vitals and nursing note reviewed.   HENT:      Head: Normocephalic and atraumatic.      Right Ear: External ear normal.      Left Ear: External ear normal.      Nose: Nose normal.      Mouth/Throat:      Mouth: Mucous membranes are moist.      Pharynx: Oropharynx is clear.   Eyes:      Extraocular Movements: Extraocular movements intact.      Conjunctiva/sclera: Conjunctivae normal.   Cardiovascular:      Rate and Rhythm: Normal rate and regular rhythm.      Pulses: Normal pulses.      Heart sounds: Normal heart sounds.   Pulmonary:      Effort: Pulmonary effort is normal.      Breath sounds: Normal breath sounds.   Abdominal:      General: Bowel sounds are normal.      Palpations: Abdomen is soft.   Musculoskeletal:      Cervical back: Normal range of motion and neck supple.      Right lower leg: No edema.      Left lower leg: No edema.      Comments:   Right hip bandage clean / dry/intact    Neurological:      Mental Status: She is alert. Mental status is at baseline.   Psychiatric:         Mood and Affect: Mood normal.         Behavior: Behavior normal.             Significant Labs: All pertinent labs within the past 24 hours have been reviewed.    Significant Imaging: I have reviewed all pertinent imaging results/findings within the past 24 hours.

## 2024-03-02 NOTE — PROGRESS NOTES
[facility] Medicine  Progress Note    Patient Name: Arleen Zarate  MRN: [id number]  Patient Class: IP- Inpatient   Admission Date: 2/28/2024  Length of Stay: 3 days  Attending Physician: Randi Sebastian MD  Primary Care Provider: Perry Jorge MD        Subjective:     Principal Problem:Closed fracture of neck of right femur        HPI:  Arleen Zarate is a 89-year-old female who was transferred from [facility] for right hip fracture.  Patient reports she sustained a mechanical fall this morning while making a per bed.  She reports falling striking her right hip on the floor also with injury to head.  She denies any loss of consciousness or cervical pain.  She describes the pain to the right hip is intense.  She rates the pain at 10/10 at worst.  The pain is worse with any movement of the right lower extremity.  The pain is minimally relieved with remaining still and positioning. Previous medical history includes hypertension, GERD, arthritis, hyperlipidemia, osteopenia.  ER workup at outside facility:  CBC and CMP were unremarkable.  CT of the brain was negative for any acute findings.  CT of the cervical spine were negative for any acute findings.  CTA of the chest was negative for pulmonary embolism or acute findings.  X-ray of the right hip demonstrates a sub cap fracture of the right femoral neck.  Patient admitted to Hospital Medicine for treatment and management.  Orthopedics was consulted and plans to take patient to OR on Friday.    Overview/Hospital Course:  Arleen Zarate is a 89 year old female with a past medical history of HTN and GERD who presented with a R femoral neck fracture after a mechanical fall. Orthopedic Surgery will take the patient to the OR 3/1. PT/OT has been consulted.    Interval History:  Patient seen and examined.  Daughter-in-law at bedside.  Patient underwent right hip hemiarthroplasty with Dr. Carmona yesterday.  PT/OT evaluations ordered.   Patient complains of nasal congestion without rhinorrhea.  Flonase ordered.  Hemoglobin 9.9.  Creatinine 0.8.    Review of Systems   Musculoskeletal:  Positive for arthralgias, gait problem and myalgias.     Objective:     Vital Signs (Most Recent):  Temp: 99.7 °F (37.6 °C) (03/02/24 1134)  Pulse: (!) 124 (03/02/24 1134)  Resp: 16 (03/02/24 1255)  BP: (!) 113/54 (03/02/24 1134)  SpO2: (!) 93 % (03/02/24 1134) Vital Signs (24h Range):  Temp:  [97.1 °F (36.2 °C)-99.7 °F (37.6 °C)] 99.7 °F (37.6 °C)  Pulse:  [] 124  Resp:  [13-20] 16  SpO2:  [93 %-100 %] 93 %  BP: (113-159)/() 113/54     Weight: 63.5 kg (139 lb 15.9 oz)  Body mass index is 28.27 kg/m².    Intake/Output Summary (Last 24 hours) at 3/2/2024 1311  Last data filed at 3/2/2024 0733  Gross per 24 hour   Intake 2116.34 ml   Output 870 ml   Net 1246.34 ml           Physical Exam  Vitals and nursing note reviewed.   HENT:      Head: Normocephalic and atraumatic.      Right Ear: External ear normal.      Left Ear: External ear normal.      Nose: Nose normal.      Mouth/Throat:      Mouth: Mucous membranes are moist.      Pharynx: Oropharynx is clear.   Eyes:      Extraocular Movements: Extraocular movements intact.      Conjunctiva/sclera: Conjunctivae normal.   Cardiovascular:      Rate and Rhythm: Normal rate and regular rhythm.      Pulses: Normal pulses.      Heart sounds: Normal heart sounds.   Pulmonary:      Effort: Pulmonary effort is normal.      Breath sounds: Normal breath sounds.   Abdominal:      General: Bowel sounds are normal.      Palpations: Abdomen is soft.   Musculoskeletal:      Cervical back: Normal range of motion and neck supple.      Right lower leg: No edema.      Left lower leg: No edema.      Comments:   Right hip bandage clean / dry/intact   Neurological:      Mental Status: She is alert. Mental status is at baseline.   Psychiatric:         Mood and Affect: Mood normal.         Behavior: Behavior normal.              Significant Labs: All pertinent labs within the past 24 hours have been reviewed.    Significant Imaging: I have reviewed all pertinent imaging results/findings within the past 24 hours.    Assessment/Plan:      * Closed fracture of neck of right femur  -Fall precautions  -NWB RLE  -Neurovascular checks  -PRN analgesics  -Orthopedic Surgery consulted;   Underwent surgery on 03/01  -PT/OT      Thrombocytopenia  -Trend platelets with CBC    Anemia  Stable. Mild drop to 9.9 postoperatively.  -Trend Hgb with CBC    Gastroesophageal reflux disease  -PPI      Essential hypertension  -Lisinopril  -Hold CCB  -Continue to monitor      VTE Risk Mitigation (From admission, onward)           Ordered     enoxaparin injection 40 mg  Every 24 hours         03/01/24 1036     Place MAURICIO hose  Until discontinued         02/28/24 1917     IP VTE HIGH RISK PATIENT  Once         02/28/24 1917     Place sequential compression device  Until discontinued         02/28/24 1917                    Discharge Planning   NICK: 3/4/2024     Code Status: Full Code   Is the patient medically ready for discharge?:     Reason for patient still in hospital (select all that apply): Patient trending condition and Treatment  Discharge Plan A: Rehab                  Randi Sebastian MD  Department of Hospital Medicine   [facility]/Surg

## 2024-03-02 NOTE — PT/OT/SLP PROGRESS
Occupational Therapy      Patient Name:  Arleen Zarate   MRN:  [id number]    Patient not seen today secondary to patient pleasantly declined due to nausea. Nurse notified. Will follow-up 3/3/2024.    3/2/2024

## 2024-03-03 LAB
ALBUMIN SERPL BCP-MCNC: 2.5 G/DL (ref 3.5–5.2)
ALP SERPL-CCNC: 69 U/L (ref 55–135)
ALT SERPL W/O P-5'-P-CCNC: 10 U/L (ref 10–44)
ANION GAP SERPL CALC-SCNC: 7 MMOL/L (ref 8–16)
AST SERPL-CCNC: 28 U/L (ref 10–40)
BASOPHILS # BLD AUTO: 0.02 K/UL (ref 0–0.2)
BASOPHILS NFR BLD: 0.2 % (ref 0–1.9)
BILIRUB SERPL-MCNC: 0.4 MG/DL (ref 0.1–1)
BUN SERPL-MCNC: 28 MG/DL (ref 10–30)
CALCIUM SERPL-MCNC: 8.4 MG/DL (ref 8.7–10.5)
CHLORIDE SERPL-SCNC: 103 MMOL/L (ref 95–110)
CO2 SERPL-SCNC: 26 MMOL/L (ref 23–29)
CREAT SERPL-MCNC: 0.8 MG/DL (ref 0.5–1.4)
DIFFERENTIAL METHOD BLD: ABNORMAL
EOSINOPHIL # BLD AUTO: 0.2 K/UL (ref 0–0.5)
EOSINOPHIL NFR BLD: 2 % (ref 0–8)
ERYTHROCYTE [DISTWIDTH] IN BLOOD BY AUTOMATED COUNT: 13.1 % (ref 11.5–14.5)
EST. GFR  (NO RACE VARIABLE): >60 ML/MIN/1.73 M^2
GLUCOSE SERPL-MCNC: 102 MG/DL (ref 70–110)
HCT VFR BLD AUTO: 28.1 % (ref 37–48.5)
HGB BLD-MCNC: 9 G/DL (ref 12–16)
IMM GRANULOCYTES # BLD AUTO: 0.02 K/UL (ref 0–0.04)
IMM GRANULOCYTES NFR BLD AUTO: 0.2 % (ref 0–0.5)
LYMPHOCYTES # BLD AUTO: 1.1 K/UL (ref 1–4.8)
LYMPHOCYTES NFR BLD: 13.2 % (ref 18–48)
MAGNESIUM SERPL-MCNC: 2.1 MG/DL (ref 1.6–2.6)
MCH RBC QN AUTO: 29.6 PG (ref 27–31)
MCHC RBC AUTO-ENTMCNC: 32 G/DL (ref 32–36)
MCV RBC AUTO: 92 FL (ref 82–98)
MONOCYTES # BLD AUTO: 1.4 K/UL (ref 0.3–1)
MONOCYTES NFR BLD: 16.6 % (ref 4–15)
NEUTROPHILS # BLD AUTO: 5.6 K/UL (ref 1.8–7.7)
NEUTROPHILS NFR BLD: 67.8 % (ref 38–73)
NRBC BLD-RTO: 0 /100 WBC
PHOSPHATE SERPL-MCNC: 2.2 MG/DL (ref 2.7–4.5)
PLATELET # BLD AUTO: 146 K/UL (ref 150–450)
PMV BLD AUTO: 10.6 FL (ref 9.2–12.9)
POTASSIUM SERPL-SCNC: 4.3 MMOL/L (ref 3.5–5.1)
PROT SERPL-MCNC: 5.3 G/DL (ref 6–8.4)
RBC # BLD AUTO: 3.04 M/UL (ref 4–5.4)
SODIUM SERPL-SCNC: 136 MMOL/L (ref 136–145)
WBC # BLD AUTO: 8.19 K/UL (ref 3.9–12.7)

## 2024-03-03 PROCEDURE — 25000003 PHARM REV CODE 250: Performed by: NURSE PRACTITIONER

## 2024-03-03 PROCEDURE — 25000242 PHARM REV CODE 250 ALT 637 W/ HCPCS: Performed by: HOSPITALIST

## 2024-03-03 PROCEDURE — 94761 N-INVAS EAR/PLS OXIMETRY MLT: CPT

## 2024-03-03 PROCEDURE — 97116 GAIT TRAINING THERAPY: CPT | Mod: CQ

## 2024-03-03 PROCEDURE — 83735 ASSAY OF MAGNESIUM: CPT | Performed by: NURSE PRACTITIONER

## 2024-03-03 PROCEDURE — 99900035 HC TECH TIME PER 15 MIN (STAT)

## 2024-03-03 PROCEDURE — 11000001 HC ACUTE MED/SURG PRIVATE ROOM

## 2024-03-03 PROCEDURE — 63600175 PHARM REV CODE 636 W HCPCS: Performed by: NURSE PRACTITIONER

## 2024-03-03 PROCEDURE — 85025 COMPLETE CBC W/AUTO DIFF WBC: CPT | Performed by: NURSE PRACTITIONER

## 2024-03-03 PROCEDURE — 63600175 PHARM REV CODE 636 W HCPCS: Performed by: STUDENT IN AN ORGANIZED HEALTH CARE EDUCATION/TRAINING PROGRAM

## 2024-03-03 PROCEDURE — 36415 COLL VENOUS BLD VENIPUNCTURE: CPT | Performed by: NURSE PRACTITIONER

## 2024-03-03 PROCEDURE — 84100 ASSAY OF PHOSPHORUS: CPT | Performed by: NURSE PRACTITIONER

## 2024-03-03 PROCEDURE — 80053 COMPREHEN METABOLIC PANEL: CPT | Performed by: NURSE PRACTITIONER

## 2024-03-03 PROCEDURE — 97165 OT EVAL LOW COMPLEX 30 MIN: CPT

## 2024-03-03 PROCEDURE — 25000003 PHARM REV CODE 250: Performed by: STUDENT IN AN ORGANIZED HEALTH CARE EDUCATION/TRAINING PROGRAM

## 2024-03-03 PROCEDURE — 94799 UNLISTED PULMONARY SVC/PX: CPT | Mod: XB

## 2024-03-03 PROCEDURE — 97110 THERAPEUTIC EXERCISES: CPT | Mod: CQ

## 2024-03-03 PROCEDURE — 97535 SELF CARE MNGMENT TRAINING: CPT

## 2024-03-03 RX ORDER — POLYETHYLENE GLYCOL 3350 17 G/17G
17 POWDER, FOR SOLUTION ORAL DAILY
Status: DISCONTINUED | OUTPATIENT
Start: 2024-03-04 | End: 2024-03-04 | Stop reason: HOSPADM

## 2024-03-03 RX ADMIN — LISINOPRIL 30 MG: 10 TABLET ORAL at 09:03

## 2024-03-03 RX ADMIN — PANTOPRAZOLE SODIUM 40 MG: 40 TABLET, DELAYED RELEASE ORAL at 09:03

## 2024-03-03 RX ADMIN — ENOXAPARIN SODIUM 40 MG: 40 INJECTION SUBCUTANEOUS at 04:03

## 2024-03-03 RX ADMIN — MORPHINE SULFATE 2 MG: 2 INJECTION, SOLUTION INTRAMUSCULAR; INTRAVENOUS at 09:03

## 2024-03-03 RX ADMIN — MELATONIN TAB 3 MG 9 MG: 3 TAB at 08:03

## 2024-03-03 RX ADMIN — MUPIROCIN: 20 OINTMENT TOPICAL at 09:03

## 2024-03-03 RX ADMIN — THERA TABS 1 TABLET: TAB at 09:03

## 2024-03-03 RX ADMIN — ACETAMINOPHEN 650 MG: 325 TABLET ORAL at 08:03

## 2024-03-03 RX ADMIN — FLUTICASONE PROPIONATE 100 MCG: 50 SPRAY, METERED NASAL at 09:03

## 2024-03-03 RX ADMIN — MUPIROCIN: 20 OINTMENT TOPICAL at 08:03

## 2024-03-03 RX ADMIN — MORPHINE SULFATE 2 MG: 2 INJECTION, SOLUTION INTRAMUSCULAR; INTRAVENOUS at 03:03

## 2024-03-03 NOTE — PT/OT/SLP PROGRESS
Physical Therapy Treatment    Patient Name:  Arleen Zarate   MRN:  [id number]    Recommendations:     Discharge Recommendations: High Intensity Therapy  Discharge Equipment Recommendations: none  Barriers to discharge:  Decreased mobility    Assessment:     Arleen Zarate is a 89 y.o. female admitted with a medical diagnosis of Closed fracture of neck of right femur.  She presents with the following impairments/functional limitations: weakness, impaired endurance, impaired self care skills, impaired functional mobility, gait instability, impaired balance, pain, impaired cardiopulmonary response to activity, orthopedic precautions . Pt sitting in bed side chair and agreeable to PT. Instructed pt in proper hand placement with sit to stand t/f using rw, pt performed t/f requiring mod a. Pt was able to ambulate 3 steps forward /c rw mod a, prior to requested a seated rest break. Pt completed Le ex in chair with no significant increase in pain. Pt left up in bed side chair with chair alarm on.     Rehab Prognosis: Fair; patient would benefit from acute skilled PT services to address these deficits and reach maximum level of function.    Recent Surgery: Procedure(s) (LRB):  HEMIARTHROPLASTY, HIP (Right) 2 Days Post-Op    Plan:     During this hospitalization, patient to be seen daily to address the identified rehab impairments via gait training, therapeutic activities, therapeutic exercises and progress toward the following goals:    Plan of Care Expires:  03/30/24    Subjective     Chief Complaint: R hip pain  Patient/Family Comments/goals: Pt agreeable to PT  Pain/Comfort:  Pain Rating 1: 0/10  Location - Side 1: Right  Location - Orientation 1: generalized  Location 1: hip  Pain Addressed 1: Cessation of Activity  Pain Rating Post-Intervention 1: 8/10      Objective:     Communicated with RN prior to session.  Patient found up in chair with chair check, PureWick, peripheral IV, telemetry upon PT entry to room.      General Precautions: Standard, fall, hearing impaired  Orthopedic Precautions: RLE weight bearing as tolerated, RLE posterior precautions  Braces: N/A  Respiratory Status: Room air     Functional Mobility:  Transfers:     Sit to Stand:  moderate assistance with rolling walker  Gait: 3 steps forward/bck      AM-PAC 6 CLICK MOBILITY          Treatment & Education:  Pt performed 2 LE there ex sitting x 15 reps with vc for proper execution and joint protection: ap, laq, marching, hip abd/add, gs/qs.     Patient left up in chair with all lines intact and chair alarm on..    GOALS:   Multidisciplinary Problems       Physical Therapy Goals          Problem: Physical Therapy    Goal Priority Disciplines Outcome Goal Variances Interventions   Physical Therapy Goal     PT, PT/OT Ongoing, Progressing     Description: Goals to be met by: 2024     Patient will increase functional independence with mobility by performin. Supine to sit with MInimal Assistance  2. Sit to stand transfer with Minimal Assistance  3. Bed to chair transfer with Minimal Assistance using Rolling Walker  4. Gait  x 50 feet with Minimal Assistance using Rolling Walker.   5. Lower extremity exercise program x20 reps     R posterior hip precautions and WBAT post RH Hemiarthroplasty 2024                         Time Tracking:     PT Received On: 24  PT Start Time: 1104     PT Stop Time: 1127  PT Total Time (min): 23 min     Billable Minutes: Gait Training 8 and Therapeutic Exercise 15    Treatment Type: Treatment  PT/PTA: PTA     Number of PTA visits since last PT visit: 2024

## 2024-03-03 NOTE — CARE UPDATE
03/03/24 0736   Patient Assessment/Suction   Level of Consciousness (AVPU) alert   Respiratory Effort Unlabored;Normal   Expansion/Accessory Muscles/Retractions no use of accessory muscles   All Lung Fields Breath Sounds equal bilaterally   Rhythm/Pattern, Respiratory unlabored;pattern regular   PRE-TX-O2   Device (Oxygen Therapy) room air   SpO2 95 %   Pulse Oximetry Type Intermittent   $ Pulse Oximetry - Multiple Charge Pulse Oximetry - Multiple   Pulse 85   Resp 17   Aerosol Therapy   $ Aerosol Therapy Charges PRN treatment not required   Respiratory Treatment Status (SVN) PRN treatment not required   Incentive Spirometer   $ Incentive Spirometer Charges done with encouragement   Incentive Spirometer Predicted Level (mL) 500   Administration (IS) instruction provided, follow-up   Number of Repetitions (IS) 20   Level Incentive Spirometer (mL) 1200   Patient Tolerance (IS) good   Respiratory Evaluation   $ Care Plan Tech Time 15 min   Evaluation For New Orders   Admitting Diagnosis FX femur   Home Oxygen   Has Home Oxygen? No   Home Aerosol, MDI, DPI, and Other Treatments/Therapies   Home Respiratory Therapy Per Patient/Review of Chart No   Oxygen Care Plan   Rationale No rational found   Bronchodilator Care Plan   Bronchodilator Care Plan Aerosol   Aerosol Meds w/ frequency May be given PRN in addition to scheduled time.   Atelectasis Care Plan   Atelectasis Care Plan Incentive Spiromentry   Frequency TID   I.S. Goal (ml) 500 ml   Airway Clearance Care Plan   Rationale No rationale found

## 2024-03-03 NOTE — PROGRESS NOTES
[facility] Medicine  Progress Note    Patient Name: Arleen Zarate  MRN: [id number]  Patient Class: IP- Inpatient   Admission Date: 2/28/2024  Length of Stay: 4 days  Attending Physician: Randi Sebastian MD  Primary Care Provider: Perry Jorge MD        Subjective:     Principal Problem:Closed fracture of neck of right femur        HPI:  Arleen Zarate is a 89-year-old female who was transferred from [facility] for right hip fracture.  Patient reports she sustained a mechanical fall this morning while making a per bed.  She reports falling striking her right hip on the floor also with injury to head.  She denies any loss of consciousness or cervical pain.  She describes the pain to the right hip is intense.  She rates the pain at 10/10 at worst.  The pain is worse with any movement of the right lower extremity.  The pain is minimally relieved with remaining still and positioning. Previous medical history includes hypertension, GERD, arthritis, hyperlipidemia, osteopenia.  ER workup at outside facility:  CBC and CMP were unremarkable.  CT of the brain was negative for any acute findings.  CT of the cervical spine were negative for any acute findings.  CTA of the chest was negative for pulmonary embolism or acute findings.  X-ray of the right hip demonstrates a sub cap fracture of the right femoral neck.  Patient admitted to Hospital Medicine for treatment and management.  Orthopedics was consulted and plans to take patient to OR on Friday.    Overview/Hospital Course:  Arleen Zarate is a 89 year old female with a past medical history of HTN and GERD who presented with a R femoral neck fracture after a mechanical fall. Orthopedic Surgery will take the patient to the OR 3/1. PT/OT has been consulted.    Interval History:  Patient seen and examined.  Sitting in chair after working with therapy.  Therapy recommending rehab placement.  Phosphorus low today, getting  replacement.    Review of Systems   Musculoskeletal:  Positive for arthralgias, gait problem and myalgias.     Objective:     Vital Signs (Most Recent):  Temp: 98.4 °F (36.9 °C) (03/03/24 0749)  Pulse: 98 (03/03/24 0749)  Resp: 16 (03/03/24 0917)  BP: (!) 153/65 (03/03/24 0749)  SpO2: (!) 92 % (03/03/24 0749) Vital Signs (24h Range):  Temp:  [97.9 °F (36.6 °C)-99.7 °F (37.6 °C)] 98.4 °F (36.9 °C)  Pulse:  [] 98  Resp:  [16-19] 16  SpO2:  [91 %-95 %] 92 %  BP: (113-153)/(54-81) 153/65     Weight: 63.5 kg (139 lb 15.9 oz)  Body mass index is 28.27 kg/m².    Intake/Output Summary (Last 24 hours) at 3/3/2024 1127  Last data filed at 3/3/2024 0500  Gross per 24 hour   Intake --   Output 925 ml   Net -925 ml           Physical Exam  Vitals and nursing note reviewed.   HENT:      Head: Normocephalic and atraumatic.      Right Ear: External ear normal.      Left Ear: External ear normal.      Nose: Nose normal.      Mouth/Throat:      Mouth: Mucous membranes are moist.      Pharynx: Oropharynx is clear.   Eyes:      Extraocular Movements: Extraocular movements intact.      Conjunctiva/sclera: Conjunctivae normal.   Cardiovascular:      Rate and Rhythm: Normal rate and regular rhythm.      Pulses: Normal pulses.      Heart sounds: Normal heart sounds.   Pulmonary:      Effort: Pulmonary effort is normal.      Breath sounds: Normal breath sounds.   Abdominal:      General: Bowel sounds are normal.      Palpations: Abdomen is soft.   Musculoskeletal:      Cervical back: Normal range of motion and neck supple.      Right lower leg: No edema.      Left lower leg: No edema.      Comments:   Right hip bandage clean / dry/intact   Neurological:      Mental Status: She is alert. Mental status is at baseline.   Psychiatric:         Mood and Affect: Mood normal.         Behavior: Behavior normal.             Significant Labs: All pertinent labs within the past 24 hours have been reviewed.    Significant Imaging: I have reviewed  all pertinent imaging results/findings within the past 24 hours.    Assessment/Plan:      * Closed fracture of neck of right femur  -Fall precautions  -NWB RLE  -Neurovascular checks  -PRN analgesics  -Orthopedic Surgery consulted;   Underwent surgery on 03/01  -PT/OT      Thrombocytopenia  -Trend platelets with CBC    Anemia  Stable. Mild drop postoperatively.  Nine today  -Trend Hgb with CBC    Gastroesophageal reflux disease  -PPI      Essential hypertension  -Lisinopril  -Hold CCB  -Continue to monitor      VTE Risk Mitigation (From admission, onward)           Ordered     enoxaparin injection 40 mg  Every 24 hours         03/01/24 1036     Place MAURICIO hose  Until discontinued         02/28/24 1917     IP VTE HIGH RISK PATIENT  Once         02/28/24 1917     Place sequential compression device  Until discontinued         02/28/24 1917                    Discharge Planning   NICK: 3/4/2024     Code Status: Full Code   Is the patient medically ready for discharge?:     Reason for patient still in hospital (select all that apply): Patient trending condition and Treatment  Discharge Plan A: Rehab                  Randi Sebastian MD  Department of Hospital Medicine   [facility]/Surg

## 2024-03-03 NOTE — PT/OT/SLP EVAL
Occupational Therapy   Evaluation    Name: Arleen Zarate  MRN: [id number]  Admitting Diagnosis: Closed fracture of neck of right femur  Recent Surgery: Procedure(s) (LRB):  HEMIARTHROPLASTY, HIP (Right) 2 Days Post-Op    Recommendations:     Discharge Recommendations: High Intensity Therapy  Discharge Equipment Recommendations:   (TBD)  Barriers to discharge:   (Increased physical assistance with ADLs and functional mobility.)    Assessment:     Arleen Zarate is a 89 y.o. female with a medical diagnosis of Closed fracture of neck of right femur.  She presents with general weakness s/p right hip hemiarthroplasty. Performance deficits affecting function: weakness, impaired endurance, impaired sensation, impaired self care skills, impaired functional mobility, gait instability, impaired balance, decreased upper extremity function, decreased lower extremity function, decreased safety awareness.      Rehab Prognosis: Good; patient would benefit from acute skilled OT services to address these deficits and reach maximum level of function.       Plan:     Patient to be seen 6 x/week to address the above listed problems via self-care/home management, therapeutic activities, therapeutic exercises  Plan of Care Expires: 04/03/24  Plan of Care Reviewed with: patient    Subjective     Chief Complaint: General weakness  Patient/Family Comments/goals: Improved functional mobility and ADL independence    Occupational Profile:  Living Environment: lives with GLENIS and GLENIS's spouse in a 1 story home, 6 steps, bilateral rails to enter.  Previous level of function: independent for ADLs, IADLs and ambulation in the home. Use a cane to ambulate community distances. Drives.  Roles and Routines: Primary homemaker  Equipment Used at Home: cane, straight  Assistance upon Discharge: GLENIS and GLENIS's Spouse    Pain/Comfort:  Pain Rating 1: 0/10  Pain Rating Post-Intervention 1: 0/10    Patients cultural, spiritual, Hoahaoism conflicts given the  current situation: no    Objective:     Communicated with: nurse prior to session.  Patient found HOB elevated with telemetry, peripheral IV, PureWick upon OT entry to room.    General Precautions: Standard, fall, aspiration, aphasia, hearing impaired  Orthopedic Precautions: RLE weight bearing as tolerated, RLE posterior precautions  Braces: N/A  Respiratory Status: Room air    Occupational Performance:    Bed Mobility:    Patient completed Scooting/Bridging with moderate assistance  Patient completed Supine to Sit with moderate assistance  Performed unsupported sitting EOB with contact guard assistance.    Functional Mobility/Transfers:  Patient completed Bed <> Chair Transfer using Step Transfer technique with moderate assistance with hand-held assist and rolling walker    Activities of Daily Living:  Lower Body Dressing: maximal assistance to don socks sitting EOB.    Cognitive/Visual Perceptual:  Cognitive/Psychosocial Skills:     -       Oriented to: Person, Place, and Situation   -       Follows Commands/attention:Follows two-step commands  -       Communication: clear/fluent  -       Memory: No Deficits noted  -       Safety awareness/insight to disability: impaired   -       Mood/Affect/Coping skills/emotional control: Cooperative and Pleasant  Visual/Perceptual:      -Intact Acuity    Physical Exam:  Balance:    -       Sitting: Contact Guard, Standing: Moderate Assist  Upper Extremity Range of Motion:     -       Right Upper Extremity: WFL  -       Left Upper Extremity: WFL  Upper Extremity Strength:    -       Right Upper Extremity: 4/5  -       Left Upper Extremity: 4/5   Strength:    -       Right Upper Extremity: WFL  -       Left Upper Extremity: WFL  Fine Motor Coordination:    -       Intact    AMPAC 6 Click ADL:  AMPAC Total Score: 19    Treatment & Education:  Patient educated on the purpose of Occupational Therapy and the importance of getting OOB.    Patient left up in chair with all lines  intact and call button in reach    GOALS:   Multidisciplinary Problems       Occupational Therapy Goals          Problem: Occupational Therapy    Goal Priority Disciplines Outcome Interventions   Occupational Therapy Goal     OT, PT/OT     Description: Goals to be met by: 4/3/2024     Patient will increase functional independence with ADLs by performing:    UE Dressing with Supervision.  LE Dressing with Supervision.  Grooming while standing at sink with Supervision.  Toileting from toilet with Supervision for hygiene and clothing management.   Toilet transfer to toilet with Supervision.  Perform 30 minutes sitting/standing ADL activity with no LOB.                         History:     Past Medical History:   Diagnosis Date    Anemia 3/1/2024    Anxiety     Arthritis     fingers and back    Depression     Disorder of kidney and ureter     Foot pain, left 9/18/2013    GERD (gastroesophageal reflux disease)     Hiatal hernia     Hyperlipidemia     Hypertension     Reflux     on meds    Tendon tear 8/21/2013    Tendonitis 8/21/2013         Past Surgical History:   Procedure Laterality Date    CHOLECYSTECTOMY      EYE SURGERY Bilateral     cataracts and implants    TONSILLECTOMY         Time Tracking:     OT Date of Treatment: 03/03/24  OT Start Time: 0905  OT Stop Time: 0942  OT Total Time (min): 37 min    Billable Minutes:Evaluation 17  Self Care/Home Management 20    3/3/2024

## 2024-03-03 NOTE — SUBJECTIVE & OBJECTIVE
Interval History:  Patient seen and examined.  Sitting in chair after working with therapy.  Therapy recommending rehab placement.  Phosphorus low today, getting replacement.    Review of Systems   Musculoskeletal:  Positive for arthralgias, gait problem and myalgias.     Objective:     Vital Signs (Most Recent):  Temp: 98.4 °F (36.9 °C) (03/03/24 0749)  Pulse: 98 (03/03/24 0749)  Resp: 16 (03/03/24 0917)  BP: (!) 153/65 (03/03/24 0749)  SpO2: (!) 92 % (03/03/24 0749) Vital Signs (24h Range):  Temp:  [97.9 °F (36.6 °C)-99.7 °F (37.6 °C)] 98.4 °F (36.9 °C)  Pulse:  [] 98  Resp:  [16-19] 16  SpO2:  [91 %-95 %] 92 %  BP: (113-153)/(54-81) 153/65     Weight: 63.5 kg (139 lb 15.9 oz)  Body mass index is 28.27 kg/m².    Intake/Output Summary (Last 24 hours) at 3/3/2024 1127  Last data filed at 3/3/2024 0500  Gross per 24 hour   Intake --   Output 925 ml   Net -925 ml           Physical Exam  Vitals and nursing note reviewed.   HENT:      Head: Normocephalic and atraumatic.      Right Ear: External ear normal.      Left Ear: External ear normal.      Nose: Nose normal.      Mouth/Throat:      Mouth: Mucous membranes are moist.      Pharynx: Oropharynx is clear.   Eyes:      Extraocular Movements: Extraocular movements intact.      Conjunctiva/sclera: Conjunctivae normal.   Cardiovascular:      Rate and Rhythm: Normal rate and regular rhythm.      Pulses: Normal pulses.      Heart sounds: Normal heart sounds.   Pulmonary:      Effort: Pulmonary effort is normal.      Breath sounds: Normal breath sounds.   Abdominal:      General: Bowel sounds are normal.      Palpations: Abdomen is soft.   Musculoskeletal:      Cervical back: Normal range of motion and neck supple.      Right lower leg: No edema.      Left lower leg: No edema.      Comments:   Right hip bandage clean / dry/intact   Neurological:      Mental Status: She is alert. Mental status is at baseline.   Psychiatric:         Mood and Affect: Mood normal.          Behavior: Behavior normal.             Significant Labs: All pertinent labs within the past 24 hours have been reviewed.    Significant Imaging: I have reviewed all pertinent imaging results/findings within the past 24 hours.

## 2024-03-03 NOTE — CARE UPDATE
03/02/24 1934   Patient Assessment/Suction   Level of Consciousness (AVPU) alert   Respiratory Effort Unlabored   Expansion/Accessory Muscles/Retractions no retractions;no use of accessory muscles   PRE-TX-O2   Device (Oxygen Therapy) room air   SpO2 (!) 91 %   Pulse Oximetry Type Intermittent   $ Pulse Oximetry - Multiple Charge Pulse Oximetry - Multiple   Pulse 104   Resp 19   Aerosol Therapy   $ Aerosol Therapy Charges PRN treatment not required

## 2024-03-04 VITALS
TEMPERATURE: 98 F | RESPIRATION RATE: 16 BRPM | SYSTOLIC BLOOD PRESSURE: 114 MMHG | OXYGEN SATURATION: 96 % | BODY MASS INDEX: 28.22 KG/M2 | DIASTOLIC BLOOD PRESSURE: 64 MMHG | WEIGHT: 140 LBS | HEART RATE: 91 BPM | HEIGHT: 59 IN

## 2024-03-04 LAB
ALBUMIN SERPL BCP-MCNC: 2.5 G/DL (ref 3.5–5.2)
ALP SERPL-CCNC: 73 U/L (ref 55–135)
ALT SERPL W/O P-5'-P-CCNC: 9 U/L (ref 10–44)
ANION GAP SERPL CALC-SCNC: 7 MMOL/L (ref 8–16)
AST SERPL-CCNC: 27 U/L (ref 10–40)
BASOPHILS # BLD AUTO: 0.03 K/UL (ref 0–0.2)
BASOPHILS NFR BLD: 0.4 % (ref 0–1.9)
BILIRUB SERPL-MCNC: 0.4 MG/DL (ref 0.1–1)
BUN SERPL-MCNC: 26 MG/DL (ref 10–30)
CALCIUM SERPL-MCNC: 8.5 MG/DL (ref 8.7–10.5)
CHLORIDE SERPL-SCNC: 104 MMOL/L (ref 95–110)
CO2 SERPL-SCNC: 27 MMOL/L (ref 23–29)
CREAT SERPL-MCNC: 0.8 MG/DL (ref 0.5–1.4)
DIFFERENTIAL METHOD BLD: ABNORMAL
EOSINOPHIL # BLD AUTO: 0.3 K/UL (ref 0–0.5)
EOSINOPHIL NFR BLD: 3.2 % (ref 0–8)
ERYTHROCYTE [DISTWIDTH] IN BLOOD BY AUTOMATED COUNT: 13 % (ref 11.5–14.5)
EST. GFR  (NO RACE VARIABLE): >60 ML/MIN/1.73 M^2
GLUCOSE SERPL-MCNC: 100 MG/DL (ref 70–110)
HCT VFR BLD AUTO: 27.7 % (ref 37–48.5)
HGB BLD-MCNC: 9 G/DL (ref 12–16)
IMM GRANULOCYTES # BLD AUTO: 0.02 K/UL (ref 0–0.04)
IMM GRANULOCYTES NFR BLD AUTO: 0.3 % (ref 0–0.5)
LYMPHOCYTES # BLD AUTO: 1.2 K/UL (ref 1–4.8)
LYMPHOCYTES NFR BLD: 15.6 % (ref 18–48)
MAGNESIUM SERPL-MCNC: 2.1 MG/DL (ref 1.6–2.6)
MCH RBC QN AUTO: 30.3 PG (ref 27–31)
MCHC RBC AUTO-ENTMCNC: 32.5 G/DL (ref 32–36)
MCV RBC AUTO: 93 FL (ref 82–98)
MONOCYTES # BLD AUTO: 1.4 K/UL (ref 0.3–1)
MONOCYTES NFR BLD: 17.1 % (ref 4–15)
NEUTROPHILS # BLD AUTO: 5 K/UL (ref 1.8–7.7)
NEUTROPHILS NFR BLD: 63.4 % (ref 38–73)
NRBC BLD-RTO: 0 /100 WBC
PHOSPHATE SERPL-MCNC: 2.6 MG/DL (ref 2.7–4.5)
PLATELET # BLD AUTO: 160 K/UL (ref 150–450)
PMV BLD AUTO: 10.9 FL (ref 9.2–12.9)
POTASSIUM SERPL-SCNC: 4.2 MMOL/L (ref 3.5–5.1)
PROT SERPL-MCNC: 5.4 G/DL (ref 6–8.4)
RBC # BLD AUTO: 2.97 M/UL (ref 4–5.4)
SODIUM SERPL-SCNC: 138 MMOL/L (ref 136–145)
WBC # BLD AUTO: 7.88 K/UL (ref 3.9–12.7)

## 2024-03-04 PROCEDURE — 84100 ASSAY OF PHOSPHORUS: CPT | Performed by: NURSE PRACTITIONER

## 2024-03-04 PROCEDURE — 97110 THERAPEUTIC EXERCISES: CPT

## 2024-03-04 PROCEDURE — 25000003 PHARM REV CODE 250: Performed by: HOSPITALIST

## 2024-03-04 PROCEDURE — 25000003 PHARM REV CODE 250: Performed by: STUDENT IN AN ORGANIZED HEALTH CARE EDUCATION/TRAINING PROGRAM

## 2024-03-04 PROCEDURE — 80053 COMPREHEN METABOLIC PANEL: CPT | Performed by: NURSE PRACTITIONER

## 2024-03-04 PROCEDURE — 99222 1ST HOSP IP/OBS MODERATE 55: CPT | Mod: ,,, | Performed by: PHYSICAL MEDICINE & REHABILITATION

## 2024-03-04 PROCEDURE — 63600175 PHARM REV CODE 636 W HCPCS: Performed by: NURSE PRACTITIONER

## 2024-03-04 PROCEDURE — 97116 GAIT TRAINING THERAPY: CPT

## 2024-03-04 PROCEDURE — 99900035 HC TECH TIME PER 15 MIN (STAT)

## 2024-03-04 PROCEDURE — 94761 N-INVAS EAR/PLS OXIMETRY MLT: CPT

## 2024-03-04 PROCEDURE — 94799 UNLISTED PULMONARY SVC/PX: CPT | Mod: XB

## 2024-03-04 PROCEDURE — 25000003 PHARM REV CODE 250: Performed by: NURSE PRACTITIONER

## 2024-03-04 PROCEDURE — 83735 ASSAY OF MAGNESIUM: CPT | Performed by: NURSE PRACTITIONER

## 2024-03-04 PROCEDURE — 85025 COMPLETE CBC W/AUTO DIFF WBC: CPT | Performed by: NURSE PRACTITIONER

## 2024-03-04 PROCEDURE — 36415 COLL VENOUS BLD VENIPUNCTURE: CPT | Performed by: NURSE PRACTITIONER

## 2024-03-04 RX ORDER — TALC
9 POWDER (GRAM) TOPICAL NIGHTLY PRN
Refills: 0
Start: 2024-03-04

## 2024-03-04 RX ORDER — FLUTICASONE PROPIONATE 50 MCG
2 SPRAY, SUSPENSION (ML) NASAL DAILY
Refills: 0
Start: 2024-03-05

## 2024-03-04 RX ORDER — HYDROCODONE BITARTRATE AND ACETAMINOPHEN 5; 325 MG/1; MG/1
1 TABLET ORAL EVERY 6 HOURS PRN
Refills: 0
Start: 2024-03-04 | End: 2024-03-22

## 2024-03-04 RX ADMIN — LISINOPRIL 30 MG: 10 TABLET ORAL at 08:03

## 2024-03-04 RX ADMIN — MORPHINE SULFATE 2 MG: 2 INJECTION, SOLUTION INTRAMUSCULAR; INTRAVENOUS at 08:03

## 2024-03-04 RX ADMIN — THERA TABS 1 TABLET: TAB at 08:03

## 2024-03-04 RX ADMIN — FLUTICASONE PROPIONATE 100 MCG: 50 SPRAY, METERED NASAL at 08:03

## 2024-03-04 RX ADMIN — POTASSIUM & SODIUM PHOSPHATES POWDER PACK 280-160-250 MG 2 PACKET: 280-160-250 PACK at 06:03

## 2024-03-04 RX ADMIN — ACETAMINOPHEN 650 MG: 325 TABLET ORAL at 06:03

## 2024-03-04 RX ADMIN — POLYETHYLENE GLYCOL (3350) 17 G: 17 POWDER, FOR SOLUTION ORAL at 08:03

## 2024-03-04 RX ADMIN — PANTOPRAZOLE SODIUM 40 MG: 40 TABLET, DELAYED RELEASE ORAL at 08:03

## 2024-03-04 RX ADMIN — ACETAMINOPHEN 650 MG: 325 TABLET ORAL at 03:03

## 2024-03-04 NOTE — PLAN OF CARE
DC orders sent to [facility]      03/04/24 1123   Post-Acute Status   Post-Acute Authorization Placement   Post-Acute Placement Status Pending post-acute provider review/more information requested

## 2024-03-04 NOTE — PT/OT/SLP PROGRESS
Physical Therapy Treatment    Patient Name:  Arleen Zarate   MRN:  [id number]    Recommendations:     Discharge Recommendations: High Intensity Therapy  Discharge Equipment Recommendations: none  Barriers to discharge: None    Assessment:     Arleen Zarate is a 89 y.o. female admitted with a medical diagnosis of Closed fracture of neck of right femur.  She presents with the following impairments/functional limitations: weakness, impaired endurance, impaired functional mobility, gait instability, impaired balance, decreased lower extremity function, pain, decreased ROM, edema, orthopedic precautions .  Patient agreeable to PT treatment this morning. Patient presented supine in bed and required max assist to transfer to sitting and the mod assist to stand.  Patient able to stand x 120 seconds RW min assist doing bilateral weight shifting.  Patient then ambulated x 3 feet RW min assist to help advance right LE.  Patient's PT treatment frequency increased to BID because patient appears to have good rehab potential.    Rehab Prognosis: Good; patient would benefit from acute skilled PT services to address these deficits and reach maximum level of function.    Recent Surgery: Procedure(s) (LRB):  HEMIARTHROPLASTY, HIP (Right) 3 Days Post-Op    Plan:     During this hospitalization, patient to be seen BID to address the identified rehab impairments via gait training, therapeutic activities, therapeutic exercises and progress toward the following goals:    Plan of Care Expires:  03/30/24    Subjective     Chief Complaint: pain  Patient/Family Comments/goals: get stronger  Pain/Comfort:  Pain Rating 1: 1/10  Location - Side 1: Right  Location - Orientation 1: lower  Location 1: hip  Pain Addressed 1: Reposition, Cessation of Activity  Pain Rating Post-Intervention 1: 9/10      Objective:     Communicated with nurse prior to session.  Patient found supine with bed alarm, PureWick, telemetry upon PT entry to room.     General  Precautions: Standard, fall  Orthopedic Precautions: RLE weight bearing as tolerated  Braces: N/A  Respiratory Status: Room air     Functional Mobility:  Bed Mobility:     Supine to Sit: maximal assistance  Transfers:     Sit to Stand:  moderate assistance with rolling walker  Gait: x 3 feet RW min assist to advance right LE  Balance: standing tolerance/balance x 120 seconds RW min assist with bilateral weight shifting      AM-PAC 6 CLICK MOBILITY          Treatment & Education:  Exercise to include ankle pumps, quad sets, heel slides, hip abd and LAQ.  All done bilateral LE x 10 reps as AAROM.  Gait training x 3 feet RW min assist to advance right LE.    Patient left up in chair with call button in reach, chair alarm on, and nurse notified..    GOALS:   Multidisciplinary Problems       Physical Therapy Goals          Problem: Physical Therapy    Goal Priority Disciplines Outcome Goal Variances Interventions   Physical Therapy Goal     PT, PT/OT Ongoing, Progressing     Description: Goals to be met by: 2024     Patient will increase functional independence with mobility by performin. Supine to sit with MInimal Assistance  2. Sit to stand transfer with Minimal Assistance  3. Bed to chair transfer with Minimal Assistance using Rolling Walker  4. Gait  x 50 feet with Minimal Assistance using Rolling Walker.   5. Lower extremity exercise program x20 reps     R posterior hip precautions and WBAT post RH Hemiarthroplasty 2024                         Time Tracking:     PT Received On: 24  PT Start Time: 30     PT Stop Time: 1005  PT Total Time (min): 35 min     Billable Minutes: Gait Training 15 and Therapeutic Exercise 20    Treatment Type: Treatment  PT/PTA: PT     Number of PTA visits since last PT visit: 0     2024

## 2024-03-04 NOTE — DISCHARGE INSTRUCTIONS
[facility]/Surg  Facility Transfer Orders        Admit to: IP rehab    Diagnoses:   Active Hospital Problems    Diagnosis  POA    *Closed fracture of neck of right femur [S72.001A]  Yes    Anemia [D64.9]  Yes    Thrombocytopenia [D69.6]  No    Essential hypertension [I10]  Yes    Gastroesophageal reflux disease [K21.9]  Yes      Resolved Hospital Problems   No resolved problems to display.     Allergies:   Review of patient's allergies indicates:   Allergen Reactions    Buspar [buspirone] Other (See Comments)     Syncope and nausea     Doxy      Other reaction(s): Unknown    Effexor [venlafaxine] Other (See Comments)     shaking    Biaxin [clarithromycin] Other (See Comments)     Unknown    Codeine Other (See Comments)     Syncope, and nausea.    Statins-hmg-coa reductase inhibitors      myalgia    Doxycycline Other (See Comments)     Made very weak       Code Status: Full    Vitals: Routine       Diet: cardiac diet    Activity: Activity as tolerated    Nursing Precautions: Aspiration , Fall, Seizure, and Pressure ulcer prevention    Bed/Surface: Low Air Loss    Consults: PT to evaluate and treat- 7 times a week and OT to evaluate and treat- 7 times a week    Oxygen: room air    Dialysis: Patient is not on dialysis.     Labs:  CBC and CMP weekly x 2      Pending Diagnostic Studies:       None          Imaging: none         Medications: Discontinue all previous medication orders, if any. See new list below.  Current Discharge Medication List        START taking these medications    Details   fluticasone propionate (FLONASE) 50 mcg/actuation nasal spray 2 sprays (100 mcg total) by Each Nostril route once daily.  Refills: 0      HYDROcodone-acetaminophen (NORCO) 5-325 mg per tablet Take 1 tablet by mouth every 6 (six) hours as needed for Pain.  Refills: 0    Comments: Quantity prescribed more than 7 day supply? No      melatonin (MELATIN) 3 mg tablet Take 3 tablets (9 mg total) by mouth nightly as  needed for Insomnia.  Refills: 0           CONTINUE these medications which have NOT CHANGED    Details   lisinopriL (PRINIVIL,ZESTRIL) 30 MG tablet TAKE 1 TABLET EVERY EVENING  Qty: 90 tablet, Refills: 2    Comments: .  Associated Diagnoses: Essential hypertension      multivitamin capsule Take 1 capsule by mouth once daily.      nisoldipine (SULAR) 8.5 MG Tb24 TAKE 1 TABLET DAILY  Qty: 90 tablet, Refills: 1    Associated Diagnoses: Essential hypertension      omeprazole (PRILOSEC) 40 MG capsule Take 1 capsule (40 mg total) by mouth Daily.  Qty: 90 capsule, Refills: 1    Associated Diagnoses: Gastroesophageal reflux disease           STOP taking these medications       meloxicam (MOBIC) 15 MG tablet Comments:   Reason for Stopping:         triamcinolone acetonide 0.025% (KENALOG) 0.025 % cream Comments:   Reason for Stopping:             Follow up:    Follow-up Information       [facility] Follow up.    Specialties: Physical Therapy, Occupational Therapy  Why: Rehab  Contact information:  [address] [address] [address]  [phone]                               Immunizations Administered as of 3/4/2024       Name Date Dose VIS Date Route Exp Date    COVID-19, MRNA, LN-S, PF (Pfizer) (Purple Cap) 12/18/2021 0.3 mL 12/9/2021 Intramuscular --    Site: Left arm     : [employer]     Lot: [id number]     Comment: Adminanup     COVID-19, MRNA, LN-S, PF (Pfizer) (Purple Cap) 6/9/2021 0.3 mL 5/10/2021 Intramuscular --    Site: Left arm     : [employer]     Lot: [id number]     Comment: Adminanup     COVID-19, MRNA, LN-S, PF (Pfizer) (Purple Cap) 5/19/2021 0.3 mL 12/1/2020 Intramuscular --    Site: Left arm     : [employer]     Lot: [id number]     Comment: Darron Sebastian MD

## 2024-03-04 NOTE — RESPIRATORY THERAPY
03/04/24 0838   Patient Assessment/Suction   Level of Consciousness (AVPU) alert   Respiratory Effort Normal;Unlabored   Expansion/Accessory Muscles/Retractions expansion symmetric;no retractions;no use of accessory muscles   Rhythm/Pattern, Respiratory depth regular;pattern regular;unlabored;no shortness of breath reported   Cough Frequency no cough   PRE-TX-O2   Device (Oxygen Therapy) room air   SpO2 (!) 94 %   Pulse Oximetry Type Intermittent   $ Pulse Oximetry - Multiple Charge Pulse Oximetry - Multiple   Pulse 106   Resp 18   Positioning HOB elevated 30 degrees   Aerosol Therapy   $ Aerosol Therapy Charges PRN treatment not required   Incentive Spirometer   $ Incentive Spirometer Charges done with encouragement   Incentive Spirometer Predicted Level (mL) 500   Administration (IS) proper technique demonstrated   Number of Repetitions (IS) 5   Level Incentive Spirometer (mL) 1500   Patient Tolerance (IS) good;no adverse signs/symptoms present

## 2024-03-04 NOTE — PT/OT/SLP PROGRESS
Physical Therapy      Patient Name:  Arleen Zarate   MRN:  [id number]    Patient not seen today secondary to Patient unwilling to participate (patient said she is getting ready to go to rehab facility).

## 2024-03-04 NOTE — CARE UPDATE
03/03/24 1922   Patient Assessment/Suction   Level of Consciousness (AVPU) alert   Respiratory Effort Unlabored   Expansion/Accessory Muscles/Retractions no use of accessory muscles;no retractions   PRE-TX-O2   Device (Oxygen Therapy) room air   SpO2 (!) 92 %   Pulse Oximetry Type Intermittent   $ Pulse Oximetry - Multiple Charge Pulse Oximetry - Multiple   Pulse 106   Resp 18   Aerosol Therapy   $ Aerosol Therapy Charges PRN treatment not required   Incentive Spirometer   $ Incentive Spirometer Charges done with encouragement;proper technique demonstrated   Administration (IS) proper technique demonstrated   Number of Repetitions (IS) 8   Level Incentive Spirometer (mL) 1500   Patient Tolerance (IS) good;no adverse signs/symptoms present

## 2024-03-04 NOTE — PLAN OF CARE
[facility] - Med/Surg  Discharge Final Note    Primary Care Provider: Perry Jorge MD    Expected Discharge Date: 3/4/2024    Patient cleared for discharge from case management standpoint.  Per Lori with [facility], patient's nurse can call report to [phone].  Per Lori, patient scheduled  3:00pm.      Final Discharge Note (most recent)       Final Note - 03/04/24 1239          Final Note    Assessment Type Final Discharge Note     Anticipated Discharge Disposition Rehab Facility     What phone number can be called within the next 1-3 days to see how you are doing after discharge? [phone]     Hospital Resources/Appts/Education Provided Provided patient/caregiver with written discharge plan information;Provided education on problems/symptoms using teachback;Appointments scheduled and added to AVS;Post-Acute resouces added to AVS        Post-Acute Status    Post-Acute Authorization Placement     Post-Acute Placement Status Set-up Complete/Auth obtained     Discharge Delays Ambulance Transport/Facility Transport                     Important Message from Medicare             Contact Info       [facility]   Specialty: Physical Therapy, Occupational Therapy    [address] [address] [address]   Phone: [phone]       Next Steps: Follow up    Instructions: Rehab

## 2024-03-05 NOTE — DISCHARGE SUMMARY
[facility] Medicine  Discharge Summary      Patient Name: Arleen Zarate  MRN: [id number]  [facility]: [id number]  Patient Class: IP- Inpatient  Admission Date: 2/28/2024  Hospital Length of Stay: 5 days  Discharge Date and Time: 3/4/2024  4:04 PM  Attending Physician: No att. providers found   Discharging Provider: Randi Sebastian MD  Primary Care Provider: Perry Jorge MD    Primary Care Team: Networked reference to record PCT     HPI:   Arleen Zarate is a 89-year-old female who was transferred from [facility] for right hip fracture.  Patient reports she sustained a mechanical fall this morning while making a per bed.  She reports falling striking her right hip on the floor also with injury to head.  She denies any loss of consciousness or cervical pain.  She describes the pain to the right hip is intense.  She rates the pain at 10/10 at worst.  The pain is worse with any movement of the right lower extremity.  The pain is minimally relieved with remaining still and positioning. Previous medical history includes hypertension, GERD, arthritis, hyperlipidemia, osteopenia.  ER workup at outside facility:  CBC and CMP were unremarkable.  CT of the brain was negative for any acute findings.  CT of the cervical spine were negative for any acute findings.  CTA of the chest was negative for pulmonary embolism or acute findings.  X-ray of the right hip demonstrates a sub cap fracture of the right femoral neck.  Patient admitted to Hospital Medicine for treatment and management.  Orthopedics was consulted and plans to take patient to OR on Friday.    Procedure(s) (LRB):  HEMIARTHROPLASTY, HIP (Right)      Hospital Course:   Arleen Zarate is a 89 year old female with a past medical history of HTN and GERD who presented with a R femoral neck fracture after a mechanical fall. Orthopedic Surgery took the patient to the OR 3/1. PT/OT evaluated patient and recommended rehab placement.  Patient was  discharged to inpatient rehab once arranged.  She will follow-up with PCP and ortho.     Goals of Care Treatment Preferences:  Code Status: Full Code      Consults:   Consults (From admission, onward)          Status Ordering Provider     Inpatient consult to Social Work/Case Management  Once        Provider:  (Not yet assigned)    Completed IRENA ZUNIGA     Inpatient consult to Orthopedics  Once        Provider:  Wan Carmona MD    Completed LUCAS PORRAS     IP consult to dietary  Once        Provider:  (Not yet assigned)    Completed LUCAS PORRAS     Inpatient consult to Orthopedic Surgery  Once        Provider:  Wan Carmona MD    Completed CARLOS JONES            No new Assessment & Plan notes have been filed under this hospital service since the last note was generated.  Service: Hospital Medicine    Final Active Diagnoses:    Diagnosis Date Noted POA    PRINCIPAL PROBLEM:  Closed fracture of neck of right femur [S72.001A] 02/28/2024 Yes    Anemia [D64.9] 03/01/2024 Yes    Thrombocytopenia [D69.6] 03/01/2024 No    Essential hypertension [I10] 03/28/2012 Yes    Gastroesophageal reflux disease [K21.9] 03/28/2012 Yes      Problems Resolved During this Admission:       Discharged Condition: good    Disposition: Rehab Facility    Follow Up:   Follow-up Information       [facility] Follow up.    Specialties: Physical Therapy, Occupational Therapy  Why: Rehab  Contact information:  [address] [address] [address] [phone]                           Patient Instructions:      Activity as tolerated       Significant Diagnostic Studies: Labs: BMP:   Recent Labs   Lab 03/04/24  0408         K 4.2      CO2 27   BUN 26   CREATININE 0.8   CALCIUM 8.5*   MG 2.1   , CMP   Recent Labs   Lab 03/04/24  0408      K 4.2      CO2 27      BUN 26   CREATININE 0.8   CALCIUM 8.5*   PROT 5.4*   ALBUMIN 2.5*   BILITOT 0.4    ALKPHOS 73   AST 27   ALT 9*   ANIONGAP 7*   , and CBC   Recent Labs   Lab 03/04/24  0412   WBC 7.88   HGB 9.0*   HCT 27.7*      Radiology Results (last 7 days)    Procedure Component Value Units Date/Time   X-Ray Hip 1 View Right (with Pelvis when performed) [[id number]] Resulted: 03/02/24 0645   Order Status: Completed Updated: 03/02/24 0647   Narrative:     EXAMINATION:  XR HIP WITH PELVIS WHEN PERFORMED, 1 VIEW RIGHT    CLINICAL HISTORY:  S/p right hip bipolar hemiarthroplasty;    TECHNIQUE:  Single view of the right hip    COMPARISON:  Preoperative films from 02/28/2024    FINDINGS:  A bipolar hip arthroplasty is now in place.  No evidence to suggest periprosthetic fracture on the single view exam.  Air seen within the soft tissues.   Impression:       As above      Electronically signed by: Kwasi Pelaez DO  Date: 03/02/2024  Time: 06:45   CTA Chest Non-Coronary (PE Studies) [[id number]] Collected: 02/28/24 1415   Order Status: Completed Updated: 02/28/24 1452   Narrative:     CMS MANDATED QUALITY DATA-CT RADIATION DOSE-436  All CT scans at this facility use dose modulation, iterative reconstruction, and or weight-based dosing when appropriate to reduce radiation dose to as low as reasonably achievable.    HISTORY: Pulmonary embolism (PE) suspected, high prob  dyspnea.    FINDINGS: Thin axial imaging through the chest was performed with 100 mL Omnipaque 350 IV contrast, with sagittal and coronal reformatted images and MIP reconstructions performed, and images stored in the patient's permanent electronic medical record.    Comparison to prior exams. There are no pulmonary arterial filling defects to suggest pulmonary thromboembolism. The central pulmonary arteries are normal in caliber. Scattered calcified and soft plaque involves normal caliber thoracic aorta, with no aortic dissection or evidence of aortic intramural hemorrhage. 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    The heart is enlarged, with no pericardial effusion. There are a few scattered coronary arterial calcifications. There is a large sliding-type hiatal hernia, with no enlarged mediastinal or hilar lymph nodes.    Mild scattered linear and reticular densities in the lower lungs reflecting subsegmental atelectasis, with no consolidation, pleural effusion, evidence of pulmonary edema, or pneumothorax. There is mild scattered lower lobe bronchial wall thickening, with no central mucous plugging.    Images of the upper abdomen show cholecystectomy clips, and are otherwise unremarkable. There are no acute fractures or destructive osseous lesions, with intervertebral disc space narrowing in the spine with osteophytes.    IMPRESSION:  1. Negative for pulmonary thromboembolism.  2. Additional observations as described.    Electronically signed by:  Syed Harris MD  02/28/2024 02:50 PM [facility] Workstation: [id number]   CT Cervical Spine Without Contrast [[id number]] Collected: 02/28/24 1036   Order Status: Completed Updated: 02/28/24 1137   Narrative:     CT CERVICAL SPINE    CMS MANDATED QUALITY DATA - CT RADIATION - 436    HISTORY: Trauma.    FINDINGS: Thin axial imaging was performed without contrast, with sagittal and coronal reformatted images reviewed. All CT exams at this facility use dose modulation, iterative reconstruction, and or weight based dosing when appropriate to reduce radiation dose to as low as reasonably achievable.    There is no evidence of cervical fracture or subluxation. Prevertebral soft tissues are normal. The odontoid is intact.    Changes of multilevel cervical degenerative disc and facet disease are noted, with uncinate process and facet hypertrophy combining to result in moderate foraminal stenosis at C4-5, C5-6, and C6-7.    IMPRESSION:      1. No evidence of cervical fracture or subluxation.  2. Multilevel cervical  degenerative disc/facet disease.    Electronically signed by:  Sami Nino MD  02/28/2024 11:34 AM CST Workstation: [id number]   CT Head Without Contrast [[id number]] Collected: 02/28/24 1035   Order Status: Completed Updated: 02/28/24 1120   Narrative:     CT HEAD WITHOUT CONTRAST    CMS MANDATED QUALITY DATA - CT RADIATION  436    All CT scans at this facility utilize dose modulation, iterative reconstruction, and/or weight based dosing when appropriate to reduce radiation dose to as low as reasonably achievable    Clinical data: Trauma. Comparison to March 2022.    FINDINGS: Noninfusion images were obtained from the skull base to the vertex. There is no intracranial mass, hemorrhage, or midline shift. Ventricles and sulci are mildly prominent. There are no pathologic extra-axial fluid collections.    There is no evidence of cortical ischemic change. Changes of mild chronic microvascular white matter disease are noted. Cerebellum and brainstem are normal. There is no evidence of calvarial fracture or acute osseous destructive process.  IMPRESSION:    1. No acute intracranial abnormalities. Chronic findings as discussed above.        Electronically signed by:  Sami Nino MD  02/28/2024 11:18 AM CST Workstation: [id number]   X-Ray Hip 2 or 3 views Right (with Pelvis when performed) [[id number]] Collected: 02/28/24 1030   Order Status: Completed Updated: 02/28/24 1043   Narrative:     AP pelvis and 2 views of the right hip    Clinical history is pain after fall    There is a subcapital fracture of the right femoral neck in various deformity. There is no dislocation or significant angulation. There is osteopenia.    There are no additional fractures. There is facet hypertrophy at L4-5 and L5-S1.    IMPRESSION: Subcapital fracture of the right femoral neck in varus deformity    Electronically signed by:  Minda Fry MD  02/28/2024 10:41 AM CST Workstation: [id number]   X-Ray Chest AP Portable  [[id number]] Collected: 02/28/24 1030   Order Status: Completed Updated: 02/28/24 1042   Narrative:     Portable chest x-ray at 10:19 AM is compared to prior study dated 3/26/2022    Clinical history is preoperative study for right hip fracture    The heart is mildly enlarged. The lungs are clear. There are no acute osseous abnormalities.    IMPRESSION: Mild cardiomegaly with no acute pulmonary process    Electronically signed by:  Minda Fry MD  02/28/2024 10:40 AM CST Workstation: [id number]       Pending Diagnostic Studies:       None           Medications:  Reconciled Home Medications:      Medication List        START taking these medications      fluticasone propionate 50 mcg/actuation nasal spray  Commonly known as: FLONASE  2 sprays (100 mcg total) by Each Nostril route once daily.     HYDROcodone-acetaminophen 5-325 mg per tablet  Commonly known as: NORCO  Take 1 tablet by mouth every 6 (six) hours as needed for Pain.     melatonin 3 mg tablet  Commonly known as: MELATIN  Take 3 tablets (9 mg total) by mouth nightly as needed for Insomnia.            CHANGE how you take these medications      lisinopriL 30 MG tablet  Commonly known as: PRINIVIL,ZESTRIL  TAKE 1 TABLET EVERY EVENING  What changed: when to take this     nisoldipine 8.5 MG Tb24  Commonly known as: SULAR  TAKE 1 TABLET DAILY  What changed: when to take this            CONTINUE taking these medications      multivitamin capsule  Take 1 capsule by mouth once daily.     omeprazole 40 MG capsule  Commonly known as: PRILOSEC  Take 1 capsule (40 mg total) by mouth Daily.            STOP taking these medications      meloxicam 15 MG tablet  Commonly known as: MOBIC     triamcinolone acetonide 0.025% 0.025 % cream  Commonly known as: KENALOG              Indwelling Lines/Drains at time of discharge:   Lines/Drains/Airways       None                   Time spent on the discharge of patient: 35 minutes         Randi Sebastian MD  Department of Hospital  Medicine  [facility]/Surg

## 2024-03-09 ENCOUNTER — HOSPITAL ENCOUNTER (OUTPATIENT)
Dept: RADIOLOGY | Facility: HOSPITAL | Age: 89
Discharge: HOME | End: 2024-03-09
Attending: PHYSICAL MEDICINE & REHABILITATION
Payer: MEDICARE

## 2024-03-09 PROCEDURE — 71046 X-RAY EXAM CHEST 2 VIEWS: CPT | Mod: 26,,, | Performed by: RADIOLOGY

## 2024-03-12 ENCOUNTER — HOSPITAL ENCOUNTER (OUTPATIENT)
Dept: RADIOLOGY | Facility: HOSPITAL | Age: 89
Discharge: HOME | End: 2024-03-12
Attending: INTERNAL MEDICINE
Payer: MEDICARE

## 2024-03-12 PROCEDURE — 93970 EXTREMITY STUDY: CPT | Mod: 26,,, | Performed by: RADIOLOGY

## 2024-03-12 PROCEDURE — 71275 CT ANGIOGRAPHY CHEST: CPT | Mod: 26,,, | Performed by: RADIOLOGY

## 2024-03-12 PROCEDURE — 71275 CT ANGIOGRAPHY CHEST: CPT | Mod: TC

## 2024-03-12 PROCEDURE — 25500020 PHARM REV CODE 255: Mod: PN | Performed by: INTERNAL MEDICINE

## 2024-03-12 RX ADMIN — IOHEXOL 100 ML: 350 INJECTION, SOLUTION INTRAVENOUS at 10:03

## 2024-03-22 ENCOUNTER — OFFICE VISIT (OUTPATIENT)
Dept: ORTHOPEDICS | Facility: CLINIC | Age: 89
End: 2024-03-22
Payer: MEDICARE

## 2024-03-22 VITALS — WEIGHT: 140 LBS | BODY MASS INDEX: 28.22 KG/M2 | HEIGHT: 59 IN | HEART RATE: 83 BPM | OXYGEN SATURATION: 98 %

## 2024-03-22 DIAGNOSIS — S72.001A CLOSED DISPLACED FRACTURE OF RIGHT FEMORAL NECK: Primary | ICD-10-CM

## 2024-03-22 PROCEDURE — 99024 POSTOP FOLLOW-UP VISIT: CPT | Mod: POP,,, | Performed by: ORTHOPAEDIC SURGERY

## 2024-03-22 PROCEDURE — 99213 OFFICE O/P EST LOW 20 MIN: CPT | Mod: PBBFAC,PO | Performed by: ORTHOPAEDIC SURGERY

## 2024-03-22 PROCEDURE — 99999 PR PBB SHADOW E&M-EST. PATIENT-LVL III: CPT | Mod: PBBFAC,,, | Performed by: ORTHOPAEDIC SURGERY

## 2024-03-22 RX ORDER — TRAMADOL HYDROCHLORIDE 50 MG/1
50 TABLET ORAL EVERY 6 HOURS PRN
COMMUNITY
Start: 2024-03-19 | End: 2024-03-26

## 2024-03-22 RX ORDER — ACETAMINOPHEN 325 MG/1
650 TABLET ORAL EVERY 6 HOURS PRN
COMMUNITY
Start: 2024-03-19

## 2024-03-22 NOTE — PROGRESS NOTES
Subjective:      Patient ID: Arleen Zarate is a 89 y.o. female.    Chief Complaint: Post-op Evaluation    HPI  89-year-old female in 3 weeks out from right hip hemiarthroplasty for femoral neck fracture.  He has no significant complaints of pain  ROS      Objective:    Ortho Exam     Incision is healing nicely without any erythema or drainage  She is ambulating nicely with a walker         My Radiographs Findings:    No new radiographs today.  Assessment:       Encounter Diagnosis   Name Primary?    Closed displaced fracture of right femoral neck Yes         Plan:       I have discussed medical condition treatment options her at length patient is doing quite well.  She may continue to advance activity slowly as tolerated follow up in approximately 6 weeks for re-evaluation and radiographs I will see her sooner if any questions or problems.  She may weightbear as tolerated        Past Medical History:   Diagnosis Date    Anemia 3/1/2024    Anxiety     Arthritis     fingers and back    Depression     Disorder of kidney and ureter     Foot pain, left 9/18/2013    GERD (gastroesophageal reflux disease)     Hiatal hernia     Hyperlipidemia     Hypertension     Reflux     on meds    Tendon tear 8/21/2013    Tendonitis 8/21/2013     Past Surgical History:   Procedure Laterality Date    CHOLECYSTECTOMY      EYE SURGERY Bilateral     cataracts and implants    HEMIARTHROPLASTY OF HIP Right 3/1/2024    Procedure: HEMIARTHROPLASTY, HIP;  Surgeon: Wan Carmona MD;  Location: [facility];  Service: Orthopedics;  Laterality: Right;    TONSILLECTOMY           Current Outpatient Medications:     acetaminophen (TYLENOL) 325 MG tablet, Take 650 mg by mouth every 6 (six) hours as needed., Disp: , Rfl:     fluticasone propionate (FLONASE) 50 mcg/actuation nasal spray, 2 sprays (100 mcg total) by Each Nostril route once daily., Disp: , Rfl: 0    lisinopriL (PRINIVIL,ZESTRIL) 30 MG tablet, TAKE 1 TABLET EVERY EVENING (Patient  taking differently: Take 30 mg by mouth once daily.), Disp: 90 tablet, Rfl: 2    melatonin (MELATIN) 3 mg tablet, Take 3 tablets (9 mg total) by mouth nightly as needed for Insomnia., Disp: , Rfl: 0    multivitamin capsule, Take 1 capsule by mouth once daily., Disp: , Rfl:     nisoldipine (SULAR) 8.5 MG Tb24, TAKE 1 TABLET DAILY (Patient taking differently: Take 8.5 mg by mouth once daily.), Disp: 90 tablet, Rfl: 1    omeprazole (PRILOSEC) 40 MG capsule, Take 1 capsule (40 mg total) by mouth Daily., Disp: 90 capsule, Rfl: 1    traMADoL (ULTRAM) 50 mg tablet, Take 50 mg by mouth every 6 (six) hours as needed., Disp: , Rfl:     Review of patient's allergies indicates:   Allergen Reactions    Buspar [buspirone] Other (See Comments)     Syncope and nausea     Doxy      Other reaction(s): Unknown    Effexor [venlafaxine] Other (See Comments)     shaking    Biaxin [clarithromycin] Other (See Comments)     Unknown    Codeine Other (See Comments)     Syncope, and nausea.    Statins-hmg-coa reductase inhibitors      myalgia    Doxycycline Other (See Comments)     Made very weak       Family History   Problem Relation Age of Onset    Hearing loss Son      Social History     Occupational History    Not on file   Tobacco Use    Smoking status: Never     Passive exposure: Never    Smokeless tobacco: Never   Substance and Sexual Activity    Alcohol use: Yes     Alcohol/week: 1.0 standard drink of alcohol     Types: 1 Glasses of wine per week     Comment: rarely    Drug use: No    Sexual activity: Not Currently

## 2024-04-02 DIAGNOSIS — I10 ESSENTIAL HYPERTENSION: ICD-10-CM

## 2024-04-02 RX ORDER — NISOLDIPINE 8.5 MG/1
TABLET, FILM COATED, EXTENDED RELEASE ORAL
Qty: 90 TABLET | Refills: 3 | Status: SHIPPED | OUTPATIENT
Start: 2024-04-02 | End: 2024-04-18

## 2024-04-02 NOTE — TELEPHONE ENCOUNTER
No care due was identified.  Health [facility] Embedded Care Due Messages. Reference number: [id number].   4/02/2024 2:32:38 AM CDT

## 2024-04-17 DIAGNOSIS — K21.9 GASTROESOPHAGEAL REFLUX DISEASE: ICD-10-CM

## 2024-04-17 RX ORDER — OMEPRAZOLE 40 MG/1
40 CAPSULE, DELAYED RELEASE ORAL
Qty: 90 CAPSULE | Refills: 3 | Status: SHIPPED | OUTPATIENT
Start: 2024-04-17

## 2024-04-17 NOTE — TELEPHONE ENCOUNTER
No care due was identified.  Health [facility] Embedded Care Due Messages. Reference number: [id number].   4/17/2024 12:00:47 PM CDT

## 2024-04-18 ENCOUNTER — OFFICE VISIT (OUTPATIENT)
Dept: FAMILY MEDICINE | Facility: CLINIC | Age: 89
End: 2024-04-18
Payer: MEDICARE

## 2024-04-18 VITALS
WEIGHT: 132.25 LBS | DIASTOLIC BLOOD PRESSURE: 62 MMHG | OXYGEN SATURATION: 97 % | RESPIRATION RATE: 17 BRPM | TEMPERATURE: 99 F | HEART RATE: 96 BPM | HEIGHT: 59 IN | BODY MASS INDEX: 26.66 KG/M2 | SYSTOLIC BLOOD PRESSURE: 132 MMHG

## 2024-04-18 DIAGNOSIS — Z78.0 ASYMPTOMATIC MENOPAUSAL STATE: ICD-10-CM

## 2024-04-18 DIAGNOSIS — E55.9 HYPOVITAMINOSIS D: ICD-10-CM

## 2024-04-18 DIAGNOSIS — Z00.00 HEALTHCARE MAINTENANCE: Primary | ICD-10-CM

## 2024-04-18 DIAGNOSIS — M25.559 HIP PAIN, UNSPECIFIED LATERALITY: ICD-10-CM

## 2024-04-18 DIAGNOSIS — I10 ESSENTIAL HYPERTENSION: ICD-10-CM

## 2024-04-18 DIAGNOSIS — E78.2 MIXED HYPERLIPIDEMIA: ICD-10-CM

## 2024-04-18 DIAGNOSIS — D64.9 NORMOCYTIC ANEMIA: ICD-10-CM

## 2024-04-18 PROCEDURE — 99999 PR PBB SHADOW E&M-EST. PATIENT-LVL III: CPT | Mod: PBBFAC,,, | Performed by: STUDENT IN AN ORGANIZED HEALTH CARE EDUCATION/TRAINING PROGRAM

## 2024-04-18 PROCEDURE — 99214 OFFICE O/P EST MOD 30 MIN: CPT | Mod: S$PBB,,, | Performed by: STUDENT IN AN ORGANIZED HEALTH CARE EDUCATION/TRAINING PROGRAM

## 2024-04-18 PROCEDURE — 99213 OFFICE O/P EST LOW 20 MIN: CPT | Mod: PBBFAC,PO | Performed by: STUDENT IN AN ORGANIZED HEALTH CARE EDUCATION/TRAINING PROGRAM

## 2024-04-18 PROCEDURE — G2211 COMPLEX E/M VISIT ADD ON: HCPCS | Mod: S$PBB,,, | Performed by: STUDENT IN AN ORGANIZED HEALTH CARE EDUCATION/TRAINING PROGRAM

## 2024-04-18 NOTE — PROGRESS NOTES
[facility] Note    Subjective:    The HPI and pertinent ROS is included in the Diagnostic Impression Remarks section at the end of the note. Please see below for further details. Chief complaint is at end of note.     Arleen is a pleasant intelligent patient who is here for evaluation.     Modified Medications    No medications on file       Data reviewed 274}  Previous medical records reviewed and summarized in plan section at end of note.      If you are due for any health screening(s) below please notify me so we can arrange them to be ordered and scheduled. Most healthy patients at your age complete them, but you are free to accept or refuse. If you can't do it, I'll definitely understand. If you can, I'd certainly appreciate it!     All of your core healthy metrics are met.      The following portions of the patient's history were reviewed and updated as appropriate: allergies, current medications, past family history, past medical history, past social history, past surgical history and problem list.    She  has a past medical history of Anemia (3/1/2024), Anxiety, Arthritis, Depression, Disorder of kidney and ureter, Foot pain, left (9/18/2013), GERD (gastroesophageal reflux disease), Hiatal hernia, Hyperlipidemia, Hypertension, Reflux, Tendon tear (8/21/2013), and Tendonitis (8/21/2013).  She  has a past surgical history that includes Cholecystectomy; Tonsillectomy; Eye surgery (Bilateral); and Hemiarthroplasty of hip (Right, 3/1/2024).    She  reports that she has never smoked. She has never been exposed to tobacco smoke. She has never used smokeless tobacco. She reports current alcohol use of about 1.0 standard drink of alcohol per week. She reports that she does not use drugs.  She family history includes Hearing loss in her son.    Review of patient's allergies indicates:   Allergen Reactions    Buspar [buspirone] Other (See Comments)     Syncope and nausea     Doxy      Other  "reaction(s): Unknown    Effexor [venlafaxine] Other (See Comments)     shaking    Biaxin [clarithromycin] Other (See Comments)     Unknown    Codeine Other (See Comments)     Syncope, and nausea.    Statins-hmg-coa reductase inhibitors      myalgia    Doxycycline Other (See Comments)     Made very weak       Tobacco Use: Low Risk  (4/18/2024)    Patient History     Smoking Tobacco Use: Never     Smokeless Tobacco Use: Never     Passive Exposure: Never     Physical Examination  General appearance: alert, cooperative, no distress  Neck: no thyromegaly, no neck stiffness  Lungs: clear to auscultation, no wheezes, rales or rhonchi, symmetric air entry  Heart: normal rate, regular rhythm, normal S1, S2, no murmurs, rubs, clicks or gallops  Abdomen: soft, nontender, nondistended, no rigidity, rebound, or guarding.   Back: no point tenderness over spine  Extremities: peripheral pulses normal, no unilateral leg swelling or calf tenderness   Neurological:alert, oriented, normal speech, no new focal findings or movement disorder noted from baseline    BP Readings from Last 3 Encounters:   04/18/24 132/62   03/04/24 114/64   01/29/24 128/70     Wt Readings from Last 3 Encounters:   04/18/24 60 kg (132 lb 4.4 oz)   03/22/24 63.5 kg (139 lb 15.9 oz)   03/01/24 63.5 kg (139 lb 15.9 oz)     /62 (BP Location: Right arm, Patient Position: Sitting, BP Method: Large (Manual))   Pulse 96   Temp 98.6 °F (37 °C) (Oral)   Resp 17   Ht 4' 11" (1.499 m)   Wt 60 kg (132 lb 4.4 oz)   SpO2 97%   BMI 26.72 kg/m²    274}  Laboratory: I have reviewed old labs below:    274}    Lab Results   Component Value Date    WBC 7.88 03/04/2024    HGB 9.0 (L) 03/04/2024    HCT 27.7 (L) 03/04/2024    MCV 93 03/04/2024     03/04/2024     03/04/2024    K 4.2 03/04/2024     03/04/2024    CALCIUM 8.5 (L) 03/04/2024    PHOS 2.6 (L) 03/04/2024    CO2 27 03/04/2024     03/04/2024    BUN 26 03/04/2024    CREATININE 0.8 " "03/04/2024    EGFRNORACEVR >60 03/04/2024    ANIONGAP 7 (L) 03/04/2024    PROT 5.4 (L) 03/04/2024    ALBUMIN 2.5 (L) 03/04/2024    BILITOT 0.4 03/04/2024    ALKPHOS 73 03/04/2024    ALT 9 (L) 03/04/2024    AST 27 03/04/2024    INR 1.0 02/28/2024    CHOL 201 (H) 10/17/2023    TRIG 110 10/17/2023    HDL 59 10/17/2023    LDLCALC 120.0 10/17/2023    TSH 1.798 02/28/2024    HGBA1C 5.2 04/12/2023      Lab reviewed by me: Particular labs of significance that I will monitor, workup, or treat to improve are mentioned below in diagnostic impression remarks.    Imaging/EKG: I have reviewed the pertinent results and my findings are noted in remarks.  274}    CC:   Chief Complaint   Patient presents with    Hospital Follow Up    Follow-up    Hypertension        274}    Assessment/Plan  Arleen Zarate is a 89 y.o. female who presents to clinic with:  1. Healthcare maintenance    2. Essential hypertension    3. Mixed hyperlipidemia    4. Hip pain, unspecified laterality    5. Normocytic anemia    6. Hypovitaminosis D    7. Asymptomatic menopausal state       274}  Diagnostic Impression Remarks + HPI     Documentation entered by me for this encounter may have been done in part using speech-recognition technology. Although I have made an effort to ensure accuracy, "sound like" errors may exist and should be interpreted in context.      Patient has finished physical therapy after her hip fracture has been taking Tylenol recommend to avoid NSAIDs due to her function will monitor   Hypertension -stable continue current meds monitor no headache or chest pain f/u blood work   Health maintenance- get DEXA scan and follow-up on this   Vitamin-D deficiency recheck vitamin-D monitor   GERD-stable continue current meds no dysphagia  Hyperlipidemia-unable to height statins will recheck monitor    This is the extent of this pleasant patient's concerns at this present time. She did not feel chest pain upon exertion, dyspnea, nausea, vomiting, " diaphoresis, or syncope. No pleuritic chest pain, unilateral leg swelling, calf tenderness, or calf pain. Negative for unintentional weight loss night sweats, hematuria, and fevers.     Additional workup planned: see labs ordered below.    See below for labs and meds ordered with associated diagnosis      1. Healthcare maintenance    2. Essential hypertension    3. Mixed hyperlipidemia  - Lipid Panel; Future    4. Hip pain, unspecified laterality    5. Normocytic anemia  - Iron and TIBC; Future  - Ferritin; Future    6. Hypovitaminosis D  - Vitamin D; Future    7. Asymptomatic menopausal state  - DXA Bone Density Axial Skeleton 1 or more sites; Future      Perry Jorge MD   274}    If you are due for any health screening(s) below please notify me so we can arrange them to be ordered and scheduled. Most healthy patients at your age complete them, but you are free to accept or refuse.     If you can't do it, I'll definitely understand. If you can, I'd certainly appreciate it!   All of your core healthy metrics are met.

## 2024-05-02 ENCOUNTER — HOSPITAL ENCOUNTER (OUTPATIENT)
Dept: RADIOLOGY | Facility: HOSPITAL | Age: 89
Discharge: HOME | End: 2024-05-02
Attending: STUDENT IN AN ORGANIZED HEALTH CARE EDUCATION/TRAINING PROGRAM
Payer: MEDICARE

## 2024-05-02 ENCOUNTER — LAB VISIT (OUTPATIENT)
Dept: LAB | Facility: HOSPITAL | Age: 89
End: 2024-05-02
Attending: STUDENT IN AN ORGANIZED HEALTH CARE EDUCATION/TRAINING PROGRAM
Payer: MEDICARE

## 2024-05-02 DIAGNOSIS — E55.9 HYPOVITAMINOSIS D: ICD-10-CM

## 2024-05-02 DIAGNOSIS — Z78.0 ASYMPTOMATIC MENOPAUSAL STATE: ICD-10-CM

## 2024-05-02 DIAGNOSIS — D64.9 NORMOCYTIC ANEMIA: ICD-10-CM

## 2024-05-02 DIAGNOSIS — E78.2 MIXED HYPERLIPIDEMIA: ICD-10-CM

## 2024-05-02 LAB
25(OH)D3+25(OH)D2 SERPL-MCNC: 31 NG/ML (ref 30–96)
CHOLEST SERPL-MCNC: 192 MG/DL (ref 120–199)
CHOLEST/HDLC SERPL: 3.8 {RATIO} (ref 2–5)
FERRITIN SERPL-MCNC: 52 NG/ML (ref 20–300)
HDLC SERPL-MCNC: 51 MG/DL (ref 40–75)
HDLC SERPL: 26.6 % (ref 20–50)
IRON SERPL-MCNC: 34 UG/DL (ref 30–160)
LDLC SERPL CALC-MCNC: 108.4 MG/DL (ref 63–159)
NONHDLC SERPL-MCNC: 141 MG/DL
SATURATED IRON: 9 % (ref 20–50)
TOTAL IRON BINDING CAPACITY: 382 UG/DL (ref 250–450)
TRANSFERRIN SERPL-MCNC: 258 MG/DL (ref 200–375)
TRIGL SERPL-MCNC: 163 MG/DL (ref 30–150)

## 2024-05-02 PROCEDURE — 77080 DXA BONE DENSITY AXIAL: CPT | Mod: TC,PO

## 2024-05-02 PROCEDURE — 36415 COLL VENOUS BLD VENIPUNCTURE: CPT | Mod: PO | Performed by: STUDENT IN AN ORGANIZED HEALTH CARE EDUCATION/TRAINING PROGRAM

## 2024-05-02 PROCEDURE — 82728 ASSAY OF FERRITIN: CPT | Performed by: STUDENT IN AN ORGANIZED HEALTH CARE EDUCATION/TRAINING PROGRAM

## 2024-05-02 PROCEDURE — 80061 LIPID PANEL: CPT | Performed by: STUDENT IN AN ORGANIZED HEALTH CARE EDUCATION/TRAINING PROGRAM

## 2024-05-02 PROCEDURE — 83540 ASSAY OF IRON: CPT | Performed by: STUDENT IN AN ORGANIZED HEALTH CARE EDUCATION/TRAINING PROGRAM

## 2024-05-02 PROCEDURE — 77080 DXA BONE DENSITY AXIAL: CPT | Mod: 26,,, | Performed by: RADIOLOGY

## 2024-05-02 PROCEDURE — 82306 VITAMIN D 25 HYDROXY: CPT | Performed by: STUDENT IN AN ORGANIZED HEALTH CARE EDUCATION/TRAINING PROGRAM

## 2024-05-03 ENCOUNTER — OFFICE VISIT (OUTPATIENT)
Dept: ORTHOPEDICS | Facility: CLINIC | Age: 89
End: 2024-05-03
Payer: MEDICARE

## 2024-05-03 ENCOUNTER — HOSPITAL ENCOUNTER (OUTPATIENT)
Dept: RADIOLOGY | Facility: HOSPITAL | Age: 89
Discharge: HOME | End: 2024-05-03
Attending: ORTHOPAEDIC SURGERY
Payer: MEDICARE

## 2024-05-03 ENCOUNTER — OFFICE VISIT (OUTPATIENT)
Dept: FAMILY MEDICINE | Facility: CLINIC | Age: 89
End: 2024-05-03
Payer: MEDICARE

## 2024-05-03 VITALS — BODY MASS INDEX: 26.66 KG/M2 | WEIGHT: 132.25 LBS | HEART RATE: 96 BPM | OXYGEN SATURATION: 96 % | HEIGHT: 59 IN

## 2024-05-03 DIAGNOSIS — K21.9 GASTROESOPHAGEAL REFLUX DISEASE WITHOUT ESOPHAGITIS: ICD-10-CM

## 2024-05-03 DIAGNOSIS — S72.001A CLOSED DISPLACED FRACTURE OF RIGHT FEMORAL NECK: Primary | ICD-10-CM

## 2024-05-03 DIAGNOSIS — S72.001A CLOSED DISPLACED FRACTURE OF RIGHT FEMORAL NECK: ICD-10-CM

## 2024-05-03 DIAGNOSIS — I10 ESSENTIAL HYPERTENSION: ICD-10-CM

## 2024-05-03 DIAGNOSIS — M81.0 OSTEOPOROSIS, UNSPECIFIED OSTEOPOROSIS TYPE, UNSPECIFIED PATHOLOGICAL FRACTURE PRESENCE: Primary | ICD-10-CM

## 2024-05-03 PROCEDURE — 99213 OFFICE O/P EST LOW 20 MIN: CPT | Mod: PBBFAC,25,PO | Performed by: ORTHOPAEDIC SURGERY

## 2024-05-03 PROCEDURE — 99024 POSTOP FOLLOW-UP VISIT: CPT | Mod: POP,,, | Performed by: ORTHOPAEDIC SURGERY

## 2024-05-03 PROCEDURE — 99999 PR PBB SHADOW E&M-EST. PATIENT-LVL III: CPT | Mod: PBBFAC,,, | Performed by: ORTHOPAEDIC SURGERY

## 2024-05-03 PROCEDURE — 73502 X-RAY EXAM HIP UNI 2-3 VIEWS: CPT | Mod: TC,PO,RT

## 2024-05-03 PROCEDURE — 99443 PR PHYSICIAN TELEPHONE EVALUATION 21-30 MIN: CPT | Mod: 95,,, | Performed by: STUDENT IN AN ORGANIZED HEALTH CARE EDUCATION/TRAINING PROGRAM

## 2024-05-03 PROCEDURE — 73502 X-RAY EXAM HIP UNI 2-3 VIEWS: CPT | Mod: 26,RT,, | Performed by: RADIOLOGY

## 2024-05-03 RX ORDER — ALENDRONATE SODIUM 70 MG/1
70 TABLET ORAL
Qty: 12 TABLET | Refills: 3 | Status: SHIPPED | OUTPATIENT
Start: 2024-05-03 | End: 2025-05-03

## 2024-05-03 NOTE — PROGRESS NOTES
Established Patient - Audio Only Telehealth Visit     The patient location is: [place]   The chief complaint leading to consultation is: dexa   Visit type: Virtual visit with audio only (telephone)  Total time spent with patient: 23 min        The reason for the audio only service rather than synchronous audio and video virtual visit was related to technical difficulties or patient preference/necessity.     Each patient to whom I provide medical services by telemedicine is:  (1) informed of the relationship between the physician and patient and the respective role of any other health care provider with respect to management of the patient; and (2) notified that they may decline to receive medical services by telemedicine and may withdraw from such care at any time. Patient verbally consented to receive this service via voice-only telephone call.       HPI:  Patient had a DEXA scan and which showed Osteoporosis had a normal vitamin-D level was normal he is recovering from a fracture has not appoint with Orthopedics patient reports blood pressure has been stable is on a PPI     Assessment and plan:  osteoporosis Needs improvement discussed risk and benefits of a Fosamax shared decision-making will send in to prevent further fractures recommend to stay active with weight-bearing activities as well    Hypertension -stable continue current meds monitor no headache or chest pain f/u blood work     GERD-stable continue current meds no dysphagia       Diagnoses and all orders for this visit:    Osteoporosis, unspecified osteoporosis type, unspecified pathological fracture presence  -     alendronate (FOSAMAX) 70 MG tablet; Take 1 tablet (70 mg total) by mouth every 7 days.    Essential hypertension    Gastroesophageal reflux disease without esophagitis                        This service was not originating from a related E/M service provided within the previous 7 days nor will  to an E/M service or procedure  within the next 24 hours or my soonest available appointment.  Prevailing standard of care was able to be met in this audio-only visit.

## 2024-05-03 NOTE — PROGRESS NOTES
Subjective:      Patient ID: Arleen Zarate is a 89 y.o. female.    Chief Complaint: Pain and Post-op Evaluation of the Right Hip    HPI  Patient follow up status post right hip hemiarthroplasty.  She is doing quite well voices no complaints of pain  ROS      Objective:    Ortho Exam       Patient comes in with a steady very minimally antalgic gait using a cane  No incisional tenderness excellent hip range of motion no leg length discrepancy  Good active range of motion of the extremity  DXA Bone Density Axial Skeleton 1 or more sites  Narrative: EXAMINATION:  DXA BONE DENSITY AXIAL SKELETON 1 OR MORE SITES    CLINICAL HISTORY:  Postmenopausal female    COMPARISON:  2014    FINDINGS:  LUMBAR SPINE:    Bone mineral density in the lumbar spine from L1 through L4 is 1.156 gm/cm2.    The T-score is 1.0 (standard deviations of Young Adult mean).    The Z-score is 3.9 (standard deviations of Age Matched mean).    The WHO classification is normal, with risk of insufficiency spinal fracture not increased when compared to Young Adults based on T-score. Lumbar bone density is slightly higher when compared to the prior study, where it measured 1.138 g per square cm.    LEFT HIP:    Bone mineral density at the left femoral neck is 0.566 gm/cm2.    The T-score is -2.5 (standard deviations of Young Adult mean).    The Z-score is 0.0 (standard deviations of Age Matched mean).    The WHO classification is osteoporosis, with risk of insufficiency hip fracture high when compared to Young Adults based on T-score. Total bone mineral density at the left hip (0.709) has decreased compared to the prior study, where it measured 0.771 g per square cm.  Impression: Please see above.    Electronically signed by: Sami Nino  Date:    05/02/2024  Time:    10:05       My Radiographs Findings:    Radiographs today of the hip show a well-seated bipolar prosthesis no evidence of any loosening or osteolysis  Assessment:       Encounter  Diagnosis   Name Primary?    Closed displaced fracture of right femoral neck Yes         Plan:       Patient is doing quite well.  Continue with physical therapy for gait and balance training use her cane for assistance and follow up with me in 3 months I will see her sooner if any questions or problems.        Past Medical History:   Diagnosis Date    Anemia 3/1/2024    Anxiety     Arthritis     fingers and back    Depression     Disorder of kidney and ureter     Foot pain, left 9/18/2013    GERD (gastroesophageal reflux disease)     Hiatal hernia     Hyperlipidemia     Hypertension     Reflux     on meds    Tendon tear 8/21/2013    Tendonitis 8/21/2013     Past Surgical History:   Procedure Laterality Date    CHOLECYSTECTOMY      EYE SURGERY Bilateral     cataracts and implants    HEMIARTHROPLASTY OF HIP Right 3/1/2024    Procedure: HEMIARTHROPLASTY, HIP;  Surgeon: Wan Carmona MD;  Location: [facility];  Service: Orthopedics;  Laterality: Right;    TONSILLECTOMY           Current Outpatient Medications:     acetaminophen (TYLENOL) 325 MG tablet, Take 650 mg by mouth every 6 (six) hours as needed., Disp: , Rfl:     alendronate (FOSAMAX) 70 MG tablet, Take 1 tablet (70 mg total) by mouth every 7 days., Disp: 12 tablet, Rfl: 3    fluticasone propionate (FLONASE) 50 mcg/actuation nasal spray, 2 sprays (100 mcg total) by Each Nostril route once daily., Disp: , Rfl: 0    lisinopriL (PRINIVIL,ZESTRIL) 30 MG tablet, TAKE 1 TABLET EVERY EVENING (Patient taking differently: Take 30 mg by mouth once daily.), Disp: 90 tablet, Rfl: 2    melatonin (MELATIN) 3 mg tablet, Take 3 tablets (9 mg total) by mouth nightly as needed for Insomnia., Disp: , Rfl: 0    multivitamin capsule, Take 1 capsule by mouth once daily., Disp: , Rfl:     omeprazole (PRILOSEC) 40 MG capsule, TAKE 1 CAPSULE DAILY, Disp: 90 capsule, Rfl: 3    Review of patient's allergies indicates:   Allergen Reactions    Buspar [buspirone] Other (See Comments)      Syncope and nausea     Doxy      Other reaction(s): Unknown    Effexor [venlafaxine] Other (See Comments)     shaking    Biaxin [clarithromycin] Other (See Comments)     Unknown    Codeine Other (See Comments)     Syncope, and nausea.    Statins-hmg-coa reductase inhibitors      myalgia    Doxycycline Other (See Comments)     Made very weak       Family History   Problem Relation Name Age of Onset    Hearing loss Son       Social History     Occupational History    Not on file   Tobacco Use    Smoking status: Never     Passive exposure: Never    Smokeless tobacco: Never   Substance and Sexual Activity    Alcohol use: Yes     Alcohol/week: 1.0 standard drink of alcohol     Types: 1 Glasses of wine per week     Comment: rarely    Drug use: No    Sexual activity: Not Currently

## 2024-05-15 DIAGNOSIS — M17.0 BILATERAL PRIMARY OSTEOARTHRITIS OF KNEE: Primary | ICD-10-CM

## 2024-05-20 ENCOUNTER — OFFICE VISIT (OUTPATIENT)
Dept: ORTHOPEDICS | Facility: CLINIC | Age: 89
End: 2024-05-20
Payer: MEDICARE

## 2024-05-20 ENCOUNTER — HOSPITAL ENCOUNTER (OUTPATIENT)
Dept: RADIOLOGY | Facility: HOSPITAL | Age: 89
Discharge: HOME | End: 2024-05-20
Attending: ORTHOPAEDIC SURGERY
Payer: MEDICARE

## 2024-05-20 VITALS — WEIGHT: 132.25 LBS | BODY MASS INDEX: 26.66 KG/M2 | HEIGHT: 59 IN

## 2024-05-20 DIAGNOSIS — M17.0 BILATERAL PRIMARY OSTEOARTHRITIS OF KNEE: ICD-10-CM

## 2024-05-20 DIAGNOSIS — M17.0 BILATERAL PRIMARY OSTEOARTHRITIS OF KNEE: Primary | ICD-10-CM

## 2024-05-20 PROCEDURE — 99214 OFFICE O/P EST MOD 30 MIN: CPT | Mod: S$PBB,25,, | Performed by: ORTHOPAEDIC SURGERY

## 2024-05-20 PROCEDURE — 73564 X-RAY EXAM KNEE 4 OR MORE: CPT | Mod: 26,50,, | Performed by: RADIOLOGY

## 2024-05-20 PROCEDURE — 20610 DRAIN/INJ JOINT/BURSA W/O US: CPT | Mod: 50,PBBFAC,PO | Performed by: ORTHOPAEDIC SURGERY

## 2024-05-20 PROCEDURE — 73564 X-RAY EXAM KNEE 4 OR MORE: CPT | Mod: TC,50,PO

## 2024-05-20 PROCEDURE — 99213 OFFICE O/P EST LOW 20 MIN: CPT | Mod: PBBFAC,25,PO | Performed by: ORTHOPAEDIC SURGERY

## 2024-05-20 PROCEDURE — 99999PBSHW PR PBB SHADOW TECHNICAL ONLY FILED TO HB: Mod: PBBFAC,,,

## 2024-05-20 PROCEDURE — 99999 PR PBB SHADOW E&M-EST. PATIENT-LVL III: CPT | Mod: PBBFAC,,, | Performed by: ORTHOPAEDIC SURGERY

## 2024-05-20 RX ORDER — METHYLPREDNISOLONE ACETATE 80 MG/ML
80 INJECTION, SUSPENSION INTRA-ARTICULAR; INTRALESIONAL; INTRAMUSCULAR; SOFT TISSUE
Status: DISCONTINUED | OUTPATIENT
Start: 2024-05-20 | End: 2024-05-20 | Stop reason: HOSPADM

## 2024-05-20 RX ADMIN — METHYLPREDNISOLONE ACETATE 80 MG: 80 INJECTION, SUSPENSION INTRA-ARTICULAR; INTRALESIONAL; INTRAMUSCULAR; SOFT TISSUE at 03:05

## 2024-05-20 NOTE — PROCEDURES
Large Joint Aspiration/Injection: bilateral knee    Date/Time: 5/20/2024 3:00 PM    Performed by: Bola Lange II, MD  Authorized by: Bola Lange II, MD    Consent Done?:  Yes (Verbal)  Indications:  Arthritis and pain    Local anesthesia used?: Yes    Local anesthetic:  Topical anesthetic    Details:  Needle Size:  22 G  Approach:  Anterolateral  Location:  Knee  Laterality:  Bilateral  Site:  Bilateral knee  Medications (Right):  80 mg methylPREDNISolone acetate 80 mg/mL  Medications (Left):  80 mg methylPREDNISolone acetate 80 mg/mL  Patient tolerance:  Patient tolerated the procedure well with no immediate complications

## 2024-05-20 NOTE — PROGRESS NOTES
CC:  89-year-old female follows up with bilateral knee osteoarthritis.  We last saw her about 4 months ago on 01/08/2024.  She received injections at that point.  They worked well until the last week or 2 when she has had an insidious return of pain in both of her knees.  She currently rates her pain as a 7/10.    Past Medical History:   Diagnosis Date    Anemia 3/1/2024    Anxiety     Arthritis     fingers and back    Depression     Disorder of kidney and ureter     Foot pain, left 9/18/2013    GERD (gastroesophageal reflux disease)     Hiatal hernia     Hyperlipidemia     Hypertension     Reflux     on meds    Tendon tear 8/21/2013    Tendonitis 8/21/2013       Past Surgical History:   Procedure Laterality Date    CHOLECYSTECTOMY      EYE SURGERY Bilateral     cataracts and implants    HEMIARTHROPLASTY OF HIP Right 3/1/2024    Procedure: HEMIARTHROPLASTY, HIP;  Surgeon: Wan Carmona MD;  Location: [facility];  Service: Orthopedics;  Laterality: Right;    TONSILLECTOMY         Current Outpatient Medications on File Prior to Visit   Medication Sig Dispense Refill    acetaminophen (TYLENOL) 325 MG tablet Take 650 mg by mouth every 6 (six) hours as needed.      alendronate (FOSAMAX) 70 MG tablet Take 1 tablet (70 mg total) by mouth every 7 days. 12 tablet 3    fluticasone propionate (FLONASE) 50 mcg/actuation nasal spray 2 sprays (100 mcg total) by Each Nostril route once daily.  0    lisinopriL (PRINIVIL,ZESTRIL) 30 MG tablet TAKE 1 TABLET EVERY EVENING (Patient taking differently: Take 30 mg by mouth once daily.) 90 tablet 2    melatonin (MELATIN) 3 mg tablet Take 3 tablets (9 mg total) by mouth nightly as needed for Insomnia.  0    multivitamin capsule Take 1 capsule by mouth once daily.      omeprazole (PRILOSEC) 40 MG capsule TAKE 1 CAPSULE DAILY 90 capsule 3    [DISCONTINUED] buPROPion (WELLBUTRIN) 75 MG tablet Take 1 tablet (75 mg total) by mouth once daily. 30 tablet 1     No current  facility-administered medications on file prior to visit.       ROS:    Constitution: Denies chills, fever, and sweats.  HENT: Denies headaches or blurry vision.  Cardiovascular: Denies chest pain or irregular heart beat.  Respiratory: Denies cough or shortness of breath.  Gastrointestinal: Denies abdominal pain, nausea, or vomiting.  Genitourinary:  Denies urinary incontinence, bladder and kidney issues  Musculoskeletal:  Denies muscle cramps.  Neurological: Denies dizziness or focal weakness.  Psychiatric/Behavioral: Normal mental status.  Hematologic/Lymphatic: Denies bleeding problem or easy bruising/bleeding.  Skin: Denies rash or suspicious lesions.    Physical examination     Gen - No acute distress, well nourished, well groomed   Eyes - Extraoccular motions intact, pupils equally round and reactive to light and accommodation   ENT - normocephalic, atruamtic, oropharynx clear   Neck - Supple, no abnormal masses   Cardiovascular - regular rate and rhythm   Pulmonary - clear to auscultation bilaterally, no wheezes, ronchi, or rales   Abdomen - soft, non-tender, non-distended, positive bowel sounds   Psych - The patient is alert and oriented x3 with normal mood and affect    Examination of the Right Lower Extremity:     Skin intact throughout.  Motor function is intact distally EHL/FHL/TA/willie   +2 dorsalis pedis and posterior tibial pulses   Sensation to light touch intact distally dorsal, plantar, and first web space     Examination of the Right knee:    ROM 0 - 150   Effusion positive  Tenderness to palpation at the joint line positive  Pain during range of motion positive  Crepitation during range of motion positive     positive increased pain noted with flexion past 90   positive antalgic gait noted   negative Lachman's Test   negative Anterior Drawer Test   negative Posterior Drawer Test   negative McMurrays Test   negative Disco Test   negative Varus/Valgus instability    Examination of the Left Lower  Extremity:     Skin intact throughout.  Motor function is intact distally EHL/FHL/TA/willie   +2 dorsalis pedis and posterior tibial pulses   Sensation to light touch intact distally dorsal, plantar, and first web space     Examination of the Left knee:    ROM 0 - 150   Effusion positive  Tenderness to palpation at the joint line positive  Pain during range of motion positive  Crepitation during range of motion positive     positive increased pain noted with flexion past 90   positive antalgic gait noted   negative Lachman's Test   negative Anterior Drawer Test   negative Posterior Drawer Test   negative McMurrays Test   negative Disco Test   negative Varus/Valgus instability    X-ray images were examined and personally interpreted by me.  Three views of bilateral knees dated 05/20/2024 were reviewed.  There is osteoarthritis of both knees with complete loss of joint space in the medial compartment, subchondral sclerosis and periarticular osteophytes are noted    Dx:  Bilateral knee osteoarthritis    Plan:  I did offer the patient injections and she agreed.  We injected both the right and left knee with a mixture of 2, 2, 1.  She tolerated that well.  After the injections we had a long discussion about topical creams including Voltaren gel, icy hot, Biofreeze, blue emu, and others.  The patient has tried several those and she can pick 1 that works best for her.  Also have previously given her a prescription for meloxicam which she is never taken and her daughter who has with a reports she is taken that in the past and tolerated it well.  I think that would be helpful.  We also discussed natural anti-inflammatories including turmeric.  They verbalized understanding.  Follow up as scheduled.

## 2024-06-12 DIAGNOSIS — I10 ESSENTIAL HYPERTENSION: ICD-10-CM

## 2024-06-12 RX ORDER — LISINOPRIL 30 MG/1
TABLET ORAL
Qty: 90 TABLET | Refills: 2 | Status: SHIPPED | OUTPATIENT
Start: 2024-06-12

## 2024-06-12 NOTE — TELEPHONE ENCOUNTER
No care due was identified.  [facility] Embedded Care Due Messages. Reference number: [id number].   6/12/2024 2:25:14 AM CDT

## 2024-06-12 NOTE — TELEPHONE ENCOUNTER
Refill Decision Note   Arleen Zarate  is requesting a refill authorization.  Brief Assessment and Rationale for Refill:  Approve     Medication Therapy Plan:         Comments:     Note composed:5:28 AM 06/12/2024

## 2024-06-25 ENCOUNTER — TELEPHONE (OUTPATIENT)
Dept: FAMILY MEDICINE | Facility: CLINIC | Age: 89
End: 2024-06-25
Payer: MEDICARE

## 2024-06-25 NOTE — TELEPHONE ENCOUNTER
----- Message from Angie Dickerson sent at 6/25/2024  1:16 PM CDT -----  Contact: Self  Pt is calling in regards to the medication alendronate (FOSAMAX) 70 MG tablet, stated she keeps getting these delivered and wanted to let Dr Jorge know that she has not taken them and will not take them because of the instructions, stated it is foolishness, that it stated to sit still for over 20-30minutes after taking medications and she can not sit still for that. Can we pleas elet the doctor know and if we need to she can be reached at [phone]. Thank You

## 2024-07-03 DIAGNOSIS — Z71.89 COMPLEX CARE COORDINATION: ICD-10-CM

## 2024-07-21 ENCOUNTER — HOSPITAL ENCOUNTER (INPATIENT)
Facility: HOSPITAL | Age: 89
LOS: 5 days | Discharge: NURSING, REHAB OR LONG-TERM CARE | DRG: 522 | End: 2024-07-26
Attending: EMERGENCY MEDICINE | Admitting: STUDENT IN AN ORGANIZED HEALTH CARE EDUCATION/TRAINING PROGRAM
Payer: MEDICARE

## 2024-07-21 DIAGNOSIS — M25.559 HIP PAIN: ICD-10-CM

## 2024-07-21 DIAGNOSIS — R40.4 UNRESPONSIVE EPISODE: ICD-10-CM

## 2024-07-21 DIAGNOSIS — S72.002A CLOSED FRACTURE OF LEFT HIP, INITIAL ENCOUNTER: Primary | ICD-10-CM

## 2024-07-21 DIAGNOSIS — T14.8XXA SKIN AVULSION: ICD-10-CM

## 2024-07-21 DIAGNOSIS — M79.603 ARM PAIN: ICD-10-CM

## 2024-07-21 DIAGNOSIS — S72.002A CLOSED FRACTURE OF NECK OF LEFT FEMUR: ICD-10-CM

## 2024-07-21 LAB
ALBUMIN SERPL BCP-MCNC: 3.6 G/DL (ref 3.5–5.2)
ALP SERPL-CCNC: 99 U/L (ref 55–135)
ALT SERPL W/O P-5'-P-CCNC: 12 U/L (ref 10–44)
ANION GAP SERPL CALC-SCNC: 11 MMOL/L (ref 8–16)
AST SERPL-CCNC: 22 U/L (ref 10–40)
BASOPHILS # BLD AUTO: 0.03 K/UL (ref 0–0.2)
BASOPHILS NFR BLD: 0.5 % (ref 0–1.9)
BILIRUB SERPL-MCNC: 0.2 MG/DL (ref 0.1–1)
BILIRUB UR QL STRIP: NEGATIVE
BUN SERPL-MCNC: 27 MG/DL (ref 10–30)
CALCIUM SERPL-MCNC: 9.2 MG/DL (ref 8.7–10.5)
CHLORIDE SERPL-SCNC: 107 MMOL/L (ref 95–110)
CLARITY UR: CLEAR
CO2 SERPL-SCNC: 24 MMOL/L (ref 23–29)
COLOR UR: YELLOW
CREAT SERPL-MCNC: 1 MG/DL (ref 0.5–1.4)
DIFFERENTIAL METHOD BLD: ABNORMAL
EOSINOPHIL # BLD AUTO: 0.1 K/UL (ref 0–0.5)
EOSINOPHIL NFR BLD: 1.3 % (ref 0–8)
ERYTHROCYTE [DISTWIDTH] IN BLOOD BY AUTOMATED COUNT: 14.7 % (ref 11.5–14.5)
EST. GFR  (NO RACE VARIABLE): 51 ML/MIN/1.73 M^2
GLUCOSE SERPL-MCNC: 99 MG/DL (ref 70–110)
GLUCOSE UR QL STRIP: NEGATIVE
HCT VFR BLD AUTO: 32.5 % (ref 37–48.5)
HGB BLD-MCNC: 10.5 G/DL (ref 12–16)
HGB UR QL STRIP: NEGATIVE
IMM GRANULOCYTES # BLD AUTO: 0.02 K/UL (ref 0–0.04)
IMM GRANULOCYTES NFR BLD AUTO: 0.3 % (ref 0–0.5)
KETONES UR QL STRIP: NEGATIVE
LEUKOCYTE ESTERASE UR QL STRIP: NEGATIVE
LYMPHOCYTES # BLD AUTO: 2 K/UL (ref 1–4.8)
LYMPHOCYTES NFR BLD: 32.2 % (ref 18–48)
MCH RBC QN AUTO: 29.9 PG (ref 27–31)
MCHC RBC AUTO-ENTMCNC: 32.3 G/DL (ref 32–36)
MCV RBC AUTO: 93 FL (ref 82–98)
MONOCYTES # BLD AUTO: 0.5 K/UL (ref 0.3–1)
MONOCYTES NFR BLD: 8.8 % (ref 4–15)
NEUTROPHILS # BLD AUTO: 3.5 K/UL (ref 1.8–7.7)
NEUTROPHILS NFR BLD: 56.9 % (ref 38–73)
NITRITE UR QL STRIP: NEGATIVE
NRBC BLD-RTO: 0 /100 WBC
PH UR STRIP: 7 [PH] (ref 5–8)
PLATELET # BLD AUTO: 169 K/UL (ref 150–450)
PMV BLD AUTO: 10.4 FL (ref 9.2–12.9)
POCT GLUCOSE: 120 MG/DL (ref 70–110)
POTASSIUM SERPL-SCNC: 4.3 MMOL/L (ref 3.5–5.1)
PROT SERPL-MCNC: 6.4 G/DL (ref 6–8.4)
PROT UR QL STRIP: ABNORMAL
RBC # BLD AUTO: 3.51 M/UL (ref 4–5.4)
SODIUM SERPL-SCNC: 142 MMOL/L (ref 136–145)
SP GR UR STRIP: 1.02 (ref 1–1.03)
URN SPEC COLLECT METH UR: ABNORMAL
UROBILINOGEN UR STRIP-ACNC: NEGATIVE EU/DL
WBC # BLD AUTO: 6.14 K/UL (ref 3.9–12.7)

## 2024-07-21 PROCEDURE — 11000001 HC ACUTE MED/SURG PRIVATE ROOM

## 2024-07-21 PROCEDURE — 51702 INSERT TEMP BLADDER CATH: CPT

## 2024-07-21 PROCEDURE — 94760 N-INVAS EAR/PLS OXIMETRY 1: CPT

## 2024-07-21 PROCEDURE — 94761 N-INVAS EAR/PLS OXIMETRY MLT: CPT

## 2024-07-21 PROCEDURE — 96374 THER/PROPH/DIAG INJ IV PUSH: CPT

## 2024-07-21 PROCEDURE — 96375 TX/PRO/DX INJ NEW DRUG ADDON: CPT

## 2024-07-21 PROCEDURE — 81003 URINALYSIS AUTO W/O SCOPE: CPT | Performed by: EMERGENCY MEDICINE

## 2024-07-21 PROCEDURE — 85025 COMPLETE CBC W/AUTO DIFF WBC: CPT | Performed by: EMERGENCY MEDICINE

## 2024-07-21 PROCEDURE — 80053 COMPREHEN METABOLIC PANEL: CPT | Performed by: EMERGENCY MEDICINE

## 2024-07-21 PROCEDURE — 82962 GLUCOSE BLOOD TEST: CPT

## 2024-07-21 PROCEDURE — 36415 COLL VENOUS BLD VENIPUNCTURE: CPT | Performed by: EMERGENCY MEDICINE

## 2024-07-21 PROCEDURE — 63600175 PHARM REV CODE 636 W HCPCS: Performed by: NURSE PRACTITIONER

## 2024-07-21 PROCEDURE — 25000003 PHARM REV CODE 250: Performed by: NURSE PRACTITIONER

## 2024-07-21 PROCEDURE — 99285 EMERGENCY DEPT VISIT HI MDM: CPT | Mod: 25

## 2024-07-21 PROCEDURE — 94799 UNLISTED PULMONARY SVC/PX: CPT

## 2024-07-21 PROCEDURE — 99900035 HC TECH TIME PER 15 MIN (STAT)

## 2024-07-21 RX ORDER — MORPHINE SULFATE 2 MG/ML
2 INJECTION, SOLUTION INTRAMUSCULAR; INTRAVENOUS EVERY 4 HOURS PRN
Status: DISCONTINUED | OUTPATIENT
Start: 2024-07-21 | End: 2024-07-26 | Stop reason: HOSPADM

## 2024-07-21 RX ORDER — IBUPROFEN 200 MG
1 CAPSULE ORAL 2 TIMES DAILY
COMMUNITY

## 2024-07-21 RX ORDER — SODIUM,POTASSIUM PHOSPHATES 280-250MG
2 POWDER IN PACKET (EA) ORAL
Status: DISCONTINUED | OUTPATIENT
Start: 2024-07-21 | End: 2024-07-26 | Stop reason: HOSPADM

## 2024-07-21 RX ORDER — SODIUM CHLORIDE 0.9 % (FLUSH) 0.9 %
10 SYRINGE (ML) INJECTION EVERY 8 HOURS PRN
Status: DISCONTINUED | OUTPATIENT
Start: 2024-07-21 | End: 2024-07-26 | Stop reason: HOSPADM

## 2024-07-21 RX ORDER — TALC
9 POWDER (GRAM) TOPICAL NIGHTLY PRN
Status: DISCONTINUED | OUTPATIENT
Start: 2024-07-21 | End: 2024-07-26 | Stop reason: HOSPADM

## 2024-07-21 RX ORDER — IBUPROFEN 200 MG
16 TABLET ORAL
Status: DISCONTINUED | OUTPATIENT
Start: 2024-07-21 | End: 2024-07-26 | Stop reason: HOSPADM

## 2024-07-21 RX ORDER — OXYCODONE HYDROCHLORIDE 5 MG/1
5 TABLET ORAL EVERY 6 HOURS PRN
Status: DISCONTINUED | OUTPATIENT
Start: 2024-07-21 | End: 2024-07-26 | Stop reason: HOSPADM

## 2024-07-21 RX ORDER — ALUMINUM HYDROXIDE, MAGNESIUM HYDROXIDE, AND SIMETHICONE 1200; 120; 1200 MG/30ML; MG/30ML; MG/30ML
30 SUSPENSION ORAL 4 TIMES DAILY PRN
Status: DISCONTINUED | OUTPATIENT
Start: 2024-07-21 | End: 2024-07-26 | Stop reason: HOSPADM

## 2024-07-21 RX ORDER — SIMETHICONE 80 MG
1 TABLET,CHEWABLE ORAL 4 TIMES DAILY PRN
Status: DISCONTINUED | OUTPATIENT
Start: 2024-07-21 | End: 2024-07-26 | Stop reason: HOSPADM

## 2024-07-21 RX ORDER — HYDROMORPHONE HYDROCHLORIDE 1 MG/ML
0.25 INJECTION, SOLUTION INTRAMUSCULAR; INTRAVENOUS; SUBCUTANEOUS
Status: ACTIVE | OUTPATIENT
Start: 2024-07-21 | End: 2024-07-22

## 2024-07-21 RX ORDER — LANOLIN ALCOHOL/MO/W.PET/CERES
800 CREAM (GRAM) TOPICAL
Status: DISCONTINUED | OUTPATIENT
Start: 2024-07-21 | End: 2024-07-26 | Stop reason: HOSPADM

## 2024-07-21 RX ORDER — NISOLDIPINE 8.5 MG/1
8.5 TABLET, FILM COATED, EXTENDED RELEASE ORAL DAILY
COMMUNITY
Start: 2024-07-01 | End: 2024-07-21 | Stop reason: ALTCHOICE

## 2024-07-21 RX ORDER — NALOXONE HCL 0.4 MG/ML
0.02 VIAL (ML) INJECTION
Status: DISCONTINUED | OUTPATIENT
Start: 2024-07-21 | End: 2024-07-26 | Stop reason: HOSPADM

## 2024-07-21 RX ORDER — IBUPROFEN 200 MG
24 TABLET ORAL
Status: DISCONTINUED | OUTPATIENT
Start: 2024-07-21 | End: 2024-07-26 | Stop reason: HOSPADM

## 2024-07-21 RX ORDER — GLUCAGON 1 MG
1 KIT INJECTION
Status: DISCONTINUED | OUTPATIENT
Start: 2024-07-21 | End: 2024-07-26 | Stop reason: HOSPADM

## 2024-07-21 RX ORDER — DOCUSATE SODIUM 100 MG/1
100 CAPSULE, LIQUID FILLED ORAL DAILY
COMMUNITY

## 2024-07-21 RX ORDER — ACETAMINOPHEN 325 MG/1
650 TABLET ORAL EVERY 6 HOURS PRN
Status: DISCONTINUED | OUTPATIENT
Start: 2024-07-21 | End: 2024-07-26 | Stop reason: HOSPADM

## 2024-07-21 RX ORDER — ONDANSETRON HYDROCHLORIDE 2 MG/ML
4 INJECTION, SOLUTION INTRAVENOUS EVERY 8 HOURS PRN
Status: DISCONTINUED | OUTPATIENT
Start: 2024-07-21 | End: 2024-07-26 | Stop reason: HOSPADM

## 2024-07-21 RX ORDER — IPRATROPIUM BROMIDE AND ALBUTEROL SULFATE 2.5; .5 MG/3ML; MG/3ML
3 SOLUTION RESPIRATORY (INHALATION) EVERY 4 HOURS PRN
Status: DISCONTINUED | OUTPATIENT
Start: 2024-07-21 | End: 2024-07-26 | Stop reason: HOSPADM

## 2024-07-21 RX ORDER — ALENDRONATE SODIUM 70 MG/1
70 TABLET ORAL
COMMUNITY
Start: 2024-07-08 | End: 2024-07-21

## 2024-07-21 RX ORDER — ACETAMINOPHEN 325 MG/1
650 TABLET ORAL EVERY 4 HOURS PRN
Status: DISCONTINUED | OUTPATIENT
Start: 2024-07-21 | End: 2024-07-26 | Stop reason: HOSPADM

## 2024-07-21 RX ADMIN — MORPHINE SULFATE 2 MG: 2 INJECTION, SOLUTION INTRAMUSCULAR; INTRAVENOUS at 06:07

## 2024-07-21 RX ADMIN — ONDANSETRON 4 MG: 2 INJECTION INTRAMUSCULAR; INTRAVENOUS at 06:07

## 2024-07-21 RX ADMIN — MELATONIN TAB 3 MG 9 MG: 3 TAB at 09:07

## 2024-07-21 RX ADMIN — OXYCODONE 5 MG: 5 TABLET ORAL at 09:07

## 2024-07-21 NOTE — ED PROVIDER NOTES
Encounter Date: 7/21/2024       History     Chief Complaint   Patient presents with    Fall     Left hip pain after fall today     Patient was in usual state [facility].  She had a mechanical fall.  No syncope.  Patient does think she hit her left head left arm and left hip/pelvis area.  She comes in with EMS.  Unable bear weight.  Paramedics did give fentanyl 25 mcg IV with improvement in pain.  Patient does endorse neck pain.      Review of patient's allergies indicates:   Allergen Reactions    Buspar [buspirone] Other (See Comments)     Syncope and nausea     Doxy      Other reaction(s): Unknown    Effexor [venlafaxine] Other (See Comments)     shaking    Biaxin [clarithromycin] Other (See Comments)     Unknown    Codeine Other (See Comments)     Syncope, and nausea.    Statins-hmg-coa reductase inhibitors      myalgia    Doxycycline Other (See Comments)     Made very weak     Past Medical History:   Diagnosis Date    Anemia 3/1/2024    Anxiety     Arthritis     fingers and back    Depression     Disorder of kidney and ureter     Foot pain, left 9/18/2013    GERD (gastroesophageal reflux disease)     Hiatal hernia     Hyperlipidemia     Hypertension     Reflux     on meds    Tendon tear 8/21/2013    Tendonitis 8/21/2013     Past Surgical History:   Procedure Laterality Date    CHOLECYSTECTOMY      EYE SURGERY Bilateral     cataracts and implants    HEMIARTHROPLASTY OF HIP Right 3/1/2024    Procedure: HEMIARTHROPLASTY, HIP;  Surgeon: Wan Carmona MD;  Location: [facility];  Service: Orthopedics;  Laterality: Right;    TONSILLECTOMY       Family History   Problem Relation Name Age of Onset    Hearing loss Son       Social History     Tobacco Use    Smoking status: Never     Passive exposure: Never    Smokeless tobacco: Never   Substance Use Topics    Alcohol use: Yes     Alcohol/week: 1.0 standard drink of alcohol     Types: 1 Glasses of wine per week     Comment: rarely    Drug use: No     Review of Systems    Constitutional:  Negative for chills and fever.   HENT:  Negative for congestion.    Eyes:  Negative for visual disturbance.   Respiratory:  Negative for shortness of breath.    Cardiovascular:  Negative for chest pain and palpitations.   Gastrointestinal:  Negative for abdominal pain and vomiting.   Genitourinary:  Negative for dysuria.   Musculoskeletal:  Negative for back pain.   Neurological:  Negative for headaches.   Psychiatric/Behavioral:  Negative for confusion.        Physical Exam     Initial Vitals   BP Pulse Resp Temp SpO2   07/21/24 1609 07/21/24 1609 07/21/24 1603 07/21/24 1603 07/21/24 1609   (!) 189/85 87 16 98.6 °F (37 °C) 95 %      MAP       --                Physical Exam    Nursing note and vitals reviewed.  Constitutional: She is not diaphoretic. No distress.   HENT:   No palpable or visible scalp hematoma or laceration   Eyes: Conjunctivae are normal.   Neck:   Some mild posterior neck pain with no significant midline tenderness   Cardiovascular:  Normal rate.           Pulmonary/Chest: Breath sounds normal.   Abdominal: Abdomen is soft. There is no abdominal tenderness.   Musculoskeletal:      Comments: All extremities are neurovascularly intact.  There is a superficial skin tear to left deltoid with some surrounding tenderness.  No deformity.  Good range of motion of shoulder.  There is tenderness to left pubic area and left hip area.  Left leg is neurovascular intact with no shortening.     Neurological: She is alert and oriented to person, place, and time. She has normal strength. No cranial nerve deficit or sensory deficit.   No gross deficits   Skin: No rash noted.   Psychiatric: She has a normal mood and affect.         ED Course   Procedures  Labs Reviewed   CBC W/ AUTO DIFFERENTIAL - Abnormal       Result Value    WBC 6.14      RBC 3.51 (*)     Hemoglobin 10.5 (*)     Hematocrit 32.5 (*)     MCV 93      MCH 29.9      MCHC 32.3      RDW 14.7 (*)     Platelets 169      MPV 10.4       Immature Granulocytes 0.3      Gran # (ANC) 3.5      Immature Grans (Abs) 0.02      Lymph # 2.0      Mono # 0.5      Eos # 0.1      Baso # 0.03      nRBC 0      Gran % 56.9      Lymph % 32.2      Mono % 8.8      Eosinophil % 1.3      Basophil % 0.5      Differential Method Automated     COMPREHENSIVE METABOLIC PANEL   URINALYSIS, REFLEX TO URINE CULTURE          Imaging Results              X-Ray Humerus 2 View Left (Final result)  Result time 07/21/24 16:46:53      Final result by Oracio Sánchez Jr., MD (07/21/24 16:46:53)                   Impression:      Negative x-rays of the left humerus.      Electronically signed by: Oracio Sánchez MD  Date:    07/21/2024  Time:    16:46               Narrative:    EXAMINATION:  XR HUMERUS 2 VIEW LEFT    CLINICAL HISTORY:  Pain in arm, unspecified    TECHNIQUE:  Two views of the left humerus are obtained.    COMPARISON:  None    FINDINGS:  A fracture or other osseous abnormality of the left humerus is not identified. Abnormalities at the shoulder or elbow joint are not seen. Soft tissue abnormalities are not seen.                                       X-Ray Hip 2 or 3 views Left with Pelvis when performed (Final result)  Result time 07/21/24 16:45:53      Final result by Oracio Sánchez Jr., MD (07/21/24 16:45:53)                   Impression:      Acute fracture with angulation of the left femoral neck.  Prior right hip arthroplasty.  Diffuse osteoporosis.  Degenerative disc disease in the lumbar spine      Electronically signed by: Oracio Sánchez MD  Date:    07/21/2024  Time:    16:45               Narrative:    EXAMINATION:  XR HIP WITH PELVIS WHEN PERFORMED 2 OR 3 VIEWS LEFT    CLINICAL HISTORY:  Pain in unspecified hip    TECHNIQUE:  AP view of the pelvis and frog leg lateral view of the left hip were performed.    COMPARISON:  Hip and pelvic x-rays of May 3, 2024    FINDINGS:  There is fracture with angulation of the left femoral neck.  There is diffuse  Osteoporosis noted.  Patient has had a prior right hip arthroplasty.  The sacroiliac joints are sharp and symmetric.  Degenerative disc disease is seen at the lower lumbar spine.                                       CT Pelvis Without Contrast (Final result)  Result time 07/21/24 16:44:54      Final result by Oracio Sánchez Jr., MD (07/21/24 16:44:54)                   Impression:      Acute fracture with angulation of the left femoral neck.  Prior right hip arthroplasty.  Diverticulosis coli.  Diffuse osteoporosis.  Degenerative disc disease at L3-4 and L4-5.      Electronically signed by: Oracio Sánchez MD  Date:    07/21/2024  Time:    16:44               Narrative:    EXAMINATION:  CT PELVIS WITHOUT CONTRAST    CLINICAL HISTORY:  Pelvic trauma;    TECHNIQUE:  Axial images are obtained through the pelvis and displayed at soft tissue and bone windows.  Sagittal and coronal reconstructions are provided.    COMPARISON:  Prior CT of the abdomen pelvis of October 30, 2021.    FINDINGS:  There is acute fracture with angulation of the left femoral neck noted.  The femoral head remains in the acetabulum.  There is diffuse Osteoporosis noted.  The patient has had a prior right hip arthroplasty.    Degenerative disc disease is noted at L3-4 and L4-5.  Within the pelvis there is diverticulosis of the sigmoid colon without CT evidence of diverticulitis.  The bladder is of normal contour without mass or asymmetry.  The uterus is not enlarged.  Free fluid in the pelvis is not seen.                                       CT Cervical Spine Without Contrast (Final result)  Result time 07/21/24 16:40:59      Final result by Oracio Sánchez Jr., MD (07/21/24 16:40:59)                   Impression:      Acute fracture or subluxation is not seen.  There is anterolisthesis of T1 on T2 of 3 mm felt  likely due to chronic degenerative disc disease and ligament laxity.  Patient has chronic degenerative disc disease with  spondylosis at C4-5, C5-6, C6-7, C7-T1 and T1-2 with neural foraminal stenosis as described.      Electronically signed by: Oracio Sánchez MD  Date:    07/21/2024  Time:    16:40               Narrative:    EXAMINATION:  CT CERVICAL SPINE WITHOUT CONTRAST    CLINICAL HISTORY:  Polytrauma, blunt;    TECHNIQUE:  Low dose axial images, sagittal and coronal reformations were performed though the cervical spine.  Contrast was not administered.    COMPARISON:  CT cervical spine of February 28, 2024.    FINDINGS:  The alignment is normal.  The vertebral bodies are intact without evidence of fracture or compression.  The posterior elements and dens are intact.  There is degenerative disc disease with spondylosis identified at C4-5, C5-6, C6-7, C7-T1 and T1-2 with anterolisthesis of T1 on T2 of 3 mm.    On axial imaging spinal canal stenosis is not seen.  There is bilateral neural foraminal stenosis at C4-5, C5-6 and C6-7.  Other levels of spinal canal or neural foraminal stenosis are not seen including at T1-2.                                       CT Head Without Contrast (Final result)  Result time 07/21/24 16:37:07      Final result by Oracio Sánchez Jr., MD (07/21/24 16:37:07)                   Impression:      Mild brain atrophy with deep white matter ischemic changes.  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  Otherwise negative head CT.      Electronically signed by: Oracio Sánchez MD  Date:    07/21/2024  Time:    16:37               Narrative:    EXAMINATION:  CT HEAD WITHOUT CONTRAST    CLINICAL HISTORY:  Polytrauma, blunt;    TECHNIQUE:  Low dose axial images were obtained through the head.  Coronal and sagittal reformations were also performed. Contrast was not administered.    COMPARISON:  CT head of February 28, 2024    FINDINGS:  No cranial fracture is identified.  A dense osteoma is noted of the right frontal bone unchanged from the prior CT.  Scalp edema or hematoma is not noted.    The  basal cisterns are clear and symmetric.  There is no mass effect or midline shift.  The ventricles are mildly enlarged as are the cerebral sulci and sylvian fissures consistent with brain atrophy.  Hypodensity in the central white matter is consistent with deep white matter ischemic changes.  Focal areas of increased or decreased CT density consistent with tumor, edema, CVA or hemorrhage is not seen.  No intracranial hemorrhage or hematoma is noted.                                       Medications   HYDROmorphone injection 0.25 mg (0.25 mg Intravenous Not Given 7/21/24 4035)     Medical Decision Making  Patient is status post mechanical fall.  Differential diagnosis includes pelvic fracture, fracture, intracranial hemorrhage, syncope.  Here x-rays of hip and arm show left femoral neck fracture.  CT of the head C-spine and pelvis show left femoral neck fracture.  CBC unremarkable.  Here patient did have mechanical fall.  Discussed with Dr. Andrew on-call for Dr. Carmona.  He or Dr. Carmona will perform ORIF likely on Tuesday.  Tied with Hospital Medicine to admit.  Patient is pain controlled after fentanyl and Dilaudid.  No CTA evidence of intracranial hemorrhage.  There is a superficial skin avulsion left deltoid area.    Amount and/or Complexity of Data Reviewed  Labs: ordered. Decision-making details documented in ED Course.  Radiology: ordered. Decision-making details documented in ED Course.    Risk  Prescription drug management.                                      Clinical Impression:  Final diagnoses:  [M79.603] Arm pain  [M25.559] Hip pain  [S72.002A] Closed fracture of left hip, initial encounter (Primary)  [T14.8XXA] Skin avulsion          ED Disposition Condition    Admit Stable                Moise Richard MD  07/21/24 8310

## 2024-07-21 NOTE — H&P
[facility] Medicine  History & Physical    Patient Name: Arleen Zarate  MRN: [id number]  Patient Class: IP- Inpatient  Admission Date: 7/21/2024  Attending Physician: Regan Bansal MD   Primary Care Provider: Perry Jorge MD         Patient information was obtained from patient, past medical records, and ER records.     Subjective:     Principal Problem:Closed fracture of neck of left femur    Chief Complaint:   Chief Complaint   Patient presents with    Fall     Left hip pain after fall today        HPI: Arleen Zarate is a 89-year-old female who presents emergency room complaining of left hip pain.  She sustained a mechanical fall at her residence.  She reports frequent falls.  She fell several months ago and sustained a right hip fracture which required surgical repair.  Pain is worse with any movement of the left leg.  No alleviating factors.  No recent sick contacts or travel.  No fever or chills.  Previous medical history includes hypertension, GERD, hyperlipidemia, anxiety.  ER workup: CBC unremarkable.  CMP unremarkable.  X-ray of the left hip demonstrates a left femoral neck fracture.  ER physician spoke with  with orthopedics.  Patient admitted to Hospital Medicine for treatment and management.    Past Medical History:   Diagnosis Date    Anemia 3/1/2024    Anxiety     Arthritis     fingers and back    Depression     Disorder of kidney and ureter     Foot pain, left 9/18/2013    GERD (gastroesophageal reflux disease)     Hiatal hernia     Hyperlipidemia     Hypertension     Reflux     on meds    Tendon tear 8/21/2013    Tendonitis 8/21/2013       Past Surgical History:   Procedure Laterality Date    CHOLECYSTECTOMY      EYE SURGERY Bilateral     cataracts and implants    HEMIARTHROPLASTY OF HIP Right 3/1/2024    Procedure: HEMIARTHROPLASTY, HIP;  Surgeon: Wan Carmona MD;  Location: [facility];  Service: Orthopedics;  Laterality: Right;     TONSILLECTOMY         Review of patient's allergies indicates:   Allergen Reactions    Buspar [buspirone] Other (See Comments)     Syncope and nausea     Doxy      Other reaction(s): Unknown    Effexor [venlafaxine] Other (See Comments)     shaking    Biaxin [clarithromycin] Other (See Comments)     Unknown    Codeine Other (See Comments)     Syncope, and nausea.    Statins-hmg-coa reductase inhibitors      myalgia    Doxycycline Other (See Comments)     Made very weak       No current facility-administered medications on file prior to encounter.     Current Outpatient Medications on File Prior to Encounter   Medication Sig    acetaminophen (TYLENOL) 325 MG tablet Take 650 mg by mouth nightly as needed for Pain or Temperature greater than.    calcium citrate (CALCITRATE) 200 mg (950 mg) tablet Take 1 tablet by mouth 2 (two) times daily.    docusate sodium (COLACE) 100 MG capsule Take 100 mg by mouth once daily.    lisinopriL (PRINIVIL,ZESTRIL) 30 MG tablet TAKE 1 TABLET EVERY EVENING (Patient taking differently: Take 30 mg by mouth once daily.)    melatonin (MELATIN) 3 mg tablet Take 3 tablets (9 mg total) by mouth nightly as needed for Insomnia. (Patient taking differently: Take 3 mg by mouth nightly as needed for Insomnia.)    multivitamin capsule Take 1 capsule by mouth once daily.    omeprazole (PRILOSEC) 40 MG capsule TAKE 1 CAPSULE DAILY (Patient taking differently: Take 40 mg by mouth once daily.)    [DISCONTINUED] alendronate (FOSAMAX) 70 MG tablet Take 70 mg by mouth every 7 days.    [DISCONTINUED] buPROPion (WELLBUTRIN) 75 MG tablet Take 1 tablet (75 mg total) by mouth once daily.    [DISCONTINUED] fluticasone propionate (FLONASE) 50 mcg/actuation nasal spray 2 sprays (100 mcg total) by Each Nostril route once daily.    [DISCONTINUED] nisoldipine (SULAR) 8.5 MG Tb24 Take 8.5 mg by mouth once daily.     Family History       Problem Relation (Age of Onset)    Hearing loss Son          Tobacco Use    Smoking  status: Never     Passive exposure: Never    Smokeless tobacco: Never   Substance and Sexual Activity    Alcohol use: Yes     Alcohol/week: 1.0 standard drink of alcohol     Types: 1 Glasses of wine per week     Comment: rarely    Drug use: No    Sexual activity: Not Currently     Review of Systems   Constitutional:  Negative for activity change, chills, diaphoresis and fever.   HENT:  Negative for congestion, nosebleeds and tinnitus.    Eyes:  Negative for photophobia and visual disturbance.   Respiratory:  Negative for cough, chest tightness, shortness of breath and wheezing.    Cardiovascular:  Negative for chest pain, palpitations and leg swelling.   Gastrointestinal:  Negative for abdominal distention, abdominal pain, constipation, diarrhea, nausea and vomiting.   Endocrine: Negative for cold intolerance and heat intolerance.   Genitourinary:  Negative for difficulty urinating, dysuria, frequency, hematuria and urgency.   Musculoskeletal:  Positive for arthralgias and gait problem. Negative for back pain and myalgias.   Skin:  Negative for pallor, rash and wound.   Allergic/Immunologic: Negative for immunocompromised state.   Neurological:  Negative for dizziness, tremors (CT), facial asymmetry, speech difficulty and weakness.   Hematological:  Negative for adenopathy. Does not bruise/bleed easily.   Psychiatric/Behavioral:  Negative for confusion and sleep disturbance. The patient is not nervous/anxious.      Objective:     Vital Signs (Most Recent):  Temp: 98.6 °F (37 °C) (07/21/24 1825)  Pulse: 87 (07/21/24 1825)  Resp: 17 (07/21/24 1825)  BP: (!) 153/107 (07/21/24 1825)  SpO2: 96 % (07/21/24 1825) Vital Signs (24h Range):  Temp:  [98.6 °F (37 °C)] 98.6 °F (37 °C)  Pulse:  [87] 87  Resp:  [16-18] 17  SpO2:  [95 %-96 %] 96 %  BP: (153-189)/() 153/107     Weight: 59 kg (130 lb)  Body mass index is 26.26 kg/m².     Physical Exam  Vitals and nursing note reviewed.   Constitutional:       General: She is  not in acute distress.     Appearance: She is well-developed. She is not diaphoretic.   HENT:      Head: Normocephalic.      Mouth/Throat:      Mouth: Mucous membranes are moist.      Pharynx: Oropharynx is clear.   Eyes:      General: No scleral icterus.     Conjunctiva/sclera: Conjunctivae normal.      Pupils: Pupils are equal, round, and reactive to light.   Neck:      Vascular: No JVD.   Cardiovascular:      Rate and Rhythm: Normal rate and regular rhythm.      Heart sounds: Normal heart sounds. No murmur heard.     No friction rub. No gallop.   Pulmonary:      Effort: Pulmonary effort is normal. No respiratory distress.      Breath sounds: Normal breath sounds. No wheezing or rales.   Abdominal:      General: Bowel sounds are normal. There is no distension.      Palpations: Abdomen is soft.      Tenderness: There is no abdominal tenderness. There is no guarding or rebound.   Musculoskeletal:         General: Tenderness and signs of injury present. Normal range of motion.      Cervical back: Normal range of motion and neck supple.      Comments: Left leg shortened and outwardly rotated   Lymphadenopathy:      Cervical: No cervical adenopathy.   Skin:     General: Skin is warm and dry.      Capillary Refill: Capillary refill takes less than 2 seconds.      Coloration: Skin is not pale.      Findings: No erythema or rash.   Neurological:      Mental Status: She is alert and oriented to person, place, and time.      Cranial Nerves: No cranial nerve deficit.      Sensory: No sensory deficit.      Coordination: Coordination normal.      Deep Tendon Reflexes: Reflexes normal.   Psychiatric:         Behavior: Behavior normal.         Thought Content: Thought content normal.         Judgment: Judgment normal.              CRANIAL NERVES     CN III, IV, VI   Pupils are equal, round, and reactive to light.       Significant Labs: All pertinent labs within the past 24 hours have been reviewed.  CBC:   Recent Labs   Lab  07/21/24  1707   WBC 6.14   HGB 10.5*   HCT 32.5*        CMP:   Recent Labs   Lab 07/21/24  1707      K 4.3      CO2 24   GLU 99   BUN 27   CREATININE 1.0   CALCIUM 9.2   PROT 6.4   ALBUMIN 3.6   BILITOT 0.2   ALKPHOS 99   AST 22   ALT 12   ANIONGAP 11     Urine Studies:   Recent Labs   Lab 07/21/24  1726   COLORU Yellow   APPEARANCEUA Clear   PHUR 7.0   SPECGRAV 1.025   PROTEINUA Trace*   GLUCUA Negative   KETONESU Negative   BILIRUBINUA Negative   OCCULTUA Negative   NITRITE Negative   UROBILINOGEN Negative   LEUKOCYTESUR Negative       Significant Imaging: I have reviewed all pertinent imaging results/findings within the past 24 hours.    CT    FINDINGS:  No cranial fracture is identified.  A dense osteoma is noted of the right frontal bone unchanged from the prior CT.  Scalp edema or hematoma is not noted.     The basal cisterns are clear and symmetric.  There is no mass effect or midline shift.  The ventricles are mildly enlarged as are the cerebral sulci and sylvian fissures consistent with brain atrophy.  Hypodensity in the central white matter is consistent with deep white matter ischemic changes.  Focal areas of increased or decreased CT density consistent with tumor, edema, CVA or hemorrhage is not seen.  No intracranial hemorrhage or hematoma is noted.     Impression:     Mild brain atrophy with deep white matter ischemic changes.  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  Otherwise negative head CT.    CT cervical spine:   Impression:     Acute fracture or subluxation is not seen.  There is anterolisthesis of T1 on T2 of 3 mm felt  likely due to chronic degenerative disc disease and ligament laxity.  Patient has chronic degenerative disc disease with spondylosis at C4-5, C5-6, C6-7, C7-T1 and T1-2 with neural foraminal stenosis as described.    CT pelvis:   FINDINGS:  There is acute fracture with angulation of the left femoral neck noted.  The femoral head remains in the  acetabulum.  There is diffuse Osteoporosis noted.  The patient has had a prior right hip arthroplasty.     Degenerative disc disease is noted at L3-4 and L4-5.  Within the pelvis there is diverticulosis of the sigmoid colon without CT evidence of diverticulitis.  The bladder is of normal contour without mass or asymmetry.  The uterus is not enlarged.  Free fluid in the pelvis is not seen.     Impression:     Acute fracture with angulation of the left femoral neck.  Prior right hip arthroplasty.  Diverticulosis coli.  Diffuse osteoporosis.  Degenerative disc disease at L3-4 and L4-5.  Assessment/Plan:     * Closed fracture of neck of left femur  Acute problem   X-ray demonstrates left femoral neck fracture   NPO after midnight   Musa catheter p.r.n.   Oxycodone for moderate pain   Morphine for severe pain   Zofran p.r.n. nausea vomiting   PT/OT consult when appropriate   Ortho consulted.        VTE Risk Mitigation (From admission, onward)           Ordered     Place MAURICIO hose  Until discontinued         07/21/24 1845     IP VTE HIGH RISK PATIENT  Once         07/21/24 1845     Place sequential compression device  Until discontinued         07/21/24 1845                                    Dimas Bone NP  Department of Hospital Medicine  [facility]

## 2024-07-21 NOTE — SUBJECTIVE & OBJECTIVE
Past Medical History:   Diagnosis Date    Anemia 3/1/2024    Anxiety     Arthritis     fingers and back    Depression     Disorder of kidney and ureter     Foot pain, left 9/18/2013    GERD (gastroesophageal reflux disease)     Hiatal hernia     Hyperlipidemia     Hypertension     Reflux     on meds    Tendon tear 8/21/2013    Tendonitis 8/21/2013       Past Surgical History:   Procedure Laterality Date    CHOLECYSTECTOMY      EYE SURGERY Bilateral     cataracts and implants    HEMIARTHROPLASTY OF HIP Right 3/1/2024    Procedure: HEMIARTHROPLASTY, HIP;  Surgeon: Wan Carmona MD;  Location: [facility];  Service: Orthopedics;  Laterality: Right;    TONSILLECTOMY         Review of patient's allergies indicates:   Allergen Reactions    Buspar [buspirone] Other (See Comments)     Syncope and nausea     Doxy      Other reaction(s): Unknown    Effexor [venlafaxine] Other (See Comments)     shaking    Biaxin [clarithromycin] Other (See Comments)     Unknown    Codeine Other (See Comments)     Syncope, and nausea.    Statins-hmg-coa reductase inhibitors      myalgia    Doxycycline Other (See Comments)     Made very weak       No current facility-administered medications on file prior to encounter.     Current Outpatient Medications on File Prior to Encounter   Medication Sig    acetaminophen (TYLENOL) 325 MG tablet Take 650 mg by mouth nightly as needed for Pain or Temperature greater than.    calcium citrate (CALCITRATE) 200 mg (950 mg) tablet Take 1 tablet by mouth 2 (two) times daily.    docusate sodium (COLACE) 100 MG capsule Take 100 mg by mouth once daily.    lisinopriL (PRINIVIL,ZESTRIL) 30 MG tablet TAKE 1 TABLET EVERY EVENING (Patient taking differently: Take 30 mg by mouth once daily.)    melatonin (MELATIN) 3 mg tablet Take 3 tablets (9 mg total) by mouth nightly as needed for Insomnia. (Patient taking differently: Take 3 mg by mouth nightly as needed for Insomnia.)    multivitamin capsule Take 1 capsule  by mouth once daily.    omeprazole (PRILOSEC) 40 MG capsule TAKE 1 CAPSULE DAILY (Patient taking differently: Take 40 mg by mouth once daily.)    [DISCONTINUED] alendronate (FOSAMAX) 70 MG tablet Take 70 mg by mouth every 7 days.    [DISCONTINUED] buPROPion (WELLBUTRIN) 75 MG tablet Take 1 tablet (75 mg total) by mouth once daily.    [DISCONTINUED] fluticasone propionate (FLONASE) 50 mcg/actuation nasal spray 2 sprays (100 mcg total) by Each Nostril route once daily.    [DISCONTINUED] nisoldipine (SULAR) 8.5 MG Tb24 Take 8.5 mg by mouth once daily.     Family History       Problem Relation (Age of Onset)    Hearing loss Son          Tobacco Use    Smoking status: Never     Passive exposure: Never    Smokeless tobacco: Never   Substance and Sexual Activity    Alcohol use: Yes     Alcohol/week: 1.0 standard drink of alcohol     Types: 1 Glasses of wine per week     Comment: rarely    Drug use: No    Sexual activity: Not Currently     Review of Systems   Constitutional:  Negative for activity change, chills, diaphoresis and fever.   HENT:  Negative for congestion, nosebleeds and tinnitus.    Eyes:  Negative for photophobia and visual disturbance.   Respiratory:  Negative for cough, chest tightness, shortness of breath and wheezing.    Cardiovascular:  Negative for chest pain, palpitations and leg swelling.   Gastrointestinal:  Negative for abdominal distention, abdominal pain, constipation, diarrhea, nausea and vomiting.   Endocrine: Negative for cold intolerance and heat intolerance.   Genitourinary:  Negative for difficulty urinating, dysuria, frequency, hematuria and urgency.   Musculoskeletal:  Positive for arthralgias and gait problem. Negative for back pain and myalgias.   Skin:  Negative for pallor, rash and wound.   Allergic/Immunologic: Negative for immunocompromised state.   Neurological:  Negative for dizziness, tremors (CT), facial asymmetry, speech difficulty and weakness.   Hematological:  Negative for  adenopathy. Does not bruise/bleed easily.   Psychiatric/Behavioral:  Negative for confusion and sleep disturbance. The patient is not nervous/anxious.      Objective:     Vital Signs (Most Recent):  Temp: 98.6 °F (37 °C) (07/21/24 1825)  Pulse: 87 (07/21/24 1825)  Resp: 17 (07/21/24 1825)  BP: (!) 153/107 (07/21/24 1825)  SpO2: 96 % (07/21/24 1825) Vital Signs (24h Range):  Temp:  [98.6 °F (37 °C)] 98.6 °F (37 °C)  Pulse:  [87] 87  Resp:  [16-18] 17  SpO2:  [95 %-96 %] 96 %  BP: (153-189)/() 153/107     Weight: 59 kg (130 lb)  Body mass index is 26.26 kg/m².     Physical Exam  Vitals and nursing note reviewed.   Constitutional:       General: She is not in acute distress.     Appearance: She is well-developed. She is not diaphoretic.   HENT:      Head: Normocephalic.      Mouth/Throat:      Mouth: Mucous membranes are moist.      Pharynx: Oropharynx is clear.   Eyes:      General: No scleral icterus.     Conjunctiva/sclera: Conjunctivae normal.      Pupils: Pupils are equal, round, and reactive to light.   Neck:      Vascular: No JVD.   Cardiovascular:      Rate and Rhythm: Normal rate and regular rhythm.      Heart sounds: Normal heart sounds. No murmur heard.     No friction rub. No gallop.   Pulmonary:      Effort: Pulmonary effort is normal. No respiratory distress.      Breath sounds: Normal breath sounds. No wheezing or rales.   Abdominal:      General: Bowel sounds are normal. There is no distension.      Palpations: Abdomen is soft.      Tenderness: There is no abdominal tenderness. There is no guarding or rebound.   Musculoskeletal:         General: Tenderness and signs of injury present. Normal range of motion.      Cervical back: Normal range of motion and neck supple.      Comments: Left leg shortened and outwardly rotated   Lymphadenopathy:      Cervical: No cervical adenopathy.   Skin:     General: Skin is warm and dry.      Capillary Refill: Capillary refill takes less than 2 seconds.       Coloration: Skin is not pale.      Findings: No erythema or rash.   Neurological:      Mental Status: She is alert and oriented to person, place, and time.      Cranial Nerves: No cranial nerve deficit.      Sensory: No sensory deficit.      Coordination: Coordination normal.      Deep Tendon Reflexes: Reflexes normal.   Psychiatric:         Behavior: Behavior normal.         Thought Content: Thought content normal.         Judgment: Judgment normal.              CRANIAL NERVES     CN III, IV, VI   Pupils are equal, round, and reactive to light.       Significant Labs: All pertinent labs within the past 24 hours have been reviewed.  CBC:   Recent Labs   Lab 07/21/24  1707   WBC 6.14   HGB 10.5*   HCT 32.5*        CMP:   Recent Labs   Lab 07/21/24  1707      K 4.3      CO2 24   GLU 99   BUN 27   CREATININE 1.0   CALCIUM 9.2   PROT 6.4   ALBUMIN 3.6   BILITOT 0.2   ALKPHOS 99   AST 22   ALT 12   ANIONGAP 11     Urine Studies:   Recent Labs   Lab 07/21/24  1726   COLORU Yellow   APPEARANCEUA Clear   PHUR 7.0   SPECGRAV 1.025   PROTEINUA Trace*   GLUCUA Negative   KETONESU Negative   BILIRUBINUA Negative   OCCULTUA Negative   NITRITE Negative   UROBILINOGEN Negative   LEUKOCYTESUR Negative       Significant Imaging: I have reviewed all pertinent imaging results/findings within the past 24 hours.    CT    FINDINGS:  No cranial fracture is identified.  A dense osteoma is noted of the right frontal bone unchanged from the prior CT.  Scalp edema or hematoma is not noted.     The basal cisterns are clear and symmetric.  There is no mass effect or midline shift.  The ventricles are mildly enlarged as are the cerebral sulci and sylvian fissures consistent with brain atrophy.  Hypodensity in the central white matter is consistent with deep white matter ischemic changes.  Focal areas of increased or decreased CT density consistent with tumor, edema, CVA or hemorrhage is not seen.  No intracranial hemorrhage or  hematoma is noted.     Impression:     Mild brain atrophy with deep white matter ischemic changes.  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  Otherwise negative head CT.    CT cervical spine:   Impression:     Acute fracture or subluxation is not seen.  There is anterolisthesis of T1 on T2 of 3 mm felt  likely due to chronic degenerative disc disease and ligament laxity.  Patient has chronic degenerative disc disease with spondylosis at C4-5, C5-6, C6-7, C7-T1 and T1-2 with neural foraminal stenosis as described.    CT pelvis:   FINDINGS:  There is acute fracture with angulation of the left femoral neck noted.  The femoral head remains in the acetabulum.  There is diffuse Osteoporosis noted.  The patient has had a prior right hip arthroplasty.     Degenerative disc disease is noted at L3-4 and L4-5.  Within the pelvis there is diverticulosis of the sigmoid colon without CT evidence of diverticulitis.  The bladder is of normal contour without mass or asymmetry.  The uterus is not enlarged.  Free fluid in the pelvis is not seen.     Impression:     Acute fracture with angulation of the left femoral neck.  Prior right hip arthroplasty.  Diverticulosis coli.  Diffuse osteoporosis.  Degenerative disc disease at L3-4 and L4-5.

## 2024-07-21 NOTE — HPI
Arleen Zarate is a 89-year-old female who presents emergency room complaining of left hip pain.  She sustained a mechanical fall at her residence.  She reports frequent falls.  She fell several months ago and sustained a right hip fracture which required surgical repair.  Pain is worse with any movement of the left leg.  No alleviating factors.  No recent sick contacts or travel.  No fever or chills.  Previous medical history includes hypertension, GERD, hyperlipidemia, anxiety.  ER workup: CBC unremarkable.  CMP unremarkable.  X-ray of the left hip demonstrates a left femoral neck fracture.  ER physician spoke with  with orthopedics.  Patient admitted to Hospital Medicine for treatment and management.

## 2024-07-21 NOTE — ASSESSMENT & PLAN NOTE
Acute problem   X-ray demonstrates left femoral neck fracture   NPO after midnight   Musa catheter p.r.n.   Oxycodone for moderate pain   Morphine for severe pain   Zofran p.r.n. nausea vomiting   PT/OT consult when appropriate   Ortho consulted.

## 2024-07-22 LAB
ALBUMIN SERPL BCP-MCNC: 3.3 G/DL (ref 3.5–5.2)
ALP SERPL-CCNC: 89 U/L (ref 55–135)
ALT SERPL W/O P-5'-P-CCNC: 36 U/L (ref 10–44)
ANION GAP SERPL CALC-SCNC: 10 MMOL/L (ref 8–16)
AST SERPL-CCNC: 39 U/L (ref 10–40)
BASOPHILS # BLD AUTO: 0.03 K/UL (ref 0–0.2)
BASOPHILS NFR BLD: 0.4 % (ref 0–1.9)
BILIRUB SERPL-MCNC: 0.7 MG/DL (ref 0.1–1)
BUN SERPL-MCNC: 24 MG/DL (ref 10–30)
CALCIUM SERPL-MCNC: 9.1 MG/DL (ref 8.7–10.5)
CHLORIDE SERPL-SCNC: 108 MMOL/L (ref 95–110)
CO2 SERPL-SCNC: 23 MMOL/L (ref 23–29)
CREAT SERPL-MCNC: 0.9 MG/DL (ref 0.5–1.4)
DIFFERENTIAL METHOD BLD: ABNORMAL
EOSINOPHIL # BLD AUTO: 0.2 K/UL (ref 0–0.5)
EOSINOPHIL NFR BLD: 2.4 % (ref 0–8)
ERYTHROCYTE [DISTWIDTH] IN BLOOD BY AUTOMATED COUNT: 14.8 % (ref 11.5–14.5)
EST. GFR  (NO RACE VARIABLE): 58 ML/MIN/1.73 M^2
GLUCOSE SERPL-MCNC: 110 MG/DL (ref 70–110)
HCT VFR BLD AUTO: 32.2 % (ref 37–48.5)
HGB BLD-MCNC: 10.5 G/DL (ref 12–16)
IMM GRANULOCYTES # BLD AUTO: 0.03 K/UL (ref 0–0.04)
IMM GRANULOCYTES NFR BLD AUTO: 0.4 % (ref 0–0.5)
LYMPHOCYTES # BLD AUTO: 1 K/UL (ref 1–4.8)
LYMPHOCYTES NFR BLD: 11.9 % (ref 18–48)
MAGNESIUM SERPL-MCNC: 1.9 MG/DL (ref 1.6–2.6)
MCH RBC QN AUTO: 30.1 PG (ref 27–31)
MCHC RBC AUTO-ENTMCNC: 32.6 G/DL (ref 32–36)
MCV RBC AUTO: 92 FL (ref 82–98)
MONOCYTES # BLD AUTO: 0.6 K/UL (ref 0.3–1)
MONOCYTES NFR BLD: 7 % (ref 4–15)
NEUTROPHILS # BLD AUTO: 6.5 K/UL (ref 1.8–7.7)
NEUTROPHILS NFR BLD: 77.9 % (ref 38–73)
NRBC BLD-RTO: 0 /100 WBC
PHOSPHATE SERPL-MCNC: 2.7 MG/DL (ref 2.7–4.5)
PLATELET # BLD AUTO: 150 K/UL (ref 150–450)
PMV BLD AUTO: 10.2 FL (ref 9.2–12.9)
POTASSIUM SERPL-SCNC: 4.3 MMOL/L (ref 3.5–5.1)
PROT SERPL-MCNC: 6 G/DL (ref 6–8.4)
RBC # BLD AUTO: 3.49 M/UL (ref 4–5.4)
SODIUM SERPL-SCNC: 141 MMOL/L (ref 136–145)
WBC # BLD AUTO: 8.27 K/UL (ref 3.9–12.7)

## 2024-07-22 PROCEDURE — 80053 COMPREHEN METABOLIC PANEL: CPT | Performed by: NURSE PRACTITIONER

## 2024-07-22 PROCEDURE — 85025 COMPLETE CBC W/AUTO DIFF WBC: CPT | Performed by: NURSE PRACTITIONER

## 2024-07-22 PROCEDURE — 11000001 HC ACUTE MED/SURG PRIVATE ROOM

## 2024-07-22 PROCEDURE — 27000221 HC OXYGEN, UP TO 24 HOURS

## 2024-07-22 PROCEDURE — 36415 COLL VENOUS BLD VENIPUNCTURE: CPT | Performed by: NURSE PRACTITIONER

## 2024-07-22 PROCEDURE — 99900035 HC TECH TIME PER 15 MIN (STAT)

## 2024-07-22 PROCEDURE — 94761 N-INVAS EAR/PLS OXIMETRY MLT: CPT

## 2024-07-22 PROCEDURE — 25000003 PHARM REV CODE 250: Performed by: INTERNAL MEDICINE

## 2024-07-22 PROCEDURE — 99223 1ST HOSP IP/OBS HIGH 75: CPT | Mod: ,,, | Performed by: ORTHOPAEDIC SURGERY

## 2024-07-22 PROCEDURE — 25000003 PHARM REV CODE 250: Performed by: NURSE PRACTITIONER

## 2024-07-22 PROCEDURE — 63600175 PHARM REV CODE 636 W HCPCS: Performed by: NURSE PRACTITIONER

## 2024-07-22 PROCEDURE — 84100 ASSAY OF PHOSPHORUS: CPT | Performed by: NURSE PRACTITIONER

## 2024-07-22 PROCEDURE — 83735 ASSAY OF MAGNESIUM: CPT | Performed by: NURSE PRACTITIONER

## 2024-07-22 RX ORDER — MUPIROCIN 20 MG/G
OINTMENT TOPICAL 2 TIMES DAILY
Status: DISCONTINUED | OUTPATIENT
Start: 2024-07-22 | End: 2024-07-26 | Stop reason: HOSPADM

## 2024-07-22 RX ORDER — LISINOPRIL 10 MG/1
30 TABLET ORAL NIGHTLY
Status: DISCONTINUED | OUTPATIENT
Start: 2024-07-22 | End: 2024-07-26 | Stop reason: HOSPADM

## 2024-07-22 RX ADMIN — MUPIROCIN 1 G: 20 OINTMENT TOPICAL at 08:07

## 2024-07-22 RX ADMIN — MORPHINE SULFATE 2 MG: 2 INJECTION, SOLUTION INTRAMUSCULAR; INTRAVENOUS at 12:07

## 2024-07-22 RX ADMIN — OXYCODONE 5 MG: 5 TABLET ORAL at 08:07

## 2024-07-22 RX ADMIN — LISINOPRIL 30 MG: 10 TABLET ORAL at 08:07

## 2024-07-22 RX ADMIN — OXYCODONE 5 MG: 5 TABLET ORAL at 05:07

## 2024-07-22 NOTE — PROGRESS NOTES
I just spoke with Dr. Carmona.  He had some emergency add on surgeries come up at [facility] tomorrow.  Case will be postponed until Wednesday morning.  Case was booked for him. Please notify family and adjust as needed

## 2024-07-22 NOTE — PLAN OF CARE
Recommendations  Continue cardiac diet  Added Boost Plus with all meals  Incorporated food preferences   Collaborate with health care providers  Encourage intake during meal rounds     Goal: intake >50% upon R\D  F/U  Nutrition Goal Status: new  Comments:  Pt denied N/V or difficulty chewing or swallowing. Last BM on  7/21. She lives with family.     Nutrition Related Social Determinants of Health: SDOH: Adequate food in home environment     Assessment and Plan  Nutrition Problem  Inadequate energy and protein intake     Related to (etiology):   Pain d/t mechanical fall     Signs and Symptoms (as evidenced by):   Intake 25% or less      Interventions/Recommendations (treatment strategy):  Supplement diet with Boost plus all meals  Scheduled for surgical repair of left hip 7/23.     Nutrition Diagnosis Status:   New     Malnutrition Assessment   Pt does not meet ASPEN criteria for Malnutrition at this time but is at risk d/t decreased appetite x several days since fall. No recent Hx of weight loss.

## 2024-07-22 NOTE — PLAN OF CARE
Problem: Infection  Goal: Absence of Infection Signs and Symptoms  Outcome: Progressing     Problem: Adult Inpatient Plan of Care  Goal: Plan of Care Review  Outcome: Progressing  Goal: Patient-Specific Goal (Individualized)  Outcome: Progressing  Goal: Absence of Hospital-Acquired Illness or Injury  Outcome: Progressing  Goal: Optimal Comfort and Wellbeing  Outcome: Progressing  Goal: Readiness for Transition of Care  Outcome: Progressing     Problem: Wound  Goal: Optimal Coping  Outcome: Progressing  Goal: Optimal Functional Ability  Outcome: Progressing  Goal: Absence of Infection Signs and Symptoms  Outcome: Progressing  Goal: Improved Oral Intake  Outcome: Progressing  Goal: Optimal Pain Control and Function  Outcome: Progressing  Goal: Skin Health and Integrity  Outcome: Progressing  Goal: Optimal Wound Healing  Outcome: Progressing     Problem: Fall Injury Risk  Goal: Absence of Fall and Fall-Related Injury  Outcome: Progressing     Problem: Skin Injury Risk Increased  Goal: Skin Health and Integrity  Outcome: Progressing   Plan of care reviewed with patient. Patient verbalized understanding. All fall precautions maintained. Bed in lowest position, call light within reach, slip resistant socks maintained. Bed alarm on  Patient remains NPO since midnight

## 2024-07-22 NOTE — PT/OT/SLP PROGRESS
Occupational Therapy      Patient Name:  Arleen Zarate   MRN:  [id number]    Patient not seen today secondary to left femur fracture and awaiting orthopedic consult.     7/22/2024

## 2024-07-22 NOTE — CARE UPDATE
07/22/24 0809 07/22/24 0810   Patient Assessment/Suction   Level of Consciousness (AVPU) alert  --    Respiratory Effort Normal;Unlabored  --    Expansion/Accessory Muscles/Retractions no use of accessory muscles;no retractions;expansion symmetric  --    Rhythm/Pattern, Respiratory unlabored;pattern regular;depth regular;no shortness of breath reported  --    PRE-TX-O2   Device (Oxygen Therapy) nasal cannula nasal cannula   $ Is the patient on Low Flow Oxygen? Yes Yes   Flow (L/min) (Oxygen Therapy) 2  (decreased to 1 LPM) 1   SpO2 96 %  --    Pulse Oximetry Type Intermittent  --    $ Pulse Oximetry - Multiple Charge Pulse Oximetry - Multiple  --    Pulse 88  --    Resp 16  --    Aerosol Therapy   $ Aerosol Therapy Charges PRN treatment not required  --    Respiratory Treatment Status (SVN) PRN treatment not required  --

## 2024-07-22 NOTE — PROGRESS NOTES
Critical access hospital Medicine  Progress Note    Patient Name: Arleen Zarate  MRN: [id number]  Patient Class: IP- Inpatient   Admission Date: 7/21/2024  Length of Stay: 1 days  Attending Physician: Beni Ta Jr., MD  Primary Care Provider: Perry Jorge MD        Subjective:     Principal Problem:Closed fracture of neck of left femur        HPI:  Arleen Zarate is a 89-year-old female who presents emergency room complaining of left hip pain.  She sustained a mechanical fall at her residence.  She reports frequent falls.  She fell several months ago and sustained a right hip fracture which required surgical repair.  Pain is worse with any movement of the left leg.  No alleviating factors.  No recent sick contacts or travel.  No fever or chills.  Previous medical history includes hypertension, GERD, hyperlipidemia, anxiety.  ER workup: CBC unremarkable.  CMP unremarkable.  X-ray of the left hip demonstrates a left femoral neck fracture.  ER physician spoke with  with orthopedics.  Patient admitted to Hospital Medicine for treatment and management.    Overview/Hospital Course:  No notes on file    Interval History:  No acute events overnight.  Pain controlled at this time.  Pending surgery on 07/23/2024.  Patient will likely need placement post surgery    Review of Systems  Objective:     Vital Signs (Most Recent):  Temp: 98.9 °F (37.2 °C) (07/22/24 0838)  Pulse: 89 (07/22/24 0838)  Resp: 18 (07/22/24 0838)  BP: (!) 146/66 (07/22/24 0838)  SpO2: (!) 93 % (07/22/24 0838) Vital Signs (24h Range):  Temp:  [97.5 °F (36.4 °C)-98.9 °F (37.2 °C)] 98.9 °F (37.2 °C)  Pulse:  [82-97] 89  Resp:  [16-24] 18  SpO2:  [90 %-97 %] 93 %  BP: (144-189)/() 146/66     Weight: 59.1 kg (130 lb 4.7 oz)  Body mass index is 26.32 kg/m².    Intake/Output Summary (Last 24 hours) at 7/22/2024 1028  Last data filed at 7/22/2024 0730  Gross per 24 hour   Intake 360 ml   Output 1200 ml   Net -840 ml          Physical Exam  Vitals reviewed.   Constitutional:       General: She is not in acute distress.     Appearance: Normal appearance.   HENT:      Head: Normocephalic and atraumatic.      Right Ear: External ear normal.      Left Ear: External ear normal.      Nose: Nose normal.      Mouth/Throat:      Mouth: Mucous membranes are moist.      Pharynx: Oropharynx is clear.   Eyes:      Extraocular Movements: Extraocular movements intact.      Conjunctiva/sclera: Conjunctivae normal.   Cardiovascular:      Rate and Rhythm: Normal rate and regular rhythm.      Pulses: Normal pulses.      Heart sounds: Normal heart sounds. No murmur heard.     No gallop.   Pulmonary:      Effort: Pulmonary effort is normal. No respiratory distress.      Breath sounds: Normal breath sounds. No wheezing or rales.   Abdominal:      General: Abdomen is flat. There is no distension.      Palpations: Abdomen is soft.      Tenderness: There is no abdominal tenderness. There is no guarding.   Musculoskeletal:         General: Deformity (Left lower extremity) present. No swelling. Normal range of motion.      Cervical back: Normal range of motion and neck supple.   Skin:     General: Skin is warm and dry.   Neurological:      General: No focal deficit present.      Mental Status: She is alert and oriented to person, place, and time.             Significant Labs: All pertinent labs within the past 24 hours have been reviewed.    Significant Imaging: I have reviewed all pertinent imaging results/findings within the past 24 hours.    Assessment/Plan:      * Closed fracture of neck of left femur  Acute problem   X-ray demonstrates left femoral neck fracture   NPO after midnight   Musa catheter p.r.n.   Oxycodone for moderate pain   Morphine for severe pain   Zofran p.r.n. nausea vomiting   PT/OT consult when appropriate   Ortho consulted.  Plan for OR on 07/23/2024        VTE Risk Mitigation (From admission, onward)           Ordered     Place  MAURICIO hose  Until discontinued         07/21/24 1845     IP VTE HIGH RISK PATIENT  Once         07/21/24 1845     Place sequential compression device  Until discontinued         07/21/24 1845                    Discharge Planning   NICK: 7/25/2024     Code Status: Full Code   Is the patient medically ready for discharge?:     Reason for patient still in hospital (select all that apply): Treatment                     Beni Ta Jr, MD  Department of Hospital Medicine   [facility]/Surg

## 2024-07-22 NOTE — CARE UPDATE
07/21/24 2101   Patient Assessment/Suction   Level of Consciousness (AVPU) alert   Respiratory Effort Unlabored   PRE-TX-O2   Device (Oxygen Therapy) room air   SpO2 (!) 93 %   Pulse Oximetry Type Intermittent   $ Pulse Oximetry - Multiple Charge Pulse Oximetry - Multiple   Pulse 89   Resp 20   Aerosol Therapy   $ Aerosol Therapy Charges PRN treatment not required   Respiratory Evaluation   $ Care Plan Tech Time 15 min   $ Respiratory Evaluation Complete   Evaluation For New Orders   Admitting Diagnosis fall   Cardiac Diagnosis no   Pulmonary Diagnosis no   Home Oxygen   Has Home Oxygen? No   Home Aerosol, MDI, DPI, and Other Treatments/Therapies   Home Respiratory Therapy Per Patient/Review of Chart No   Oxygen Care Plan   Oxygen Care Plan Per Protocol   SPO2 Goal (%) 92% non-cardiac   Rationale SpO2 is <92% (Non-cardiac Pt.)   Bronchodilator Care Plan   Bronchodilator Care Plan Aerosol  (duoneb Q4prn)   Rationale Shortness of breath (increased WOB)

## 2024-07-22 NOTE — PLAN OF CARE
[facility] - Med/Surg  Initial Discharge Assessment       Primary Care Provider: Perry Jorge MD    Admission Diagnosis: Closed fracture of left hip, initial encounter [S72.002A]    Admission Date: 7/21/2024  Expected Discharge Date: 7/25/2024    Transition of Care Barriers: None    Spoke to pt at bedside to complete dc assessment. Verified facesheet. Pt lives a listed address with daughter in law. PCP Luisito. Pharmacy [address] DME rw. Denies hd/hh/dm/coumadin. Pt without recent admit. Dc plan is rehab vs snf. 1st choice [facility]      Payor: MEDICARE / Plan: MEDICARE PART A & B / Product Type: Government /     Extended Emergency Contact Information  Primary Emergency Contact: Griselda Zarate  Address: [address] [place] [address] [facility]  Home Phone: [phone]  Mobile Phone: [phone]  Relation: Relative  Preferred language: English   needed? No    Discharge Plan A: Rehab  Discharge Plan B: Skilled Nursing Facility      [facility] - [address]  [address] [place] [address]  Phone: [phone] Fax: [phone]    [id number] [address] AT [facility] [address]  [address]  [facility] [address]  Phone: [phone] Fax: [phone]      Initial Assessment (most recent)       Adult Discharge Assessment - 07/22/24 1153          Discharge Assessment    Assessment Type Discharge Planning Assessment     Confirmed/corrected address, phone number and insurance Yes     Confirmed Demographics Correct on Facesheet     Source of Information patient     People in Home child(betina), adult     Do you have help at home or someone to help you manage your care at home? Yes     Prior to hospitilization cognitive status: Alert/Oriented     Current cognitive status: Alert/Oriented     Walking or Climbing Stairs Difficulty yes     Walking or Climbing  Stairs ambulation difficulty, requires equipment     Dressing/Bathing Difficulty yes     Dressing/Bathing bathing difficulty, requires equipment     Equipment Currently Used at Home walker, rolling     Readmission within 30 days? No     Patient currently being followed by outpatient case management? No     Do you currently have service(s) that help you manage your care at home? No     Do you take prescription medications? Yes     Do you have prescription coverage? Yes     Coverage [facility]     Do you have any problems affording any of your prescribed medications? No     Is the patient taking medications as prescribed? yes     How do you get to doctors appointments? family or friend will provide     Are you on dialysis? No     Do you take coumadin? No     Discharge Plan A Rehab     Discharge Plan B Skilled Nursing Facility     DME Needed Upon Discharge  none     Discharge Plan discussed with: Patient     Transition of Care Barriers None

## 2024-07-22 NOTE — ASSESSMENT & PLAN NOTE
Acute problem   X-ray demonstrates left femoral neck fracture   NPO after midnight   Musa catheter p.r.n.   Oxycodone for moderate pain   Morphine for severe pain   Zofran p.r.n. nausea vomiting   PT/OT consult when appropriate   Ortho consulted.  Plan for OR on 07/23/2024

## 2024-07-22 NOTE — SUBJECTIVE & OBJECTIVE
Past Medical History:   Diagnosis Date    Anemia 3/1/2024    Anxiety     Arthritis     fingers and back    Depression     Disorder of kidney and ureter     Foot pain, left 9/18/2013    GERD (gastroesophageal reflux disease)     Hiatal hernia     Hyperlipidemia     Hypertension     Reflux     on meds    Tendon tear 8/21/2013    Tendonitis 8/21/2013       Past Surgical History:   Procedure Laterality Date    CHOLECYSTECTOMY      EYE SURGERY Bilateral     cataracts and implants    HEMIARTHROPLASTY OF HIP Right 3/1/2024    Procedure: HEMIARTHROPLASTY, HIP;  Surgeon: Wan Carmona MD;  Location: [facility];  Service: Orthopedics;  Laterality: Right;    TONSILLECTOMY         Review of patient's allergies indicates:   Allergen Reactions    Buspar [buspirone] Other (See Comments)     Syncope and nausea     Doxy      Other reaction(s): Unknown    Effexor [venlafaxine] Other (See Comments)     shaking    Biaxin [clarithromycin] Other (See Comments)     Unknown    Codeine Other (See Comments)     Syncope, and nausea.    Statins-hmg-coa reductase inhibitors      myalgia    Doxycycline Other (See Comments)     Made very weak       Current Facility-Administered Medications   Medication    acetaminophen tablet 650 mg    acetaminophen tablet 650 mg    albuterol-ipratropium 2.5 mg-0.5 mg/3 mL nebulizer solution 3 mL    aluminum-magnesium hydroxide-simethicone 200-200-20 mg/5 mL suspension 30 mL    dextrose 10% bolus 125 mL 125 mL    dextrose 10% bolus 250 mL 250 mL    glucagon (human recombinant) injection 1 mg    glucose chewable tablet 16 g    glucose chewable tablet 24 g    lisinopriL tablet 30 mg    magnesium oxide tablet 800 mg    magnesium oxide tablet 800 mg    melatonin tablet 9 mg    morphine injection 2 mg    mupirocin 2 % ointment    naloxone 0.4 mg/mL injection 0.02 mg    ondansetron injection 4 mg    oxyCODONE immediate release tablet 5 mg    potassium bicarbonate disintegrating tablet 35 mEq    potassium  bicarbonate disintegrating tablet 50 mEq    potassium bicarbonate disintegrating tablet 60 mEq    potassium, sodium phosphates 280-160-250 mg packet 2 packet    potassium, sodium phosphates 280-160-250 mg packet 2 packet    potassium, sodium phosphates 280-160-250 mg packet 2 packet    simethicone chewable tablet 80 mg    sodium chloride 0.9% flush 10 mL     Family History       Problem Relation (Age of Onset)    Hearing loss Son          Tobacco Use    Smoking status: Never     Passive exposure: Never    Smokeless tobacco: Never   Substance and Sexual Activity    Alcohol use: Yes     Alcohol/week: 1.0 standard drink of alcohol     Types: 1 Glasses of wine per week     Comment: rarely    Drug use: No    Sexual activity: Not Currently     Review of Systems   Constitutional: Negative for chills and fever.   HENT:  Negative for congestion.    Eyes:  Negative for blurred vision.   Cardiovascular:  Negative for chest pain and syncope.   Respiratory:  Negative for cough and shortness of breath.    Endocrine: Negative for polyuria.   Hematologic/Lymphatic: Negative for bleeding problem. Does not bruise/bleed easily.   Skin:  Negative for rash.   Musculoskeletal:  Positive for falls, joint pain and joint swelling. Negative for muscle cramps, muscle weakness and myalgias.   Gastrointestinal:  Negative for abdominal pain, nausea and vomiting.   Genitourinary:  Negative for flank pain.   Neurological:  Negative for numbness and seizures.   Psychiatric/Behavioral:  Negative for altered mental status.    Allergic/Immunologic: Negative for persistent infections.     Objective:     Vital Signs (Most Recent):  Temp: 98.3 °F (36.8 °C) (07/22/24 1215)  Pulse: 92 (07/22/24 1215)  Resp: 18 (07/22/24 1215)  BP: (!) 153/71 (07/22/24 1215)  SpO2: 97 % (07/22/24 1215) Vital Signs (24h Range):  Temp:  [97.5 °F (36.4 °C)-98.9 °F (37.2 °C)] 98.3 °F (36.8 °C)  Pulse:  [82-97] 92  Resp:  [16-24] 18  SpO2:  [90 %-97 %] 97 %  BP:  "(144189)/() 153/71     Weight: 59.1 kg (130 lb 4.7 oz)  Height: 4' 11" (149.9 cm)  Body mass index is 26.32 kg/m².      Intake/Output Summary (Last 24 hours) at 7/22/2024 1237  Last data filed at 7/22/2024 0730  Gross per 24 hour   Intake 360 ml   Output 1200 ml   Net -840 ml        General    Nursing note and vitals reviewed.  Constitutional: She is oriented to person, place, and time. She appears well-developed and well-nourished. No distress.   HENT:   Head: Atraumatic.   Eyes: EOM are normal.   Cardiovascular:  Normal rate.            Pulmonary/Chest: Effort normal.   Abdominal: Soft.   Neurological: She is alert and oriented to person, place, and time.   Psychiatric: Her behavior is normal.             Right Hip Exam   Right hip exam is normal.   Left Hip Exam     Inspection   Swelling: present  Erythema: absent    Tenderness   The patient tender to palpation of the trochanteric bursa.    Range of Motion   Extension:  abnormal   Flexion:  abnormal     Tests   Log Roll: positive    Other   Sensation: normal          Vascular Exam       Left Pulses  Dorsalis Pedis:      2+             Significant Labs: BMP:   Recent Labs   Lab 07/22/24  0428         K 4.3      CO2 23   BUN 24   CREATININE 0.9   CALCIUM 9.1   MG 1.9     CBC:   Recent Labs   Lab 07/21/24  1707 07/22/24  0557   WBC 6.14 8.27   HGB 10.5* 10.5*   HCT 32.5* 32.2*    150     CMP:   Recent Labs   Lab 07/21/24  1707 07/22/24  0428    141   K 4.3 4.3    108   CO2 24 23   GLU 99 110   BUN 27 24   CREATININE 1.0 0.9   CALCIUM 9.2 9.1   PROT 6.4 6.0   ALBUMIN 3.6 3.3*   BILITOT 0.2 0.7   ALKPHOS 99 89   AST 22 39   ALT 12 36   ANIONGAP 11 10     Coagulation: No results for input(s): "LABPROT", "INR", "APTT" in the last 48 hours.  All pertinent labs within the past 24 hours have been reviewed.    Significant Imaging: X-Ray: I have reviewed all pertinent results/findings and my personal findings are:  X-rays left hip " show a displaced left femoral neck fracture.  Patient has previous right hip hemiarthroplasty without complication.

## 2024-07-22 NOTE — CONSULTS
[facility] Med/Surg  Wound Care    Patient Name:  Arleen Zarate   MRN:  [id number]  Date: 7/22/2024  Diagnosis: Closed fracture of neck of left femur    History:     Past Medical History:   Diagnosis Date    Anemia 3/1/2024    Anxiety     Arthritis     fingers and back    Depression     Disorder of kidney and ureter     Foot pain, left 9/18/2013    GERD (gastroesophageal reflux disease)     Hiatal hernia     Hyperlipidemia     Hypertension     Reflux     on meds    Tendon tear 8/21/2013    Tendonitis 8/21/2013       Allergies as of 07/21/2024 - Reviewed 07/21/2024   Allergen Reaction Noted    Buspar [buspirone] Other (See Comments) 11/04/2021    Doxy  01/18/2012    Effexor [venlafaxine] Other (See Comments) 05/08/2014    Biaxin [clarithromycin] Other (See Comments) 04/08/2013    Codeine Other (See Comments) 05/11/2015    Statins-hmg-coa reductase inhibitors  06/16/2017    Doxycycline Other (See Comments) 07/29/2013       WOC Assessment Details/Treatment     Wound care consulted for skin tears to left upper arm and left elbow. Left upper arm skin tear applied steri strips to this skin tear to due loose skin despite edges not able to approximate skin appears viable and steri strips will secure skin tear tissue with dressing changes. The left elbow skin tear is a total loss skin tear.        07/22/24 1357   WOCN Assessment   WOCN Total Time (mins) 20   Visit Date 07/22/24   Visit Time 1000   Consult Type New   WOCN Speciality Wound   Wound skin tear   Number of Wounds 2   Pressure Injury Prevention    Check Moisture Management Pad Done        Wound 07/21/24 1609 Skin Tear Left anterior;proximal;upper Arm #1   Date First Assessed/Time First Assessed: 07/21/24 1609   Present on Original Admission: Yes  Primary Wound Type: Skin Tear  Side: (c) Left  Orientation: anterior;proximal;upper  Location: Arm  Wound Number: #1   Wound Image    Dressing Appearance Dried drainage   Drainage Amount Small   Drainage  Characteristics/Odor Sanguineous   Appearance Purple;Maroon;Red   Tissue loss description Partial thickness   Care Cleansed with:;Wound cleanser;Applied:;Skin Barrier   Dressing Applied;Silver;Foam  (steri strips applied)   Dressing Change Due 07/29/24        Wound 07/21/24 1609 Skin Tear Left anterior;lower;proximal Elbow #2   Date First Assessed/Time First Assessed: 07/21/24 1609   Present on Original Admission: Yes  Primary Wound Type: Skin Tear  Side: Left  Orientation: (c) anterior;lower;proximal  Location: Elbow  Wound Number: #2  Is this injury device related?: No   Wound Image     Dressing Appearance Dried drainage   Drainage Amount Small   Drainage Characteristics/Odor Sanguineous   Appearance Pink;Red;Purple   Tissue loss description Partial thickness   Care Cleansed with:;Wound cleanser;Applied:;Skin Barrier   Dressing Applied;Silver;Foam   Dressing Change Due 07/29/24       Recommendations made to primary team for Urgotul with foam dressings weekly on Mondays. Orders placed.     07/22/2024

## 2024-07-22 NOTE — NURSING
"Awake alert and oriented to person, place and month. Griselda (DIL) and son at bedside.  Reviewed room/unit assigned. Questions answered and encouraged.20g to lt a/c with site free of s/s of infiltration. /73, 96% RA, 87 HR. Reports c/o pain to lt hip but refuses and prn pain medication at this time. States "I don't like the way it makes me feel." Reassurance given.  "

## 2024-07-22 NOTE — CONSULTS
[facility]/Surg  Orthopedics  Consult Note    Patient Name: Arleen Zarate  MRN: [id number]  Admission Date: 7/21/2024  Hospital Length of Stay: 1 days  Attending Provider: Beni Ta Jr., MD  Primary Care Provider: Perry Jorge MD    Patient information was obtained from past medical records, ER records, and primary team.     Inpatient consult to Orthopedics  Consult performed by: Bernardino Carlos MD  Consult ordered by: Dimas Bone NP  Reason for consult: L hip fx        Subjective:     Principal Problem:Closed fracture of neck of left femur    Chief Complaint:   Chief Complaint   Patient presents with    Fall     Left hip pain after fall today        HPI: Arleen Zarate is a 89-year-old female who presents emergency room complaining of left hip pain. She sustained a mechanical fall at her residence. She reports frequent falls. She fell several months ago and sustained a right hip fracture which required surgical repair. Pain is worse with any movement of the left leg. No alleviating factors. No recent sick contacts or travel. No fever or chills. Previous medical history includes hypertension, GERD, hyperlipidemia, anxiety. ER workup: CBC unremarkable. CMP unremarkable. X-ray of the left hip demonstrates a left femoral neck fracture.     Past Medical History:   Diagnosis Date    Anemia 3/1/2024    Anxiety     Arthritis     fingers and back    Depression     Disorder of kidney and ureter     Foot pain, left 9/18/2013    GERD (gastroesophageal reflux disease)     Hiatal hernia     Hyperlipidemia     Hypertension     Reflux     on meds    Tendon tear 8/21/2013    Tendonitis 8/21/2013       Past Surgical History:   Procedure Laterality Date    CHOLECYSTECTOMY      EYE SURGERY Bilateral     cataracts and implants    HEMIARTHROPLASTY OF HIP Right 3/1/2024    Procedure: HEMIARTHROPLASTY, HIP;  Surgeon: Wan Carmona MD;  Location: [facility];  Service: Orthopedics;   Laterality: Right;    TONSILLECTOMY         Review of patient's allergies indicates:   Allergen Reactions    Buspar [buspirone] Other (See Comments)     Syncope and nausea     Doxy      Other reaction(s): Unknown    Effexor [venlafaxine] Other (See Comments)     shaking    Biaxin [clarithromycin] Other (See Comments)     Unknown    Codeine Other (See Comments)     Syncope, and nausea.    Statins-hmg-coa reductase inhibitors      myalgia    Doxycycline Other (See Comments)     Made very weak       Current Facility-Administered Medications   Medication    acetaminophen tablet 650 mg    acetaminophen tablet 650 mg    albuterol-ipratropium 2.5 mg-0.5 mg/3 mL nebulizer solution 3 mL    aluminum-magnesium hydroxide-simethicone 200-200-20 mg/5 mL suspension 30 mL    dextrose 10% bolus 125 mL 125 mL    dextrose 10% bolus 250 mL 250 mL    glucagon (human recombinant) injection 1 mg    glucose chewable tablet 16 g    glucose chewable tablet 24 g    lisinopriL tablet 30 mg    magnesium oxide tablet 800 mg    magnesium oxide tablet 800 mg    melatonin tablet 9 mg    morphine injection 2 mg    mupirocin 2 % ointment    naloxone 0.4 mg/mL injection 0.02 mg    ondansetron injection 4 mg    oxyCODONE immediate release tablet 5 mg    potassium bicarbonate disintegrating tablet 35 mEq    potassium bicarbonate disintegrating tablet 50 mEq    potassium bicarbonate disintegrating tablet 60 mEq    potassium, sodium phosphates 280-160-250 mg packet 2 packet    potassium, sodium phosphates 280-160-250 mg packet 2 packet    potassium, sodium phosphates 280-160-250 mg packet 2 packet    simethicone chewable tablet 80 mg    sodium chloride 0.9% flush 10 mL     Family History       Problem Relation (Age of Onset)    Hearing loss Son          Tobacco Use    Smoking status: Never     Passive exposure: Never    Smokeless tobacco: Never   Substance and Sexual Activity    Alcohol use: Yes     Alcohol/week: 1.0 standard drink of alcohol     Types: 1  "Glasses of wine per week     Comment: rarely    Drug use: No    Sexual activity: Not Currently     Review of Systems   Constitutional: Negative for chills and fever.   HENT:  Negative for congestion.    Eyes:  Negative for blurred vision.   Cardiovascular:  Negative for chest pain and syncope.   Respiratory:  Negative for cough and shortness of breath.    Endocrine: Negative for polyuria.   Hematologic/Lymphatic: Negative for bleeding problem. Does not bruise/bleed easily.   Skin:  Negative for rash.   Musculoskeletal:  Positive for falls, joint pain and joint swelling. Negative for muscle cramps, muscle weakness and myalgias.   Gastrointestinal:  Negative for abdominal pain, nausea and vomiting.   Genitourinary:  Negative for flank pain.   Neurological:  Negative for numbness and seizures.   Psychiatric/Behavioral:  Negative for altered mental status.    Allergic/Immunologic: Negative for persistent infections.     Objective:     Vital Signs (Most Recent):  Temp: 98.3 °F (36.8 °C) (07/22/24 1215)  Pulse: 92 (07/22/24 1215)  Resp: 18 (07/22/24 1215)  BP: (!) 153/71 (07/22/24 1215)  SpO2: 97 % (07/22/24 1215) Vital Signs (24h Range):  Temp:  [97.5 °F (36.4 °C)-98.9 °F (37.2 °C)] 98.3 °F (36.8 °C)  Pulse:  [82-97] 92  Resp:  [16-24] 18  SpO2:  [90 %-97 %] 97 %  BP: (144-189)/() 153/71     Weight: 59.1 kg (130 lb 4.7 oz)  Height: 4' 11" (149.9 cm)  Body mass index is 26.32 kg/m².      Intake/Output Summary (Last 24 hours) at 7/22/2024 1237  Last data filed at 7/22/2024 0730  Gross per 24 hour   Intake 360 ml   Output 1200 ml   Net -840 ml        General    Nursing note and vitals reviewed.  Constitutional: She is oriented to person, place, and time. She appears well-developed and well-nourished. No distress.   HENT:   Head: Atraumatic.   Eyes: EOM are normal.   Cardiovascular:  Normal rate.            Pulmonary/Chest: Effort normal.   Abdominal: Soft.   Neurological: She is alert and oriented to person, place, " "and time.   Psychiatric: Her behavior is normal.             Right Hip Exam   Right hip exam is normal.   Left Hip Exam     Inspection   Swelling: present  Erythema: absent    Tenderness   The patient tender to palpation of the trochanteric bursa.    Range of Motion   Extension:  abnormal   Flexion:  abnormal     Tests   Log Roll: positive    Other   Sensation: normal          Vascular Exam       Left Pulses  Dorsalis Pedis:      2+             Significant Labs: BMP:   Recent Labs   Lab 07/22/24  0428         K 4.3      CO2 23   BUN 24   CREATININE 0.9   CALCIUM 9.1   MG 1.9     CBC:   Recent Labs   Lab 07/21/24  1707 07/22/24  0557   WBC 6.14 8.27   HGB 10.5* 10.5*   HCT 32.5* 32.2*    150     CMP:   Recent Labs   Lab 07/21/24 1707 07/22/24  0428    141   K 4.3 4.3    108   CO2 24 23   GLU 99 110   BUN 27 24   CREATININE 1.0 0.9   CALCIUM 9.2 9.1   PROT 6.4 6.0   ALBUMIN 3.6 3.3*   BILITOT 0.2 0.7   ALKPHOS 99 89   AST 22 39   ALT 12 36   ANIONGAP 11 10     Coagulation: No results for input(s): "LABPROT", "INR", "APTT" in the last 48 hours.  All pertinent labs within the past 24 hours have been reviewed.    Significant Imaging: X-Ray: I have reviewed all pertinent results/findings and my personal findings are:  X-rays left hip show a displaced left femoral neck fracture.  Patient has previous right hip hemiarthroplasty without complication.  Assessment/Plan:     * Closed fracture of neck of left femur  Patient needs a left hip hemiarthroplasty.  Dr. Carmona to perform procedure at patient's family's request.  Surgery in next day or two.    pain control in the meantime.  Might benefit from muscle relaxer to help with the spasms        Thank you for your consult. I will follow-up with patient. Please contact us if you have any additional questions.    Bernardino Carlos MD  Orthopedics  [facility]/Surg        "

## 2024-07-22 NOTE — SUBJECTIVE & OBJECTIVE
Interval History:  No acute events overnight.  Pain controlled at this time.  Pending surgery on 07/23/2024.  Patient will likely need placement post surgery    Review of Systems  Objective:     Vital Signs (Most Recent):  Temp: 98.9 °F (37.2 °C) (07/22/24 0838)  Pulse: 89 (07/22/24 0838)  Resp: 18 (07/22/24 0838)  BP: (!) 146/66 (07/22/24 0838)  SpO2: (!) 93 % (07/22/24 0838) Vital Signs (24h Range):  Temp:  [97.5 °F (36.4 °C)-98.9 °F (37.2 °C)] 98.9 °F (37.2 °C)  Pulse:  [82-97] 89  Resp:  [16-24] 18  SpO2:  [90 %-97 %] 93 %  BP: (144-189)/() 146/66     Weight: 59.1 kg (130 lb 4.7 oz)  Body mass index is 26.32 kg/m².    Intake/Output Summary (Last 24 hours) at 7/22/2024 1028  Last data filed at 7/22/2024 0730  Gross per 24 hour   Intake 360 ml   Output 1200 ml   Net -840 ml         Physical Exam  Vitals reviewed.   Constitutional:       General: She is not in acute distress.     Appearance: Normal appearance.   HENT:      Head: Normocephalic and atraumatic.      Right Ear: External ear normal.      Left Ear: External ear normal.      Nose: Nose normal.      Mouth/Throat:      Mouth: Mucous membranes are moist.      Pharynx: Oropharynx is clear.   Eyes:      Extraocular Movements: Extraocular movements intact.      Conjunctiva/sclera: Conjunctivae normal.   Cardiovascular:      Rate and Rhythm: Normal rate and regular rhythm.      Pulses: Normal pulses.      Heart sounds: Normal heart sounds. No murmur heard.     No gallop.   Pulmonary:      Effort: Pulmonary effort is normal. No respiratory distress.      Breath sounds: Normal breath sounds. No wheezing or rales.   Abdominal:      General: Abdomen is flat. There is no distension.      Palpations: Abdomen is soft.      Tenderness: There is no abdominal tenderness. There is no guarding.   Musculoskeletal:         General: Deformity (Left lower extremity) present. No swelling. Normal range of motion.      Cervical back: Normal range of motion and neck supple.    Skin:     General: Skin is warm and dry.   Neurological:      General: No focal deficit present.      Mental Status: She is alert and oriented to person, place, and time.             Significant Labs: All pertinent labs within the past 24 hours have been reviewed.    Significant Imaging: I have reviewed all pertinent imaging results/findings within the past 24 hours.

## 2024-07-22 NOTE — NURSING
Nurses Note -- 4 Eyes      7/22/2024   1:42 AM      Skin assessed during: Admit      [] No Altered Skin Integrity Present    []Prevention Measures Documented      [x] Yes- Altered Skin Integrity Present or Discovered   [x] LDA Added if Not in Epic (Describe Wound)   [x] New Altered Skin Integrity was Present on Admit and Documented in LDA   [x] Wound Image Taken    Wound Care Consulted? Yes    Attending Nurse:  Zaida Sanchez RN/Staff Member: Maame

## 2024-07-22 NOTE — PLAN OF CARE
POC/Meds reviewed, pt verbalized understanding. Vitals stable. Afebrile. Remains on 2L O2. IVPB abx administered. Tele In place-NSR. Musa in place, good UOP. IS at bedside, instructed on use and return demonstration performed. PRN pain meds administered. Repositioned Q2hrs. Hourly/Q2hr rounding performed, safety maintained. Bed in lowest position, wheels locked, SR up x2, call light in easy reach. No complaints at this time. Will continue to monitor.

## 2024-07-22 NOTE — ASSESSMENT & PLAN NOTE
Patient needs a left hip hemiarthroplasty.  Dr. Carmona to perform procedure at patient's family's request.  Surgery in next day or two.    pain control in the meantime.  Might benefit from muscle relaxer to help with the spasms

## 2024-07-22 NOTE — CONSULTS
"  [facility]/Surg  Adult Nutrition  Consult Note    SUMMARY     Recommendations  Continue cardiac diet  Added Boost Plus with all meals  Incorporated food preferences   Collaborate with health care providers  Encourage intake during meal rounds    Goal: intake >50% upon R\D  F/U  Nutrition Goal Status: new  Comments:  Pt denied N/V or difficulty chewing or swallowing. Last BM on  7/21. She lives with family.    Nutrition Related Social Determinants of Health: SDOH: Adequate food in home environment    Assessment and Plan  Nutrition Problem  Inadequate energy and protein intake    Related to (etiology):   Pain d/t mechanical fall    Signs and Symptoms (as evidenced by):   Intake 25% or less     Interventions/Recommendations (treatment strategy):  Supplement diet with Boost plus all meals  Scheduled for surgical repair of left hip 7/23.    Nutrition Diagnosis Status:   New     Malnutrition Assessment   Pt does not meet ASPEN criteria for Malnutrition at this time but is at risk d/t decreased appetite x several days since fall. No recent Hx of weight loss.              Reason for Assessment: R/O malnutrition  Dx: closed fracture of neck of left femur   PMH:  HTN; DDD; ANYA; GERD  Reason For Assessment: consult  Diagnosis: other (see comments)  Interdisciplinary Rounds: did not attend  Nutrition Discharge Planning: pending    Nutrition Risk Screen    Nutrition Risk Screen: no indicators present    Nutrition/Diet History    Patient Reported Diet/Restrictions/Preferences: general  Spiritual, Cultural Beliefs, Tenriism Practices, Values that Affect Care: no  Food Allergies: NKFA  Factors Affecting Nutritional Intake: pain    Anthropometrics    Temp: 98.3 °F (36.8 °C)  Height Method: Stated  Height: 4' 11" (149.9 cm)  Height (inches): 59 in  Weight Method: Bed Scale  Weight: 59.1 kg (130 lb 4.7 oz)  Weight (lb): 130.29 lb  Ideal Body Weight (IBW), Female: 95 lb  % Ideal Body Weight, Female (lb): 137.15 " %  BMI (Calculated): 26.3  BMI Grade: 25 - 29.9 - overweight     Lab/Procedures/Meds   Latest Reference Range & Units Most Recent   Hemoglobin 12.0 - 16.0 g/dL 10.5 (L)  7/22/24 05:57   Hematocrit 37.0 - 48.5 % 32.2 (L)  7/22/24 05:57   (L): Data is abnormally low   Latest Reference Range & Units Most Recent   Albumin 3.5 - 5.2 g/dL 3.3 (L)  7/22/24 04:28   (L): Data is abnormally low  Pertinent Labs Reviewed: reviewed  Pertinent Medications Reviewed: reviewed; lisinopril    Physical Findings/Assessment  Left femur fracture  Dominick score = 17     Estimated/Assessed Needs    Weight Used For Calorie Calculations: 59.1 kg (130 lb 4.7 oz)  Energy Calorie Requirements (kcal): 1600 calories daily;  25/kg  Energy Need Method: Kcal/kg  Protein Requirements: 78 gm protein daily;  1.3/kg  Weight Used For Protein Calculations: 59.1 kg (130 lb 4.7 oz)     Estimated Fluid Requirement Method: RDA Method  RDA Method (mL): 1600     Nutrition Prescription Ordered    Current Diet Order: cardiac  Oral Nutrition Supplement: none    Evaluation of Received Nutrient/Fluid Intake    Intake/Output Summary (Last 24 hours) at 7/22/2024 1450  Last data filed at 7/22/2024 0730  Gross per 24 hour   Intake 360 ml   Output 1200 ml   Net -840 ml     Energy Calories Required: meeting needs  Protein Required: meeting needs  Fluid Required: meeting needs  Tolerance: tolerating  % Intake of Estimated Energy Needs: 0 - 25 %  % Meal Intake: 0 - 25 %    Nutrition Risk    Level of Risk/Frequency of Follow-up: high 2 x weekly    Monitor and Evaluation    Food and Nutrient Intake: food and beverage intake  Food and Nutrient Adminstration: diet order  Knowledge/Beliefs/Attitudes: beliefs and attitudes  Physical Activity and Function: nutrition-related ADLs and IADLs  Anthropometric Measurements: weight change  Biochemical Data, Medical Tests and Procedures: gastrointestinal profile, glucose/endocrine profile, electrolyte and renal panel, other  (specify)  Nutrition-Focused Physical Findings: overall appearance     Nutrition Follow-Up  yes

## 2024-07-22 NOTE — PT/OT/SLP PROGRESS
Physical Therapy      Patient Name:  Arleen Zarate   MRN:  [id number]    Patient not seen today secondary to Other (Comment) (left femur fracture so awaiting orthopedic consult). Will follow-up following orthopedic consult.

## 2024-07-22 NOTE — HOSPITAL COURSE
patient with displaced left femoral neck fracture.  Patient needs left angelina arthroplasty.  Family requested Dr. Carmona who agrees to see and treat the patient.  Plan is for left hip hemiarthroplasty on July 23rd or 24th.

## 2024-07-23 ENCOUNTER — ANESTHESIA EVENT (OUTPATIENT)
Dept: SURGERY | Facility: HOSPITAL | Age: 89
End: 2024-07-23
Payer: MEDICARE

## 2024-07-23 LAB
ALBUMIN SERPL BCP-MCNC: 3.1 G/DL (ref 3.5–5.2)
ALP SERPL-CCNC: 74 U/L (ref 55–135)
ALT SERPL W/O P-5'-P-CCNC: 33 U/L (ref 10–44)
ANION GAP SERPL CALC-SCNC: 8 MMOL/L (ref 8–16)
AST SERPL-CCNC: 28 U/L (ref 10–40)
BASOPHILS # BLD AUTO: 0.03 K/UL (ref 0–0.2)
BASOPHILS NFR BLD: 0.4 % (ref 0–1.9)
BILIRUB SERPL-MCNC: 0.5 MG/DL (ref 0.1–1)
BUN SERPL-MCNC: 16 MG/DL (ref 10–30)
CALCIUM SERPL-MCNC: 8.8 MG/DL (ref 8.7–10.5)
CHLORIDE SERPL-SCNC: 105 MMOL/L (ref 95–110)
CO2 SERPL-SCNC: 25 MMOL/L (ref 23–29)
CREAT SERPL-MCNC: 0.8 MG/DL (ref 0.5–1.4)
DIFFERENTIAL METHOD BLD: ABNORMAL
EOSINOPHIL # BLD AUTO: 0.3 K/UL (ref 0–0.5)
EOSINOPHIL NFR BLD: 3.6 % (ref 0–8)
ERYTHROCYTE [DISTWIDTH] IN BLOOD BY AUTOMATED COUNT: 14.6 % (ref 11.5–14.5)
EST. GFR  (NO RACE VARIABLE): >60 ML/MIN/1.73 M^2
GLUCOSE SERPL-MCNC: 101 MG/DL (ref 70–110)
HCT VFR BLD AUTO: 33.1 % (ref 37–48.5)
HGB BLD-MCNC: 10.7 G/DL (ref 12–16)
IMM GRANULOCYTES # BLD AUTO: 0.03 K/UL (ref 0–0.04)
IMM GRANULOCYTES NFR BLD AUTO: 0.4 % (ref 0–0.5)
LYMPHOCYTES # BLD AUTO: 1.2 K/UL (ref 1–4.8)
LYMPHOCYTES NFR BLD: 15.2 % (ref 18–48)
MAGNESIUM SERPL-MCNC: 1.8 MG/DL (ref 1.6–2.6)
MCH RBC QN AUTO: 29.9 PG (ref 27–31)
MCHC RBC AUTO-ENTMCNC: 32.3 G/DL (ref 32–36)
MCV RBC AUTO: 93 FL (ref 82–98)
MONOCYTES # BLD AUTO: 0.8 K/UL (ref 0.3–1)
MONOCYTES NFR BLD: 9.3 % (ref 4–15)
NEUTROPHILS # BLD AUTO: 5.7 K/UL (ref 1.8–7.7)
NEUTROPHILS NFR BLD: 71.1 % (ref 38–73)
NRBC BLD-RTO: 0 /100 WBC
PHOSPHATE SERPL-MCNC: 2.3 MG/DL (ref 2.7–4.5)
PLATELET # BLD AUTO: 138 K/UL (ref 150–450)
PMV BLD AUTO: 10.5 FL (ref 9.2–12.9)
POTASSIUM SERPL-SCNC: 4.3 MMOL/L (ref 3.5–5.1)
PROT SERPL-MCNC: 5.7 G/DL (ref 6–8.4)
RBC # BLD AUTO: 3.58 M/UL (ref 4–5.4)
SODIUM SERPL-SCNC: 138 MMOL/L (ref 136–145)
WBC # BLD AUTO: 8.04 K/UL (ref 3.9–12.7)

## 2024-07-23 PROCEDURE — 94799 UNLISTED PULMONARY SVC/PX: CPT | Mod: XB

## 2024-07-23 PROCEDURE — 80053 COMPREHEN METABOLIC PANEL: CPT | Performed by: NURSE PRACTITIONER

## 2024-07-23 PROCEDURE — 99900035 HC TECH TIME PER 15 MIN (STAT)

## 2024-07-23 PROCEDURE — 27000221 HC OXYGEN, UP TO 24 HOURS

## 2024-07-23 PROCEDURE — 85025 COMPLETE CBC W/AUTO DIFF WBC: CPT | Performed by: NURSE PRACTITIONER

## 2024-07-23 PROCEDURE — 25000003 PHARM REV CODE 250: Performed by: INTERNAL MEDICINE

## 2024-07-23 PROCEDURE — 11000001 HC ACUTE MED/SURG PRIVATE ROOM

## 2024-07-23 PROCEDURE — 83735 ASSAY OF MAGNESIUM: CPT | Performed by: NURSE PRACTITIONER

## 2024-07-23 PROCEDURE — 94799 UNLISTED PULMONARY SVC/PX: CPT

## 2024-07-23 PROCEDURE — 84100 ASSAY OF PHOSPHORUS: CPT | Performed by: NURSE PRACTITIONER

## 2024-07-23 PROCEDURE — 63600175 PHARM REV CODE 636 W HCPCS: Performed by: NURSE PRACTITIONER

## 2024-07-23 PROCEDURE — 94761 N-INVAS EAR/PLS OXIMETRY MLT: CPT

## 2024-07-23 PROCEDURE — 25000003 PHARM REV CODE 250: Performed by: NURSE PRACTITIONER

## 2024-07-23 PROCEDURE — 36415 COLL VENOUS BLD VENIPUNCTURE: CPT | Performed by: NURSE PRACTITIONER

## 2024-07-23 RX ADMIN — MUPIROCIN 1 G: 20 OINTMENT TOPICAL at 08:07

## 2024-07-23 RX ADMIN — OXYCODONE 5 MG: 5 TABLET ORAL at 08:07

## 2024-07-23 RX ADMIN — MORPHINE SULFATE 2 MG: 2 INJECTION, SOLUTION INTRAMUSCULAR; INTRAVENOUS at 01:07

## 2024-07-23 RX ADMIN — MUPIROCIN 1 G: 20 OINTMENT TOPICAL at 10:07

## 2024-07-23 RX ADMIN — LISINOPRIL 30 MG: 10 TABLET ORAL at 08:07

## 2024-07-23 RX ADMIN — MELATONIN TAB 3 MG 9 MG: 3 TAB at 08:07

## 2024-07-23 RX ADMIN — MORPHINE SULFATE 2 MG: 2 INJECTION, SOLUTION INTRAMUSCULAR; INTRAVENOUS at 06:07

## 2024-07-23 NOTE — PLAN OF CARE
Problem: Adult Inpatient Plan of Care  Goal: Plan of Care Review  Outcome: Progressing  Goal: Patient-Specific Goal (Individualized)  Outcome: Progressing  Goal: Optimal Comfort and Wellbeing  Outcome: Progressing     Problem: Wound  Goal: Optimal Pain Control and Function  Outcome: Progressing     Problem: Fall Injury Risk  Goal: Absence of Fall and Fall-Related Injury  Outcome: Progressing   POC has been discussed with pt.  Pt has nicholson catheter in place for immobilization.  Pt is to have surgery with Dr. Carmona for L hip Fx tomorrow morning on 7/24/2024.  Pain has been controlled with current pain regimen.  No falls during this shift.  No replacements needed today.  No possible discharge at this time.

## 2024-07-23 NOTE — PROGRESS NOTES
[facility] Medicine  Progress Note    Patient Name: Arleen Zarate  MRN: [id number]  Patient Class: IP- Inpatient   Admission Date: 7/21/2024  Length of Stay: 2 days  Attending Physician: Beni Ta Jr., MD  Primary Care Provider: Perry Jorge MD        Subjective:     Principal Problem:Closed fracture of neck of left femur        HPI:  Arleen Zarate is a 89-year-old female who presents emergency room complaining of left hip pain.  She sustained a mechanical fall at her residence.  She reports frequent falls.  She fell several months ago and sustained a right hip fracture which required surgical repair.  Pain is worse with any movement of the left leg.  No alleviating factors.  No recent sick contacts or travel.  No fever or chills.  Previous medical history includes hypertension, GERD, hyperlipidemia, anxiety.  ER workup: CBC unremarkable.  CMP unremarkable.  X-ray of the left hip demonstrates a left femoral neck fracture.  ER physician spoke with  with orthopedics.  Patient admitted to Hospital Medicine for treatment and management.    Overview/Hospital Course:  No notes on file    Interval History:  No acute events overnight.  Patient is stable at this time.  Plan for OR tomorrow    Review of Systems  Objective:     Vital Signs (Most Recent):  Temp: 98.2 °F (36.8 °C) (07/23/24 0723)  Pulse: 94 (07/23/24 0723)  Resp: 18 (07/23/24 0723)  BP: (!) 172/74 (07/23/24 0723)  SpO2: 98 % (07/23/24 0752) Vital Signs (24h Range):  Temp:  [98.2 °F (36.8 °C)-99.9 °F (37.7 °C)] 98.2 °F (36.8 °C)  Pulse:  [87-98] 94  Resp:  [17-18] 18  SpO2:  [94 %-98 %] 98 %  BP: (141-176)/(68-75) 172/74     Weight: 59.1 kg (130 lb 4.7 oz)  Body mass index is 26.32 kg/m².    Intake/Output Summary (Last 24 hours) at 7/23/2024 0957  Last data filed at 7/23/2024 0442  Gross per 24 hour   Intake 360 ml   Output 1550 ml   Net -1190 ml         Physical Exam  Vitals reviewed.   Constitutional:        General: She is not in acute distress.     Appearance: Normal appearance.   HENT:      Head: Normocephalic and atraumatic.      Right Ear: External ear normal.      Left Ear: External ear normal.      Nose: Nose normal.      Mouth/Throat:      Mouth: Mucous membranes are moist.      Pharynx: Oropharynx is clear.   Eyes:      Extraocular Movements: Extraocular movements intact.      Conjunctiva/sclera: Conjunctivae normal.   Cardiovascular:      Rate and Rhythm: Normal rate and regular rhythm.      Pulses: Normal pulses.      Heart sounds: Normal heart sounds. No murmur heard.     No gallop.   Pulmonary:      Effort: Pulmonary effort is normal. No respiratory distress.      Breath sounds: Normal breath sounds. No wheezing or rales.   Abdominal:      General: Abdomen is flat. There is no distension.      Palpations: Abdomen is soft.      Tenderness: There is no abdominal tenderness. There is no guarding.   Musculoskeletal:         General: Deformity (Left lower extremity) present. No swelling. Normal range of motion.      Cervical back: Normal range of motion and neck supple.   Skin:     General: Skin is warm and dry.   Neurological:      General: No focal deficit present.      Mental Status: She is alert and oriented to person, place, and time.             Significant Labs: All pertinent labs within the past 24 hours have been reviewed.    Significant Imaging: I have reviewed all pertinent imaging results/findings within the past 24 hours.    Assessment/Plan:      * Closed fracture of neck of left femur  Acute problem   X-ray demonstrates left femoral neck fracture   NPO after midnight   Musa catheter p.r.n.   Oxycodone for moderate pain   Morphine for severe pain   Zofran p.r.n. nausea vomiting   PT/OT consult when appropriate   Ortho consulted.  Plan for OR on 07/24/2024        VTE Risk Mitigation (From admission, onward)           Ordered     Place MAURICIO hose  Until discontinued         07/21/24 6268     IP  VTE HIGH RISK PATIENT  Once         07/21/24 1845     Place sequential compression device  Until discontinued         07/21/24 1845                    Discharge Planning   NICK: 7/25/2024     Code Status: Full Code   Is the patient medically ready for discharge?:     Reason for patient still in hospital (select all that apply): Treatment  Discharge Plan A: Rehab                  Beni Ta Jr, MD  Department of Hospital Medicine   [facility]/Surg

## 2024-07-23 NOTE — PLAN OF CARE
Problem: Infection  Goal: Absence of Infection Signs and Symptoms  Outcome: Progressing     Problem: Adult Inpatient Plan of Care  Goal: Plan of Care Review  Outcome: Progressing  Goal: Patient-Specific Goal (Individualized)  Outcome: Progressing  Goal: Absence of Hospital-Acquired Illness or Injury  Outcome: Progressing  Goal: Optimal Comfort and Wellbeing  Outcome: Progressing  Goal: Readiness for Transition of Care  Outcome: Progressing     Problem: Wound  Goal: Optimal Coping  Outcome: Progressing  Goal: Optimal Functional Ability  Outcome: Progressing  Goal: Absence of Infection Signs and Symptoms  Outcome: Progressing  Goal: Improved Oral Intake  Outcome: Progressing  Goal: Optimal Pain Control and Function  Outcome: Progressing  Goal: Skin Health and Integrity  Outcome: Progressing  Goal: Optimal Wound Healing  Outcome: Progressing     Problem: Fall Injury Risk  Goal: Absence of Fall and Fall-Related Injury  Outcome: Progressing     Problem: Skin Injury Risk Increased  Goal: Skin Health and Integrity  Outcome: Progressing

## 2024-07-23 NOTE — ASSESSMENT & PLAN NOTE
Acute problem   X-ray demonstrates left femoral neck fracture   NPO after midnight   Musa catheter p.r.n.   Oxycodone for moderate pain   Morphine for severe pain   Zofran p.r.n. nausea vomiting   PT/OT consult when appropriate   Ortho consulted.  Plan for OR on 07/24/2024

## 2024-07-23 NOTE — SUBJECTIVE & OBJECTIVE
Interval History:  No acute events overnight.  Patient is stable at this time.  Plan for OR tomorrow    Review of Systems  Objective:     Vital Signs (Most Recent):  Temp: 98.2 °F (36.8 °C) (07/23/24 0723)  Pulse: 94 (07/23/24 0723)  Resp: 18 (07/23/24 0723)  BP: (!) 172/74 (07/23/24 0723)  SpO2: 98 % (07/23/24 0752) Vital Signs (24h Range):  Temp:  [98.2 °F (36.8 °C)-99.9 °F (37.7 °C)] 98.2 °F (36.8 °C)  Pulse:  [87-98] 94  Resp:  [17-18] 18  SpO2:  [94 %-98 %] 98 %  BP: (141-176)/(68-75) 172/74     Weight: 59.1 kg (130 lb 4.7 oz)  Body mass index is 26.32 kg/m².    Intake/Output Summary (Last 24 hours) at 7/23/2024 0957  Last data filed at 7/23/2024 0442  Gross per 24 hour   Intake 360 ml   Output 1550 ml   Net -1190 ml         Physical Exam  Vitals reviewed.   Constitutional:       General: She is not in acute distress.     Appearance: Normal appearance.   HENT:      Head: Normocephalic and atraumatic.      Right Ear: External ear normal.      Left Ear: External ear normal.      Nose: Nose normal.      Mouth/Throat:      Mouth: Mucous membranes are moist.      Pharynx: Oropharynx is clear.   Eyes:      Extraocular Movements: Extraocular movements intact.      Conjunctiva/sclera: Conjunctivae normal.   Cardiovascular:      Rate and Rhythm: Normal rate and regular rhythm.      Pulses: Normal pulses.      Heart sounds: Normal heart sounds. No murmur heard.     No gallop.   Pulmonary:      Effort: Pulmonary effort is normal. No respiratory distress.      Breath sounds: Normal breath sounds. No wheezing or rales.   Abdominal:      General: Abdomen is flat. There is no distension.      Palpations: Abdomen is soft.      Tenderness: There is no abdominal tenderness. There is no guarding.   Musculoskeletal:         General: Deformity (Left lower extremity) present. No swelling. Normal range of motion.      Cervical back: Normal range of motion and neck supple.   Skin:     General: Skin is warm and dry.   Neurological:       General: No focal deficit present.      Mental Status: She is alert and oriented to person, place, and time.             Significant Labs: All pertinent labs within the past 24 hours have been reviewed.    Significant Imaging: I have reviewed all pertinent imaging results/findings within the past 24 hours.

## 2024-07-24 ENCOUNTER — ANESTHESIA (OUTPATIENT)
Dept: SURGERY | Facility: HOSPITAL | Age: 89
End: 2024-07-24
Payer: MEDICARE

## 2024-07-24 LAB
ABO + RH BLD: NORMAL
ALBUMIN SERPL BCP-MCNC: 2.8 G/DL (ref 3.5–5.2)
ALP SERPL-CCNC: 126 U/L (ref 55–135)
ALT SERPL W/O P-5'-P-CCNC: 69 U/L (ref 10–44)
ANION GAP SERPL CALC-SCNC: 9 MMOL/L (ref 8–16)
AST SERPL-CCNC: 47 U/L (ref 10–40)
BASOPHILS # BLD AUTO: 0.02 K/UL (ref 0–0.2)
BASOPHILS NFR BLD: 0.3 % (ref 0–1.9)
BILIRUB SERPL-MCNC: 0.7 MG/DL (ref 0.1–1)
BLD GP AB SCN CELLS X3 SERPL QL: NORMAL
BUN SERPL-MCNC: 16 MG/DL (ref 10–30)
CALCIUM SERPL-MCNC: 8.8 MG/DL (ref 8.7–10.5)
CHLORIDE SERPL-SCNC: 103 MMOL/L (ref 95–110)
CO2 SERPL-SCNC: 24 MMOL/L (ref 23–29)
CREAT SERPL-MCNC: 0.8 MG/DL (ref 0.5–1.4)
DIFFERENTIAL METHOD BLD: ABNORMAL
EOSINOPHIL # BLD AUTO: 0.2 K/UL (ref 0–0.5)
EOSINOPHIL NFR BLD: 2.9 % (ref 0–8)
ERYTHROCYTE [DISTWIDTH] IN BLOOD BY AUTOMATED COUNT: 14.3 % (ref 11.5–14.5)
EST. GFR  (NO RACE VARIABLE): >60 ML/MIN/1.73 M^2
GLUCOSE SERPL-MCNC: 105 MG/DL (ref 70–110)
HCT VFR BLD AUTO: 31.7 % (ref 37–48.5)
HGB BLD-MCNC: 10.2 G/DL (ref 12–16)
IMM GRANULOCYTES # BLD AUTO: 0.02 K/UL (ref 0–0.04)
IMM GRANULOCYTES NFR BLD AUTO: 0.3 % (ref 0–0.5)
LYMPHOCYTES # BLD AUTO: 1.1 K/UL (ref 1–4.8)
LYMPHOCYTES NFR BLD: 14.2 % (ref 18–48)
MAGNESIUM SERPL-MCNC: 1.8 MG/DL (ref 1.6–2.6)
MCH RBC QN AUTO: 29.3 PG (ref 27–31)
MCHC RBC AUTO-ENTMCNC: 32.2 G/DL (ref 32–36)
MCV RBC AUTO: 91 FL (ref 82–98)
MONOCYTES # BLD AUTO: 1 K/UL (ref 0.3–1)
MONOCYTES NFR BLD: 13.6 % (ref 4–15)
NEUTROPHILS # BLD AUTO: 5.3 K/UL (ref 1.8–7.7)
NEUTROPHILS NFR BLD: 68.7 % (ref 38–73)
NRBC BLD-RTO: 0 /100 WBC
PHOSPHATE SERPL-MCNC: 2.8 MG/DL (ref 2.7–4.5)
PLATELET # BLD AUTO: 106 K/UL (ref 150–450)
PMV BLD AUTO: 10.2 FL (ref 9.2–12.9)
POTASSIUM SERPL-SCNC: 4.1 MMOL/L (ref 3.5–5.1)
PROT SERPL-MCNC: 5.7 G/DL (ref 6–8.4)
RBC # BLD AUTO: 3.48 M/UL (ref 4–5.4)
SODIUM SERPL-SCNC: 136 MMOL/L (ref 136–145)
SPECIMEN OUTDATE: NORMAL
WBC # BLD AUTO: 7.65 K/UL (ref 3.9–12.7)

## 2024-07-24 PROCEDURE — 64450 NJX AA&/STRD OTHER PN/BRANCH: CPT | Performed by: ANESTHESIOLOGY

## 2024-07-24 PROCEDURE — 25000003 PHARM REV CODE 250: Performed by: NURSE ANESTHETIST, CERTIFIED REGISTERED

## 2024-07-24 PROCEDURE — 71000033 HC RECOVERY, INTIAL HOUR: Performed by: ORTHOPAEDIC SURGERY

## 2024-07-24 PROCEDURE — 36000710: Performed by: ORTHOPAEDIC SURGERY

## 2024-07-24 PROCEDURE — 86900 BLOOD TYPING SEROLOGIC ABO: CPT | Performed by: ANESTHESIOLOGY

## 2024-07-24 PROCEDURE — 94761 N-INVAS EAR/PLS OXIMETRY MLT: CPT

## 2024-07-24 PROCEDURE — 94799 UNLISTED PULMONARY SVC/PX: CPT

## 2024-07-24 PROCEDURE — 36415 COLL VENOUS BLD VENIPUNCTURE: CPT | Performed by: INTERNAL MEDICINE

## 2024-07-24 PROCEDURE — C1776 JOINT DEVICE (IMPLANTABLE): HCPCS | Performed by: ORTHOPAEDIC SURGERY

## 2024-07-24 PROCEDURE — 63600175 PHARM REV CODE 636 W HCPCS: Performed by: INTERNAL MEDICINE

## 2024-07-24 PROCEDURE — 85025 COMPLETE CBC W/AUTO DIFF WBC: CPT | Performed by: NURSE PRACTITIONER

## 2024-07-24 PROCEDURE — 80053 COMPREHEN METABOLIC PANEL: CPT | Performed by: NURSE PRACTITIONER

## 2024-07-24 PROCEDURE — 27236 TREAT THIGH FRACTURE: CPT | Mod: LT,,, | Performed by: ORTHOPAEDIC SURGERY

## 2024-07-24 PROCEDURE — 63600175 PHARM REV CODE 636 W HCPCS

## 2024-07-24 PROCEDURE — 25000003 PHARM REV CODE 250: Performed by: ANESTHESIOLOGY

## 2024-07-24 PROCEDURE — 36415 COLL VENOUS BLD VENIPUNCTURE: CPT | Performed by: NURSE PRACTITIONER

## 2024-07-24 PROCEDURE — 86850 RBC ANTIBODY SCREEN: CPT | Performed by: ANESTHESIOLOGY

## 2024-07-24 PROCEDURE — 36000711: Performed by: ORTHOPAEDIC SURGERY

## 2024-07-24 PROCEDURE — 37000008 HC ANESTHESIA 1ST 15 MINUTES: Performed by: ORTHOPAEDIC SURGERY

## 2024-07-24 PROCEDURE — 27200750 HC INSULATED NEEDLE/ STIMUPLEX: Performed by: ANESTHESIOLOGY

## 2024-07-24 PROCEDURE — 99900031 HC PATIENT EDUCATION (STAT)

## 2024-07-24 PROCEDURE — 63600175 PHARM REV CODE 636 W HCPCS: Performed by: ANESTHESIOLOGY

## 2024-07-24 PROCEDURE — 27000221 HC OXYGEN, UP TO 24 HOURS

## 2024-07-24 PROCEDURE — 71000039 HC RECOVERY, EACH ADD'L HOUR: Performed by: ORTHOPAEDIC SURGERY

## 2024-07-24 PROCEDURE — 11000001 HC ACUTE MED/SURG PRIVATE ROOM

## 2024-07-24 PROCEDURE — 63600175 PHARM REV CODE 636 W HCPCS: Performed by: ORTHOPAEDIC SURGERY

## 2024-07-24 PROCEDURE — 25000003 PHARM REV CODE 250: Performed by: INTERNAL MEDICINE

## 2024-07-24 PROCEDURE — 99900035 HC TECH TIME PER 15 MIN (STAT)

## 2024-07-24 PROCEDURE — 36415 COLL VENOUS BLD VENIPUNCTURE: CPT | Performed by: ANESTHESIOLOGY

## 2024-07-24 PROCEDURE — 25000003 PHARM REV CODE 250: Performed by: NURSE PRACTITIONER

## 2024-07-24 PROCEDURE — 27201423 OPTIME MED/SURG SUP & DEVICES STERILE SUPPLY: Performed by: ORTHOPAEDIC SURGERY

## 2024-07-24 PROCEDURE — 0SRS0JZ REPLACEMENT OF LEFT HIP JOINT, FEMORAL SURFACE WITH SYNTHETIC SUBSTITUTE, OPEN APPROACH: ICD-10-PCS | Performed by: ORTHOPAEDIC SURGERY

## 2024-07-24 PROCEDURE — 63600175 PHARM REV CODE 636 W HCPCS: Mod: JZ,JG | Performed by: ANESTHESIOLOGY

## 2024-07-24 PROCEDURE — 83735 ASSAY OF MAGNESIUM: CPT | Performed by: NURSE PRACTITIONER

## 2024-07-24 PROCEDURE — 86901 BLOOD TYPING SEROLOGIC RH(D): CPT | Performed by: ANESTHESIOLOGY

## 2024-07-24 PROCEDURE — 94799 UNLISTED PULMONARY SVC/PX: CPT | Mod: XB

## 2024-07-24 PROCEDURE — 25000003 PHARM REV CODE 250: Performed by: ORTHOPAEDIC SURGERY

## 2024-07-24 PROCEDURE — 63600175 PHARM REV CODE 636 W HCPCS: Performed by: NURSE ANESTHETIST, CERTIFIED REGISTERED

## 2024-07-24 PROCEDURE — 37000009 HC ANESTHESIA EA ADD 15 MINS: Performed by: ORTHOPAEDIC SURGERY

## 2024-07-24 PROCEDURE — 84100 ASSAY OF PHOSPHORUS: CPT | Performed by: NURSE PRACTITIONER

## 2024-07-24 PROCEDURE — 76942 ECHO GUIDE FOR BIOPSY: CPT | Performed by: ANESTHESIOLOGY

## 2024-07-24 DEVICE — HIP STEM - STANDARD OFFSET
Type: IMPLANTABLE DEVICE | Site: HIP | Status: FUNCTIONAL
Brand: INSIGNIA

## 2024-07-24 DEVICE — BIPOLAR COMPONENT
Type: IMPLANTABLE DEVICE | Site: HIP | Status: FUNCTIONAL
Brand: UHR

## 2024-07-24 DEVICE — LFIT V40 FEMORAL HEAD
Type: IMPLANTABLE DEVICE | Site: HIP | Status: FUNCTIONAL
Brand: V40 HEAD

## 2024-07-24 RX ORDER — MUPIROCIN 20 MG/G
OINTMENT TOPICAL 2 TIMES DAILY
Status: DISCONTINUED | OUTPATIENT
Start: 2024-07-24 | End: 2024-07-24 | Stop reason: SDUPTHER

## 2024-07-24 RX ORDER — BUPIVACAINE HYDROCHLORIDE 5 MG/ML
INJECTION, SOLUTION EPIDURAL; INTRACAUDAL
Status: DISCONTINUED | OUTPATIENT
Start: 2024-07-24 | End: 2024-07-24

## 2024-07-24 RX ORDER — FENTANYL CITRATE 50 UG/ML
INJECTION, SOLUTION INTRAMUSCULAR; INTRAVENOUS
Status: DISCONTINUED | OUTPATIENT
Start: 2024-07-24 | End: 2024-07-24

## 2024-07-24 RX ORDER — CEFAZOLIN SODIUM 2 G/50ML
2 SOLUTION INTRAVENOUS ONCE
Status: DISCONTINUED | OUTPATIENT
Start: 2024-07-24 | End: 2024-07-24

## 2024-07-24 RX ORDER — ONDANSETRON HYDROCHLORIDE 2 MG/ML
INJECTION, SOLUTION INTRAVENOUS
Status: DISCONTINUED | OUTPATIENT
Start: 2024-07-24 | End: 2024-07-24

## 2024-07-24 RX ORDER — ACETAMINOPHEN 10 MG/ML
INJECTION, SOLUTION INTRAVENOUS
Status: DISCONTINUED | OUTPATIENT
Start: 2024-07-24 | End: 2024-07-24

## 2024-07-24 RX ORDER — LIDOCAINE HYDROCHLORIDE 20 MG/ML
INJECTION INTRAVENOUS
Status: DISCONTINUED | OUTPATIENT
Start: 2024-07-24 | End: 2024-07-24

## 2024-07-24 RX ORDER — PROPOFOL 10 MG/ML
VIAL (ML) INTRAVENOUS
Status: DISCONTINUED | OUTPATIENT
Start: 2024-07-24 | End: 2024-07-24

## 2024-07-24 RX ORDER — TRANEXAMIC ACID 100 MG/ML
INJECTION, SOLUTION INTRAVENOUS
Status: DISCONTINUED | OUTPATIENT
Start: 2024-07-24 | End: 2024-07-24

## 2024-07-24 RX ORDER — METOCLOPRAMIDE HYDROCHLORIDE 5 MG/ML
10 INJECTION INTRAMUSCULAR; INTRAVENOUS EVERY 10 MIN PRN
Status: DISCONTINUED | OUTPATIENT
Start: 2024-07-24 | End: 2024-07-26 | Stop reason: HOSPADM

## 2024-07-24 RX ORDER — ROCURONIUM BROMIDE 10 MG/ML
INJECTION, SOLUTION INTRAVENOUS
Status: DISCONTINUED | OUTPATIENT
Start: 2024-07-24 | End: 2024-07-24

## 2024-07-24 RX ORDER — DEXAMETHASONE SODIUM PHOSPHATE 4 MG/ML
INJECTION, SOLUTION INTRA-ARTICULAR; INTRALESIONAL; INTRAMUSCULAR; INTRAVENOUS; SOFT TISSUE
Status: DISCONTINUED | OUTPATIENT
Start: 2024-07-24 | End: 2024-07-24

## 2024-07-24 RX ORDER — LIDOCAINE HYDROCHLORIDE 10 MG/ML
1 INJECTION, SOLUTION EPIDURAL; INFILTRATION; INTRACAUDAL; PERINEURAL ONCE
Status: DISCONTINUED | OUTPATIENT
Start: 2024-07-24 | End: 2024-07-26 | Stop reason: HOSPADM

## 2024-07-24 RX ORDER — SUCCINYLCHOLINE CHLORIDE 20 MG/ML
INJECTION INTRAMUSCULAR; INTRAVENOUS
Status: DISCONTINUED | OUTPATIENT
Start: 2024-07-24 | End: 2024-07-24

## 2024-07-24 RX ORDER — SODIUM CHLORIDE 9 MG/ML
INJECTION, SOLUTION INTRAVENOUS CONTINUOUS
Status: DISCONTINUED | OUTPATIENT
Start: 2024-07-24 | End: 2024-07-26 | Stop reason: HOSPADM

## 2024-07-24 RX ORDER — ACETAMINOPHEN 650 MG/1
650 SUPPOSITORY RECTAL EVERY 4 HOURS PRN
Status: DISCONTINUED | OUTPATIENT
Start: 2024-07-24 | End: 2024-07-24 | Stop reason: SDUPTHER

## 2024-07-24 RX ORDER — HYDROMORPHONE HYDROCHLORIDE 2 MG/ML
0.1 INJECTION, SOLUTION INTRAMUSCULAR; INTRAVENOUS; SUBCUTANEOUS EVERY 5 MIN PRN
Status: DISCONTINUED | OUTPATIENT
Start: 2024-07-24 | End: 2024-07-26 | Stop reason: HOSPADM

## 2024-07-24 RX ADMIN — CEFAZOLIN 2 G: 2 INJECTION, POWDER, FOR SOLUTION INTRAMUSCULAR; INTRAVENOUS at 08:07

## 2024-07-24 RX ADMIN — BUPIVACAINE HYDROCHLORIDE 20 ML: 5 INJECTION, SOLUTION EPIDURAL; INTRACAUDAL; PERINEURAL at 11:07

## 2024-07-24 RX ADMIN — HYDROMORPHONE HYDROCHLORIDE 0.1 MG: 2 INJECTION INTRAMUSCULAR; INTRAVENOUS; SUBCUTANEOUS at 02:07

## 2024-07-24 RX ADMIN — SODIUM CHLORIDE: 9 INJECTION, SOLUTION INTRAVENOUS at 03:07

## 2024-07-24 RX ADMIN — SUCCINYLCHOLINE CHLORIDE 120 MG: 20 INJECTION, SOLUTION INTRAMUSCULAR; INTRAVENOUS at 12:07

## 2024-07-24 RX ADMIN — FENTANYL CITRATE 50 MCG: 50 INJECTION, SOLUTION INTRAMUSCULAR; INTRAVENOUS at 11:07

## 2024-07-24 RX ADMIN — LIDOCAINE HYDROCHLORIDE 25 MG: 20 INJECTION, SOLUTION INTRAVENOUS at 12:07

## 2024-07-24 RX ADMIN — SODIUM CHLORIDE, SODIUM GLUCONATE, SODIUM ACETATE, POTASSIUM CHLORIDE AND MAGNESIUM CHLORIDE: 526; 502; 368; 37; 30 INJECTION, SOLUTION INTRAVENOUS at 10:07

## 2024-07-24 RX ADMIN — MUPIROCIN 1 G: 20 OINTMENT TOPICAL at 08:07

## 2024-07-24 RX ADMIN — TRANEXAMIC ACID 570 MG: 100 INJECTION, SOLUTION INTRAVENOUS at 12:07

## 2024-07-24 RX ADMIN — LISINOPRIL 30 MG: 10 TABLET ORAL at 10:07

## 2024-07-24 RX ADMIN — ONDANSETRON 8 MG: 2 INJECTION INTRAMUSCULAR; INTRAVENOUS at 12:07

## 2024-07-24 RX ADMIN — CEFAZOLIN 2 G: 2 INJECTION, POWDER, FOR SOLUTION INTRAMUSCULAR; INTRAVENOUS at 12:07

## 2024-07-24 RX ADMIN — DEXAMETHASONE SODIUM PHOSPHATE 4 MG: 4 INJECTION, SOLUTION INTRA-ARTICULAR; INTRALESIONAL; INTRAMUSCULAR; INTRAVENOUS; SOFT TISSUE at 12:07

## 2024-07-24 RX ADMIN — OXYCODONE 5 MG: 5 TABLET ORAL at 02:07

## 2024-07-24 RX ADMIN — FENTANYL CITRATE 50 MCG: 50 INJECTION, SOLUTION INTRAMUSCULAR; INTRAVENOUS at 12:07

## 2024-07-24 RX ADMIN — PROPOFOL 100 MG: 10 INJECTION, EMULSION INTRAVENOUS at 12:07

## 2024-07-24 RX ADMIN — OXYCODONE 5 MG: 5 TABLET ORAL at 10:07

## 2024-07-24 RX ADMIN — ACETAMINOPHEN 1000 MG: 10 INJECTION, SOLUTION INTRAVENOUS at 12:07

## 2024-07-24 RX ADMIN — MUPIROCIN 1 G: 20 OINTMENT TOPICAL at 10:07

## 2024-07-24 RX ADMIN — FENTANYL CITRATE 50 MCG: 50 INJECTION, SOLUTION INTRAMUSCULAR; INTRAVENOUS at 01:07

## 2024-07-24 RX ADMIN — ROCURONIUM BROMIDE 5 MG: 10 INJECTION, SOLUTION INTRAVENOUS at 12:07

## 2024-07-24 NOTE — TRANSFER OF CARE
"Anesthesia Transfer of Care Note    Patient: Arleen Zarate    Procedure(s) Performed: Procedure(s) (LRB):  HEMIARTHROPLASTY, HIP (Left)    Patient location: PACU    Anesthesia Type: general    Transport from OR: Transported from OR on 2-3 L/min O2 by NC with adequate spontaneous ventilation    Post assessment: no apparent anesthetic complications    Post vital signs: stable    Level of consciousness: sedated and responds to stimulation    Nausea/Vomiting: no nausea/vomiting    Complications: none    Transfer of care protocol was followed      Last vitals: Visit Vitals  BP (!) 150/69   Pulse 84   Temp 37.1 °C (98.8 °F) (Skin)   Resp 11   Ht 4' 11" (1.499 m)   Wt 56.7 kg (125 lb)   SpO2 96%   Breastfeeding No   BMI 25.25 kg/m²     "

## 2024-07-24 NOTE — PLAN OF CARE
Pt to OR today with Dr Carmona. JELLY plan remains [facility] rehab. CM following       07/24/24 0941   Discharge Reassessment   Assessment Type Discharge Planning Reassessment   Did the patient's condition or plan change since previous assessment? Yes   Discharge Plan discussed with: Patient   Communicated NICK with patient/caregiver Yes   Discharge Plan A Rehab   Discharge Plan B Skilled Nursing Facility   Why the patient remains in the hospital Requires continued medical care   Post-Acute Status   Post-Acute Authorization Placement   Post-Acute Placement Status Referrals Sent

## 2024-07-24 NOTE — ANESTHESIA PREPROCEDURE EVALUATION
07/24/2024  Arleen Zarate is a 89 y.o., female.      Pre-op Assessment    I have reviewed the Patient Summary Reports.     I have reviewed the Nursing Notes. I have reviewed the NPO Status.   I have reviewed the Medications.     Review of Systems  Anesthesia Hx:    Worsening of dementia after last surgery              Hematology/Oncology:       -- Anemia:               Hematology Comments: H/H 10/31; plt 106                    Cardiovascular:  Exercise tolerance: good   Hypertension              ECG has been reviewed.                          Pulmonary:  Pulmonary Normal                       Renal/:  Chronic Renal Disease, CKD                Hepatic/GI:    Hiatal Hernia, GERD, well controlled             Musculoskeletal:  Arthritis   left hip fxr            Neurological:    Neuromuscular Disease,            Dementia                         Endocrine:  Endocrine Normal            Psych:  Psychiatric History anxiety                 Physical Exam  General: Well nourished    Airway:  Mallampati: II   Neck ROM: Normal ROM    Dental:  Intact    Chest/Lungs:  Clear to auscultation, Normal Respiratory Rate    Heart:  Rate: Normal  Rhythm: Regular Rhythm        Anesthesia Plan  Type of Anesthesia, risks & benefits discussed:    Anesthesia Type: Gen Supraglottic Airway, Gen ETT  Intra-op Monitoring Plan: Standard ASA Monitors  Post Op Pain Control Plan: multimodal analgesia, IV/PO Opioids PRN and peripheral nerve block  Induction:  IV and Inhalation  Airway Plan: Direct, Post-Induction  Informed Consent: Informed consent signed with the Patient and all parties understand the risks and agree with anesthesia plan.  All questions answered. Patient consented to blood products? Yes  ASA Score: 3    Ready For Surgery From Anesthesia Perspective.     .

## 2024-07-24 NOTE — PLAN OF CARE
Patient prepared for procedure.  No questions at this time.  Family at bedside.  Belongings left in room 322.

## 2024-07-24 NOTE — PLAN OF CARE
Problem: Infection  Goal: Absence of Infection Signs and Symptoms  Outcome: Progressing     Problem: Adult Inpatient Plan of Care  Goal: Plan of Care Review  Outcome: Progressing  Goal: Patient-Specific Goal (Individualized)  Outcome: Progressing  Goal: Optimal Comfort and Wellbeing  Outcome: Progressing     Problem: Wound  Goal: Optimal Pain Control and Function  Outcome: Progressing  Goal: Skin Health and Integrity  Outcome: Progressing     Problem: Fall Injury Risk  Goal: Absence of Fall and Fall-Related Injury  Outcome: Progressing     Problem: Skin Injury Risk Increased  Goal: Skin Health and Integrity  Outcome: Progressing     Problem: Pain Acute  Goal: Optimal Pain Control and Function  Outcome: Progressing     Problem: Hip Fracture Medical Management  Goal: Absence of Bleeding  Outcome: Progressing  Goal: Baseline Cognitive Function Maintained  Outcome: Progressing  Goal: Absence of Embolism  Outcome: Progressing  Goal: Fracture Stability  Outcome: Progressing  Goal: Pain Control and Function  Outcome: Progressing  Goal: Effective Urinary Elimination  Outcome: Progressing

## 2024-07-24 NOTE — ASSESSMENT & PLAN NOTE
Acute problem   X-ray demonstrates left femoral neck fracture   NPO after midnight   Musa catheter p.r.n.   Oxycodone for moderate pain   Morphine for severe pain   Zofran p.r.n. nausea vomiting   Consulted PT and OT to see patient on 07/25/2024  Ortho consulted.  Plan for OR on 07/24/2024  Patient will likely need placement, case management aware

## 2024-07-24 NOTE — ANESTHESIA PROCEDURE NOTES
Intubation    Date/Time: 7/24/2024 12:10 PM    Performed by: Zeinab Whitehead CRNA  Authorized by: Zeinab Whitehead CRNA    Intubation:     Induction:  Intravenous    Intubated:  Postinduction    Mask Ventilation:  Easy mask    Attempts:  1    Attempted By:  CRNA    Method of Intubation:  Video laryngoscopy    Blade:  Ibarra 3    Laryngeal View Grade: Grade I - full view of cords      Difficult Airway Encountered?: No      Complications:  None    Airway Device:  Oral endotracheal tube    Airway Device Size:  7.0    Style/Cuff Inflation:  Cuffed (inflated to minimal occlusive pressure)    Inflation Amount (mL):  5    Tube secured:  21    Secured at:  The lips    Placement Verified By:  Capnometry and Revisualization with laryngoscopy    Complicating Factors:  None    Findings Post-Intubation:  BS equal bilateral and atraumatic/condition of teeth unchanged

## 2024-07-24 NOTE — PROGRESS NOTES
[facility] Medicine  Progress Note    Patient Name: Arleen Zarate  MRN: [id number]  Patient Class: IP- Inpatient   Admission Date: 7/21/2024  Length of Stay: 3 days  Attending Physician: Beni Ta Jr., MD  Primary Care Provider: Perry Jorge MD        Subjective:     Principal Problem:Closed fracture of neck of left femur        HPI:  Arleen Zarate is a 89-year-old female who presents emergency room complaining of left hip pain.  She sustained a mechanical fall at her residence.  She reports frequent falls.  She fell several months ago and sustained a right hip fracture which required surgical repair.  Pain is worse with any movement of the left leg.  No alleviating factors.  No recent sick contacts or travel.  No fever or chills.  Previous medical history includes hypertension, GERD, hyperlipidemia, anxiety.  ER workup: CBC unremarkable.  CMP unremarkable.  X-ray of the left hip demonstrates a left femoral neck fracture.  ER physician spoke with  with orthopedics.  Patient admitted to Hospital Medicine for treatment and management.    Overview/Hospital Course:  No notes on file    Interval History:  No acute events overnight.  Plan for surgery today.  We will consult physical therapy to see patient tomorrow    Review of Systems  Objective:     Vital Signs (Most Recent):  Temp: 98.4 °F (36.9 °C) (07/24/24 0800)  Pulse: 92 (07/24/24 0800)  Resp: 18 (07/24/24 0800)  BP: (!) 151/69 (07/24/24 0800)  SpO2: 95 % (07/24/24 0800) Vital Signs (24h Range):  Temp:  [97.7 °F (36.5 °C)-99.3 °F (37.4 °C)] 98.4 °F (36.9 °C)  Pulse:  [] 92  Resp:  [16-18] 18  SpO2:  [93 %-96 %] 95 %  BP: (132-161)/(63-73) 151/69     Weight: 56.7 kg (125 lb)  Body mass index is 25.25 kg/m².    Intake/Output Summary (Last 24 hours) at 7/24/2024 1021  Last data filed at 7/24/2024 0616  Gross per 24 hour   Intake 620 ml   Output 1275 ml   Net -655 ml         Physical Exam  Vitals reviewed.    Constitutional:       General: She is not in acute distress.     Appearance: Normal appearance.   HENT:      Head: Normocephalic and atraumatic.      Right Ear: External ear normal.      Left Ear: External ear normal.      Nose: Nose normal.      Mouth/Throat:      Mouth: Mucous membranes are moist.      Pharynx: Oropharynx is clear.   Eyes:      Extraocular Movements: Extraocular movements intact.      Conjunctiva/sclera: Conjunctivae normal.   Cardiovascular:      Rate and Rhythm: Normal rate and regular rhythm.      Pulses: Normal pulses.      Heart sounds: Normal heart sounds. No murmur heard.     No gallop.   Pulmonary:      Effort: Pulmonary effort is normal. No respiratory distress.      Breath sounds: Normal breath sounds. No wheezing or rales.   Abdominal:      General: Abdomen is flat. There is no distension.      Palpations: Abdomen is soft.      Tenderness: There is no abdominal tenderness. There is no guarding.   Musculoskeletal:         General: Deformity (Left lower extremity) present. No swelling. Normal range of motion.      Cervical back: Normal range of motion and neck supple.   Skin:     General: Skin is warm and dry.   Neurological:      General: No focal deficit present.      Mental Status: She is alert and oriented to person, place, and time.             Significant Labs: All pertinent labs within the past 24 hours have been reviewed.    Significant Imaging: I have reviewed all pertinent imaging results/findings within the past 24 hours.    Assessment/Plan:      * Closed fracture of neck of left femur  Acute problem   X-ray demonstrates left femoral neck fracture   NPO after midnight   Musa catheter p.r.n.   Oxycodone for moderate pain   Morphine for severe pain   Zofran p.r.n. nausea vomiting   Consulted PT and OT to see patient on 07/25/2024  Ortho consulted.  Plan for OR on 07/24/2024  Patient will likely need placement, case management aware        VTE Risk Mitigation (From admission,  onward)           Ordered     Place MAURICIO hose  Until discontinued         07/21/24 1845     IP VTE HIGH RISK PATIENT  Once         07/21/24 1845     Place sequential compression device  Until discontinued         07/21/24 1845                    Discharge Planning   NICK: 7/26/2024     Code Status: Full Code   Is the patient medically ready for discharge?:     Reason for patient still in hospital (select all that apply): Treatment  Discharge Plan A: Rehab                  Beni Ta Jr, MD  Department of Hospital Medicine   [facility]/Surg

## 2024-07-24 NOTE — PLAN OF CARE
Pt released per anesthesia. Pt A&Ox3. No nausea or vomiting. Pain controlled. All belongings in pt room 322.

## 2024-07-24 NOTE — SUBJECTIVE & OBJECTIVE
Interval History:  No acute events overnight.  Plan for surgery today.  We will consult physical therapy to see patient tomorrow    Review of Systems  Objective:     Vital Signs (Most Recent):  Temp: 98.4 °F (36.9 °C) (07/24/24 0800)  Pulse: 92 (07/24/24 0800)  Resp: 18 (07/24/24 0800)  BP: (!) 151/69 (07/24/24 0800)  SpO2: 95 % (07/24/24 0800) Vital Signs (24h Range):  Temp:  [97.7 °F (36.5 °C)-99.3 °F (37.4 °C)] 98.4 °F (36.9 °C)  Pulse:  [] 92  Resp:  [16-18] 18  SpO2:  [93 %-96 %] 95 %  BP: (132-161)/(63-73) 151/69     Weight: 56.7 kg (125 lb)  Body mass index is 25.25 kg/m².    Intake/Output Summary (Last 24 hours) at 7/24/2024 1021  Last data filed at 7/24/2024 0616  Gross per 24 hour   Intake 620 ml   Output 1275 ml   Net -655 ml         Physical Exam  Vitals reviewed.   Constitutional:       General: She is not in acute distress.     Appearance: Normal appearance.   HENT:      Head: Normocephalic and atraumatic.      Right Ear: External ear normal.      Left Ear: External ear normal.      Nose: Nose normal.      Mouth/Throat:      Mouth: Mucous membranes are moist.      Pharynx: Oropharynx is clear.   Eyes:      Extraocular Movements: Extraocular movements intact.      Conjunctiva/sclera: Conjunctivae normal.   Cardiovascular:      Rate and Rhythm: Normal rate and regular rhythm.      Pulses: Normal pulses.      Heart sounds: Normal heart sounds. No murmur heard.     No gallop.   Pulmonary:      Effort: Pulmonary effort is normal. No respiratory distress.      Breath sounds: Normal breath sounds. No wheezing or rales.   Abdominal:      General: Abdomen is flat. There is no distension.      Palpations: Abdomen is soft.      Tenderness: There is no abdominal tenderness. There is no guarding.   Musculoskeletal:         General: Deformity (Left lower extremity) present. No swelling. Normal range of motion.      Cervical back: Normal range of motion and neck supple.   Skin:     General: Skin is warm and  dry.   Neurological:      General: No focal deficit present.      Mental Status: She is alert and oriented to person, place, and time.             Significant Labs: All pertinent labs within the past 24 hours have been reviewed.    Significant Imaging: I have reviewed all pertinent imaging results/findings within the past 24 hours.

## 2024-07-24 NOTE — CONSULTS
Past Medical History:   Diagnosis Date    Anemia 3/1/2024    Anxiety     Arthritis     fingers and back    Depression     Disorder of kidney and ureter     Foot pain, left 9/18/2013    GERD (gastroesophageal reflux disease)     Hiatal hernia     Hyperlipidemia     Hypertension     Reflux     on meds    Tendon tear 8/21/2013    Tendonitis 8/21/2013       Past Surgical History:   Procedure Laterality Date    CHOLECYSTECTOMY      EYE SURGERY Bilateral     cataracts and implants    HEMIARTHROPLASTY OF HIP Right 3/1/2024    Procedure: HEMIARTHROPLASTY, HIP;  Surgeon: Wan Carmona MD;  Location: [facility];  Service: Orthopedics;  Laterality: Right;    TONSILLECTOMY           Current Facility-Administered Medications:     acetaminophen tablet 650 mg, 650 mg, Oral, Q4H PRN, Dimas Bone, NP    acetaminophen tablet 650 mg, 650 mg, Oral, Q6H PRN, Dimas Bone, JEYSON    albuterol-ipratropium 2.5 mg-0.5 mg/3 mL nebulizer solution 3 mL, 3 mL, Nebulization, Q4H PRN, Dimas Bone, NP    aluminum-magnesium hydroxide-simethicone 200-200-20 mg/5 mL suspension 30 mL, 30 mL, Oral, QID PRN, Dimas Bone, NP    ceFAZolin 2 g in D5W 50 mL IVPB (MB+), 2 g, Intravenous, Once, Beni Ta Jr., MD    dextrose 10% bolus 125 mL 125 mL, 12.5 g, Intravenous, PRN, Dimas Bone, NP    dextrose 10% bolus 250 mL 250 mL, 25 g, Intravenous, PRN, Dimas Bone, NP    electrolyte-S (ISOLYTE), , Intravenous, Continuous, Renan Bowling MD, Last Rate: 10 mL/hr at 07/24/24 1056, New Bag at 07/24/24 1056    glucagon (human recombinant) injection 1 mg, 1 mg, Intramuscular, PRN, Diams Bone, NP    glucose chewable tablet 16 g, 16 g, Oral, PRN, Dimas Bone, NP    glucose chewable tablet 24 g, 24 g, Oral, PRN, Dimas Bone, NP    LIDOcaine (PF) 10 mg/ml (1%) injection 10 mg, 1 mL, Intradermal, Once, Renan Bowling MD    lisinopriL tablet 30 mg, 30 mg, Oral, QHS, Dimas Bone, NP, 30 mg  at 07/23/24 2055    magnesium oxide tablet 800 mg, 800 mg, Oral, PRN, Dimas Bone, JEYSON    magnesium oxide tablet 800 mg, 800 mg, Oral, PRN, Dimas Bone, JEYSON    melatonin tablet 9 mg, 9 mg, Oral, Nightly PRN, Dimas Bone, NP, 9 mg at 07/23/24 2055    morphine injection 2 mg, 2 mg, Intravenous, Q4H PRN, Dimas Bone, NP, 2 mg at 07/23/24 1332    mupirocin 2 % ointment, , Nasal, BID, Beni Ta Jr., MD, 1 g at 07/24/24 0825    naloxone 0.4 mg/mL injection 0.02 mg, 0.02 mg, Intravenous, PRN, Dimas Bone, NP    ondansetron injection 4 mg, 4 mg, Intravenous, Q8H PRN, Dimas Bone, NP, 4 mg at 07/21/24 1858    oxyCODONE immediate release tablet 5 mg, 5 mg, Oral, Q6H PRN, Dimas Bone, NP, 5 mg at 07/23/24 2055    potassium bicarbonate disintegrating tablet 35 mEq, 35 mEq, Oral, PRN, Dimas Bone, NP    potassium bicarbonate disintegrating tablet 50 mEq, 50 mEq, Oral, PRN, Dimas Bone, NP    potassium bicarbonate disintegrating tablet 60 mEq, 60 mEq, Oral, PRN, Dimas Bone, NP    potassium, sodium phosphates 280-160-250 mg packet 2 packet, 2 packet, Oral, PRN, Dimas Bone, NP    potassium, sodium phosphates 280-160-250 mg packet 2 packet, 2 packet, Oral, PRN, Dimas Bone, NP    potassium, sodium phosphates 280-160-250 mg packet 2 packet, 2 packet, Oral, PRN, Dimas Bone, NP    simethicone chewable tablet 80 mg, 1 tablet, Oral, QID PRN, Dimas Bone, JEYSON    sodium chloride 0.9% flush 10 mL, 10 mL, Intravenous, Q8H PRN, Dimas Bone, NP    Facility-Administered Medications Ordered in Other Encounters:     fentaNYL 50 mcg/mL injection, , Intravenous, PRN, Javier Casanova RN, 50 mcg at 07/24/24 1115    Allergies as of 07/21/2024 - Reviewed 07/21/2024   Allergen Reaction Noted    Buspar [buspirone] Other (See Comments) 11/04/2021    Doxy  01/18/2012    Effexor [venlafaxine] Other (See Comments) 05/08/2014    Biaxin  [clarithromycin] Other (See Comments) 04/08/2013    Codeine Other (See Comments) 05/11/2015    Statins-hmg-coa reductase inhibitors  06/16/2017    Doxycycline Other (See Comments) 07/29/2013       Family History   Problem Relation Name Age of Onset    Hearing loss Son         Social History     Socioeconomic History    Marital status:    Tobacco Use    Smoking status: Never     Passive exposure: Never    Smokeless tobacco: Never   Substance and Sexual Activity    Alcohol use: Yes     Alcohol/week: 1.0 standard drink of alcohol     Types: 1 Glasses of wine per week     Comment: rarely    Drug use: No    Sexual activity: Not Currently     Social Determinants of Health     Financial Resource Strain: Low Risk  (1/29/2024)    Overall Financial Resource Strain (CARDIA)     Difficulty of Paying Living Expenses: Not hard at all   Food Insecurity: No Food Insecurity (1/29/2024)    Hunger Vital Sign     Worried About Running Out of Food in the Last Year: Never true     Ran Out of Food in the Last Year: Never true   Transportation Needs: No Transportation Needs (1/29/2024)    PRAPARE - Transportation     Lack of Transportation (Medical): No     Lack of Transportation (Non-Medical): No   Physical Activity: Sufficiently Active (1/29/2024)    Exercise Vital Sign     Days of Exercise per Week: 7 days     Minutes of Exercise per Session: 30 min   Stress: No Stress Concern Present (1/29/2024)    [employer] - Occupational Stress Questionnaire     Feeling of Stress : Not at all   Housing Stability: Low Risk  (1/29/2024)    Housing Stability Vital Sign     Unable to Pay for Housing in the Last Year: No     Number of Places Lived in the Last Year: 1     Unstable Housing in the Last Year: No         CC:  Left hip pain    HPI:  Patient is a 89-year-old female who recently sustained ground level fall in her residence.  She was recovering from a fairly recent right hip fracture and sustained a left  femoral neck fracture.  Admitted by hospitalist services.  I was consulted for further evaluation of the request of the family.    Review of Systems   Constitution: Negative for chills and fever.   HENT: Negative for headaches and blurry vision.   Cardiovascular: Negative for chest pain, irregular heartbeat, leg swelling and palpitations.   Respiratory: Negative for cough and shortness of breath.   Endocrine: Negative for polyuria.   Hematologic/Lymphatic: Negative for bleeding problem. Does not bruise/bleed easily.   Skin: Negative for poor wound healing and rash.   Musculoskeletal: Negative for joint pain. Negative for arthritis, joint swelling, muscle weakness and myalgias.   Gastrointestinal: Negative for abdominal pain, heartburn, melena, nausea and vomiting.   Genitourinary: Negative for bladder incontinence and dysuria.   Neurological: Negative for numbness.   Psychiatric/Behavioral: Negative for depression. The patient is not nervous/anxious.     PE:  Temp:  [97.7 °F (36.5 °C)-99.3 °F (37.4 °C)] 98.2 °F (36.8 °C)  Pulse:  [] 93  Resp:  [16-18] 18  SpO2:  [95 %-96 %] 95 %  BP: (132-158)/(63-71) 151/69    Constitutional:   Patient is alert  and oriented in no acute distress  HEENT:  normocephalic atraumatic; PERRL EOMI  Neck:  Supple without adenopathy  Cardiovascular:  Normal rate and rhythm  Pulmonary:  Normal respiratory effort normal chest wall expansion  Abdominal:  Nonprotuberant nondistended  Musculoskeletal:  Patient has mild tenderness over the left greater trochanter  Limited range of motion of her hip secondary to pain.  Moves foot toes and ankles without pain she has brisk capillary refill of her digits.  Neurological:  No focal defect; cranial nerves 2-12 grossly intact  Psychiatric/behavioral:  Mood and behavior normal    Xrays:  Radiographs left hip consistent with a moderately displaced left femoral neck fracture    Labs:    Recent Results (from the past 24 hour(s))   Comprehensive  Metabolic Panel (CMP)    Collection Time: 07/24/24  4:17 AM   Result Value Ref Range    Sodium 136 136 - 145 mmol/L    Potassium 4.1 3.5 - 5.1 mmol/L    Chloride 103 95 - 110 mmol/L    CO2 24 23 - 29 mmol/L    Glucose 105 70 - 110 mg/dL    BUN 16 10 - 30 mg/dL    Creatinine 0.8 0.5 - 1.4 mg/dL    Calcium 8.8 8.7 - 10.5 mg/dL    Total Protein 5.7 (L) 6.0 - 8.4 g/dL    Albumin 2.8 (L) 3.5 - 5.2 g/dL    Total Bilirubin 0.7 0.1 - 1.0 mg/dL    Alkaline Phosphatase 126 55 - 135 U/L    AST 47 (H) 10 - 40 U/L    ALT 69 (H) 10 - 44 U/L    eGFR >60 >60 mL/min/1.73 m^2    Anion Gap 9 8 - 16 mmol/L   Magnesium    Collection Time: 07/24/24  4:17 AM   Result Value Ref Range    Magnesium 1.8 1.6 - 2.6 mg/dL   Phosphorus    Collection Time: 07/24/24  4:17 AM   Result Value Ref Range    Phosphorus 2.8 2.7 - 4.5 mg/dL   CBC with Automated Differential    Collection Time: 07/24/24  4:17 AM   Result Value Ref Range    WBC 7.65 3.90 - 12.70 K/uL    RBC 3.48 (L) 4.00 - 5.40 M/uL    Hemoglobin 10.2 (L) 12.0 - 16.0 g/dL    Hematocrit 31.7 (L) 37.0 - 48.5 %    MCV 91 82 - 98 fL    MCH 29.3 27.0 - 31.0 pg    MCHC 32.2 32.0 - 36.0 g/dL    RDW 14.3 11.5 - 14.5 %    Platelets 106 (L) 150 - 450 K/uL    MPV 10.2 9.2 - 12.9 fL    Immature Granulocytes 0.3 0.0 - 0.5 %    Gran # (ANC) 5.3 1.8 - 7.7 K/uL    Immature Grans (Abs) 0.02 0.00 - 0.04 K/uL    Lymph # 1.1 1.0 - 4.8 K/uL    Mono # 1.0 0.3 - 1.0 K/uL    Eos # 0.2 0.0 - 0.5 K/uL    Baso # 0.02 0.00 - 0.20 K/uL    nRBC 0 0 /100 WBC    Gran % 68.7 38.0 - 73.0 %    Lymph % 14.2 (L) 18.0 - 48.0 %    Mono % 13.6 4.0 - 15.0 %    Eosinophil % 2.9 0.0 - 8.0 %    Basophil % 0.3 0.0 - 1.9 %    Differential Method Automated    Type & Screen    Collection Time: 07/24/24  9:48 AM   Result Value Ref Range    Group & Rh O POS     Indirect [place] NEG     Specimen Outdate 07/27/2024 23:59        A:  Left femoral neck fracture      P:  I have discussed medical condition treatment options with her and her family  at length.  I have discussed the indications f alternatives and potential complications of the planned left hip hemiarthroplasty to include but not limited to bleeding infection damage to neurovascular structures leg length discrepancy hardware or implant failure joint stiffness or instability/dislocation malunion nonunion ongoing pain and need for further surgery.  Patient expresses understanding and agrees to proceed.  We will proceed when medically an anesthetic was cleared.  Maintain her NPO.

## 2024-07-24 NOTE — ANESTHESIA POSTPROCEDURE EVALUATION
Anesthesia Post Evaluation    Patient: Arleen Zarate    Procedure(s) Performed: Procedure(s) (LRB):  HEMIARTHROPLASTY, HIP (Left)    Final Anesthesia Type: general      Patient location during evaluation: PACU  Patient participation: Yes- Able to Participate  Level of consciousness: awake and alert and oriented  Post-procedure vital signs: reviewed and stable  Pain management: adequate  Airway patency: patent  STACY mitigation strategies: Multimodal analgesia, Extubation while patient is awake and Use of major conduction anesthesia (spinal/epidural) or peripheral nerve block  PONV status at discharge: No PONV  Anesthetic complications: no      Cardiovascular status: blood pressure returned to baseline  Respiratory status: unassisted, spontaneous ventilation and room air  Hydration status: euvolemic  Follow-up not needed.              Vitals Value Taken Time   /67 07/24/24 1458   Temp 36.7 °C (98.1 °F) 07/24/24 1458   Pulse 85 07/24/24 1458   Resp 16 07/24/24 1458   SpO2 94 % 07/24/24 1458         Event Time   Out of Recovery 07/24/2024 14:45:00         Pain/Amee Score: Pain Rating Prior to Med Admin: 6 (7/24/2024  2:24 PM)  Pain Rating Post Med Admin: 0 (Patient appears to be resting comfortably at this time. Eyes are closed with easy/unlabored respirations noted. There are no evident s/s of acute distress or pain evident at this time.) (7/23/2024  9:55 PM)  Amee Score: 9 (7/24/2024  2:15 PM)

## 2024-07-24 NOTE — CARE UPDATE
07/23/24 2007   Patient Assessment/Suction   Level of Consciousness (AVPU) alert   Respiratory Effort Unlabored   Expansion/Accessory Muscles/Retractions no use of accessory muscles;no retractions   PRE-TX-O2   Device (Oxygen Therapy) nasal cannula   $ Is the patient on Low Flow Oxygen? Yes   Flow (L/min) (Oxygen Therapy) 1   SpO2 96 %   Pulse 98   Resp 17   Aerosol Therapy   $ Aerosol Therapy Charges PRN treatment not required   Incentive Spirometer   $ Incentive Spirometer Charges done with encouragement;proper technique demonstrated   Administration (IS) proper technique demonstrated   Number of Repetitions (IS) 10   Level Incentive Spirometer (mL) 1500   Patient Tolerance (IS) good;no adverse signs/symptoms present

## 2024-07-24 NOTE — ANESTHESIA PROCEDURE NOTES
JENNYFER SARKAR    Patient location during procedure: pre-op   Block not for primary anesthetic.  Reason for block: at surgeon's request and post-op pain management   Post-op Pain Location: left hip   Start time: 7/24/2024 11:21 AM  Timeout: 7/24/2024 11:20 AM   End time: 7/24/2024 11:24 AM    Staffing  Authorizing Provider: Nicola Hendricks MD  Performing Provider: Nicola Hendricks MD    Staffing  Other anesthesia staff: Beni Meraz Jr., MD  Performed by: Nicola Hendricks MD  Authorized by: Nicola Hendricks MD    Preanesthetic Checklist  Completed: patient identified, IV checked, site marked, risks and benefits discussed, surgical consent, monitors and equipment checked, pre-op evaluation and timeout performed  Peripheral Block  Patient position: supine  Prep: ChloraPrep  Patient monitoring: heart rate, continuous pulse ox and cardiac monitor  Block type: PENG  Laterality: left  Injection technique: single shot  Needle  Needle type: Stimuplex   Needle gauge: 21 G  Needle length: 4 in  Needle localization: ultrasound guidance   -ultrasound image captured on disc.  Assessment  Injection assessment: negative aspiration and negative parasthesia  Paresthesia pain: none  Heart rate change: no  Slow fractionated injection: yes  Pain Tolerance: comfortable throughout block and no complaints  Medications:    Medications: bupivacaine (pf) (MARCAINE) injection 0.5% - Perineural   20 mL - 7/24/2024 11:24:00 AM

## 2024-07-25 PROBLEM — R40.4 UNRESPONSIVE EPISODE: Status: ACTIVE | Noted: 2024-07-25

## 2024-07-25 PROBLEM — G93.40 ACUTE ENCEPHALOPATHY: Status: ACTIVE | Noted: 2024-07-25

## 2024-07-25 LAB
ALBUMIN SERPL BCP-MCNC: 2.5 G/DL (ref 3.5–5.2)
ALBUMIN SERPL BCP-MCNC: 2.7 G/DL (ref 3.5–5.2)
ALLENS TEST: ABNORMAL
ALP SERPL-CCNC: 104 U/L (ref 55–135)
ALP SERPL-CCNC: 117 U/L (ref 55–135)
ALT SERPL W/O P-5'-P-CCNC: 28 U/L (ref 10–44)
ALT SERPL W/O P-5'-P-CCNC: 39 U/L (ref 10–44)
ANION GAP SERPL CALC-SCNC: 10 MMOL/L (ref 8–16)
ANION GAP SERPL CALC-SCNC: 8 MMOL/L (ref 8–16)
AST SERPL-CCNC: 30 U/L (ref 10–40)
AST SERPL-CCNC: 33 U/L (ref 10–40)
BASOPHILS # BLD AUTO: 0.01 K/UL (ref 0–0.2)
BASOPHILS # BLD AUTO: 0.02 K/UL (ref 0–0.2)
BASOPHILS NFR BLD: 0.1 % (ref 0–1.9)
BASOPHILS NFR BLD: 0.2 % (ref 0–1.9)
BILIRUB SERPL-MCNC: 0.4 MG/DL (ref 0.1–1)
BILIRUB SERPL-MCNC: 0.4 MG/DL (ref 0.1–1)
BUN SERPL-MCNC: 21 MG/DL (ref 10–30)
BUN SERPL-MCNC: 24 MG/DL (ref 10–30)
CALCIUM SERPL-MCNC: 8.2 MG/DL (ref 8.7–10.5)
CALCIUM SERPL-MCNC: 8.7 MG/DL (ref 8.7–10.5)
CHLORIDE SERPL-SCNC: 102 MMOL/L (ref 95–110)
CHLORIDE SERPL-SCNC: 103 MMOL/L (ref 95–110)
CO2 SERPL-SCNC: 24 MMOL/L (ref 23–29)
CO2 SERPL-SCNC: 25 MMOL/L (ref 23–29)
CREAT SERPL-MCNC: 0.9 MG/DL (ref 0.5–1.4)
CREAT SERPL-MCNC: 1 MG/DL (ref 0.5–1.4)
DELSYS: ABNORMAL
DIFFERENTIAL METHOD BLD: ABNORMAL
DIFFERENTIAL METHOD BLD: ABNORMAL
EOSINOPHIL # BLD AUTO: 0 K/UL (ref 0–0.5)
EOSINOPHIL # BLD AUTO: 0 K/UL (ref 0–0.5)
EOSINOPHIL NFR BLD: 0 % (ref 0–8)
EOSINOPHIL NFR BLD: 0.3 % (ref 0–8)
ERYTHROCYTE [DISTWIDTH] IN BLOOD BY AUTOMATED COUNT: 14 % (ref 11.5–14.5)
ERYTHROCYTE [DISTWIDTH] IN BLOOD BY AUTOMATED COUNT: 14.1 % (ref 11.5–14.5)
EST. GFR  (NO RACE VARIABLE): 51 ML/MIN/1.73 M^2
EST. GFR  (NO RACE VARIABLE): 58 ML/MIN/1.73 M^2
FIO2: 32
FLOW: 3
FOLATE SERPL-MCNC: 15 NG/ML (ref 4–24)
GLUCOSE SERPL-MCNC: 120 MG/DL (ref 70–110)
GLUCOSE SERPL-MCNC: 167 MG/DL (ref 70–110)
HCO3 UR-SCNC: 24.1 MMOL/L (ref 24–28)
HCT VFR BLD AUTO: 28.7 % (ref 37–48.5)
HCT VFR BLD AUTO: 30.1 % (ref 37–48.5)
HGB BLD-MCNC: 9.2 G/DL (ref 12–16)
HGB BLD-MCNC: 9.7 G/DL (ref 12–16)
IMM GRANULOCYTES # BLD AUTO: 0.05 K/UL (ref 0–0.04)
IMM GRANULOCYTES # BLD AUTO: 0.05 K/UL (ref 0–0.04)
IMM GRANULOCYTES NFR BLD AUTO: 0.4 % (ref 0–0.5)
IMM GRANULOCYTES NFR BLD AUTO: 0.4 % (ref 0–0.5)
LYMPHOCYTES # BLD AUTO: 0.6 K/UL (ref 1–4.8)
LYMPHOCYTES # BLD AUTO: 1.5 K/UL (ref 1–4.8)
LYMPHOCYTES NFR BLD: 12.3 % (ref 18–48)
LYMPHOCYTES NFR BLD: 4.9 % (ref 18–48)
MAGNESIUM SERPL-MCNC: 1.7 MG/DL (ref 1.6–2.6)
MAGNESIUM SERPL-MCNC: 1.9 MG/DL (ref 1.6–2.6)
MCH RBC QN AUTO: 29.8 PG (ref 27–31)
MCH RBC QN AUTO: 30.3 PG (ref 27–31)
MCHC RBC AUTO-ENTMCNC: 32.1 G/DL (ref 32–36)
MCHC RBC AUTO-ENTMCNC: 32.2 G/DL (ref 32–36)
MCV RBC AUTO: 92 FL (ref 82–98)
MCV RBC AUTO: 94 FL (ref 82–98)
MODE: ABNORMAL
MONOCYTES # BLD AUTO: 1.7 K/UL (ref 0.3–1)
MONOCYTES # BLD AUTO: 1.8 K/UL (ref 0.3–1)
MONOCYTES NFR BLD: 13.5 % (ref 4–15)
MONOCYTES NFR BLD: 15.3 % (ref 4–15)
NEUTROPHILS # BLD AUTO: 9 K/UL (ref 1.8–7.7)
NEUTROPHILS # BLD AUTO: 9.2 K/UL (ref 1.8–7.7)
NEUTROPHILS NFR BLD: 73.3 % (ref 38–73)
NEUTROPHILS NFR BLD: 79.3 % (ref 38–73)
NRBC BLD-RTO: 0 /100 WBC
NRBC BLD-RTO: 0 /100 WBC
PCO2 BLDA: 37.2 MMHG (ref 35–45)
PH SMN: 7.42 [PH] (ref 7.35–7.45)
PHOSPHATE SERPL-MCNC: 2.1 MG/DL (ref 2.7–4.5)
PHOSPHATE SERPL-MCNC: 2.5 MG/DL (ref 2.7–4.5)
PLATELET # BLD AUTO: 131 K/UL (ref 150–450)
PLATELET # BLD AUTO: 150 K/UL (ref 150–450)
PMV BLD AUTO: 10.7 FL (ref 9.2–12.9)
PMV BLD AUTO: 11.1 FL (ref 9.2–12.9)
PO2 BLDA: 75 MMHG (ref 80–100)
POC BE: 0 MMOL/L
POC SATURATED O2: 95 % (ref 95–100)
POC TCO2: 25 MMOL/L (ref 23–27)
POCT GLUCOSE: 175 MG/DL (ref 70–110)
POTASSIUM SERPL-SCNC: 4 MMOL/L (ref 3.5–5.1)
POTASSIUM SERPL-SCNC: 4.8 MMOL/L (ref 3.5–5.1)
PROT SERPL-MCNC: 5.3 G/DL (ref 6–8.4)
PROT SERPL-MCNC: 5.7 G/DL (ref 6–8.4)
RBC # BLD AUTO: 3.04 M/UL (ref 4–5.4)
RBC # BLD AUTO: 3.26 M/UL (ref 4–5.4)
SAMPLE: ABNORMAL
SITE: ABNORMAL
SODIUM SERPL-SCNC: 135 MMOL/L (ref 136–145)
SODIUM SERPL-SCNC: 137 MMOL/L (ref 136–145)
TSH SERPL DL<=0.005 MIU/L-ACNC: 0.69 UIU/ML (ref 0.4–4)
VIT B12 SERPL-MCNC: 334 PG/ML (ref 210–950)
WBC # BLD AUTO: 11.65 K/UL (ref 3.9–12.7)
WBC # BLD AUTO: 12.32 K/UL (ref 3.9–12.7)

## 2024-07-25 PROCEDURE — 27000221 HC OXYGEN, UP TO 24 HOURS

## 2024-07-25 PROCEDURE — 97110 THERAPEUTIC EXERCISES: CPT

## 2024-07-25 PROCEDURE — 85025 COMPLETE CBC W/AUTO DIFF WBC: CPT | Performed by: NURSE PRACTITIONER

## 2024-07-25 PROCEDURE — 82803 BLOOD GASES ANY COMBINATION: CPT

## 2024-07-25 PROCEDURE — 82746 ASSAY OF FOLIC ACID SERUM: CPT | Performed by: INTERNAL MEDICINE

## 2024-07-25 PROCEDURE — 84100 ASSAY OF PHOSPHORUS: CPT | Performed by: NURSE PRACTITIONER

## 2024-07-25 PROCEDURE — 95816 EEG AWAKE AND DROWSY: CPT | Mod: 26,,, | Performed by: PSYCHIATRY & NEUROLOGY

## 2024-07-25 PROCEDURE — 25000003 PHARM REV CODE 250: Performed by: NURSE PRACTITIONER

## 2024-07-25 PROCEDURE — 11000001 HC ACUTE MED/SURG PRIVATE ROOM

## 2024-07-25 PROCEDURE — 36600 WITHDRAWAL OF ARTERIAL BLOOD: CPT

## 2024-07-25 PROCEDURE — 97162 PT EVAL MOD COMPLEX 30 MIN: CPT

## 2024-07-25 PROCEDURE — 25000003 PHARM REV CODE 250: Performed by: INTERNAL MEDICINE

## 2024-07-25 PROCEDURE — 25000003 PHARM REV CODE 250: Performed by: ORTHOPAEDIC SURGERY

## 2024-07-25 PROCEDURE — 36415 COLL VENOUS BLD VENIPUNCTURE: CPT | Performed by: NURSE PRACTITIONER

## 2024-07-25 PROCEDURE — 84100 ASSAY OF PHOSPHORUS: CPT | Mod: 91 | Performed by: INTERNAL MEDICINE

## 2024-07-25 PROCEDURE — 63600175 PHARM REV CODE 636 W HCPCS: Performed by: NURSE PRACTITIONER

## 2024-07-25 PROCEDURE — 83735 ASSAY OF MAGNESIUM: CPT | Mod: 91 | Performed by: INTERNAL MEDICINE

## 2024-07-25 PROCEDURE — 80053 COMPREHEN METABOLIC PANEL: CPT | Performed by: NURSE PRACTITIONER

## 2024-07-25 PROCEDURE — 82607 VITAMIN B-12: CPT | Performed by: INTERNAL MEDICINE

## 2024-07-25 PROCEDURE — 85025 COMPLETE CBC W/AUTO DIFF WBC: CPT | Mod: 91 | Performed by: INTERNAL MEDICINE

## 2024-07-25 PROCEDURE — 63600175 PHARM REV CODE 636 W HCPCS: Performed by: ORTHOPAEDIC SURGERY

## 2024-07-25 PROCEDURE — 84443 ASSAY THYROID STIM HORMONE: CPT | Performed by: INTERNAL MEDICINE

## 2024-07-25 PROCEDURE — 95816 EEG AWAKE AND DROWSY: CPT

## 2024-07-25 PROCEDURE — 99900035 HC TECH TIME PER 15 MIN (STAT)

## 2024-07-25 PROCEDURE — 94761 N-INVAS EAR/PLS OXIMETRY MLT: CPT

## 2024-07-25 PROCEDURE — 36415 COLL VENOUS BLD VENIPUNCTURE: CPT | Performed by: INTERNAL MEDICINE

## 2024-07-25 PROCEDURE — 83735 ASSAY OF MAGNESIUM: CPT | Performed by: NURSE PRACTITIONER

## 2024-07-25 PROCEDURE — 94799 UNLISTED PULMONARY SVC/PX: CPT | Mod: XB

## 2024-07-25 PROCEDURE — 80053 COMPREHEN METABOLIC PANEL: CPT | Mod: 91 | Performed by: INTERNAL MEDICINE

## 2024-07-25 RX ORDER — CALCIUM CARBONATE 200(500)MG
500 TABLET,CHEWABLE ORAL 2 TIMES DAILY PRN
Status: DISCONTINUED | OUTPATIENT
Start: 2024-07-25 | End: 2024-07-26 | Stop reason: HOSPADM

## 2024-07-25 RX ADMIN — SIMETHICONE 80 MG: 80 TABLET, CHEWABLE ORAL at 05:07

## 2024-07-25 RX ADMIN — POTASSIUM & SODIUM PHOSPHATES POWDER PACK 280-160-250 MG 2 PACKET: 280-160-250 PACK at 11:07

## 2024-07-25 RX ADMIN — ACETAMINOPHEN 650 MG: 325 TABLET ORAL at 07:07

## 2024-07-25 RX ADMIN — CEFAZOLIN 2 G: 2 INJECTION, POWDER, FOR SOLUTION INTRAMUSCULAR; INTRAVENOUS at 04:07

## 2024-07-25 RX ADMIN — MELATONIN TAB 3 MG 9 MG: 3 TAB at 09:07

## 2024-07-25 RX ADMIN — OXYCODONE 5 MG: 5 TABLET ORAL at 10:07

## 2024-07-25 RX ADMIN — MUPIROCIN 1 G: 20 OINTMENT TOPICAL at 09:07

## 2024-07-25 RX ADMIN — MUPIROCIN 1 G: 20 OINTMENT TOPICAL at 11:07

## 2024-07-25 RX ADMIN — CALCIUM CARBONATE (ANTACID) CHEW TAB 500 MG 500 MG: 500 CHEW TAB at 11:07

## 2024-07-25 RX ADMIN — LISINOPRIL 30 MG: 10 TABLET ORAL at 09:07

## 2024-07-25 RX ADMIN — MORPHINE SULFATE 2 MG: 2 INJECTION, SOLUTION INTRAMUSCULAR; INTRAVENOUS at 07:07

## 2024-07-25 NOTE — ASSESSMENT & PLAN NOTE
Patient had acute encephalopathy this morning.  We will do a CT scan was negative.  ABG ordered.  Repeat labs.  EEG ordered.  Patient was unarousable to sternal rub for some time, came to when I was in the room.  Rapid response was called.  Following testing.

## 2024-07-25 NOTE — CODE/ RAPID DOCUMENTATION
"0905- Pt found unresponsive per Dr. Ta. Pt breathing but not responding to sternal rub. Pt diaphoretic VSS stable. O2 3 liters placed. RT Lilibeth Newton RN, Rodrigo Sweet Sup,  at bedside. Sx site examined no bleeding noted to site.   0906 pt placed flat, started bolus, pt now responding- "asking what is happening". Responding to questions appropriately. Labs drawn. See labs for update    0925- Pt tx for stat head CT  0938- Pt back to floor. Lilibeth LOCK resource at bedside. VSS stable. Pt alert and oriented.       "

## 2024-07-25 NOTE — SUBJECTIVE & OBJECTIVE
Interval History:  Patient had no acute events overnight.  When I will arrived she was unresponsive.  Rapid response called.  Vitals blood sugar were normal.  Order repeat labs.  CT scan was ordered that was negative for any acute process.  Patient eventually woke up.  Unknown etiology for this.  Pending ABG.    Review of Systems  Objective:     Vital Signs (Most Recent):  Temp: 98.5 °F (36.9 °C) (07/25/24 0757)  Pulse: 87 (07/25/24 0940)  Resp: (!) 24 (pt speaking constantly) (07/25/24 0940)  BP: (!) 128/59 (07/25/24 0940)  SpO2: 98 % (07/25/24 0940) Vital Signs (24h Range):  Temp:  [97.9 °F (36.6 °C)-98.8 °F (37.1 °C)] 98.5 °F (36.9 °C)  Pulse:  [] 87  Resp:  [10-42] 24  SpO2:  [91 %-98 %] 98 %  BP: (104-178)/(53-92) 128/59     Weight: 66.1 kg (145 lb 11.6 oz)  Body mass index is 29.43 kg/m².    Intake/Output Summary (Last 24 hours) at 7/25/2024 1028  Last data filed at 7/25/2024 0612  Gross per 24 hour   Intake 2442.35 ml   Output 1265 ml   Net 1177.35 ml         Physical Exam  Vitals reviewed.   Constitutional:       General: She is not in acute distress.     Appearance: Normal appearance.   HENT:      Head: Normocephalic and atraumatic.      Right Ear: External ear normal.      Left Ear: External ear normal.      Nose: Nose normal.      Mouth/Throat:      Mouth: Mucous membranes are moist.      Pharynx: Oropharynx is clear.   Eyes:      Extraocular Movements: Extraocular movements intact.      Conjunctiva/sclera: Conjunctivae normal.   Cardiovascular:      Rate and Rhythm: Normal rate and regular rhythm.      Pulses: Normal pulses.      Heart sounds: Normal heart sounds. No murmur heard.     No gallop.   Pulmonary:      Effort: Pulmonary effort is normal. No respiratory distress.      Breath sounds: Normal breath sounds. No wheezing or rales.   Abdominal:      General: Abdomen is flat. There is no distension.      Palpations: Abdomen is soft.      Tenderness: There is no abdominal tenderness. There is no  guarding.   Musculoskeletal:         General: No swelling. Normal range of motion.      Cervical back: Normal range of motion and neck supple.   Skin:     General: Skin is warm and dry.   Neurological:      General: No focal deficit present.      Mental Status: She is alert and oriented to person, place, and time.             Significant Labs: All pertinent labs within the past 24 hours have been reviewed.    Significant Imaging: I have reviewed all pertinent imaging results/findings within the past 24 hours.

## 2024-07-25 NOTE — PROCEDURES
Date of service  07/25/2024    Introduction  Electroencephalographic (EEG) recording is performed with electrodes placed according to the International 10-20 placement system. Thirty two (32) channels of digital signal (sampling rate of 512/sec) including T1 and T2 was simultaneously recorded from the scalp and may include EKG, EMG, and/or eye monitors. Recording band pass was 0.1 to 512 Hz. Digital video recording of the patient is simultaneously recorded with the EEG. The patient is instructed to report clinical symptoms which may occur during the recording session. EEG and video recording is stored and archived in digital format. Activation procedures which include photic stimulation, hyperventilation and instructing patients to perform simple tasks are done in selected patients.    The EEG is displayed on a monitor screen and can be reviewed using different montages. Computer assisted analysis is employed to detect spike and electrographic seizure activity. The entire record is submitted for computer analysis. The entire recording is visually reviewed and, the times identified by computer analysis as being spikes or seizures are reviewed again.     Compressed spectral analysis (CSA) is also performed on the activity recorded from each individual channel. This is displayed as a power display of frequencies from 0 to 30 Hz over time. The CSA is reviewed looking for asymmetries in power between homologous areas of the scalp and then compared with the original EEG recording.     Findings  The short-term video EEG recording during wakefulness contains 9 Hz alpha activity over the posterior head regions. There is mild diffuse slowing in the range of 6-7 Hz.      No abnormal activity occurred during photic stimulation. Hyperventilation was not performed.     During the recording, the patient became drowsy but did not fall asleep.     The EKG channel showed irregular rhythm.    Interpretation  The short-term video EEG  shows mild diffuse nonspecific slowing of the background.  No potentially epileptiform activity was seen.

## 2024-07-25 NOTE — PT/OT/SLP EVAL
Physical Therapy Evaluation    Patient Name:  Arleen Zarate   MRN:  [id number]    Recommendations:     Discharge Recommendations: High Intensity Therapy   Discharge Equipment Recommendations: none   Barriers to discharge: Decreased caregiver support    Assessment:     Arleen Zarate is a 89 y.o. female admitted with a medical diagnosis of Closed fracture of neck of left femur.  She presents with the following impairments/functional limitations: weakness, impaired endurance, impaired functional mobility, gait instability, impaired balance, decreased lower extremity function, pain, decreased ROM, impaired cardiopulmonary response to activity, orthopedic precautions .    Pt seen supine in bed, awake, alert and agreeable to PT. Pt had an episode of uresponsiveness earlier and resolved. CT brain negative. Pt stated that she has not been eating. Pt seen for thera ex in supine with care handling LH post hemiarthroplasty.  Pt able to participate with isometric exercises GS/QS. EOB sitting max assist x2 with care handling LLE. Pt requiring extra time and encouragement. Pt able to stand briefly for linen change max assist x2. Back to supine due to fatigue.  Pt was previously functional and recovered well post R hemiarthroplasty   03-. Pt to benefit from high intensity therapies.    Rehab Prognosis: Fair; patient would benefit from acute skilled PT services to address these deficits and reach maximum level of function.    Recent Surgery: Procedure(s) (LRB):  HEMIARTHROPLASTY, HIP (Left) 1 Day Post-Op    Plan:     During this hospitalization, patient to be seen daily to address the identified rehab impairments via gait training, therapeutic activities, therapeutic exercises and progress toward the following goals:    Plan of Care Expires:  08/30/24    Subjective   Stated had an episode earlier- might be because I haven't been eating well  Chief Complaint: pain LH, weakness  Patient/Family Comments/goals: get  well  Pain/Comfort:  Pain Rating 1:  (not rated)  Location - Side 1: Left  Location 1: hip  Pain Addressed 1: Pre-medicate for activity, Reposition, Distraction, Cessation of Activity    Patients cultural, spiritual, Mormon conflicts given the current situation:      Living Environment:  Home with daughter in law and her   Prior to admission, patients level of function was ambulatory and functional.  Equipment used at home: walker, rolling.  DME owned (not currently used): none.  Upon discharge, patient will have assistance from family.    Objective:     Communicated with nurse Araujo prior to session.  Patient found HOB elevated with peripheral IV, SCD, bed alarm, oxygen, telemetry  upon PT entry to room.    General Precautions: Standard, fall  Orthopedic Precautions:LLE weight bearing as tolerated, LLE posterior precautions   Braces: N/A  Respiratory Status: Nasal cannula, flow 3 L/min    Exams:  Postural Exam:  Patient presented with the following abnormalities:    -       Rounded shoulders  -       Forward head  -       BMI 29.43  RLE ROM: WFL  RLE Strength: Deficits: 3/5  LLE ROM: Deficits: within L posterior hip precautions  LLE Strength: Deficits: 3-/5    Functional Mobility:  Bed Mobility:     Scooting: maximal assistance  Supine to Sit: maximal assistance and of 2 persons  Sit to Supine: maximal assistance and of 2 persons  Transfers:     Sit to Stand:  maximal assistance with rolling walker  Gait: stood only for ~10 seconds      AM-PAC 6 CLICK MOBILITY  Total Score:8       Treatment & Education:  Patient was educated on the importance of OOB activity and functional mobility to negate negative effects of prolonged bed rest during hospitalization, safe transfers and ambulation, and D/C planning   Thera ex with AP,QS/GS,gentle heel slides  EOB sitting assist x2 and brief standing    Patient left HOB elevated with all lines intact, call button in reach, bed alarm on, and nurse Van  notified.    GOALS:   Multidisciplinary Problems       Physical Therapy Goals          Problem: Physical Therapy    Goal Priority Disciplines Outcome Goal Variances Interventions   Physical Therapy Goal     PT, PT/OT Progressing     Description: Goals to be met by: 2024     Patient will increase functional independence with mobility by performin. Supine to sit with MInimal Assistance  2. Sit to stand transfer with Minimal Assistance  3. Bed to chair transfer with Minimal Assistance using Rolling Walker  4. Gait  x 20 feet with Minimal Assistance using Rolling Walker.   5. Lower extremity exercise program x20 reps    LLE hemiarthroplasty with WBAT and posterior hip precautions                         History:     Past Medical History:   Diagnosis Date    Acute encephalopathy 2024    Anemia 3/1/2024    Anxiety     Arthritis     fingers and back    Depression     Disorder of kidney and ureter     Foot pain, left 2013    GERD (gastroesophageal reflux disease)     Hiatal hernia     Hyperlipidemia     Hypertension     Reflux     on meds    Tendon tear 2013    Tendonitis 2013       Past Surgical History:   Procedure Laterality Date    CHOLECYSTECTOMY      EYE SURGERY Bilateral     cataracts and implants    HEMIARTHROPLASTY OF HIP Right 3/1/2024    Procedure: HEMIARTHROPLASTY, HIP;  Surgeon: Wan Carmona MD;  Location: [facility];  Service: Orthopedics;  Laterality: Right;    HEMIARTHROPLASTY OF HIP Left 2024    Procedure: HEMIARTHROPLASTY, HIP;  Surgeon: Wan Carmona MD;  Location: [facility];  Service: Orthopedics;  Laterality: Left;    TONSILLECTOMY         Time Tracking:     PT Received On: 24  PT Start Time: 958     PT Stop Time: 1025  PT Total Time (min): 27 min     Billable Minutes: Evaluation 10 and Therapeutic Exercise 17      2024

## 2024-07-25 NOTE — NURSING
"See RRT documentation.  Called to room 322 -pt sitting in high fowlers position, NC in use 4L/min. Informed that telemetry shows that pt is in SR. Respirations even and unlabored. Skin flushed, diaphoretic to touch. Dr Ta at bedside directing care- informed that pt was unresponsive, even to sternal rub. Also informed that pt had recently finished breakfast, mouth checked for residual- no food present in pt's mouth. Pt placed flat, IV site to right posterior forearm with IV fluids infusing. Surgical site left lateral hip noted to have elastaplast tape, intact, free of drainage, and occlusive over surgical site. Left thigh without noticeable swelling to palpation. After several seconds, pt opening her eyes, looking around and speaking coherently to bedside doctor and nurses. Pt taken for stat CT head w/out contrast with tele monitor and portable O2 in use- accompanied by RNS x2, Resp therapist. Pt with nicholson cath- noted to have clear yellow urine in  bag.   Pt no longer diaphoretic on arrival to CT scan. Noted to have bleeding from IV site right anterior forearm- discontinued. New site started to right upper forearm by YASMIN Shaw RN, labs collected. ABGs performed. CT head completed, returned to room 322.   From the time pt opened her eyes initially after RRT called, pt continued to speak and move upper extremities with purpose and strength, following commands. Able to correctly state her name and , able to accurately recall events leading to her admission- speech clear and coherent. Pt c/o pain with movement -indicating her "cut" side (left).   "

## 2024-07-25 NOTE — PLAN OF CARE
Problem: Infection  Goal: Absence of Infection Signs and Symptoms  Outcome: Progressing     Problem: Adult Inpatient Plan of Care  Goal: Plan of Care Review  Outcome: Progressing  Goal: Patient-Specific Goal (Individualized)  Outcome: Progressing  Goal: Optimal Comfort and Wellbeing  Outcome: Progressing     Problem: Wound  Goal: Optimal Functional Ability  Outcome: Progressing  Goal: Absence of Infection Signs and Symptoms  Outcome: Progressing  Goal: Optimal Pain Control and Function  Outcome: Progressing  Goal: Skin Health and Integrity  Outcome: Progressing     Problem: Fall Injury Risk  Goal: Absence of Fall and Fall-Related Injury  Outcome: Progressing     Problem: Skin Injury Risk Increased  Goal: Skin Health and Integrity  Outcome: Progressing     Problem: Pain Acute  Goal: Optimal Pain Control and Function  Outcome: Progressing     Problem: Hip Fracture Medical Management  Goal: Absence of Bleeding  Outcome: Progressing  Goal: Absence of Embolism  Outcome: Progressing  Goal: Fracture Stability  Outcome: Progressing  Goal: Pain Control and Function  Outcome: Progressing     Problem: Hip Arthroplasty  Goal: Optimal Coping  Outcome: Progressing  Goal: Absence of Bleeding  Outcome: Progressing  Goal: Optimal Functional Ability  Outcome: Progressing  Goal: Absence of Infection Signs and Symptoms  Outcome: Progressing  Goal: Intact Neurovascular Status  Outcome: Progressing  Goal: Acceptable Pain Control  Outcome: Progressing  Goal: Effective Oxygenation and Ventilation  Outcome: Progressing

## 2024-07-25 NOTE — PLAN OF CARE
Pt is under review @ [facility]       07/25/24 1542   Post-Acute Status   Post-Acute Authorization Placement   Post-Acute Placement Status Pending post-acute provider review/more information requested

## 2024-07-25 NOTE — PROGRESS NOTES
[facility] Medicine  Progress Note    Patient Name: Arleen Zarate  MRN: [id number]  Patient Class: IP- Inpatient   Admission Date: 7/21/2024  Length of Stay: 4 days  Attending Physician: Beni Ta Jr., MD  Primary Care Provider: Perry Jorge MD        Subjective:     Principal Problem:Closed fracture of neck of left femur        HPI:  Arleen Zarate is a 89-year-old female who presents emergency room complaining of left hip pain.  She sustained a mechanical fall at her residence.  She reports frequent falls.  She fell several months ago and sustained a right hip fracture which required surgical repair.  Pain is worse with any movement of the left leg.  No alleviating factors.  No recent sick contacts or travel.  No fever or chills.  Previous medical history includes hypertension, GERD, hyperlipidemia, anxiety.  ER workup: CBC unremarkable.  CMP unremarkable.  X-ray of the left hip demonstrates a left femoral neck fracture.  ER physician spoke with  with orthopedics.  Patient admitted to Hospital Medicine for treatment and management.    Overview/Hospital Course:  No notes on file    Interval History:  Patient had no acute events overnight.  When I will arrived she was unresponsive.  Rapid response called.  Vitals blood sugar were normal.  Order repeat labs.  CT scan was ordered that was negative for any acute process.  Patient eventually woke up.  Unknown etiology for this.  Pending ABG.    Review of Systems  Objective:     Vital Signs (Most Recent):  Temp: 98.5 °F (36.9 °C) (07/25/24 0757)  Pulse: 87 (07/25/24 0940)  Resp: (!) 24 (pt speaking constantly) (07/25/24 0940)  BP: (!) 128/59 (07/25/24 0940)  SpO2: 98 % (07/25/24 0940) Vital Signs (24h Range):  Temp:  [97.9 °F (36.6 °C)-98.8 °F (37.1 °C)] 98.5 °F (36.9 °C)  Pulse:  [] 87  Resp:  [10-42] 24  SpO2:  [91 %-98 %] 98 %  BP: (104-178)/(53-92) 128/59     Weight: 66.1 kg (145 lb 11.6 oz)  Body mass  index is 29.43 kg/m².    Intake/Output Summary (Last 24 hours) at 7/25/2024 1028  Last data filed at 7/25/2024 0612  Gross per 24 hour   Intake 2442.35 ml   Output 1265 ml   Net 1177.35 ml         Physical Exam  Vitals reviewed.   Constitutional:       General: She is not in acute distress.     Appearance: Normal appearance.   HENT:      Head: Normocephalic and atraumatic.      Right Ear: External ear normal.      Left Ear: External ear normal.      Nose: Nose normal.      Mouth/Throat:      Mouth: Mucous membranes are moist.      Pharynx: Oropharynx is clear.   Eyes:      Extraocular Movements: Extraocular movements intact.      Conjunctiva/sclera: Conjunctivae normal.   Cardiovascular:      Rate and Rhythm: Normal rate and regular rhythm.      Pulses: Normal pulses.      Heart sounds: Normal heart sounds. No murmur heard.     No gallop.   Pulmonary:      Effort: Pulmonary effort is normal. No respiratory distress.      Breath sounds: Normal breath sounds. No wheezing or rales.   Abdominal:      General: Abdomen is flat. There is no distension.      Palpations: Abdomen is soft.      Tenderness: There is no abdominal tenderness. There is no guarding.   Musculoskeletal:         General: No swelling. Normal range of motion.      Cervical back: Normal range of motion and neck supple.   Skin:     General: Skin is warm and dry.   Neurological:      General: No focal deficit present.      Mental Status: She is alert and oriented to person, place, and time.             Significant Labs: All pertinent labs within the past 24 hours have been reviewed.    Significant Imaging: I have reviewed all pertinent imaging results/findings within the past 24 hours.    Assessment/Plan:      * Closed fracture of neck of left femur  Acute problem   X-ray demonstrates left femoral neck fracture   NPO after midnight   Musa catheter p.r.n.   Oxycodone for moderate pain   Morphine for severe pain   Zofran p.r.n. nausea vomiting   Consulted  PT and OT to see patient on 07/25/2024  Ortho consulted.  Plan for OR on 07/24/2024  Patient will likely need placement, case management aware      Acute encephalopathy  Patient had acute encephalopathy this morning.  We will do a CT scan was negative.  ABG ordered.  Repeat labs.  EEG ordered.  Patient was unarousable to sternal rub for some time, came to when I was in the room.  Rapid response was called.  Following testing.        VTE Risk Mitigation (From admission, onward)           Ordered     Place MAURICIO hose  Until discontinued         07/21/24 1845     IP VTE HIGH RISK PATIENT  Once         07/21/24 1845     Place sequential compression device  Until discontinued         07/21/24 1845                    Discharge Planning   NICK: 7/26/2024     Code Status: Full Code   Is the patient medically ready for discharge?:     Reason for patient still in hospital (select all that apply): Treatment  Discharge Plan A: Rehab                  Beni Ta Jr, MD  Department of Hospital Medicine   [facility]/Surg

## 2024-07-25 NOTE — PT/OT/SLP PROGRESS
"Occupational Therapy      Patient Name:  Arleen Zarate   MRN:  [id number]    Patient not seen today secondary to Other (Comment) (Attempted OT Eval 11am-Pt refused "not today I want to sleep". Attempted again at 1:50pm-EEG). Will follow-up.    7/25/2024  "

## 2024-07-25 NOTE — CARE UPDATE
07/25/24 0755   Patient Assessment/Suction   Level of Consciousness (AVPU) alert   Respiratory Effort Unlabored;Normal   Expansion/Accessory Muscles/Retractions no use of accessory muscles;no retractions   All Lung Fields Breath Sounds clear   Rhythm/Pattern, Respiratory unlabored;pattern regular   PRE-TX-O2   Device (Oxygen Therapy) nasal cannula   $ Is the patient on Low Flow Oxygen? Yes   Flow (L/min) (Oxygen Therapy) 2   SpO2 98 %   $ Pulse Oximetry - Multiple Charge Pulse Oximetry - Multiple   Pulse 75   Resp 17   Aerosol Therapy   $ Aerosol Therapy Charges PRN treatment not required   Respiratory Treatment Status (SVN) PRN treatment not required   Incentive Spirometer   $ Incentive Spirometer Charges done with encouragement   Administration (IS) proper technique demonstrated   Number of Repetitions (IS) 10   Level Incentive Spirometer (mL) 1500   Patient Tolerance (IS) good

## 2024-07-25 NOTE — CARE UPDATE
07/24/24 1913   Patient Assessment/Suction   Level of Consciousness (AVPU) alert   Respiratory Effort Normal;Unlabored   All Lung Fields Breath Sounds clear   PRE-TX-O2   Device (Oxygen Therapy) nasal cannula   Flow (L/min) (Oxygen Therapy) 2   SpO2 98 %   $ Pulse Oximetry - Multiple Charge Pulse Oximetry - Multiple   Pulse 92   Resp 18   Incentive Spirometer   $ Incentive Spirometer Charges done with encouragement   Administration (IS) proper technique demonstrated   Number of Repetitions (IS) 10   Level Incentive Spirometer (mL) 1500   Patient Tolerance (IS) good   Education   $ Education DME Oxygen;15 min

## 2024-07-25 NOTE — PLAN OF CARE
Problem: Adult Inpatient Plan of Care  Goal: Plan of Care Review  Outcome: Progressing  Goal: Patient-Specific Goal (Individualized)  Outcome: Progressing  Goal: Optimal Comfort and Wellbeing  Outcome: Progressing     Problem: Wound  Goal: Optimal Pain Control and Function  Outcome: Progressing  Goal: Optimal Wound Healing  Outcome: Progressing     Problem: Fall Injury Risk  Goal: Absence of Fall and Fall-Related Injury  Outcome: Progressing     Problem: Pain Acute  Goal: Optimal Pain Control and Function  Outcome: Progressing   POC has been discussed with pt.  Pt had surgery today with Dr. Carmona for L hip Fx; tabitha was placed.  Pt still has nicholson draining to bag.  Pt was started on IV fluids after surgery.  No falls during shift.  No c/o pain since coming back from surgery.  Pt is weight-bearing as tolerated.  Pt to work with PT/OT tomorrow.  No possible discharge at this time.

## 2024-07-25 NOTE — RESPIRATORY THERAPY
Latest Reference Range & Units 07/25/24 09:23   POC PH 7.35 - 7.45  7.419   POC PCO2 35 - 45 mmHg 37.2   POC PO2 80 - 100 mmHg 75 (L)   POC HCO3 24 - 28 mmol/L 24.1   POC SATURATED O2 95 - 100 % 95   Sample  ARTERIAL   POC TCO2 23 - 27 mmol/L 25   POC BE -2 to 2 mmol/L 0   FiO2  32   Flow  3   DelSys  Nasal Can   Site  LB   Mode  SPONT   (L): Data is abnormally low

## 2024-07-25 NOTE — PROGRESS NOTES
Interval note  7/25:  Pt had rapid response this AM; stated she feels weak and not much appetite; ordered soup and crackers for noon meal.  She is drinking the Boost Plus with all meals.  No reports of N/V.  She had surgery on left hip yesterday. Dietary will assist her daily with menu selections.  DC plans are for Rehab facility.  Continue to encourage intake during meal rounds and F/U 2-3 x week.    Intake/Output Summary (Last 24 hours) at 7/25/2024 1257  Last data filed at 7/25/2024 0612  Gross per 24 hour   Intake 2092.35 ml   Output 990 ml   Net 1102.35 ml           Recommendations  Continue cardiac diet  Added Boost Plus with all meals  Incorporated food preferences   Collaborate with health care providers  Encourage intake during meal rounds     Goal: intake >50% upon R\D  F/U  Nutrition Goal Status: new  Comments:  Pt denied N/V or difficulty chewing or swallowing. Last BM on  7/21. She lives with family.     Nutrition Related Social Determinants of Health: SDOH: Adequate food in home environment     Assessment and Plan  Nutrition Problem  Inadequate energy and protein intake     Related to (etiology):   Pain d/t mechanical fall     Signs and Symptoms (as evidenced by):   Intake 25% or less      Interventions/Recommendations (treatment strategy):  Supplement diet with Boost plus all meals  Scheduled for surgical repair of left hip 7/23.     Nutrition Diagnosis Status:   New     Malnutrition Assessment   Pt does not meet ASPEN criteria for Malnutrition at this time but is at risk d/t decreased appetite x several days since fall. No recent Hx of weight loss.              Reason for Assessment: R/O malnutrition  Dx: closed fracture of neck of left femur   PMH:  HTN; DDD; ANYA; GERD  Reason For Assessment: consult  Diagnosis: other (see comments)  Interdisciplinary Rounds: did not attend  Nutrition Discharge Planning: pending     Nutrition Risk Screen     Nutrition Risk Screen: no indicators present    "  Nutrition/Diet History     Patient Reported Diet/Restrictions/Preferences: general  Spiritual, Cultural Beliefs, Bahai Practices, Values that Affect Care: no  Food Allergies: NKFA  Factors Affecting Nutritional Intake: pain     Anthropometrics     Temp: 98.3 °F (36.8 °C)  Height Method: Stated  Height: 4' 11" (149.9 cm)  Height (inches): 59 in  Weight Method: Bed Scale  Weight: 59.1 kg (130 lb 4.7 oz)  Weight (lb): 130.29 lb  Ideal Body Weight (IBW), Female: 95 lb  % Ideal Body Weight, Female (lb): 137.15 %  BMI (Calculated): 26.3  BMI Grade: 25 - 29.9 - overweight  Lab/Procedures/Meds    Latest Reference Range & Units Most Recent   Hemoglobin 12.0 - 16.0 g/dL 10.5 (L)  7/22/24 05:57   Hematocrit 37.0 - 48.5 % 32.2 (L)  7/22/24 05:57   (L): Data is abnormally low    Latest Reference Range & Units Most Recent   Albumin 3.5 - 5.2 g/dL 3.3 (L)  7/22/24 04:28   (L): Data is abnormally low  Pertinent Labs Reviewed: reviewed  Pertinent Medications Reviewed: reviewed; lisinopril     Physical Findings/Assessment  Left femur fracture  Dominick score = 17  Estimated/Assessed Needs     Weight Used For Calorie Calculations: 59.1 kg (130 lb 4.7 oz)  Energy Calorie Requirements (kcal): 1600 calories daily;  25/kg  Energy Need Method: Kcal/kg  Protein Requirements: 78 gm protein daily;  1.3/kg  Weight Used For Protein Calculations: 59.1 kg (130 lb 4.7 oz)  Estimated Fluid Requirement Method: RDA Method  RDA Method (mL): 1600  Nutrition Prescription Ordered     Current Diet Order: cardiac  Oral Nutrition Supplement: none     Evaluation of Received Nutrient/Fluid Intake     Intake/Output Summary (Last 24 hours) at 7/22/2024 1450  Last data filed at 7/22/2024 0730      Gross per 24 hour   Intake 360 ml   Output 1200 ml   Net -840 ml      Energy Calories Required: meeting needs  Protein Required: meeting needs  Fluid Required: meeting needs  Tolerance: tolerating  % Intake of Estimated Energy Needs: 0 - 25 %  % Meal Intake: 0 - 25 " %     Nutrition Risk     Level of Risk/Frequency of Follow-up: high 2 x weekly     Monitor and Evaluation     Food and Nutrient Intake: food and beverage intake  Food and Nutrient Adminstration: diet order  Knowledge/Beliefs/Attitudes: beliefs and attitudes  Physical Activity and Function: nutrition-related ADLs and IADLs  Anthropometric Measurements: weight change  Biochemical Data, Medical Tests and Procedures: gastrointestinal profile, glucose/endocrine profile, electrolyte and renal panel, other (specify)  Nutrition-Focused Physical Findings: overall appearance      Nutrition Follow-Up  yes

## 2024-07-25 NOTE — PLAN OF CARE
Problem: Physical Therapy  Goal: Physical Therapy Goal  Description: Goals to be met by: 2024     Patient will increase functional independence with mobility by performin. Supine to sit with MInimal Assistance  2. Sit to stand transfer with Minimal Assistance  3. Bed to chair transfer with Minimal Assistance using Rolling Walker  4. Gait  x 20 feet with Minimal Assistance using Rolling Walker.   5. Lower extremity exercise program x20 reps    LLE hemiarthroplasty with WBAT and posterior hip precautions    Outcome: Progressing   PT eval and treat initiated. Thera ex in supine and reviewed on L posterior hip precautions. EOB sitting max assist x2. Stood briefly max assist for linen change. High intensity

## 2024-07-26 VITALS
BODY MASS INDEX: 29.82 KG/M2 | SYSTOLIC BLOOD PRESSURE: 153 MMHG | TEMPERATURE: 98 F | HEART RATE: 94 BPM | HEIGHT: 59 IN | WEIGHT: 147.94 LBS | DIASTOLIC BLOOD PRESSURE: 67 MMHG | RESPIRATION RATE: 18 BRPM | OXYGEN SATURATION: 94 %

## 2024-07-26 LAB
ALBUMIN SERPL BCP-MCNC: 2.5 G/DL (ref 3.5–5.2)
ALP SERPL-CCNC: 104 U/L (ref 55–135)
ALT SERPL W/O P-5'-P-CCNC: 14 U/L (ref 10–44)
ANION GAP SERPL CALC-SCNC: 9 MMOL/L (ref 8–16)
AST SERPL-CCNC: 31 U/L (ref 10–40)
BASOPHILS # BLD AUTO: 0.02 K/UL (ref 0–0.2)
BASOPHILS NFR BLD: 0.2 % (ref 0–1.9)
BILIRUB SERPL-MCNC: 0.4 MG/DL (ref 0.1–1)
BUN SERPL-MCNC: 22 MG/DL (ref 10–30)
CALCIUM SERPL-MCNC: 8.7 MG/DL (ref 8.7–10.5)
CHLORIDE SERPL-SCNC: 106 MMOL/L (ref 95–110)
CO2 SERPL-SCNC: 23 MMOL/L (ref 23–29)
CREAT SERPL-MCNC: 0.8 MG/DL (ref 0.5–1.4)
DIFFERENTIAL METHOD BLD: ABNORMAL
EOSINOPHIL # BLD AUTO: 0.3 K/UL (ref 0–0.5)
EOSINOPHIL NFR BLD: 2.9 % (ref 0–8)
ERYTHROCYTE [DISTWIDTH] IN BLOOD BY AUTOMATED COUNT: 14.4 % (ref 11.5–14.5)
EST. GFR  (NO RACE VARIABLE): >60 ML/MIN/1.73 M^2
GLUCOSE SERPL-MCNC: 99 MG/DL (ref 70–110)
HCT VFR BLD AUTO: 28.5 % (ref 37–48.5)
HGB BLD-MCNC: 9 G/DL (ref 12–16)
IMM GRANULOCYTES # BLD AUTO: 0.03 K/UL (ref 0–0.04)
IMM GRANULOCYTES NFR BLD AUTO: 0.4 % (ref 0–0.5)
LYMPHOCYTES # BLD AUTO: 1.3 K/UL (ref 1–4.8)
LYMPHOCYTES NFR BLD: 15 % (ref 18–48)
MAGNESIUM SERPL-MCNC: 2 MG/DL (ref 1.6–2.6)
MCH RBC QN AUTO: 29.9 PG (ref 27–31)
MCHC RBC AUTO-ENTMCNC: 31.6 G/DL (ref 32–36)
MCV RBC AUTO: 95 FL (ref 82–98)
MONOCYTES # BLD AUTO: 1.4 K/UL (ref 0.3–1)
MONOCYTES NFR BLD: 15.9 % (ref 4–15)
NEUTROPHILS # BLD AUTO: 5.6 K/UL (ref 1.8–7.7)
NEUTROPHILS NFR BLD: 65.6 % (ref 38–73)
NRBC BLD-RTO: 0 /100 WBC
PHOSPHATE SERPL-MCNC: 2.7 MG/DL (ref 2.7–4.5)
PLATELET # BLD AUTO: 136 K/UL (ref 150–450)
PMV BLD AUTO: 11 FL (ref 9.2–12.9)
POTASSIUM SERPL-SCNC: 4.5 MMOL/L (ref 3.5–5.1)
PROT SERPL-MCNC: 5.6 G/DL (ref 6–8.4)
RBC # BLD AUTO: 3.01 M/UL (ref 4–5.4)
SODIUM SERPL-SCNC: 138 MMOL/L (ref 136–145)
WBC # BLD AUTO: 8.55 K/UL (ref 3.9–12.7)

## 2024-07-26 PROCEDURE — 25000003 PHARM REV CODE 250: Performed by: ORTHOPAEDIC SURGERY

## 2024-07-26 PROCEDURE — 27000221 HC OXYGEN, UP TO 24 HOURS

## 2024-07-26 PROCEDURE — 97165 OT EVAL LOW COMPLEX 30 MIN: CPT

## 2024-07-26 PROCEDURE — 85025 COMPLETE CBC W/AUTO DIFF WBC: CPT | Performed by: NURSE PRACTITIONER

## 2024-07-26 PROCEDURE — 84100 ASSAY OF PHOSPHORUS: CPT | Performed by: NURSE PRACTITIONER

## 2024-07-26 PROCEDURE — 99900035 HC TECH TIME PER 15 MIN (STAT)

## 2024-07-26 PROCEDURE — 25000003 PHARM REV CODE 250: Performed by: INTERNAL MEDICINE

## 2024-07-26 PROCEDURE — 99222 1ST HOSP IP/OBS MODERATE 55: CPT | Mod: ,,, | Performed by: PHYSICAL MEDICINE & REHABILITATION

## 2024-07-26 PROCEDURE — 83735 ASSAY OF MAGNESIUM: CPT | Performed by: NURSE PRACTITIONER

## 2024-07-26 PROCEDURE — 36415 COLL VENOUS BLD VENIPUNCTURE: CPT | Performed by: NURSE PRACTITIONER

## 2024-07-26 PROCEDURE — 25000003 PHARM REV CODE 250: Performed by: NURSE PRACTITIONER

## 2024-07-26 PROCEDURE — 80053 COMPREHEN METABOLIC PANEL: CPT | Performed by: NURSE PRACTITIONER

## 2024-07-26 PROCEDURE — 94799 UNLISTED PULMONARY SVC/PX: CPT

## 2024-07-26 PROCEDURE — 97535 SELF CARE MNGMENT TRAINING: CPT

## 2024-07-26 PROCEDURE — 97530 THERAPEUTIC ACTIVITIES: CPT

## 2024-07-26 PROCEDURE — 94761 N-INVAS EAR/PLS OXIMETRY MLT: CPT

## 2024-07-26 PROCEDURE — 94760 N-INVAS EAR/PLS OXIMETRY 1: CPT

## 2024-07-26 RX ORDER — POLYETHYLENE GLYCOL 3350 17 G/17G
POWDER, FOR SOLUTION ORAL
Status: DISCONTINUED
Start: 2024-07-26 | End: 2024-07-26 | Stop reason: HOSPADM

## 2024-07-26 RX ORDER — POLYETHYLENE GLYCOL 3350 17 G/17G
17 POWDER, FOR SOLUTION ORAL DAILY
Status: DISCONTINUED | OUTPATIENT
Start: 2024-07-26 | End: 2024-07-26 | Stop reason: HOSPADM

## 2024-07-26 RX ORDER — ASPIRIN 325 MG
325 TABLET, DELAYED RELEASE (ENTERIC COATED) ORAL DAILY
Qty: 30 TABLET | Refills: 0 | Status: SHIPPED | OUTPATIENT
Start: 2024-07-26 | End: 2024-08-25

## 2024-07-26 RX ORDER — OXYCODONE HYDROCHLORIDE 5 MG/1
5 TABLET ORAL EVERY 6 HOURS PRN
Start: 2024-07-26 | End: 2024-07-31

## 2024-07-26 RX ADMIN — MUPIROCIN 1 G: 20 OINTMENT TOPICAL at 08:07

## 2024-07-26 RX ADMIN — POLYETHYLENE GLYCOL (3350) 17 G: 17 POWDER, FOR SOLUTION ORAL at 11:07

## 2024-07-26 RX ADMIN — SODIUM CHLORIDE: 9 INJECTION, SOLUTION INTRAVENOUS at 08:07

## 2024-07-26 RX ADMIN — OXYCODONE 5 MG: 5 TABLET ORAL at 08:07

## 2024-07-26 NOTE — PLAN OF CARE
Problem: Infection  Goal: Absence of Infection Signs and Symptoms  Outcome: Progressing     Problem: Adult Inpatient Plan of Care  Goal: Plan of Care Review  Outcome: Progressing  Goal: Patient-Specific Goal (Individualized)  Outcome: Progressing  Goal: Absence of Hospital-Acquired Illness or Injury  Outcome: Progressing  Goal: Optimal Comfort and Wellbeing  Outcome: Progressing  Goal: Readiness for Transition of Care  Outcome: Progressing     Problem: Wound  Goal: Optimal Coping  Outcome: Progressing  Goal: Optimal Functional Ability  Outcome: Progressing  Goal: Absence of Infection Signs and Symptoms  Outcome: Progressing  Goal: Improved Oral Intake  Outcome: Progressing  Goal: Optimal Pain Control and Function  Outcome: Progressing  Goal: Skin Health and Integrity  Outcome: Progressing  Goal: Optimal Wound Healing  Outcome: Progressing     Problem: Fall Injury Risk  Goal: Absence of Fall and Fall-Related Injury  Outcome: Progressing     Problem: Skin Injury Risk Increased  Goal: Skin Health and Integrity  Outcome: Progressing     Problem: Pain Acute  Goal: Optimal Pain Control and Function  Outcome: Progressing     Problem: Hip Fracture Medical Management  Goal: Optimal Coping with Change in Health Status  Outcome: Progressing  Goal: Absence of Bleeding  Outcome: Progressing  Goal: Effective Bowel Elimination  Outcome: Progressing  Goal: Baseline Cognitive Function Maintained  Outcome: Progressing  Goal: Absence of Embolism  Outcome: Progressing  Goal: Fracture Stability  Outcome: Progressing  Goal: Optimal Functional Performance  Outcome: Progressing  Goal: Pain Control and Function  Outcome: Progressing  Goal: Effective Urinary Elimination  Outcome: Progressing     Problem: Hip Arthroplasty  Goal: Optimal Coping  Outcome: Progressing  Goal: Absence of Bleeding  Outcome: Progressing  Goal: Effective Bowel Elimination  Outcome: Progressing  Goal: Fluid and Electrolyte Balance  Outcome: Progressing  Goal:  Optimal Functional Ability  Outcome: Progressing  Goal: Absence of Infection Signs and Symptoms  Outcome: Progressing  Goal: Intact Neurovascular Status  Outcome: Progressing  Goal: Anesthesia/Sedation Recovery  Outcome: Progressing  Goal: Acceptable Pain Control  Outcome: Progressing  Goal: Nausea and Vomiting Relief  Outcome: Progressing  Goal: Effective Urinary Elimination  Outcome: Progressing  Goal: Effective Oxygenation and Ventilation  Outcome: Progressing

## 2024-07-26 NOTE — DISCHARGE INSTRUCTIONS
[facility]/Surg  Facility Transfer Orders        Admit to: rehab    Diagnoses:   Active Hospital Problems    Diagnosis  POA    *Closed fracture of neck of left femur [S72.002A]  Yes    Acute encephalopathy [G93.40]  Yes    Unresponsive episode [R40.4]  Yes      Resolved Hospital Problems   No resolved problems to display.     Allergies:   Review of patient's allergies indicates:   Allergen Reactions    Buspar [buspirone] Other (See Comments)     Syncope and nausea     Doxy      Other reaction(s): Unknown    Effexor [venlafaxine] Other (See Comments)     shaking    Biaxin [clarithromycin] Other (See Comments)     Unknown    Codeine Other (See Comments)     Syncope, and nausea.    Statins-hmg-coa reductase inhibitors      myalgia    Doxycycline Other (See Comments)     Made very weak       Code Status: full    Vitals: Routine       Diet: cardiac diet    Activity: Activity as tolerated      Pending Diagnostic Studies:       None             Medications: Discontinue all previous medication orders, if any. See new list below.  Current Discharge Medication List        START taking these medications    Details   oxyCODONE (ROXICODONE) 5 MG immediate release tablet Take 1 tablet (5 mg total) by mouth every 6 (six) hours as needed for Pain.    Comments: Quantity prescribed more than 7 day supply? No           CONTINUE these medications which have NOT CHANGED    Details   acetaminophen (TYLENOL) 325 MG tablet Take 650 mg by mouth nightly as needed for Pain or Temperature greater than.      calcium citrate (CALCITRATE) 200 mg (950 mg) tablet Take 1 tablet by mouth 2 (two) times daily.      docusate sodium (COLACE) 100 MG capsule Take 100 mg by mouth once daily.      lisinopriL (PRINIVIL,ZESTRIL) 30 MG tablet TAKE 1 TABLET EVERY EVENING  Qty: 90 tablet, Refills: 2    Comments: .  Associated Diagnoses: Essential hypertension      melatonin (MELATIN) 3 mg tablet Take 3 tablets (9 mg total) by mouth nightly as  needed for Insomnia.  Refills: 0      multivitamin capsule Take 1 capsule by mouth once daily.      omeprazole (PRILOSEC) 40 MG capsule TAKE 1 CAPSULE DAILY  Qty: 90 capsule, Refills: 3    Associated Diagnoses: Gastroesophageal reflux disease           Follow up:    Follow-up Information       Perry Jorge MD Follow up in 1 week(s).    Specialty: Family Medicine  Why: after dc from rehab  Contact information:  [address]  [place] [address]  [phone]               Wan Carmona MD Follow up.    Specialty: Orthopedic Surgery  Why: 8/2 @ 10:15 after xray  Contact information:  [address] [place] [address] Drive  [place] [address]  [phone]                               Immunizations Administered as of 7/26/2024       Name Date Dose VIS Date Route Exp Date    COVID-19, MRNA, LN-S, PF (Pfizer) (Purple Cap) 12/18/2021 0.3 mL 12/9/2021 Intramuscular --    Site: Left arm     : [employer]     Lot: [id number]     Comment: Adminis     COVID-19, MRNA, LN-S, PF (Pfizer) (Purple Cap) 6/9/2021 0.3 mL 5/10/2021 Intramuscular --    Site: Left arm     : [employer]     Lot: [id number]     Comment: Adminis     COVID-19, MRNA, LN-S, PF (Pfizer) (Purple Cap) 5/19/2021 0.3 mL 12/1/2020 Intramuscular --    Site: Left arm     : [employer]     Lot: [id number]     Comment: Adminanup Ta Jr, MD

## 2024-07-26 NOTE — PLAN OF CARE
Problem: Physical Therapy  Goal: Physical Therapy Goal  Description: Goals to be met by: 2024     Patient will increase functional independence with mobility by performin. Supine to sit with MInimal Assistance  2. Sit to stand transfer with Minimal Assistance  3. Bed to chair transfer with Minimal Assistance using Rolling Walker  4. Gait  x 20 feet with Minimal Assistance using Rolling Walker.   5. Lower extremity exercise program x20 reps    LLE hemiarthroplasty with WBAT and posterior hip precautions    Outcome: Progressing

## 2024-07-26 NOTE — NURSING
Report given to ASHVIN Garcia at [facility]. Also informed RN that patient needs to have a bowel movement, Miralax began today. RN voiced understanding of report/plan of care.

## 2024-07-26 NOTE — NURSING
Patient discharged via wheelchair with Rehab transport. Patient has two family members going with patient. Discharge paperwork faxed over to rehab via case management. Patient's iv discontinued, iv catheter tip intact, applied gauze dressing. Patient's telemetry discontinued. Transfer of care complete, patient discharge complete.

## 2024-07-26 NOTE — PLAN OF CARE
Problem: Occupational Therapy  Goal: Occupational Therapy Goal  Description: Goals to be met by: 8/16/2024     Patient will increase functional independence with ADLs by performing:    LE Dressing with Stand-by Assistance and Assistive Devices as needed.  Grooming while standing at sink with Stand-by Assistance.  Toileting from toilet with Stand-by Assistance for hygiene and clothing management.   Supine to sit with Stand-by Assistance.  Toilet transfer to toilet with Stand-by Assistance.    Outcome: Progressing

## 2024-07-26 NOTE — HOSPITAL COURSE
Patient presents to the ED for left hip fracture.  Underwent internal fixation with surgery.  Patient was seen by PT and OT and placed into rehab.

## 2024-07-26 NOTE — RESPIRATORY THERAPY
07/26/24 0833   Patient Assessment/Suction   Level of Consciousness (AVPU) alert   Respiratory Effort Normal;Unlabored   Expansion/Accessory Muscles/Retractions expansion symmetric;no retractions;no use of accessory muscles   All Lung Fields Breath Sounds Anterior:;Lateral:;clear;equal bilaterally   Rhythm/Pattern, Respiratory depth regular;pattern regular;unlabored;no shortness of breath reported   Cough Frequency no cough   PRE-TX-O2   Device (Oxygen Therapy) room air  (weaned off O2)   $ Is the patient on Low Flow Oxygen? Yes  (weaned off from 2L)   SpO2 96 %  (98%2L)   Pulse Oximetry Type Intermittent   $ Pulse Oximetry - Multiple Charge Pulse Oximetry - Multiple   Pulse 98   Resp 18   Positioning HOB elevated 45 degrees   Aerosol Therapy   $ Aerosol Therapy Charges PRN treatment not required   Respiratory Treatment Status (SVN) PRN treatment not required   Incentive Spirometer   $ Incentive Spirometer Charges done with encouragement   Incentive Spirometer Predicted Level (mL) 7600   Administration (IS) proper technique demonstrated   Number of Repetitions (IS) 10   Level Incentive Spirometer (mL) 1250   Patient Tolerance (IS) good;no adverse signs/symptoms present

## 2024-07-26 NOTE — NURSING
Notified Dr. Ta of patient and situation, ISBAR provided, informed MD that patient reports she has not had a bowel movement since last Sunday and has nothing ordered for this. Also informed MD that rehab is requesting that patient be placed on an anticoagulant. MD voiced understanding and ordered Miralax and asa.

## 2024-07-26 NOTE — PLAN OF CARE
Problem: Infection  Goal: Absence of Infection Signs and Symptoms  7/26/2024 1324 by Opal Sarabia RN  Outcome: Met  7/26/2024 1058 by Opal Sarabia RN  Outcome: Progressing     Problem: Adult Inpatient Plan of Care  Goal: Plan of Care Review  7/26/2024 1324 by Opal Sarabia RN  Outcome: Met  7/26/2024 1058 by Opal Sarabia RN  Outcome: Progressing  Goal: Patient-Specific Goal (Individualized)  7/26/2024 1324 by Opal Sarabia RN  Outcome: Met  7/26/2024 1058 by Opal Sarabia RN  Outcome: Progressing  Goal: Absence of Hospital-Acquired Illness or Injury  7/26/2024 1324 by Opal Sarabia RN  Outcome: Met  7/26/2024 1058 by Opal Sarabia RN  Outcome: Progressing  Goal: Optimal Comfort and Wellbeing  7/26/2024 1324 by Opal Sarabia RN  Outcome: Met  7/26/2024 1058 by Opal Sarabia RN  Outcome: Progressing  Goal: Readiness for Transition of Care  7/26/2024 1324 by Opal Sarabia RN  Outcome: Met  7/26/2024 1058 by Opal Sarabia RN  Outcome: Progressing     Problem: Wound  Goal: Optimal Coping  7/26/2024 1324 by Opal Sarabia RN  Outcome: Met  7/26/2024 1058 by Opal Sarabia RN  Outcome: Progressing  Goal: Optimal Functional Ability  7/26/2024 1324 by Opal Sarabia RN  Outcome: Met  7/26/2024 1058 by Opal Sarabia RN  Outcome: Progressing  Goal: Absence of Infection Signs and Symptoms  7/26/2024 1324 by Opal Sarabia RN  Outcome: Met  7/26/2024 1058 by Opal Sarabia RN  Outcome: Progressing  Goal: Improved Oral Intake  7/26/2024 1324 by Opal Sarabia RN  Outcome: Met  7/26/2024 1058 by Opal Sarabia RN  Outcome: Progressing  Goal: Optimal Pain Control and Function  7/26/2024 1324 by Opal Sarabia RN  Outcome: Met  7/26/2024 1058 by Opal Sarabia RN  Outcome: Progressing  Goal: Skin Health and Integrity  7/26/2024 1324 by Opal Sarabia, RN  Outcome: Met  7/26/2024 1058 by Opal Sarabia, RN  Outcome: Progressing  Goal: Optimal Wound Healing  7/26/2024  1324 by Opal Sarabia RN  Outcome: Met  7/26/2024 1058 by Opal Sarabia RN  Outcome: Progressing     Problem: Fall Injury Risk  Goal: Absence of Fall and Fall-Related Injury  7/26/2024 1324 by Opal Sarabia, RN  Outcome: Met  7/26/2024 1058 by Opal Sarabia RN  Outcome: Progressing     Problem: Skin Injury Risk Increased  Goal: Skin Health and Integrity  7/26/2024 1324 by Opal Sarabia, RN  Outcome: Met  7/26/2024 1058 by Opal Sarabia RN  Outcome: Progressing     Problem: Pain Acute  Goal: Optimal Pain Control and Function  7/26/2024 1324 by Opal Sarabia RN  Outcome: Met  7/26/2024 1058 by Opal Sarabia RN  Outcome: Progressing     Problem: Hip Fracture Medical Management  Goal: Optimal Coping with Change in Health Status  7/26/2024 1324 by Opal Sarabia, RN  Outcome: Met  7/26/2024 1058 by Opal Sarabia RN  Outcome: Progressing  Goal: Absence of Bleeding  7/26/2024 1324 by Opal Sarabia RN  Outcome: Met  7/26/2024 1058 by Opal Sarabia RN  Outcome: Progressing  Goal: Effective Bowel Elimination  7/26/2024 1324 by Opal Sarabia, RN  Outcome: Met  7/26/2024 1058 by Opal Sarabia RN  Outcome: Progressing  Goal: Baseline Cognitive Function Maintained  7/26/2024 1324 by Opal Sarabia, RN  Outcome: Met  7/26/2024 1058 by Opal Sarabia RN  Outcome: Progressing  Goal: Absence of Embolism  7/26/2024 1324 by Opal Sarabia, RN  Outcome: Met  7/26/2024 1058 by Opal Sarabia RN  Outcome: Progressing  Goal: Fracture Stability  7/26/2024 1324 by Opal Sarabia, RN  Outcome: Met  7/26/2024 1058 by Opal Sarabia RN  Outcome: Progressing  Goal: Optimal Functional Performance  7/26/2024 1324 by Opal Sarabia, RN  Outcome: Met  7/26/2024 1058 by Opal Sarabia, RN  Outcome: Progressing  Goal: Pain Control and Function  7/26/2024 1324 by Opal Sarabia, RN  Outcome: Met  7/26/2024 1058 by Opal Sarabia, RN  Outcome: Progressing  Goal: Effective Urinary Elimination  7/26/2024  1324 by Opal Sarabia RN  Outcome: Met  7/26/2024 1058 by Opal Sarabia RN  Outcome: Progressing     Problem: Hip Arthroplasty  Goal: Optimal Coping  7/26/2024 1324 by Opal Sarabia RN  Outcome: Met  7/26/2024 1058 by Opal Sarabia RN  Outcome: Progressing  Goal: Absence of Bleeding  7/26/2024 1324 by Opal Sarabia RN  Outcome: Met  7/26/2024 1058 by Opal Sarabia RN  Outcome: Progressing  Goal: Effective Bowel Elimination  7/26/2024 1324 by Opal Sarabia RN  Outcome: Met  7/26/2024 1058 by Opal Sarabia RN  Outcome: Progressing  Goal: Fluid and Electrolyte Balance  7/26/2024 1324 by Opal Sarabia RN  Outcome: Met  7/26/2024 1058 by Opal Sarabia RN  Outcome: Progressing  Goal: Optimal Functional Ability  7/26/2024 1324 by Opal Sarabia RN  Outcome: Met  7/26/2024 1058 by Opal Sarabia RN  Outcome: Progressing  Goal: Absence of Infection Signs and Symptoms  7/26/2024 1324 by Opal Sarabia RN  Outcome: Met  7/26/2024 1058 by Opal Sarabia RN  Outcome: Progressing  Goal: Intact Neurovascular Status  7/26/2024 1324 by Opal Sarabia RN  Outcome: Met  7/26/2024 1058 by Opal Sarabia RN  Outcome: Progressing  Goal: Anesthesia/Sedation Recovery  7/26/2024 1324 by Opal Sarabia, RN  Outcome: Met  7/26/2024 1058 by Opal Sarabia RN  Outcome: Progressing  Goal: Acceptable Pain Control  7/26/2024 1324 by Opal Sarabia, RN  Outcome: Met  7/26/2024 1058 by Opal Sarabia RN  Outcome: Progressing  Goal: Nausea and Vomiting Relief  7/26/2024 1324 by Opal Sarabia, RN  Outcome: Met  7/26/2024 1058 by Opal Sarabia RN  Outcome: Progressing  Goal: Effective Urinary Elimination  7/26/2024 1324 by Opal Sarabia RN  Outcome: Met  7/26/2024 1058 by Opal Sarabia RN  Outcome: Progressing  Goal: Effective Oxygenation and Ventilation  7/26/2024 1324 by Opal Sarabia RN  Outcome: Met  7/26/2024 1058 by Opal Sarabia RN  Outcome: Progressing     Patient to continue  therapy at rehab

## 2024-07-26 NOTE — PT/OT/SLP PROGRESS
"Physical Therapy Treatment    Patient Name:  Arleen Zarate   MRN:  [id number]    Recommendations:     Discharge Recommendations: High Intensity Therapy  Discharge Equipment Recommendations: none  Barriers to discharge: None    Assessment:     Arleen Zarate is a 89 y.o. female admitted with a medical diagnosis of Closed fracture of neck of left femur.  She presents with the following impairments/functional limitations: weakness, impaired endurance, impaired self care skills, impaired functional mobility, gait instability, impaired balance, decreased lower extremity function, decreased safety awareness, pain, orthopedic precautions. Patient sitting up in chair and is agreeable to participation with PT treatment. She reports feeling very tired after not getting much sleep the past few nights and requests assistance getting back to bed. She was re-educated on posterior hip precautions. She requires ModA for sit to stand from chair with RW and VC for sequencing. She performed a step transfer from chair to EOB with RW, ModA, LLE WBAT, and VC for sequencing. She reports fatigue and declines further ambulation at this time. She returned to bed with bed alarm on, RN notified, all needs met, and pillow placed between legs.     Rehab Prognosis: Good; patient would benefit from acute skilled PT services to address these deficits and reach maximum level of function.    Recent Surgery: Procedure(s) (LRB):  HEMIARTHROPLASTY, HIP (Left) 2 Days Post-Op    Plan:     During this hospitalization, patient to be seen daily to address the identified rehab impairments via gait training, therapeutic activities, therapeutic exercises and progress toward the following goals:    Plan of Care Expires:  07/30/24    Subjective     Chief Complaint: tired  Patient/Family Comments/goals: "I turn 89 in 2 weeks and I hope I make it! My head has been feeling funny."  Pain/Comfort:  Pain Rating 1: 0/10      Objective:     Communicated with ASHVIN Joseph " prior to session.  Patient found up in chair with chair check, PureWick, peripheral IV, telemetry upon PT entry to room.     General Precautions: Standard, fall  Orthopedic Precautions: LLE weight bearing as tolerated, LLE posterior precautions  Braces: N/A  Respiratory Status: Room air     Functional Mobility:  Bed Mobility:     Sit to Supine: moderate assistance  Transfers:     Sit to Stand:  moderate assistance with rolling walker  Chair to Bed: moderate assistance with  rolling walker  using  Step Transfer      AM-PAC 6 CLICK MOBILITY  Turning over in bed (including adjusting bedclothes, sheets and blankets)?: 2  Sitting down on and standing up from a chair with arms (e.g., wheelchair, bedside commode, etc.): 2  Moving from lying on back to sitting on the side of the bed?: 2  Moving to and from a bed to a chair (including a wheelchair)?: 2  Need to walk in hospital room?: 1  Climbing 3-5 steps with a railing?: 1  Basic Mobility Total Score: 10       Treatment & Education:  Patient was educated on the importance of OOB activity and functional mobility to negate negative effects of prolonged bed rest during hospitalization, safe transfers and ambulation, LLE WBAT and posterior hip precautions, and D/C planning     Patient left HOB elevated with all lines intact, call button in reach, bed alarm on, and RN notified..    GOALS:   Multidisciplinary Problems       Physical Therapy Goals          Problem: Physical Therapy    Goal Priority Disciplines Outcome Goal Variances Interventions   Physical Therapy Goal     PT, PT/OT Progressing     Description: Goals to be met by: 2024     Patient will increase functional independence with mobility by performin. Supine to sit with MInimal Assistance  2. Sit to stand transfer with Minimal Assistance  3. Bed to chair transfer with Minimal Assistance using Rolling Walker  4. Gait  x 20 feet with Minimal Assistance using Rolling Walker.   5. Lower extremity exercise  program x20 reps    LLE hemiarthroplasty with WBAT and posterior hip precautions                         Time Tracking:     PT Received On: 07/26/24  PT Start Time: 1022     PT Stop Time: 1038  PT Total Time (min): 16 min     Billable Minutes: Therapeutic Activity 16    Treatment Type: Treatment  PT/PTA: PT     Number of PTA visits since last PT visit: 0     07/26/2024

## 2024-07-26 NOTE — PLAN OF CARE
can call report to [phone] in 20 min transportation set up for 2pm    Pt is clear for dc from . Discharging to [facility]    Spoke to pt's daughter in law and notified her of 2pm  time. She would like to be present for the transport       07/26/24 1226   Final Note   Assessment Type Final Discharge Note   Anticipated Discharge Disposition Rehab   Hospital Resources/Appts/Education Provided Appointments scheduled and added to AVS   Post-Acute Status   Post-Acute Authorization Placement   Post-Acute Placement Status Set-up Complete/Auth obtained

## 2024-07-26 NOTE — PT/OT/SLP EVAL
Occupational Therapy   Evaluation    Name: Arleen Zarate  MRN: [id number]  Admitting Diagnosis: Closed fracture of neck of left femur  Recent Surgery: Procedure(s) (LRB):  HEMIARTHROPLASTY, HIP (Left) 2 Days Post-Op    Recommendations:     Discharge Recommendations: High Intensity Therapy  Discharge Equipment Recommendations:  to be determined by next level of care  Barriers to discharge:  None    Assessment:     Arleen Zarate is a 89 y.o. female with a medical diagnosis of Closed fracture of neck of left femur. Performance deficits affecting function: impaired endurance, weakness, impaired self care skills, impaired functional mobility, gait instability, impaired balance, decreased lower extremity function, decreased coordination, pain, decreased ROM, orthopedic precautions.      Rehab Prognosis: Good; patient would benefit from acute skilled OT services to address these deficits and reach maximum level of function.       Plan:     Patient to be seen 5 x/week to address the above listed problems via self-care/home management, therapeutic activities, therapeutic exercises  Plan of Care Expires: 08/16/24  Plan of Care Reviewed with: patient    Subjective     Chief Complaint: L hip pain  Patient/Family Comments/goals: none    Occupational Profile:  Living Environment: Patient lives with GLENIS and GLENIS's  in a [facility] with 7 KAVON and B HR's.   Previous level of function: Patient was independent with ADLs. Patient was ambulatory w/o AD in home, but used cane with community mobility.   Equipment Used at Home: walker, rolling  Assistance upon Discharge: Patient will receive assistance from family, but would benefit from inpatient rehab placement to return to [facility].     Pain/Comfort:  Pain Rating 1: 8/10  Location - Side 1: Left  Location - Orientation 1: generalized  Location 1: hip  Pain Addressed 1: Reposition, Distraction  Pain Rating Post-Intervention 1: 8/10    Patients cultural, spiritual, L[employer]-day conflicts  given the current situation: no    Objective:     Communicated with: nurse Joseph prior to session.  Patient found HOB elevated with bed alarm, peripheral IV, telemetry upon OT entry to room.    General Precautions: Standard, fall  Orthopedic Precautions: LLE weight bearing as tolerated, LLE posterior precautions  Braces: N/A  Respiratory Status: Room air    Occupational Performance:    Bed Mobility:    Patient completed Scooting/Bridging with minimum assistance  Patient completed Supine to Sit with minimum assistance    Functional Mobility/Transfers:  Patient completed Sit <> Stand Transfer with minimum assistance  with  rolling walker   Patient completed Bed <> Chair Transfer using Stand Pivot technique with minimum assistance with rolling walker    Activities of Daily Living:  Grooming: stand by assistance with oral and facial hygiene seated in chair  Lower Body Dressing: maximal assistance to don/doff socks while supine in bed  Toileting: dependence with Purewick in place    Cognitive/Visual Perceptual:  Cognitive/Psychosocial Skills:     -       Oriented to: x4   -       Follows Commands/attention:Follows multistep  commands  -       Communication: clear/fluent  -       Safety awareness/insight to disability: intact   -       Mood/Affect/Coping skills/emotional control: Appropriate to situation and Cooperative  Visual/Perceptual:      -Intact     Physical Exam:  Postural examination/scapula alignment:    -       Rounded shoulders  -       Forward head  Upper Extremity Range of Motion:     -       Right Upper Extremity: WFL  -       Left Upper Extremity: WFL  Upper Extremity Strength:    -       Right Upper Extremity: WFL  -       Left Upper Extremity: WFL   Strength:    -       Right Upper Extremity: WFL  -       Left Upper Extremity: WFL  Fine Motor Coordination:    -       Intact  Gross motor coordination:   WFL in BUE    AMPAC 6 Click ADL:  AMPAC Total Score: 16    Treatment & Education:  Pt educated  on importance of time EOB/OOB, safety with ADLs, importance of intermittent mobility, safe techniques for transfers/ambulation, discharge recommendations/options, and use of call light for assistance and fall prevention.       Patient left up in chair with all lines intact, call button in reach, and chair alarm on    GOALS:   Multidisciplinary Problems       Occupational Therapy Goals          Problem: Occupational Therapy    Goal Priority Disciplines Outcome Interventions   Occupational Therapy Goal     OT, PT/OT Progressing    Description: Goals to be met by: 8/16/2024     Patient will increase functional independence with ADLs by performing:    LE Dressing with Stand-by Assistance and Assistive Devices as needed.  Grooming while standing at sink with Stand-by Assistance.  Toileting from toilet with Stand-by Assistance for hygiene and clothing management.   Supine to sit with Stand-by Assistance.  Toilet transfer to toilet with Stand-by Assistance.                         History:     Past Medical History:   Diagnosis Date    Acute encephalopathy 7/25/2024    Anemia 3/1/2024    Anxiety     Arthritis     fingers and back    Depression     Disorder of kidney and ureter     Foot pain, left 9/18/2013    GERD (gastroesophageal reflux disease)     Hiatal hernia     Hyperlipidemia     Hypertension     Reflux     on meds    Tendon tear 8/21/2013    Tendonitis 8/21/2013         Past Surgical History:   Procedure Laterality Date    CHOLECYSTECTOMY      EYE SURGERY Bilateral     cataracts and implants    HEMIARTHROPLASTY OF HIP Right 3/1/2024    Procedure: HEMIARTHROPLASTY, HIP;  Surgeon: Wan Carmona MD;  Location: [facility] OR;  Service: Orthopedics;  Laterality: Right;    HEMIARTHROPLASTY OF HIP Left 7/24/2024    Procedure: HEMIARTHROPLASTY, HIP;  Surgeon: Wan Caromna MD;  Location: [facility] OR;  Service: Orthopedics;  Laterality: Left;    TONSILLECTOMY         Time Tracking:     OT Date of Treatment:  07/26/24  OT Start Time: 0854  OT Stop Time: 0930  OT Total Time (min): 36 min    Billable Minutes:Evaluation 10  Self Care/Home Management 26 7/26/2024

## 2024-07-26 NOTE — DISCHARGE SUMMARY
[facility]  Discharge Summary      Patient Name: Arleen Zarate  MRN: [id number]  JENNY: [id number]  Patient Class: IP- Inpatient  Admission Date: 7/21/2024  Hospital Length of Stay: 5 days  Discharge Date and Time:  07/26/2024 10:18 AM  Attending Physician: Beni Ta Jr., MD   Discharging Provider: Beni Ta Jr, MD  Primary Care Provider: Perry Jorge MD    Primary Care Team: Networked reference to record PCT     HPI:   Arleen Zarate is a 89-year-old female who presents emergency room complaining of left hip pain.  She sustained a mechanical fall at her residence.  She reports frequent falls.  She fell several months ago and sustained a right hip fracture which required surgical repair.  Pain is worse with any movement of the left leg.  No alleviating factors.  No recent sick contacts or travel.  No fever or chills.  Previous medical history includes hypertension, GERD, hyperlipidemia, anxiety.  ER workup: CBC unremarkable.  CMP unremarkable.  X-ray of the left hip demonstrates a left femoral neck fracture.  ER physician spoke with  with orthopedics.  Patient admitted to [facility] for treatment and management.    Procedure(s) (LRB):  HEMIARTHROPLASTY, HIP (Left)      Hospital Course:   Patient presents to the ED for left hip fracture.  Underwent internal fixation with surgery.  Patient was seen by PT and OT and placed into rehab.     Goals of Care Treatment Preferences:  Code Status: Full Code      Consults:   Consults (From admission, onward)          Status Ordering Provider     Inpatient consult to Social Work/Case Management  Once        Provider:  (Not yet assigned)    Acknowledged BENI TA JR     Inpatient consult to Orthopedics  Once        Provider:  Bernardino Carlos MD    Completed LUCAS PORRAS     IP consult to dietary  Once        Provider:  (Not yet assigned)    Completed LUCAS PORRAS            No  new Assessment & Plan notes have been filed under this hospital service since the last note was generated.  Service: Hospital Medicine    Final Active Diagnoses:    Diagnosis Date Noted POA    PRINCIPAL PROBLEM:  Closed fracture of neck of left femur [S72.002A] 07/21/2024 Yes    Acute encephalopathy [G93.40] 07/25/2024 Yes    Unresponsive episode [R40.4] 07/25/2024 Yes      Problems Resolved During this Admission:       Discharged Condition: fair    Disposition: Rehab Facility    Follow Up:   Follow-up Information       Perry Jorge MD Follow up in 1 week(s).    Specialty: Family Medicine  Contact information:  [address] [facility] [address]  [phone]                           Patient Instructions:      Diet general     Call MD for:  temperature >100.4     Call MD for:  persistent nausea and vomiting     Call MD for:  severe uncontrolled pain     Call MD for:  difficulty breathing, headache or visual disturbances     Call MD for:  redness, tenderness, or signs of infection (pain, swelling, redness, odor or green/yellow discharge around incision site)     Call MD for:  hives     Call MD for:  persistent dizziness or light-headedness     Call MD for:  extreme fatigue     Change dressing (specify)   Order Comments: Dressing change:  As needed for saturation replaced original postop bandage with the Aquacel when needed       Significant Diagnostic Studies: Labs: CMP   Recent Labs   Lab 07/25/24  0406 07/25/24  0922 07/26/24  0458    135* 138   K 4.8 4.0 4.5    103 106   CO2 25 24 23   * 167* 99   BUN 24 21 22   CREATININE 0.9 1.0 0.8   CALCIUM 8.7 8.2* 8.7   PROT 5.7* 5.3* 5.6*   ALBUMIN 2.7* 2.5* 2.5*   BILITOT 0.4 0.4 0.4   ALKPHOS 117 104 104   AST 33 30 31   ALT 39 28 14   ANIONGAP 10 8 9    and CBC   Recent Labs   Lab 07/25/24  0409 07/25/24  0922 07/26/24  0458   WBC 11.65 12.32 8.55   HGB 9.7* 9.2* 9.0*   HCT 30.1* 28.7* 28.5*   * 150 136*       Pending Diagnostic  Studies:       None           Medications:  Reconciled Home Medications:      Medication List        START taking these medications      oxyCODONE 5 MG immediate release tablet  Commonly known as: ROXICODONE  Take 1 tablet (5 mg total) by mouth every 6 (six) hours as needed for Pain.            CHANGE how you take these medications      lisinopriL 30 MG tablet  Commonly known as: PRINIVIL,ZESTRIL  TAKE 1 TABLET EVERY EVENING  What changed: when to take this            CONTINUE taking these medications      acetaminophen 325 MG tablet  Commonly known as: TYLENOL  Take 650 mg by mouth nightly as needed for Pain or Temperature greater than.     calcium citrate 200 mg (950 mg) tablet  Commonly known as: CALCITRATE  Take 1 tablet by mouth 2 (two) times daily.     docusate sodium 100 MG capsule  Commonly known as: COLACE  Take 100 mg by mouth once daily.     melatonin 3 mg tablet  Commonly known as: MELATIN  Take 3 tablets (9 mg total) by mouth nightly as needed for Insomnia.     multivitamin capsule  Take 1 capsule by mouth once daily.     omeprazole 40 MG capsule  Commonly known as: PRILOSEC  TAKE 1 CAPSULE DAILY              Indwelling Lines/Drains at time of discharge:   Lines/Drains/Airways       Drain  Duration             Female External Urinary Catheter w/ Suction 07/25/24 1530 <1 day                    Time spent on the discharge of patient: 40 minutes         Beni Ta Jr, MD  Department of Hospital Medicine  [facility]/Surg

## 2024-07-26 NOTE — PLAN OF CARE
07/25/24 2020   Patient Assessment/Suction   Level of Consciousness (AVPU) alert   Respiratory Effort Normal;Unlabored   Rhythm/Pattern, Respiratory pattern regular   Cough Frequency no cough   PRE-TX-O2   Device (Oxygen Therapy) room air   SpO2 (!) 94 %   Pulse Oximetry Type Intermittent   Aerosol Therapy   $ Aerosol Therapy Charges PRN treatment not required   Incentive Spirometer   $ Incentive Spirometer Charges done with encouragement   Administration (IS) proper technique demonstrated   Number of Repetitions (IS) 10   Level Incentive Spirometer (mL) 1250   Patient Tolerance (IS) good;no adverse signs/symptoms present

## 2024-07-26 NOTE — PLAN OF CARE
Pt worked with PT/OT today  AVS sent to [facility] for review    Spoke to pt's daughter in law by phone       07/26/24 8154   Post-Acute Status   Post-Acute Authorization Placement   Post-Acute Placement Status Pending post-acute provider review/more information requested

## 2024-07-26 NOTE — PLAN OF CARE
Problem: Infection  Goal: Absence of Infection Signs and Symptoms  Outcome: Progressing     Problem: Adult Inpatient Plan of Care  Goal: Plan of Care Review  Outcome: Progressing  Goal: Patient-Specific Goal (Individualized)  Outcome: Progressing  Goal: Optimal Comfort and Wellbeing  Outcome: Progressing     Problem: Wound  Goal: Optimal Functional Ability  Outcome: Progressing  Goal: Absence of Infection Signs and Symptoms  Outcome: Progressing  Goal: Optimal Pain Control and Function  Outcome: Progressing  Goal: Skin Health and Integrity  Outcome: Progressing     Problem: Fall Injury Risk  Goal: Absence of Fall and Fall-Related Injury  Outcome: Progressing     Problem: Skin Injury Risk Increased  Goal: Skin Health and Integrity  Outcome: Progressing     Problem: Pain Acute  Goal: Optimal Pain Control and Function  Outcome: Progressing     Problem: Hip Fracture Medical Management  Goal: Optimal Coping with Change in Health Status  Outcome: Progressing  Goal: Absence of Bleeding  Outcome: Progressing  Goal: Baseline Cognitive Function Maintained  Outcome: Progressing  Goal: Absence of Embolism  Outcome: Progressing  Goal: Fracture Stability  Outcome: Progressing  Goal: Optimal Functional Performance  Outcome: Progressing  Goal: Pain Control and Function  Outcome: Progressing  Goal: Effective Urinary Elimination  Outcome: Progressing     Problem: Hip Arthroplasty  Goal: Absence of Infection Signs and Symptoms  Outcome: Progressing  Goal: Intact Neurovascular Status  Outcome: Progressing  Goal: Anesthesia/Sedation Recovery  Outcome: Progressing  Goal: Acceptable Pain Control  Outcome: Progressing  Goal: Effective Urinary Elimination  Outcome: Progressing  Goal: Effective Oxygenation and Ventilation  Outcome: Progressing

## 2024-07-31 ENCOUNTER — HOSPITAL ENCOUNTER (OUTPATIENT)
Dept: RADIOLOGY | Facility: HOSPITAL | Age: 89
Discharge: HOME | End: 2024-07-31
Attending: PHYSICAL MEDICINE & REHABILITATION
Payer: MEDICARE

## 2024-07-31 PROCEDURE — 74018 RADEX ABDOMEN 1 VIEW: CPT | Mod: 26,,, | Performed by: RADIOLOGY

## 2024-08-08 ENCOUNTER — TELEPHONE (OUTPATIENT)
Dept: FAMILY MEDICINE | Facility: CLINIC | Age: 89
End: 2024-08-08
Payer: MEDICARE

## 2024-08-12 ENCOUNTER — TELEPHONE (OUTPATIENT)
Dept: ORTHOPEDICS | Facility: CLINIC | Age: 89
End: 2024-08-12
Payer: MEDICARE

## 2024-08-12 NOTE — OP NOTE
OPERATIVE NOTE     DATE:  July 24, 2024  TIME:  2:00 p.m.    PATIENT NAME:  Arleen Zarate    PRE-OPERATIVE DIAGNOSIS: Leftdisplaced femoral neck fracture.     POST-OPERATIVE DIAGNOSIS: Leftdisplaced femoral neck fracture.    PROCEDURE: Left hip hemiarthroplasty    SURGEON: Wan Carmona MD    ANESTHESIA TYPE: Spinal    SPECIMENS SENT: NONE     COMPLICATIONS: NONE     BLOOD LOSS: 300 cc     ASSISTANT: None    The mobility limitation cannot be sufficiently resolved by the use of a cane. Patient's functional mobility deficit can be sufficiently resolved with the use of a (Rolling Walker or Walker). Patient's mobility limitation significantly impairs their ability to participate in one of more activities of daily living. The use of a (RW or Walker) will significantly improve the patient's ability to participate in MRADLS and the patient will use it on regular basis in the home.     PROCEDURE IN DETAIL: After appropriate informed consent was obtained the patient was taken to the OR and placed in the lateral position on the beanbag.  The Left lower extremity was prepped and draped in the usual sterile fashion.  An approximately 15-cm incision was made in line with the tip of the greater trochanter overlying the shaft of the femur using a #10 blade through the skin and subcutaneous tissue.  The flaps were raised and a lap was used to clear the bursal tissue from the lateral aspect of the greater trochanter.  A 1-cm incision was made through the tensor fascia marleny and this was transected in line with the skin incision using a curved wall scissors proximally and distally.  A Charnley retractor was placed deep to this and the gluteus medius muscle was split approximately 2 cm in line with its fibers. Bovie cautery was used to raise the fascia of the gluteus medius and part of the vastus lateralis from the lateral border of the greater trochanter with the soft tissue attachments left intact for repair at the end of  the case.  The Gluteus minimus tendon was identified and transected  and a #1 Ethibond suture was placed for repair at the end of the case as well.  The capsule was incised in an H-type fashion and the capsule was tagged using #1 Ethibond suture for repair at the end of the case.    The leg was externally rotated with the knee bent. The femoral neck fracture was well visualized and a saw blade used to make a 1 cm oblique cut 1 fingerbreath above the lesser trochanter.  The femoral canal finder was placed and then the canal was broached up to a #4 stem.  The neck cut was freshened with a calcar planar with the broach in place.  Xray showed very good position of the stem and fill of the canal within the femur.  The broach was removed.  The femoral head was removed using a corkscrew and the ligamentum was excised from the acetabulum using bovie cautery.  The canal was prepped for the implant using tampon suction.    A hip stem  was placed in approximately 15 degrees of anteversion.   The joint and surrounding tissues were irrigated with normal saline using pulsivac lavage.  A 0 offset neck and a  hip stem -mm bipolar head were trialed and found to have good fit without any shuck.  The joint showed good reduction with good leg length compared to the other extremity.  Therefore, a 0 offset neck and a appropriate size bipolar head final implants were placed an gently malleted onto the stem.  The hip was then reduced.    The incision was irrigated with normal saline.  The capsule was re-approximated using #1 Ethibond, which had previously been placed.  The deep fascia layer was  re-approximated using #1 vicryl in a running fashion.  The deep fatty layer was re-approximated using 0-monocryl in an interrupted fashion. The subcutaneous layer was re-approximated using 2.0-monocryl in an interrupted fashion.  The skin was re-approximated with a running 3-0 Monocryl.  Sterile dressing using xeroform, dressing sponges Abd's and  Microfoam tape was placed.  The patient tolerated the procedure well without complications.  I was present and scrubbed for the entire case.

## 2024-08-12 NOTE — TELEPHONE ENCOUNTER
----- Message from Ramesh Suarez sent at 8/12/2024  8:49 AM CDT -----  Daughter in  law / BJ  has canceled the patient's Post Op and would like a callback regarding when would be a better date for rescheduling    [phone]

## 2024-08-12 NOTE — TELEPHONE ENCOUNTER
Returned call to pt daughter assisted in rescheduling pt post op appointment as pt will have home health care and does not need to be seen in clinic until 6 weeks post op

## 2024-08-23 ENCOUNTER — OFFICE VISIT (OUTPATIENT)
Dept: FAMILY MEDICINE | Facility: CLINIC | Age: 89
End: 2024-08-23
Payer: MEDICARE

## 2024-08-23 ENCOUNTER — LAB VISIT (OUTPATIENT)
Dept: LAB | Facility: HOSPITAL | Age: 89
End: 2024-08-23
Attending: PHYSICIAN ASSISTANT
Payer: MEDICARE

## 2024-08-23 VITALS
OXYGEN SATURATION: 97 % | DIASTOLIC BLOOD PRESSURE: 60 MMHG | TEMPERATURE: 99 F | WEIGHT: 131.63 LBS | SYSTOLIC BLOOD PRESSURE: 130 MMHG | RESPIRATION RATE: 18 BRPM | BODY MASS INDEX: 26.54 KG/M2 | HEIGHT: 59 IN | HEART RATE: 88 BPM

## 2024-08-23 DIAGNOSIS — S72.002D CLOSED FRACTURE OF NECK OF LEFT FEMUR WITH ROUTINE HEALING, SUBSEQUENT ENCOUNTER: ICD-10-CM

## 2024-08-23 DIAGNOSIS — K21.9 GASTROESOPHAGEAL REFLUX DISEASE WITHOUT ESOPHAGITIS: ICD-10-CM

## 2024-08-23 DIAGNOSIS — D69.6 THROMBOCYTOPENIA: ICD-10-CM

## 2024-08-23 DIAGNOSIS — D64.9 ANEMIA, UNSPECIFIED TYPE: ICD-10-CM

## 2024-08-23 DIAGNOSIS — Z09 HOSPITAL DISCHARGE FOLLOW-UP: Primary | ICD-10-CM

## 2024-08-23 DIAGNOSIS — R40.4 UNRESPONSIVE EPISODE: ICD-10-CM

## 2024-08-23 DIAGNOSIS — I10 ESSENTIAL HYPERTENSION: ICD-10-CM

## 2024-08-23 DIAGNOSIS — E78.2 MIXED HYPERLIPIDEMIA: ICD-10-CM

## 2024-08-23 DIAGNOSIS — Z09 HOSPITAL DISCHARGE FOLLOW-UP: ICD-10-CM

## 2024-08-23 LAB
ALBUMIN SERPL BCP-MCNC: 3.3 G/DL (ref 3.5–5.2)
ALP SERPL-CCNC: 121 U/L (ref 55–135)
ALT SERPL W/O P-5'-P-CCNC: 8 U/L (ref 10–44)
ANION GAP SERPL CALC-SCNC: 8 MMOL/L (ref 8–16)
AST SERPL-CCNC: 16 U/L (ref 10–40)
BASOPHILS # BLD AUTO: 0.05 K/UL (ref 0–0.2)
BASOPHILS NFR BLD: 0.6 % (ref 0–1.9)
BILIRUB SERPL-MCNC: 0.2 MG/DL (ref 0.1–1)
BUN SERPL-MCNC: 22 MG/DL (ref 10–30)
CALCIUM SERPL-MCNC: 9.6 MG/DL (ref 8.7–10.5)
CHLORIDE SERPL-SCNC: 105 MMOL/L (ref 95–110)
CO2 SERPL-SCNC: 27 MMOL/L (ref 23–29)
CREAT SERPL-MCNC: 1 MG/DL (ref 0.5–1.4)
DIFFERENTIAL METHOD BLD: ABNORMAL
EOSINOPHIL # BLD AUTO: 0.2 K/UL (ref 0–0.5)
EOSINOPHIL NFR BLD: 1.9 % (ref 0–8)
ERYTHROCYTE [DISTWIDTH] IN BLOOD BY AUTOMATED COUNT: 14.1 % (ref 11.5–14.5)
EST. GFR  (NO RACE VARIABLE): 50.6 ML/MIN/1.73 M^2
FERRITIN SERPL-MCNC: 77 NG/ML (ref 20–300)
GLUCOSE SERPL-MCNC: 97 MG/DL (ref 70–110)
HCT VFR BLD AUTO: 31.7 % (ref 37–48.5)
HGB BLD-MCNC: 9.7 G/DL (ref 12–16)
IMM GRANULOCYTES # BLD AUTO: 0.04 K/UL (ref 0–0.04)
IMM GRANULOCYTES NFR BLD AUTO: 0.5 % (ref 0–0.5)
IRON SERPL-MCNC: 77 UG/DL (ref 30–160)
LYMPHOCYTES # BLD AUTO: 1.7 K/UL (ref 1–4.8)
LYMPHOCYTES NFR BLD: 19.9 % (ref 18–48)
MCH RBC QN AUTO: 29.7 PG (ref 27–31)
MCHC RBC AUTO-ENTMCNC: 30.6 G/DL (ref 32–36)
MCV RBC AUTO: 97 FL (ref 82–98)
MONOCYTES # BLD AUTO: 0.7 K/UL (ref 0.3–1)
MONOCYTES NFR BLD: 8.9 % (ref 4–15)
NEUTROPHILS # BLD AUTO: 5.7 K/UL (ref 1.8–7.7)
NEUTROPHILS NFR BLD: 68.2 % (ref 38–73)
NRBC BLD-RTO: 0 /100 WBC
PLATELET # BLD AUTO: 195 K/UL (ref 150–450)
PMV BLD AUTO: 11.4 FL (ref 9.2–12.9)
POTASSIUM SERPL-SCNC: 4.7 MMOL/L (ref 3.5–5.1)
PROT SERPL-MCNC: 6.5 G/DL (ref 6–8.4)
RBC # BLD AUTO: 3.27 M/UL (ref 4–5.4)
SATURATED IRON: 21 % (ref 20–50)
SODIUM SERPL-SCNC: 140 MMOL/L (ref 136–145)
TOTAL IRON BINDING CAPACITY: 361 UG/DL (ref 250–450)
TRANSFERRIN SERPL-MCNC: 244 MG/DL (ref 200–375)
WBC # BLD AUTO: 8.3 K/UL (ref 3.9–12.7)

## 2024-08-23 PROCEDURE — 85025 COMPLETE CBC W/AUTO DIFF WBC: CPT | Performed by: PHYSICIAN ASSISTANT

## 2024-08-23 PROCEDURE — 36415 COLL VENOUS BLD VENIPUNCTURE: CPT | Mod: PO | Performed by: PHYSICIAN ASSISTANT

## 2024-08-23 PROCEDURE — 80053 COMPREHEN METABOLIC PANEL: CPT | Performed by: PHYSICIAN ASSISTANT

## 2024-08-23 PROCEDURE — 99214 OFFICE O/P EST MOD 30 MIN: CPT | Mod: PBBFAC,PO | Performed by: PHYSICIAN ASSISTANT

## 2024-08-23 PROCEDURE — 82728 ASSAY OF FERRITIN: CPT | Performed by: PHYSICIAN ASSISTANT

## 2024-08-23 PROCEDURE — 83540 ASSAY OF IRON: CPT | Performed by: PHYSICIAN ASSISTANT

## 2024-08-23 PROCEDURE — 99999 PR PBB SHADOW E&M-EST. PATIENT-LVL IV: CPT | Mod: PBBFAC,,, | Performed by: PHYSICIAN ASSISTANT

## 2024-08-23 RX ORDER — FAMOTIDINE 40 MG/1
40 TABLET, FILM COATED ORAL NIGHTLY PRN
Qty: 90 TABLET | Refills: 1 | Status: SHIPPED | OUTPATIENT
Start: 2024-08-23 | End: 2025-08-23

## 2024-08-23 NOTE — PROGRESS NOTES
Subjective:       Patient ID: Arleen Zarate is a 89 y.o. female.    Chief Complaint: No chief complaint on file.    Ms. Zarate is a 89 year old female with HTN, HLD, and GERD. She was recently discharged from [facility] after fall and fracture. She did have an episode of unresponsiveness during rehab stay; this was worked up and etiology not identified. Patient has had no recurrence of episodes.  Patient's daugther in law suspects that patient had a reaction to pain medication. The patient was discharged home to her family. The patient lives with her daughter in law. The patient is receiving PT through . The patent has no complaints and reports overall she is feeling well.    Transitional Care Note  Admit date: 2024  Discharge date: 24  Date of interactive contact (2 business days post D/C):   Hospitalized at: [facility]  Discharge diagnoses: closed fracture left femur    Family and/or Caretaker present at visit?  Yes.  Diagnostic tests reviewed/disposition: I have reviewed all completed as well as pending diagnostic tests at the time of discharge.  Disease/illness education: Continue PT. Do not ambulate without walker  Medication changes: taking 81mg aspirin rather than 325  Home health/community services discussion/referrals: Patient has home health established at [facility].   Establishment or re-establishment of referral orders for community resources: No other necessary community resources.   Discussion with other health care providers: No discussion with other health care providers necessary.       INPATIENT DISCHARGE SUMMARY        Patient Identification:  Arleen Zarate  :  8/15/1925     Admitting Provider: NOEMI RUSH MD  Discharge Provider: NOEMI RUSH MD     Admission Date: 2024     Discharge Date: 2024     Admission Diagnosis:  Closed fracture of hip <Left side; Routine; Closed; Subsequent> [S72.002D]      Primary Discharge Diagnosis:  Closed fracture of hip <Left  side; Routine; Closed; Subsequent>        DETAILS OF HOSPITAL STAY     History of Present Illness/Past Medical History:  Patient is a 89 year old lady admitted to [facility] on 07/21/2024 after a fall at her residence resulting in left hip pain.  She has frequent falls.  In March after a fall in which she sustained a right hip fracture requiring surgical repair she came to us at [facility] for therapy, discharged home being able to ambulate with a walker.     In the are lab work was unremarkable.  X-ray left hip showed a left femoral neck fracture.  She then underwent left hip hemiarthroplasty on 07/24/2024 by Dr. Wan Carmona.     On 07/25/2024, patient was noted to be unresponsive.  Rapid response called.  Vital signs and blood sugars were normal.  Repeat CT head performed which was negative for any acute process.  EEG showed mild diffuse nonspecific slowing of the background, No potentially epileptiform activity was seen.  Patient eventually woke up.  Etiology for acute encephalopathy unknown.  She has been doing well since.     Patient started working with physical therapy and occupational therapy, requiring considerable assistance. She continues to present with weakness, impaired gait, impaired self-care, impaired safety. She would benefit from a comprehensive inpatient rehab program involving PT/OT/Wound care/Nutrition/Rehab Nursing under direct medical supervision provided by a PM&R physician with a goal to discharge the patient to home/community.      Patient was deemed medically stable and transferred to [facility] to participate in acute inpatient rehabilitation on 07/26/2024 .         Hospital Course:  Patient was admitted and enrolled in a comprehensive rehabilitation program, including PT, OT, and as required SLP.       Pain Management: Tylenol PRN for mild pain, immediate release oxycodone 5 mg p.o. q.6 hours p.r.n. severe pain.  - will  continue to adjust pain medication as clinically indicated       Nutrition: nutritionist/dietitian following, see diet above     GI ppx:  History of GERD  Continue Protonix 40 mg p.o. daily.      Bladder Management:   monitor for continence     Bowel Management:   monitor for continence and regularity     Mood:  History of anxiety and depression  stable, monitor       Sleep:   monitor, encourage good sleep hygiene   Start melatonin 5 mg p.o. nightly p.r.n..      Skin: monitor, wound care as needed     DVT ppx:  On aspirin 325 mg p.o. daily at this time, will continue same.  Encourage ambulation with PT. Howie hose stockings during the daytime as needed, elevate bilateral lower extremities when in bed at night.  Ankle pumps at rest.        MEDICAL MANAGEMENT:       Acute encephalopathy  Patient found unresponsive on 07/25/2024.  Lab work WNL.  Blood sugars stable.  CT head negative.  EEG with diffuse slowing, no seizure activity.  Patient later woke up, mental status stable.  Unclear etiology of encephalopathy.  Could be medication related.  Avoid high dose narcotics, sleeping meds, anxiety meds, higher doses of muscle relaxers while in inpatient rehab.  Will closely monitor.     Postoperative anemia  Monitor H&H closely, monitor for symptoms while in therapy.  On multivitamin daily.     Hyperlipidemia  Not on any medication at this time.  Will review home meds.     Hypertension  Monitor blood pressure closely and adjust medications as needed.  On lisinopril 30 mg p.o. daily.              Functional Status Upon Dishcarge:  Section GG CARE Scores - Current All Therapy    Physical Therapy    Car Transfer Car Transfer - CARE Score: 4 (08/09/24 0816 : Arti Multani, PT)  Walk 10 Feet Walk 10 Feet - CARE Score: 4 (08/09/24 0816 : Arti Multani, PT)  Walk 50 Feet with Two Turns  Walk 50 Feet with Two Turns - CARE Score: 4 (08/09/24 0816 : Arti Multani, PT)  Walk 150 Feet Walk 150 Feet - CARE Score: 4 (08/09/24 0816  : Arti Multani, PT)  Walking 10 Feet on Uneven Surfaces Walking 10 Feet on Uneven Surfaces - CARE Score: 4 (08/09/24 0816 : Arti Multani, PT)  1 Step (Curb) 1 Step (Curb) - CARE Score: 4 (08/09/24 0816 : Arti Multani, PT)  4 Steps 4 Steps - CARE Score: 4 (08/09/24 0816 : Arti Multani, PT)  12 Steps 12 Steps - CARE Score: 88 (08/09/24 0816 : Arti Multani PT)  Picking Up Object  Picking Up Object - CARE Score: 4 (08/09/24 0816 : Arti Multani PT)  Wheel 50 Feet with Two Turns Wheel 50 Feet with Two Turns - CARE Score: 4 (08/09/24 0816 : Arti Multani PT)  Wheel 150 Feet  Wheel 150 Feet - CARE Score: 3 (08/09/24 0816 : Arti Multani PT)  Occupational Therapy    Eating Eating - CARE Score: 6 (08/09/24 1023 : Sia Mcfadden RN)  Oral Hygiene Oral Hygiene - CARE Score: 6 (08/09/24 0815 : Nila Rizzo OT)  Toileting Hygiene Toileting Hygiene - CARE Score: 6 (08/09/24 0815 : Nila Rizzo OT)  Shower/Bathe Self Shower/Bathe Self - CARE Score: 4 (08/09/24 0815 : Nila Rizzo OT)  Upper Body Dressing  Upper Body Dressing - CARE Score: 6 (08/09/24 0815 : Nila Rizzo OT)  Lower Body Dressing Lower Body Dressing - CARE Score: 6 (08/09/24 0815 : Nila Rizzo OT)  Putting On/Taking Off Footwear Putting On/Taking Off Footwear  - CARE Score: 6 (08/09/24 0815 : Nila Rizzo OT)  Roll Left and Right Roll Left and Right - CARE Score: 6 (08/09/24 0815 : Nila Rizzo OT)  Sit to Lying Sit to Lying - CARE Score: 6 (08/09/24 0815 : Nila M. Licciardi, OT)  Lying to Sitting on Side of Bed Lying to Sitting on Side of Bed - CARE Score: 6 (08/09/24 0815 : Nila Rizzo OT)  Sit to Stand Sit to Stand - CARE Score: 6 (08/09/24 0815 : Nila Rizzo OT)  Chair/Bed-to-Chair Transfer  Chair/Bed-to-Chair Transfer - CARE Score: 6 (08/09/24 0815 : Nila Rizzo OT)  Toilet Transfer Toilet Transfer - CARE Score: 6 (08/09/24 0815 : Nila Rizzo,  "OT)  Speech Therapy    Expression of Ideas and Wants Expression of Ideas and Wants: Without difficulty (07/27/24 0700 : Elissa Dorado OT)  Understanding Verbal and Non-Verbal Content Understanding Verbal and Non-Verbal Content: Understands (07/27/24 0700 : Elissa Dorado OT)  BIMS Brief Interview for Mental Status (BIMS)  Repetition of Three Words (First Attempt): 3 (08/09/24 0847 : Nila Rizzo OT)  Temporal Orientation: Year: Correct (08/09/24 0847 : Nila Rizzo OT)  Temporal Orientation: Month: Accurate within 5 days (08/09/24 0847 : Nila Rizzo OT)  Temporal Orientation: Day: Correct (08/09/24 0847 : Nila Rizzo OT)  Recall: "Sock": Yes, no cue required (08/09/24 0847 : Nila Rizzo OT)  Recall: "Blue": Yes, no cue required (08/09/24 0847 : Nila Rizzo OT)  Recall: "Bed": Yes, after cueing ("a piece of furniture") (08/09/24 0847 : Nila Rizzo OT)  BIMS Summary Score: 14 (08/09/24 0847 : Nila Rizzo OT)  Memory/Recall Ability          Discharge Medications:       Medication List          START taking these medications          Prescription Last Dose and Time given  aspirin 325 MG tablet    Instructions: Take 1 tablet (325 mg total) by mouth in the morning. Indications: heart health.  Refill: 0                    CONTINUE taking these medications          Prescription Last Dose and Time given  acetaminophen 325 MG tablet  Commonly known as: TYLENOL    Instructions: Take 2 tablets (650 mg total) by mouth every 6 (six) hours as needed for mild pain or Temp > or equal to 101F (38.3C).  Refill: 0       calcium citrate 950 (200 Ca) MG tablet  Commonly known as: CALCITRATE    Instructions: Take 1 tablet (950 mg total) by mouth in the morning and 1 tablet (950 mg total) before bedtime.  Refill: 0       docusate sodium 100 MG capsule  Commonly known as: COLACE    Instructions: Take 1 capsule (100 mg total) by mouth in the morning.  Refill: 0       lisinopril " 10 MG tablet  Commonly known as: ZESTRIL    Instructions: Take 3 tablets (30 mg total) by mouth in the morning.  Refill: 0       melatonin tablet    Instructions: Take 1 tablet (3 mg total) by mouth nightly as needed for sleep.  Refill: 0       MULTIVITAMIN ADULT PO    Instructions: Take 1 tablet by mouth in the morning.  Refill: 0       multivitamin tablet    Instructions: Take 1 tablet by mouth in the morning.  Refill: 0       omeprazole 40 MG capsule  Commonly known as: PriLOSEC    Instructions: Take 1 capsule (40 mg total) by mouth in the morning.  Refill: 0                    ASK your doctor about these medications          Prescription Last Dose and Time given  traMADol 50 MG tablet  Commonly known as: ULTRAM  Ask about: Should I take this medication?    Instructions: Take 1 tablet (50 mg total) by mouth every 8 (eight) hours as needed for severe pain or moderate pain for up to 7 days Indications: Pain.  Dispense: 21 tablet  Refill: 0                     Diet: Regular Diet           Discharge Disposition: Home      Condition on Discharge: Good         Recommended Outpatient Follow-Up:    Follow-up Information          Follow up With Specialties Details Why Contact Info    Perry Jorge MD Family Medicine Follow up Office will call Griselda to schedule the hospital follow up appointment [address] [facility] [address]  [phone]       Wan Carmona MD Orthopedic Surgery Follow up on 8/16/2024 Post op follow up appointment is scheduled for 11:15am [address] [place] [address] Drive  [facility] [address]  [phone]       [facility] [place] Direct     Provider for Medical Equipment of a Wheelchair [address] [facility] [place] [phone]    [facility]     Provider for Home Health Agency; They will call to schedule first home visit [address] [place] [address] [phone]             Greater than 30 minutes spent on discharge of patient     PERRY RUSH,  MD    Electronically signed by Perry Haile MD at 8/19/2024 11:04 AM          Allergen Reactions    Buspar [buspirone] Other (See Comments)     Syncope and nausea     Doxy      Other reaction(s): Unknown    Effexor [venlafaxine] Other (See Comments)     shaking    Biaxin [clarithromycin] Other (See Comments)     Unknown    Codeine Other (See Comments)     Syncope, and nausea.    Statins-hmg-coa reductase inhibitors      myalgia    Doxycycline Other (See Comments)     Made very weak         Current Outpatient Medications:     acetaminophen (TYLENOL) 325 MG tablet, Take 650 mg by mouth nightly as needed for Pain or Temperature greater than., Disp: , Rfl:     aspirin (ECOTRIN) 325 MG EC tablet, Take 1 tablet (325 mg total) by mouth once daily., Disp: 30 tablet, Rfl: 0    calcium citrate (CALCITRATE) 200 mg (950 mg) tablet, Take 1 tablet by mouth 2 (two) times daily., Disp: , Rfl:     docusate sodium (COLACE) 100 MG capsule, Take 100 mg by mouth once daily., Disp: , Rfl:     lisinopriL (PRINIVIL,ZESTRIL) 30 MG tablet, TAKE 1 TABLET EVERY EVENING (Patient taking differently: Take 30 mg by mouth once daily.), Disp: 90 tablet, Rfl: 2    melatonin (MELATIN) 3 mg tablet, Take 3 tablets (9 mg total) by mouth nightly as needed for Insomnia. (Patient taking differently: Take 3 mg by mouth nightly as needed for Insomnia.), Disp: , Rfl: 0    multivitamin capsule, Take 1 capsule by mouth once daily., Disp: , Rfl:     omeprazole (PRILOSEC) 40 MG capsule, TAKE 1 CAPSULE DAILY (Patient taking differently: Take 40 mg by mouth once daily.), Disp: 90 capsule, Rfl: 3    Lab Results   Component Value Date    WBC 8.55 07/26/2024    HGB 9.0 (L) 07/26/2024    HCT 28.5 (L) 07/26/2024     (L) 07/26/2024    CHOL 192 05/02/2024    TRIG 163 (H) 05/02/2024    HDL 51 05/02/2024    ALT 14 07/26/2024    AST 31 07/26/2024     07/26/2024    K 4.5 07/26/2024     07/26/2024    CREATININE 0.8 07/26/2024    BUN 22 07/26/2024    CO2  23 07/26/2024    TSH 0.686 07/25/2024    INR 1.0 02/28/2024    HGBA1C 5.2 04/12/2023       Review of Systems   Constitutional:  Positive for activity change. Negative for appetite change and fever.   HENT:  Negative for postnasal drip, rhinorrhea and sinus pressure.    Eyes:  Negative for visual disturbance.   Respiratory:  Negative for cough and shortness of breath.    Cardiovascular:  Negative for chest pain.   Gastrointestinal:  Negative for abdominal distention and abdominal pain.   Genitourinary:  Negative for difficulty urinating and dysuria.   Musculoskeletal:  Positive for arthralgias. Negative for myalgias.   Neurological:  Negative for headaches.   Hematological:  Negative for adenopathy.   Psychiatric/Behavioral:  The patient is not nervous/anxious.        Objective:      Physical Exam  Constitutional:       Appearance: Normal appearance.   HENT:      Head: Normocephalic and atraumatic.   Eyes:      Conjunctiva/sclera: Conjunctivae normal.   Cardiovascular:      Rate and Rhythm: Normal rate and regular rhythm.   Pulmonary:      Effort: Pulmonary effort is normal. No respiratory distress.      Breath sounds: Normal breath sounds. No wheezing.   Abdominal:      General: There is no distension.      Palpations: There is no mass.      Tenderness: There is no abdominal tenderness.   Musculoskeletal:      Right lower leg: No edema.      Left lower leg: No edema.      Comments: Ambulating with walker   Lymphadenopathy:      Cervical: No cervical adenopathy.   Skin:     Findings: No erythema.   Neurological:      Mental Status: She is alert and oriented to person, place, and time.   Psychiatric:         Behavior: Behavior normal.         Assessment:       1. Hospital discharge follow-up    2. Essential hypertension    3. Closed fracture of neck of left femur with routine healing, subsequent encounter    4. Unresponsive episode    5. Gastroesophageal reflux disease without esophagitis    6. Mixed hyperlipidemia     7. Anemia, unspecified type    8. Thrombocytopenia        Plan:       Arleen was seen today for hospital follow up.    Diagnoses and all orders for this visit:    Hospital discharge follow-up  -     CBC Auto Differential; Future  -     Iron and TIBC; Future  -     Ferritin; Future  -     Comprehensive Metabolic Panel; Future    Essential hypertension  -     Comprehensive Metabolic Panel; Future  Controlled  Low salt diet  Continue current medication  Closed fracture of neck of left femur with routine healing, subsequent encounter  Follow up ortho  Unresponsive episode  Resolved. No further episodes  Gastroesophageal reflux disease without esophagitis  -     Comprehensive Metabolic Panel; Future  -     famotidine (PEPCID) 40 MG tablet; Take 1 tablet (40 mg total) by mouth nightly as needed for Heartburn.    Mixed hyperlipidemia  High fiber diet  Continue current medication  Anemia, unspecified type  -     CBC Auto Differential; Future  -     Iron and TIBC; Future  -     Ferritin; Future    Thrombocytopenia  -     CBC Auto Differential; Future      Patient will be notified when labs return and further recommendations will be given at that time.

## 2024-08-27 ENCOUNTER — TELEPHONE (OUTPATIENT)
Dept: ORTHOPEDICS | Facility: CLINIC | Age: 89
End: 2024-08-27
Payer: MEDICARE

## 2024-08-27 NOTE — TELEPHONE ENCOUNTER
Spoke with pt regarding request for injections in the knees being done during her HIP post op appointment . Advised this would need to be discussed during the appointment as Dr. Carmona will need to review UTD Xrays and determine if time frame between injections is appropriate. Pt verbalized understanding.

## 2024-08-27 NOTE — TELEPHONE ENCOUNTER
----- Message from Bertha Saxena sent at 8/27/2024 12:31 PM CDT -----  Contact: BJ/daughter  Type: Needs Medical Advice  Who Called:  JOSHUA daughter in law     Best Call Back Number: [phone]  Additional Information: they are asking if on her follow up apt 9/4 she may also have knee injections so that they do not have to make 2 appts for a 89year old.

## 2024-09-03 DIAGNOSIS — Z96.642 S/P HIP REPLACEMENT, LEFT: Primary | ICD-10-CM

## 2024-09-04 ENCOUNTER — OFFICE VISIT (OUTPATIENT)
Dept: ORTHOPEDICS | Facility: CLINIC | Age: 89
End: 2024-09-04
Payer: MEDICARE

## 2024-09-04 ENCOUNTER — HOSPITAL ENCOUNTER (OUTPATIENT)
Dept: RADIOLOGY | Facility: HOSPITAL | Age: 89
Discharge: HOME | End: 2024-09-04
Attending: ORTHOPAEDIC SURGERY
Payer: MEDICARE

## 2024-09-04 VITALS — BODY MASS INDEX: 26.41 KG/M2 | HEIGHT: 59 IN | WEIGHT: 131 LBS

## 2024-09-04 DIAGNOSIS — Z96.642 S/P HIP REPLACEMENT, LEFT: ICD-10-CM

## 2024-09-04 DIAGNOSIS — Z96.642 S/P HIP REPLACEMENT, LEFT: Primary | ICD-10-CM

## 2024-09-04 PROCEDURE — 99999 PR PBB SHADOW E&M-EST. PATIENT-LVL III: CPT | Mod: PBBFAC,,, | Performed by: ORTHOPAEDIC SURGERY

## 2024-09-04 PROCEDURE — 73502 X-RAY EXAM HIP UNI 2-3 VIEWS: CPT | Mod: 26,LT,, | Performed by: RADIOLOGY

## 2024-09-04 PROCEDURE — 99024 POSTOP FOLLOW-UP VISIT: CPT | Mod: POP,,, | Performed by: ORTHOPAEDIC SURGERY

## 2024-09-04 PROCEDURE — 73502 X-RAY EXAM HIP UNI 2-3 VIEWS: CPT | Mod: TC,PO,LT

## 2024-09-04 PROCEDURE — 99213 OFFICE O/P EST LOW 20 MIN: CPT | Mod: PBBFAC,25,PO | Performed by: ORTHOPAEDIC SURGERY

## 2024-09-04 RX ORDER — ASPIRIN 325 MG
325 TABLET ORAL
COMMUNITY
Start: 2024-08-09

## 2024-09-04 NOTE — PROGRESS NOTES
Subjective:      Patient ID: Arleen Zarate is a 89 y.o. female.    Chief Complaint: Pain of the Left Hip and Pain and Post-op Evaluation of the Right Hip    HPI  Patient follow up status post bilateral hemiarthroplasty for femoral neck fractures.  Still has general aching pain.  Still getting home therapy twice weekly.  ROS      Objective:    Ortho Exam     Patient ambulates nicely with a walker  She has good active range of motion of both hips  Minimal incisional tenderness    X-Ray Hip 2 or 3 views Left with Pelvis when performed  Narrative: EXAMINATION:  XR HIP WITH PELVIS WHEN PERFORMED 2 OR 3 VIEWS LEFT    CLINICAL HISTORY:  Presence of left artificial hip joint    TECHNIQUE:  AP view of the pelvis and frog leg lateral view of the left hip were performed.    COMPARISON:  Hip and pelvic x-ray of July 24, 2024    FINDINGS:  The patient has undergone a left hip arthroplasty with metallic acetabular and femoral components in good position.  Fracture or dislocation is not seen.  Lucency around the prosthesis consistent with osteomyelitis or loosening is not noted.  Patient has also had a right hip arthroplasty with metallic acetabular and femoral components in good position.  Lucency around this prosthesis consistent with osteomyelitis or loosening is not seen.  The bones of the pelvis are intact.  Degenerative disc disease and DJD are noted at the lower lumbar spine.  Impression: Status post bilateral hip arthroplasties, no acute findings.  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    Electronically signed by: Oracio Sánchez MD  Date:    09/04/2024  Time:    11:09       My Radiographs Findings:    I have personally reviewed radiographs and concur with above findings    Assessment:       Encounter Diagnosis   Name Primary?    S/P hip replacement, left Yes         Plan:       I have discussed medical condition treatment options with her and her family the need for ongoing physical therapy.  She  may continue to weightbear as tolerated.  I have suggested that for her general aching pain in multiple body parts that they see their PCP P for consideration for some therapeutics for that.  Follow up with me in 2-3 months for repeat radiographs sooner if any questions or problems.        Past Medical History:   Diagnosis Date    Acute encephalopathy 7/25/2024    Anemia 3/1/2024    Anxiety     Arthritis     fingers and back    Depression     Disorder of kidney and ureter     Foot pain, left 9/18/2013    GERD (gastroesophageal reflux disease)     Hiatal hernia     Hyperlipidemia     Hypertension     Reflux     on meds    Tendon tear 8/21/2013    Tendonitis 8/21/2013     Past Surgical History:   Procedure Laterality Date    CHOLECYSTECTOMY      EYE SURGERY Bilateral     cataracts and implants    HEMIARTHROPLASTY OF HIP Right 3/1/2024    Procedure: HEMIARTHROPLASTY, HIP;  Surgeon: Wan Carmona MD;  Location: [facility];  Service: Orthopedics;  Laterality: Right;    HEMIARTHROPLASTY OF HIP Left 7/24/2024    Procedure: HEMIARTHROPLASTY, HIP;  Surgeon: Wan Carmona MD;  Location: [facility];  Service: Orthopedics;  Laterality: Left;    TONSILLECTOMY           Current Outpatient Medications:     aspirin 325 MG tablet, Take 325 mg by mouth., Disp: , Rfl:     acetaminophen (TYLENOL) 325 MG tablet, Take 650 mg by mouth nightly as needed for Pain or Temperature greater than., Disp: , Rfl:     calcium citrate (CALCITRATE) 200 mg (950 mg) tablet, Take 1 tablet by mouth 2 (two) times daily., Disp: , Rfl:     docusate sodium (COLACE) 100 MG capsule, Take 100 mg by mouth once daily., Disp: , Rfl:     famotidine (PEPCID) 40 MG tablet, Take 1 tablet (40 mg total) by mouth nightly as needed for Heartburn., Disp: 90 tablet, Rfl: 1    lisinopriL (PRINIVIL,ZESTRIL) 30 MG tablet, TAKE 1 TABLET EVERY EVENING (Patient taking differently: Take 30 mg by mouth once daily.), Disp: 90 tablet, Rfl: 2    melatonin (MELATIN) 3 mg  tablet, Take 3 tablets (9 mg total) by mouth nightly as needed for Insomnia. (Patient taking differently: Take 3 mg by mouth nightly as needed for Insomnia.), Disp: , Rfl: 0    multivitamin capsule, Take 1 capsule by mouth once daily., Disp: , Rfl:     omeprazole (PRILOSEC) 40 MG capsule, TAKE 1 CAPSULE DAILY (Patient taking differently: Take 40 mg by mouth once daily.), Disp: 90 capsule, Rfl: 3    Review of patient's allergies indicates:   Allergen Reactions    Buspar [buspirone] Other (See Comments)     Syncope and nausea     Doxy      Other reaction(s): Unknown    Effexor [venlafaxine] Other (See Comments)     shaking    Biaxin [clarithromycin] Other (See Comments)     Unknown    Codeine Other (See Comments)     Syncope, and nausea.    Statins-hmg-coa reductase inhibitors      myalgia    Doxycycline Other (See Comments)     Made very weak       Family History   Problem Relation Name Age of Onset    Hearing loss Son       Social History     Occupational History    Not on file   Tobacco Use    Smoking status: Never     Passive exposure: Never    Smokeless tobacco: Never   Substance and Sexual Activity    Alcohol use: Yes     Alcohol/week: 1.0 standard drink of alcohol     Types: 1 Glasses of wine per week     Comment: rarely    Drug use: No    Sexual activity: Not Currently

## 2024-09-06 ENCOUNTER — EXTERNAL HOME HEALTH (OUTPATIENT)
Dept: HOME HEALTH SERVICES | Facility: HOSPITAL | Age: 89
End: 2024-09-06
Payer: MEDICARE

## 2024-09-18 ENCOUNTER — TELEPHONE (OUTPATIENT)
Dept: FAMILY MEDICINE | Facility: CLINIC | Age: 89
End: 2024-09-18
Payer: MEDICARE

## 2024-09-18 NOTE — TELEPHONE ENCOUNTER
----- Message from Carolin Nugent sent at 9/18/2024  1:51 PM CDT -----  .Type:  Test Results    Who Called: Griselda - daughter in law     Name of Test (Lab/Mammo/Etc): Lab    Date of Test: 8/23/24    Ordering Provider: Sia Lopes    Where the test was performed: [facility], [facility]    Would the patient rather a call back or a response via [facility]? Call Back     Best Call Back Number: [phone]    Additional Information:      For Clinical Team:Has the provider reviewed the results?

## 2024-09-24 ENCOUNTER — OFFICE VISIT (OUTPATIENT)
Dept: PODIATRY | Facility: CLINIC | Age: 89
End: 2024-09-24
Payer: MEDICARE

## 2024-09-24 VITALS — BODY MASS INDEX: 26.4 KG/M2 | HEIGHT: 59 IN | WEIGHT: 130.94 LBS

## 2024-09-24 DIAGNOSIS — M79.674 GREAT TOE PAIN, RIGHT: Primary | ICD-10-CM

## 2024-09-24 DIAGNOSIS — L60.0 ONYCHOCRYPTOSIS: ICD-10-CM

## 2024-09-24 PROCEDURE — 99213 OFFICE O/P EST LOW 20 MIN: CPT | Mod: PBBFAC,PN

## 2024-09-24 PROCEDURE — 99999 PR PBB SHADOW E&M-EST. PATIENT-LVL III: CPT | Mod: PBBFAC,,,

## 2024-09-24 PROCEDURE — 99203 OFFICE O/P NEW LOW 30 MIN: CPT | Mod: S$PBB,,,

## 2024-09-24 NOTE — PROGRESS NOTES
"  [address] [facility] [address]  [place] [address]  Phone: [phone]   [phone]    Patient's PCP:Perry Jorge MD  Referring Provider: Dr. Andrea Waite    Subjective:      Chief Complaint:: Ingrown Toenail (RT 1st, medial and lateral sides, pain is increased on the top of the nail)    Ingrown Toenail  Associated symptoms include myalgias (right great toe pain). Pertinent negatives include no abdominal pain, arthralgias, chest pain, chills, coughing, fatigue, fever, headaches, joint swelling, nausea, neck pain, numbness, rash or weakness.     Arleen Zarate is a 89 y.o. female who presents today with a complaint of RT 1st, BL border with increased pain on the top of the nail. The current episode started 2 years.  The symptoms include sharp pain. Probable cause of complaint unknown.  The symptoms are aggravated by touch. The problem has stayed the same. Treatment to date have included triple antibiotic cream, wrap toe up which provided some relief.     Vitals:    09/24/24 1455   Weight: 59.4 kg (130 lb 15.3 oz)   Height: 4' 11" (1.499 m)   PainSc: 0-No pain      Shoe Size: 7    Past Surgical History:   Procedure Laterality Date    CHOLECYSTECTOMY      EYE SURGERY Bilateral     cataracts and implants    HEMIARTHROPLASTY OF HIP Right 3/1/2024    Procedure: HEMIARTHROPLASTY, HIP;  Surgeon: Wan Carmona MD;  Location: [facility] OR;  Service: Orthopedics;  Laterality: Right;    HEMIARTHROPLASTY OF HIP Left 7/24/2024    Procedure: HEMIARTHROPLASTY, HIP;  Surgeon: Wan Carmona MD;  Location: [facility] OR;  Service: Orthopedics;  Laterality: Left;    TONSILLECTOMY       Past Medical History:   Diagnosis Date    Acute encephalopathy 7/25/2024    Anemia 3/1/2024    Anxiety     Arthritis     fingers and back    Depression     Disorder of kidney and ureter     Foot pain, left 9/18/2013    GERD (gastroesophageal reflux disease)     Hiatal hernia     Hyperlipidemia     Hypertension     Reflux  "    on meds    Tendon tear 8/21/2013    Tendonitis 8/21/2013     Family History   Problem Relation Name Age of Onset    Hearing loss Son          Social History:   Marital Status:   Alcohol History:  reports current alcohol use of about 1.0 standard drink of alcohol per week.  Tobacco History:  reports that she has never smoked. She has never been exposed to tobacco smoke. She has never used smokeless tobacco.  Drug History:  reports no history of drug use.    Review of patient's allergies indicates:   Allergen Reactions    Buspar [buspirone] Other (See Comments)     Syncope and nausea     Doxy      Other reaction(s): Unknown    Effexor [venlafaxine] Other (See Comments)     shaking    Biaxin [clarithromycin] Other (See Comments)     Unknown    Codeine Other (See Comments)     Syncope, and nausea.    Statins-hmg-coa reductase inhibitors      myalgia    Doxycycline Other (See Comments)     Made very weak       Current Outpatient Medications   Medication Sig Dispense Refill    acetaminophen (TYLENOL) 325 MG tablet Take 650 mg by mouth nightly as needed for Pain or Temperature greater than.      aspirin 325 MG tablet Take 325 mg by mouth.      calcium citrate (CALCITRATE) 200 mg (950 mg) tablet Take 1 tablet by mouth 2 (two) times daily.      docusate sodium (COLACE) 100 MG capsule Take 100 mg by mouth once daily.      famotidine (PEPCID) 40 MG tablet Take 1 tablet (40 mg total) by mouth nightly as needed for Heartburn. 90 tablet 1    lisinopriL (PRINIVIL,ZESTRIL) 30 MG tablet TAKE 1 TABLET EVERY EVENING (Patient taking differently: Take 30 mg by mouth once daily.) 90 tablet 2    melatonin (MELATIN) 3 mg tablet Take 3 tablets (9 mg total) by mouth nightly as needed for Insomnia. (Patient taking differently: Take 3 mg by mouth nightly as needed for Insomnia.)  0    multivitamin capsule Take 1 capsule by mouth once daily.      omeprazole (PRILOSEC) 40 MG capsule TAKE 1 CAPSULE DAILY (Patient taking differently:  Take 40 mg by mouth once daily.) 90 capsule 3     No current facility-administered medications for this visit.       Review of Systems   Constitutional:  Negative for chills, fatigue, fever and unexpected weight change.   HENT:  Negative for hearing loss and trouble swallowing.    Eyes:  Negative for photophobia and visual disturbance.   Respiratory:  Negative for cough, shortness of breath and wheezing.    Cardiovascular:  Negative for chest pain, palpitations and leg swelling.   Gastrointestinal:  Negative for abdominal pain and nausea.   Genitourinary:  Negative for dysuria and frequency.   Musculoskeletal:  Positive for myalgias (right great toe pain). Negative for arthralgias, back pain, gait problem, joint swelling and neck pain.   Skin:  Negative for rash and wound.   Neurological:  Negative for tremors, seizures, weakness, numbness and headaches.   Hematological:  Does not bruise/bleed easily.   Psychiatric/Behavioral:  Negative for hallucinations.          Objective:        Physical Exam:   Foot Exam    General  General Appearance: appears stated age and healthy   Orientation: alert and oriented to person, place, and time   Affect: appropriate   Gait: antalgic       Right Foot/Ankle     Inspection and Palpation  Ecchymosis: none  Tenderness: (Great toe)  Swelling: (Great toe)  Arch: normal  Hammertoes: absent  Claw Toes: absent  Hallux valgus: no  Hallux limitus: no  Skin Exam: erythema; no drainage, no cellulitis and no ulcer   Neurovascular  Dorsalis pedis: 2+  Posterior tibial: 2+  Capillary Refill: 3+  Saphenous nerve sensation: normal  Tibial nerve sensation: normal  Superficial peroneal nerve sensation: normal  Deep peroneal nerve sensation: normal  Sural nerve sensation: normal    Muscle Strength  Ankle dorsiflexion: 5  Ankle plantar flexion: 5  Ankle inversion: 5  Ankle eversion: 5  Great toe extension: 5  Great toe flexion: 5    Range of Motion    Normal right ankle ROM    Tests  Calcaneal squeeze:  negative   PT Tinel's sign: negative    Valleix sign: negative  Comments  Right great toe:   -Incurvated Bilateral nail border  -Tenderness with palpation to the great toe Bilateral skin folds of the nail plate with associated erythema and edema    Left Foot/Ankle      Inspection and Palpation  Ecchymosis: none  Tenderness: none   Swelling: none   Arch: normal  Hammertoes: absent  Claw toes: absent  Hallux valgus: no  Hallux limitus: no  Skin Exam: skin intact; no drainage, no cellulitis and no ulcer   Neurovascular  Dorsalis pedis: 2+  Posterior tibial: 2+  Capillary refill: 3+  Saphenous nerve sensation: normal  Tibial nerve sensation: normal  Superficial peroneal nerve sensation: normal  Deep peroneal nerve sensation: normal  Sural nerve sensation: normal    Muscle Strength  Ankle dorsiflexion: 5  Ankle plantar flexion: 5  Ankle inversion: 5  Ankle eversion: 5  Great toe extension: 5  Great toe flexion: 5    Range of Motion    Normal left ankle ROM    Tests  Calcaneal squeeze: negative   PT Tinel's sign: negative  Valleix sign: negative      Physical Exam  Cardiovascular:      Pulses:           Dorsalis pedis pulses are 2+ on the right side and 2+ on the left side.        Posterior tibial pulses are 2+ on the right side and 2+ on the left side.   Musculoskeletal:      Right foot: No bunion.      Left foot: No bunion.   Feet:      Right foot:      Skin integrity: Erythema present. No ulcer.      Left foot:      Skin integrity: No ulcer.               Right Ankle/Foot Exam     Range of Motion   The patient has normal right ankle ROM.    Comments:  Right great toe:   -Incurvated Bilateral nail border  -Tenderness with palpation to the great toe Bilateral skin folds of the nail plate with associated erythema and edema    Left Ankle/Foot Exam     Range of Motion   The patient has normal left ankle ROM.       Muscle Strength   Right Lower Extremity   Ankle Dorsiflexion:  5   Plantar flexion:  5/5  Left Lower Extremity    Ankle Dorsiflexion:  5   Plantar flexion:  5/5     Vascular Exam     Right Pulses  Dorsalis Pedis:      2+  Posterior Tibial:      2+        Left Pulses  Dorsalis Pedis:      2+  Posterior Tibial:      2+             Assessment:       1. Great toe pain, right    2. Onychocryptosis      Plan:   Great toe pain, right    Onychocryptosis      I provided patient education verbally regarding: Patient diagnosis, treatment options, as well as alternatives, risks, and benefits.     Utilizing sterile toenail clippers I aggressively trimmed the offending nail borders Right bilateral, approximately 3 mm from its/their edge and carried the nail plate down at an angle in order to wedge out the offending cryptotic portion of the nail plate. The offending border was then removed in total. The area was cleansed with alcohol. Patient tolerated the procedure well and related significant relief. No wound or signs of infection noted to the area at this time.     Briefly discussed phenol matrixectomy of affected borders of affected toenail if routine trimming does not help improve the pain. Discussed alternatives, risks and complications of the procedure along with post procedure protocol and expectations. Patient will consider this if symptoms fail to improve with today's trimming.     This note was created using Dragon voice recognition software that occasionally misinterpreted phrases or words.     Follow up if symptoms worsen or fail to improve.        Nolberto Cottrell DPM  Podiatry / Foot and Ankle Surgery   Secure chat preferred

## 2024-09-27 ENCOUNTER — OFFICE VISIT (OUTPATIENT)
Dept: ORTHOPEDICS | Facility: CLINIC | Age: 89
End: 2024-09-27
Payer: MEDICARE

## 2024-09-27 VITALS — WEIGHT: 130.94 LBS | BODY MASS INDEX: 26.4 KG/M2 | HEIGHT: 59 IN

## 2024-09-27 DIAGNOSIS — M17.0 PRIMARY OSTEOARTHRITIS OF BOTH KNEES: Primary | ICD-10-CM

## 2024-09-27 PROCEDURE — 99999 PR PBB SHADOW E&M-EST. PATIENT-LVL III: CPT | Mod: PBBFAC,,, | Performed by: ORTHOPAEDIC SURGERY

## 2024-09-27 PROCEDURE — 99213 OFFICE O/P EST LOW 20 MIN: CPT | Mod: PBBFAC,PO | Performed by: ORTHOPAEDIC SURGERY

## 2024-09-27 RX ORDER — TRIAMCINOLONE ACETONIDE 40 MG/ML
40 INJECTION, SUSPENSION INTRA-ARTICULAR; INTRAMUSCULAR
Status: DISCONTINUED | OUTPATIENT
Start: 2024-09-27 | End: 2024-09-27 | Stop reason: HOSPADM

## 2024-09-27 RX ADMIN — TRIAMCINOLONE ACETONIDE 40 MG: 40 INJECTION, SUSPENSION INTRA-ARTICULAR; INTRAMUSCULAR at 10:09

## 2024-09-27 NOTE — PROCEDURES
Large Joint Aspiration/Injection: bilateral knee    Date/Time: 9/27/2024 10:45 AM    Performed by: Wan Carmona MD  Authorized by: Wan Carmona MD    Consent Done?:  Yes (Verbal)  Indications:  Pain  Site marked: the procedure site was marked    Timeout: prior to procedure the correct patient, procedure, and site was verified    Local anesthetic:  Lidocaine 1% without epinephrine and bupivacaine 0.25% without epinephrine  Anesthetic total (ml):  6      Details:  Needle Size:  20 G  Approach:  Anterolateral  Location:  Knee  Laterality:  Bilateral  Site:  Bilateral knee  Medications (Right):  40 mg triamcinolone acetonide 40 mg/mL  Medications (Left):  40 mg triamcinolone acetonide 40 mg/mL  Patient tolerance:  Patient tolerated the procedure well with no immediate complications

## 2024-09-27 NOTE — PROGRESS NOTES
Subjective:      Patient ID: Arleen Zarate is a 89 y.o. female.    Chief Complaint: Pain of the Left Knee and Pain of the Right Knee    HPI  Patient was in today with a long history of bilateral knee pain.  She feels like she is doing very well from the standpoint of the previous hip fracture but knees are quite limiting.  ROS      Objective:    Ortho Exam     Constitutional:   Patient is alert  and oriented in no acute distress  HEENT:  normocephalic atraumatic; PERRL EOMI  Neck:  Supple without adenopathy  Cardiovascular:  Normal rate and rhythm  Pulmonary:  Normal respiratory effort normal chest wall expansion  Abdominal:  Nonprotuberant nondistended  Musculoskeletal:  Patient has a steady very minimally antalgic gait using a walker  She has adequate knee range of motion bilaterally with crepitus and diffuse joint line tenderness  Neurological:  No focal defect; cranial nerves 2-12 grossly intact  Psychiatric/behavioral:  Mood and behavior normal      X-Ray Hip 2 or 3 views Left with Pelvis when performed  Narrative: EXAMINATION:  XR HIP WITH PELVIS WHEN PERFORMED 2 OR 3 VIEWS LEFT    CLINICAL HISTORY:  Presence of left artificial hip joint    TECHNIQUE:  AP view of the pelvis and frog leg lateral view of the left hip were performed.    COMPARISON:  Hip and pelvic x-ray of July 24, 2024    FINDINGS:  The patient has undergone a left hip arthroplasty with metallic acetabular and femoral components in good position.  Fracture or dislocation is not seen.  Lucency around the prosthesis consistent with osteomyelitis or loosening is not noted.  Patient has also had a right hip arthroplasty with metallic acetabular and femoral components in good position.  Lucency around this prosthesis consistent with osteomyelitis or loosening is not seen.  The bones of the pelvis are intact.  Degenerative disc disease and DJD are noted at the lower lumbar spine.  Impression: Status post bilateral hip arthroplasties, no acute  findings.  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    Electronically signed by: Oracio Sánchez MD  Date:    09/04/2024  Time:    11:09       My Radiographs Findings:    Radiographs of both knees were reviewed consistent with moderate to severe degenerative changes  Assessment:       Encounter Diagnosis   Name Primary?    Primary osteoarthritis of both knees Yes         Plan:       I have discussed medical condition treatment options with her at length.  After a verbal consent and sterile prep I injected both knees today without complication.  We have discussed general knee rehab exercises continue with rehab for her hip follow up can be as needed.  May be weight-bearing as tolerated        Past Medical History:   Diagnosis Date    Acute encephalopathy 7/25/2024    Anemia 3/1/2024    Anxiety     Arthritis     fingers and back    Depression     Disorder of kidney and ureter     Foot pain, left 9/18/2013    GERD (gastroesophageal reflux disease)     Hiatal hernia     Hyperlipidemia     Hypertension     Reflux     on meds    Tendon tear 8/21/2013    Tendonitis 8/21/2013     Past Surgical History:   Procedure Laterality Date    CHOLECYSTECTOMY      EYE SURGERY Bilateral     cataracts and implants    HEMIARTHROPLASTY OF HIP Right 3/1/2024    Procedure: HEMIARTHROPLASTY, HIP;  Surgeon: Wan Carmona MD;  Location: [facility];  Service: Orthopedics;  Laterality: Right;    HEMIARTHROPLASTY OF HIP Left 7/24/2024    Procedure: HEMIARTHROPLASTY, HIP;  Surgeon: Wan Carmona MD;  Location: [facility];  Service: Orthopedics;  Laterality: Left;    TONSILLECTOMY           Current Outpatient Medications:     acetaminophen (TYLENOL) 325 MG tablet, Take 650 mg by mouth nightly as needed for Pain or Temperature greater than., Disp: , Rfl:     aspirin 325 MG tablet, Take 325 mg by mouth., Disp: , Rfl:     calcium citrate (CALCITRATE) 200 mg (950 mg) tablet, Take 1 tablet by mouth 2 (two) times  daily., Disp: , Rfl:     docusate sodium (COLACE) 100 MG capsule, Take 100 mg by mouth once daily., Disp: , Rfl:     famotidine (PEPCID) 40 MG tablet, Take 1 tablet (40 mg total) by mouth nightly as needed for Heartburn., Disp: 90 tablet, Rfl: 1    lisinopriL (PRINIVIL,ZESTRIL) 30 MG tablet, TAKE 1 TABLET EVERY EVENING (Patient taking differently: Take 30 mg by mouth once daily.), Disp: 90 tablet, Rfl: 2    melatonin (MELATIN) 3 mg tablet, Take 3 tablets (9 mg total) by mouth nightly as needed for Insomnia. (Patient taking differently: Take 3 mg by mouth nightly as needed for Insomnia.), Disp: , Rfl: 0    multivitamin capsule, Take 1 capsule by mouth once daily., Disp: , Rfl:     omeprazole (PRILOSEC) 40 MG capsule, TAKE 1 CAPSULE DAILY (Patient taking differently: Take 40 mg by mouth once daily.), Disp: 90 capsule, Rfl: 3    Review of patient's allergies indicates:   Allergen Reactions    Buspar [buspirone] Other (See Comments)     Syncope and nausea     Doxy      Other reaction(s): Unknown    Effexor [venlafaxine] Other (See Comments)     shaking    Biaxin [clarithromycin] Other (See Comments)     Unknown    Codeine Other (See Comments)     Syncope, and nausea.    Statins-hmg-coa reductase inhibitors      myalgia    Doxycycline Other (See Comments)     Made very weak       Family History   Problem Relation Name Age of Onset    Hearing loss Son       Social History     Occupational History    Not on file   Tobacco Use    Smoking status: Never     Passive exposure: Never    Smokeless tobacco: Never   Substance and Sexual Activity    Alcohol use: Yes     Alcohol/week: 1.0 standard drink of alcohol     Types: 1 Glasses of wine per week     Comment: rarely    Drug use: No    Sexual activity: Not Currently

## 2024-10-22 ENCOUNTER — EXTERNAL HOME HEALTH (OUTPATIENT)
Dept: HOME HEALTH SERVICES | Facility: HOSPITAL | Age: 89
End: 2024-10-22
Payer: MEDICARE

## 2024-10-22 ENCOUNTER — OFFICE VISIT (OUTPATIENT)
Dept: FAMILY MEDICINE | Facility: CLINIC | Age: 89
End: 2024-10-22
Payer: MEDICARE

## 2024-10-22 ENCOUNTER — LAB VISIT (OUTPATIENT)
Dept: LAB | Facility: HOSPITAL | Age: 89
End: 2024-10-22
Payer: MEDICARE

## 2024-10-22 VITALS
DIASTOLIC BLOOD PRESSURE: 62 MMHG | HEIGHT: 59 IN | TEMPERATURE: 98 F | RESPIRATION RATE: 16 BRPM | OXYGEN SATURATION: 95 % | SYSTOLIC BLOOD PRESSURE: 138 MMHG | HEART RATE: 91 BPM | WEIGHT: 132.25 LBS | BODY MASS INDEX: 26.66 KG/M2

## 2024-10-22 DIAGNOSIS — E55.9 HYPOVITAMINOSIS D: ICD-10-CM

## 2024-10-22 DIAGNOSIS — M25.562 CHRONIC PAIN OF BOTH KNEES: ICD-10-CM

## 2024-10-22 DIAGNOSIS — G89.29 CHRONIC PAIN OF BOTH KNEES: ICD-10-CM

## 2024-10-22 DIAGNOSIS — D64.9 MILD ANEMIA: ICD-10-CM

## 2024-10-22 DIAGNOSIS — M81.0 OSTEOPOROSIS, UNSPECIFIED OSTEOPOROSIS TYPE, UNSPECIFIED PATHOLOGICAL FRACTURE PRESENCE: ICD-10-CM

## 2024-10-22 DIAGNOSIS — M25.561 CHRONIC PAIN OF BOTH KNEES: ICD-10-CM

## 2024-10-22 DIAGNOSIS — N18.31 STAGE 3A CHRONIC KIDNEY DISEASE: Primary | ICD-10-CM

## 2024-10-22 DIAGNOSIS — Z23 ENCOUNTER FOR IMMUNIZATION: ICD-10-CM

## 2024-10-22 DIAGNOSIS — I10 ESSENTIAL HYPERTENSION: ICD-10-CM

## 2024-10-22 DIAGNOSIS — N18.31 STAGE 3A CHRONIC KIDNEY DISEASE: ICD-10-CM

## 2024-10-22 LAB
ALBUMIN SERPL BCP-MCNC: 3.6 G/DL (ref 3.5–5.2)
ALP SERPL-CCNC: 119 U/L (ref 40–150)
ALT SERPL W/O P-5'-P-CCNC: 12 U/L (ref 10–44)
ANION GAP SERPL CALC-SCNC: 7 MMOL/L (ref 8–16)
AST SERPL-CCNC: 16 U/L (ref 10–40)
BASOPHILS # BLD AUTO: 0.05 K/UL (ref 0–0.2)
BASOPHILS NFR BLD: 0.7 % (ref 0–1.9)
BILIRUB SERPL-MCNC: 0.2 MG/DL (ref 0.1–1)
BUN SERPL-MCNC: 28 MG/DL (ref 10–30)
CALCIUM SERPL-MCNC: 9.5 MG/DL (ref 8.7–10.5)
CHLORIDE SERPL-SCNC: 105 MMOL/L (ref 95–110)
CO2 SERPL-SCNC: 28 MMOL/L (ref 23–29)
CREAT SERPL-MCNC: 1.1 MG/DL (ref 0.5–1.4)
DIFFERENTIAL METHOD BLD: ABNORMAL
EOSINOPHIL # BLD AUTO: 0.1 K/UL (ref 0–0.5)
EOSINOPHIL NFR BLD: 1.3 % (ref 0–8)
ERYTHROCYTE [DISTWIDTH] IN BLOOD BY AUTOMATED COUNT: 13.9 % (ref 11.5–14.5)
EST. GFR  (NO RACE VARIABLE): 45.1 ML/MIN/1.73 M^2
GLUCOSE SERPL-MCNC: 84 MG/DL (ref 70–110)
HCT VFR BLD AUTO: 31.8 % (ref 37–48.5)
HGB BLD-MCNC: 9.9 G/DL (ref 12–16)
IMM GRANULOCYTES # BLD AUTO: 0.02 K/UL (ref 0–0.04)
IMM GRANULOCYTES NFR BLD AUTO: 0.3 % (ref 0–0.5)
LYMPHOCYTES # BLD AUTO: 1.8 K/UL (ref 1–4.8)
LYMPHOCYTES NFR BLD: 24.2 % (ref 18–48)
MCH RBC QN AUTO: 28.7 PG (ref 27–31)
MCHC RBC AUTO-ENTMCNC: 31.1 G/DL (ref 32–36)
MCV RBC AUTO: 92 FL (ref 82–98)
MONOCYTES # BLD AUTO: 0.8 K/UL (ref 0.3–1)
MONOCYTES NFR BLD: 10.5 % (ref 4–15)
NEUTROPHILS # BLD AUTO: 4.8 K/UL (ref 1.8–7.7)
NEUTROPHILS NFR BLD: 63 % (ref 38–73)
NRBC BLD-RTO: 0 /100 WBC
PLATELET # BLD AUTO: 180 K/UL (ref 150–450)
PMV BLD AUTO: 11.1 FL (ref 9.2–12.9)
POTASSIUM SERPL-SCNC: 4.5 MMOL/L (ref 3.5–5.1)
PROT SERPL-MCNC: 6.4 G/DL (ref 6–8.4)
RBC # BLD AUTO: 3.45 M/UL (ref 4–5.4)
SODIUM SERPL-SCNC: 140 MMOL/L (ref 136–145)
WBC # BLD AUTO: 7.53 K/UL (ref 3.9–12.7)

## 2024-10-22 PROCEDURE — 80053 COMPREHEN METABOLIC PANEL: CPT

## 2024-10-22 PROCEDURE — 36415 COLL VENOUS BLD VENIPUNCTURE: CPT | Mod: PO

## 2024-10-22 PROCEDURE — 99214 OFFICE O/P EST MOD 30 MIN: CPT | Mod: S$PBB,,,

## 2024-10-22 PROCEDURE — 85025 COMPLETE CBC W/AUTO DIFF WBC: CPT

## 2024-10-22 PROCEDURE — 90653 IIV ADJUVANT VACCINE IM: CPT | Mod: PBBFAC,PO

## 2024-10-22 PROCEDURE — 99214 OFFICE O/P EST MOD 30 MIN: CPT | Mod: PBBFAC,PO,25

## 2024-10-22 PROCEDURE — G0008 ADMIN INFLUENZA VIRUS VAC: HCPCS | Mod: PBBFAC,PO

## 2024-10-22 PROCEDURE — 99999PBSHW PR PBB SHADOW TECHNICAL ONLY FILED TO HB: Mod: PBBFAC,,,

## 2024-10-22 PROCEDURE — 99999 PR PBB SHADOW E&M-EST. PATIENT-LVL IV: CPT | Mod: PBBFAC,,,

## 2024-10-22 RX ADMIN — INFLUENZA A VIRUS A/VICTORIA/4897/2022 IVR-238 (H1N1) ANTIGEN (FORMALDEHYDE INACTIVATED), INFLUENZA A VIRUS A/THAILAND/8/2022 IVR-237 (H3N2) ANTIGEN (FORMALDEHYDE INACTIVATED), INFLUENZA B VIRUS B/AUSTRIA/1359417/2021 BVR-26 ANTIGEN (FORMALDEHYDE INACTIVATED) 0.5 ML: 15; 15; 15 INJECTION, SUSPENSION INTRAMUSCULAR at 02:10

## 2024-10-22 NOTE — PROGRESS NOTES
Subjective:       Patient ID:  [id number]     Chief Complaint: 6 month follow-up      History of Present Illness      Ms. Zarate presents today for follow-up on multiple health concerns.     She reports knee pain due to arthritis, which varies in intensity. She uses an elastic strap for support and manages pain with Tylenol for arthritis and Voltaren gel. She has received injections in the past but notes they do not provide long-lasting relief. She denies taking prescription pain medications offered by her doctor.     She experienced two hip fractures this year, in February and July, which caused significant pain. Her mobility fluctuates daily. For precautionary measures, she uses a walker intermittently, even on days when she feels she could manage without it, expressing concern about falling and wanting to avoid further hip fractures. She reports HH is visiting every other week.  She takes lisinopril for blood pressure management.  She also takes calcium and vitamin D supplements. For GI issues, she takes omeprazole in the morning and Pepcid at night.      Overall, patient reports that she was doing well.  No other concerns today.    Past Medical History:   Diagnosis Date    Acute encephalopathy 7/25/2024    Anemia 3/1/2024    Anxiety     Arthritis     fingers and back    Depression     Disorder of kidney and ureter     Foot pain, left 9/18/2013    GERD (gastroesophageal reflux disease)     Hiatal hernia     Hyperlipidemia     Hypertension     Reflux     on meds    Tendon tear 8/21/2013    Tendonitis 8/21/2013      Active Problem List with Overview Notes    Diagnosis Date Noted    Acute encephalopathy 07/25/2024    Unresponsive episode 07/25/2024    Closed fracture of neck of left femur 07/21/2024    Anemia 03/01/2024    Thrombocytopenia 03/01/2024    Closed fracture of neck of right femur 02/28/2024    ANYA (generalized anxiety disorder) 11/04/2021    Primary osteoarthritis involving multiple joints 10/21/2019     Osteopenia determined by x-ray 10/21/2019    Varicose vein of leg 08/22/2018    Atherosclerosis of aorta 08/22/2018    Diverticulosis 06/16/2017    Carpal tunnel syndrome, bilateral 06/16/2017    Hypovitaminosis D 05/11/2016    Mixed hyperlipidemia 03/23/2015    Anxiety 03/23/2015    Lumbar spondylosis 10/31/2014    DDD (degenerative disc disease), lumbar 07/15/2014    Nocturnal muscle spasm 07/15/2013     In feet. Also evaluated by podiatry      Essential hypertension 03/28/2012    Gastroesophageal reflux disease 03/28/2012    Chronic seasonal allergic rhinitis 03/28/2012      Review of patient's allergies indicates:   Allergen Reactions    Buspar [buspirone] Other (See Comments)     Syncope and nausea     Doxy      Other reaction(s): Unknown    Effexor [venlafaxine] Other (See Comments)     shaking    Biaxin [clarithromycin] Other (See Comments)     Unknown    Codeine Other (See Comments)     Syncope, and nausea.    Statins-hmg-coa reductase inhibitors      myalgia    Doxycycline Other (See Comments)     Made very weak         Current Outpatient Medications:     acetaminophen (TYLENOL) 325 MG tablet, Take 650 mg by mouth nightly as needed for Pain or Temperature greater than., Disp: , Rfl:     aspirin 325 MG tablet, Take 325 mg by mouth., Disp: , Rfl:     calcium citrate (CALCITRATE) 200 mg (950 mg) tablet, Take 1 tablet by mouth 2 (two) times daily., Disp: , Rfl:     famotidine (PEPCID) 40 MG tablet, Take 1 tablet (40 mg total) by mouth nightly as needed for Heartburn., Disp: 90 tablet, Rfl: 1    lisinopriL (PRINIVIL,ZESTRIL) 30 MG tablet, TAKE 1 TABLET EVERY EVENING (Patient taking differently: Take 30 mg by mouth once daily.), Disp: 90 tablet, Rfl: 2    melatonin (MELATIN) 3 mg tablet, Take 3 tablets (9 mg total) by mouth nightly as needed for Insomnia. (Patient taking differently: Take 3 mg by mouth nightly as needed for Insomnia.), Disp: , Rfl: 0    multivitamin capsule, Take 1 capsule by mouth once daily.,  Disp: , Rfl:     omeprazole (PRILOSEC) 40 MG capsule, TAKE 1 CAPSULE DAILY (Patient taking differently: Take 40 mg by mouth once daily.), Disp: 90 capsule, Rfl: 3    docusate sodium (COLACE) 100 MG capsule, Take 100 mg by mouth once daily. (Patient not taking: Reported on 10/22/2024), Disp: , Rfl:     Current Facility-Administered Medications:     influenza (adjuvanted) (Fluad) 45 mcg/0.5 mL IM vaccine (> or = 66 yo) 0.5 mL, 0.5 mL, Intramuscular, 1 time in Clinic/HOD, Octavia Bell PA-C    Lab Results   Component Value Date    WBC 8.30 08/23/2024    HGB 9.7 (L) 08/23/2024    HCT 31.7 (L) 08/23/2024     08/23/2024    CHOL 192 05/02/2024    TRIG 163 (H) 05/02/2024    HDL 51 05/02/2024    ALT 8 (L) 08/23/2024    AST 16 08/23/2024     08/23/2024    K 4.7 08/23/2024     08/23/2024    CREATININE 1.0 08/23/2024    BUN 22 08/23/2024    CO2 27 08/23/2024    TSH 0.686 07/25/2024    INR 1.0 02/28/2024    HGBA1C 5.2 04/12/2023       Review of Systems   Constitutional:  Negative for fatigue and fever.   HENT: Negative.  Negative for congestion, sneezing and sore throat.    Eyes: Negative.    Respiratory:  Negative for cough, shortness of breath and wheezing.    Cardiovascular:  Negative for chest pain, palpitations and leg swelling.   Gastrointestinal:  Negative for abdominal pain, nausea and vomiting.   Genitourinary: Negative.    Musculoskeletal:  Positive for arthralgias.   Skin: Negative.  Negative for rash.   Neurological:  Negative for dizziness, weakness, light-headedness, numbness and headaches.   Hematological: Negative.    Psychiatric/Behavioral: Negative.         Objective:      Physical Exam  Constitutional:       Appearance: Normal appearance.   HENT:      Head: Normocephalic and atraumatic.      Nose: Nose normal.   Eyes:      Extraocular Movements: Extraocular movements intact.   Cardiovascular:      Rate and Rhythm: Normal rate and regular rhythm.   Pulmonary:      Effort: Pulmonary  effort is normal.      Breath sounds: Normal breath sounds.   Musculoskeletal:         General: Normal range of motion.      Cervical back: Normal range of motion.   Skin:     General: Skin is warm and dry.   Neurological:      General: No focal deficit present.      Mental Status: She is alert and oriented to person, place, and time.   Psychiatric:         Mood and Affect: Mood normal.         Assessment:       1. Stage 3a chronic kidney disease    2. Mild anemia    3. Essential hypertension    4. Hypovitaminosis D    5. Osteoporosis, unspecified osteoporosis type, unspecified pathological fracture presence    6. Chronic pain of both knees    7. Encounter for immunization        Plan:       Arleen was seen today for 6 month follow-up.    Diagnoses and all orders for this visit:    Stage 3a chronic kidney disease  -     COMPREHENSIVE METABOLIC PANEL; Future    Mild anemia  -     CBC W/ AUTO DIFFERENTIAL; Future    Essential hypertension  - well controlled  - continue current medication regimen   - discussed dash diet, exercise/weight loss, and increased cardiovascular exercise   - monitor BP at home w/ goal <140/90    Hypovitaminosis D  Continue vitamin D and calcium supp    Osteoporosis, unspecified osteoporosis type, unspecified pathological fracture presence  Continue vitamin D and calcium supp    Chronic pain of both knees  - Ms. Zarate to continue using ice or heat as needed for arthritis pain relief.  - Continued Tylenol for arthritis as needed, up to 2 tablets when pain is severe.  - Continued Voltaren gel (diclofenac) for topical pain relief.    Encounter for immunization  -     influenza (adjuvanted) (Fluad) 45 mcg/0.5 mL IM vaccine (> or = 64 yo) 0.5 mL           - Follow up with Dr. oJrge in April 2025 (about 6 months).        Future Appointments       Date Provider Specialty Appt Notes    4/22/2025 Perry Jorge MD Family Medicine annual               Portions of this note were dictated using  voice recognition software and may contain dictation related errors in spelling / grammar / syntax not discovered on text review.     Octavia Bell PA-C

## 2024-10-23 DIAGNOSIS — N18.31 STAGE 3A CHRONIC KIDNEY DISEASE: Primary | ICD-10-CM

## 2024-11-12 ENCOUNTER — TELEPHONE (OUTPATIENT)
Dept: FAMILY MEDICINE | Facility: CLINIC | Age: 89
End: 2024-11-12
Payer: MEDICARE

## 2024-11-12 NOTE — TELEPHONE ENCOUNTER
----- Message from Lynsey sent at 11/12/2024  8:56 AM CST -----  Contact: Jami with [facility]  Type:  Needs Medical Advice    Who Called:   Jami from [facility]    Would the patient rather a call back or a response via [facility]?   Call back  Best Call Back Number:    [phone] - Jami    Additional Information:   States patient would like to know if she can either go down on her aspirin to the 81 mg or be taken off of it completely - states she bruises really easy - please call - thank you

## 2024-12-06 ENCOUNTER — OFFICE VISIT (OUTPATIENT)
Dept: ORTHOPEDICS | Facility: CLINIC | Age: 89
End: 2024-12-06
Payer: MEDICARE

## 2024-12-06 VITALS — HEIGHT: 59 IN | BODY MASS INDEX: 26.61 KG/M2 | WEIGHT: 132 LBS

## 2024-12-06 DIAGNOSIS — M17.0 BILATERAL PRIMARY OSTEOARTHRITIS OF KNEE: Primary | ICD-10-CM

## 2024-12-06 PROCEDURE — 99213 OFFICE O/P EST LOW 20 MIN: CPT | Mod: PBBFAC,PO | Performed by: ORTHOPAEDIC SURGERY

## 2024-12-06 PROCEDURE — 99999 PR PBB SHADOW E&M-EST. PATIENT-LVL III: CPT | Mod: PBBFAC,,, | Performed by: ORTHOPAEDIC SURGERY

## 2024-12-06 NOTE — PROGRESS NOTES
Subjective:      Patient ID: Arleen Zarate is a 89 y.o. female.    Chief Complaint: Pain of the Left Knee and Pain of the Right Knee    HPI  The patient is in today for follow up on bilateral hip and bilateral knee pain.  She feels like she is doing very well and feels very blessed to be as active as she is at her age.  She states it she can ambulate without pain without her walker but she feels more comfortable with it and uses it except occasionally uses a cane in her home.  ROS      Objective:    Ortho Exam     Constitutional:   Patient is alert  and oriented in no acute distress  HEENT:  normocephalic atraumatic; PERRL EOMI  Neck:  Supple without adenopathy  Cardiovascular:  Normal rate and rhythm  Pulmonary:  Normal respiratory effort normal chest wall expansion  Abdominal:  Nonprotuberant nondistended  Musculoskeletal:  Patient appears both comfortable unsteady ambulating with a walker today.  Neurological:  No focal defect; cranial nerves 2-12 grossly intact  Psychiatric/behavioral:  Mood and behavior normal      X-Ray Hip 2 or 3 views Left with Pelvis when performed  Narrative: EXAMINATION:  XR HIP WITH PELVIS WHEN PERFORMED 2 OR 3 VIEWS LEFT    CLINICAL HISTORY:  Presence of left artificial hip joint    TECHNIQUE:  AP view of the pelvis and frog leg lateral view of the left hip were performed.    COMPARISON:  Hip and pelvic x-ray of July 24, 2024    FINDINGS:  The patient has undergone a left hip arthroplasty with metallic acetabular and femoral components in good position.  Fracture or dislocation is not seen.  Lucency around the prosthesis consistent with osteomyelitis or loosening is not noted.  Patient has also had a right hip arthroplasty with metallic acetabular and femoral components in good position.  Lucency around this prosthesis consistent with osteomyelitis or loosening is not seen.  The bones of the pelvis are intact.  Degenerative disc disease and DJD are noted at the lower lumbar  spine.  Impression: Status post bilateral hip arthroplasties, no acute findings.  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    Electronically signed by: Oracio Sánchez MD  Date:    09/04/2024  Time:    11:09       My Radiographs Findings:    No new radiographs  Assessment:       Encounter Diagnosis   Name Primary?    Bilateral primary osteoarthritis of knee Yes         Plan:       Patient is doing quite well at this point.  She may follow up as needed.        Past Medical History:   Diagnosis Date    Acute encephalopathy 7/25/2024    Anemia 3/1/2024    Anxiety     Arthritis     fingers and back    Depression     Disorder of kidney and ureter     Foot pain, left 9/18/2013    GERD (gastroesophageal reflux disease)     Hiatal hernia     Hyperlipidemia     Hypertension     Reflux     on meds    Tendon tear 8/21/2013    Tendonitis 8/21/2013     Past Surgical History:   Procedure Laterality Date    CHOLECYSTECTOMY      EYE SURGERY Bilateral     cataracts and implants    HEMIARTHROPLASTY OF HIP Right 3/1/2024    Procedure: HEMIARTHROPLASTY, HIP;  Surgeon: Wan Carmona MD;  Location: [facility];  Service: Orthopedics;  Laterality: Right;    HEMIARTHROPLASTY OF HIP Left 7/24/2024    Procedure: HEMIARTHROPLASTY, HIP;  Surgeon: Wan Carmona MD;  Location: [facility];  Service: Orthopedics;  Laterality: Left;    TONSILLECTOMY           Current Outpatient Medications:     acetaminophen (TYLENOL) 325 MG tablet, Take 650 mg by mouth nightly as needed for Pain or Temperature greater than., Disp: , Rfl:     aspirin 325 MG tablet, Take 325 mg by mouth., Disp: , Rfl:     calcium citrate (CALCITRATE) 200 mg (950 mg) tablet, Take 1 tablet by mouth 2 (two) times daily., Disp: , Rfl:     famotidine (PEPCID) 40 MG tablet, Take 1 tablet (40 mg total) by mouth nightly as needed for Heartburn., Disp: 90 tablet, Rfl: 1    lisinopriL (PRINIVIL,ZESTRIL) 30 MG tablet, TAKE 1 TABLET EVERY EVENING (Patient  taking differently: Take 30 mg by mouth once daily.), Disp: 90 tablet, Rfl: 2    melatonin (MELATIN) 3 mg tablet, Take 3 tablets (9 mg total) by mouth nightly as needed for Insomnia., Disp: , Rfl: 0    multivitamin capsule, Take 1 capsule by mouth once daily., Disp: , Rfl:     omeprazole (PRILOSEC) 40 MG capsule, TAKE 1 CAPSULE DAILY (Patient taking differently: Take 40 mg by mouth once daily.), Disp: 90 capsule, Rfl: 3    docusate sodium (COLACE) 100 MG capsule, Take 100 mg by mouth once daily., Disp: , Rfl:     Review of patient's allergies indicates:   Allergen Reactions    Buspar [buspirone] Other (See Comments)     Syncope and nausea     Doxy      Other reaction(s): Unknown    Effexor [venlafaxine] Other (See Comments)     shaking    Biaxin [clarithromycin] Other (See Comments)     Unknown    Codeine Other (See Comments)     Syncope, and nausea.    Statins-hmg-coa reductase inhibitors      myalgia    Doxycycline Other (See Comments)     Made very weak       Family History   Problem Relation Name Age of Onset    Hearing loss Son       Social History     Occupational History    Not on file   Tobacco Use    Smoking status: Never     Passive exposure: Never    Smokeless tobacco: Never   Substance and Sexual Activity    Alcohol use: Yes     Alcohol/week: 1.0 standard drink of alcohol     Types: 1 Glasses of wine per week     Comment: rarely    Drug use: No    Sexual activity: Not Currently

## 2025-01-31 ENCOUNTER — OFFICE VISIT (OUTPATIENT)
Dept: FAMILY MEDICINE | Facility: CLINIC | Age: OVER 89
End: 2025-01-31
Payer: MEDICARE

## 2025-01-31 ENCOUNTER — LAB VISIT (OUTPATIENT)
Dept: LAB | Facility: HOSPITAL | Age: OVER 89
End: 2025-01-31
Payer: MEDICARE

## 2025-01-31 VITALS
WEIGHT: 133.38 LBS | TEMPERATURE: 98 F | BODY MASS INDEX: 26.89 KG/M2 | HEART RATE: 86 BPM | HEIGHT: 59 IN | OXYGEN SATURATION: 99 %

## 2025-01-31 DIAGNOSIS — R26.9 ABNORMALITY OF GAIT AND MOBILITY: ICD-10-CM

## 2025-01-31 DIAGNOSIS — Z99.89 DEPENDENCE ON OTHER ENABLING MACHINES AND DEVICES: ICD-10-CM

## 2025-01-31 DIAGNOSIS — D69.2 NONTHROMBOCYTOPENIC PURPURA: ICD-10-CM

## 2025-01-31 DIAGNOSIS — H91.90 HEARING LOSS, UNSPECIFIED HEARING LOSS TYPE, UNSPECIFIED LATERALITY: ICD-10-CM

## 2025-01-31 DIAGNOSIS — Z74.09 OTHER REDUCED MOBILITY: ICD-10-CM

## 2025-01-31 DIAGNOSIS — I70.0 ATHEROSCLEROSIS OF AORTA: ICD-10-CM

## 2025-01-31 DIAGNOSIS — N18.31 STAGE 3A CHRONIC KIDNEY DISEASE: ICD-10-CM

## 2025-01-31 DIAGNOSIS — Z00.00 ENCOUNTER FOR PREVENTIVE HEALTH EXAMINATION: Primary | ICD-10-CM

## 2025-01-31 PROBLEM — D69.6 THROMBOCYTOPENIA: Status: RESOLVED | Noted: 2024-03-01 | Resolved: 2025-01-31

## 2025-01-31 LAB
ANION GAP SERPL CALC-SCNC: 7 MMOL/L (ref 8–16)
BUN SERPL-MCNC: 22 MG/DL (ref 10–30)
CALCIUM SERPL-MCNC: 9.2 MG/DL (ref 8.7–10.5)
CHLORIDE SERPL-SCNC: 108 MMOL/L (ref 95–110)
CO2 SERPL-SCNC: 27 MMOL/L (ref 23–29)
CREAT SERPL-MCNC: 1 MG/DL (ref 0.5–1.4)
EST. GFR  (NO RACE VARIABLE): 50.6 ML/MIN/1.73 M^2
GLUCOSE SERPL-MCNC: 92 MG/DL (ref 70–110)
POTASSIUM SERPL-SCNC: 4.7 MMOL/L (ref 3.5–5.1)
SODIUM SERPL-SCNC: 142 MMOL/L (ref 136–145)

## 2025-01-31 PROCEDURE — 99214 OFFICE O/P EST MOD 30 MIN: CPT | Mod: PBBFAC,PO | Performed by: NURSE PRACTITIONER

## 2025-01-31 PROCEDURE — G0439 PPPS, SUBSEQ VISIT: HCPCS | Mod: ,,, | Performed by: NURSE PRACTITIONER

## 2025-01-31 PROCEDURE — 80048 BASIC METABOLIC PNL TOTAL CA: CPT

## 2025-01-31 PROCEDURE — 99999 PR PBB SHADOW E&M-EST. PATIENT-LVL IV: CPT | Mod: PBBFAC,,, | Performed by: NURSE PRACTITIONER

## 2025-01-31 PROCEDURE — 36415 COLL VENOUS BLD VENIPUNCTURE: CPT | Mod: PO

## 2025-01-31 NOTE — PROGRESS NOTES
"  Arleen Zarate presented for a  Medicare AWV and comprehensive Health Risk Assessment today. The following components were reviewed and updated:    Medical history  Family History  Social history  Allergies and Current Medications  Health Risk Assessment  Health Maintenance  Care Team         ** See Completed Assessments for Annual Wellness Visit within the encounter summary.**         The following assessments were completed:  Living Situation  CAGE  Depression Screening  Timed Get Up and Go  Whisper Test  Cognitive Function Screening  Nutrition Screening  ADL Screening  PAQ Screening    Clock in media   Opioid documentation:      Patient does not have a current opioid prescription.        Vitals:    01/31/25 1103   Pulse: 86   Temp: 97.8 °F (36.6 °C)   TempSrc: Oral   SpO2: 99%   Weight: 60.5 kg (133 lb 6.1 oz)   Height: 4' 11" (1.499 m)     Body mass index is 26.94 kg/m².  Physical Exam  Constitutional:       Appearance: She is well-developed.   HENT:      Head: Normocephalic and atraumatic.      Nose: Nose normal.   Eyes:      General: Lids are normal.      Conjunctiva/sclera: Conjunctivae normal.   Cardiovascular:      Rate and Rhythm: Normal rate.   Pulmonary:      Effort: Pulmonary effort is normal.   Neurological:      Mental Status: She is alert and oriented to person, place, and time.   Psychiatric:         Speech: Speech normal.         Behavior: Behavior normal.               Diagnoses and health risks identified today and associated recommendations/orders:    1. Encounter for preventive health examination  Discussed health maintenance guidelines appropriate for age.        2. Hearing loss, unspecified hearing loss type, unspecified laterality    - Ambulatory referral/consult to Audiology; Future  - Ambulatory referral/consult to ENT; Future    3. Dependence on other enabling machines and devices  Stable, continue current use     4. Abnormality of gait and mobility  Stable, continue use of assistive " device     5. Other reduced mobility  Stable, continue use of assistive device     6. Atherosclerosis of aorta  Stable, continue to monitor  Followed by pcp     7. Nonthrombocytopenic purpura  Stable, continue to monitor       Provided Arleen with a 5-10 year written screening schedule and personal prevention plan. Recommendations were developed using the USPSTF age appropriate recommendations. Education, counseling, and referrals were provided as needed. After Visit Summary printed and given to patient which includes a list of additional screenings\tests needed.    Follow up for One year for Annual Wellness Visit.    Patricia Munoz NP      I offered to discuss advanced care planning, including how to pick a person who would make decisions for you if you were unable to make them for yourself, called a health care power of , and what kind of decisions you might make such as use of life sustaining treatments such as ventilators and tube feeding when faced with a life limiting illness recorded on a living will that they will need to know. (How you want to be cared for as you near the end of your natural life)     X  Patient has advanced directive written but has opted not to place them on file with the institution.

## 2025-01-31 NOTE — PATIENT INSTRUCTIONS
Counseling and Referral of Other Preventative  (Italic type indicates deductible and co-insurance are waived)    Patient Name: Arleen Zarate  Today's Date: 1/31/2025    Health Maintenance       Date Due Completion Date    TETANUS VACCINE Never done ---    Shingles Vaccine (1 of 2) Never done ---    Pneumococcal Vaccines (Age 50+) (1 of 1 - PCV) Never done ---    RSV Vaccine (Age 60+ and Pregnant patients) (1 - 1-dose 75+ series) Never done ---    DEXA Scan 05/02/2026 5/2/2024    Lipid Panel 05/02/2029 5/2/2024        Orders Placed This Encounter   Procedures    Ambulatory referral/consult to Audiology    Ambulatory referral/consult to ENT     The following information is provided to all patients.  This information is to help you find resources for any of the problems found today that may be affecting your health:                  Living healthy guide: www.[email or web address].[place].[facility]      Understanding Diabetes: www.diabetes.org      Eating healthy: www.cdc.gov/healthyweight      [facility] home safety checklist: www.cdc.gov/steadi/patient.html      Agency on Aging: www.goea.[place].[facility]      Alcoholics anonymous (AA): www.aa.org      Physical Activity: www.sulaiman.nih.gov/qp4qptt      Tobacco use: www.quitwithusla.org

## 2025-02-20 ENCOUNTER — CLINICAL SUPPORT (OUTPATIENT)
Dept: AUDIOLOGY | Facility: CLINIC | Age: OVER 89
End: 2025-02-20
Payer: MEDICARE

## 2025-02-20 ENCOUNTER — OFFICE VISIT (OUTPATIENT)
Dept: OTOLARYNGOLOGY | Facility: CLINIC | Age: OVER 89
End: 2025-02-20
Payer: MEDICARE

## 2025-02-20 DIAGNOSIS — H90.3 SENSORINEURAL HEARING LOSS (SNHL) OF BOTH EARS: ICD-10-CM

## 2025-02-20 DIAGNOSIS — H90.3 SENSORINEURAL HEARING LOSS (SNHL) OF BOTH EARS: Primary | ICD-10-CM

## 2025-02-20 DIAGNOSIS — H61.893 DRY BOTH EAR CANALS: ICD-10-CM

## 2025-02-20 DIAGNOSIS — K21.9 GASTROESOPHAGEAL REFLUX DISEASE WITHOUT ESOPHAGITIS: ICD-10-CM

## 2025-02-20 DIAGNOSIS — H61.21 IMPACTED CERUMEN OF RIGHT EAR: Primary | ICD-10-CM

## 2025-02-20 PROCEDURE — 92557 COMPREHENSIVE HEARING TEST: CPT | Mod: PBBFAC,PO | Performed by: AUDIOLOGIST

## 2025-02-20 PROCEDURE — 92567 TYMPANOMETRY: CPT | Mod: PBBFAC,PO | Performed by: AUDIOLOGIST

## 2025-02-20 PROCEDURE — 99213 OFFICE O/P EST LOW 20 MIN: CPT | Mod: PBBFAC,PO | Performed by: PHYSICIAN ASSISTANT

## 2025-02-20 PROCEDURE — 99212 OFFICE O/P EST SF 10 MIN: CPT | Mod: PBBFAC,27,PO | Performed by: AUDIOLOGIST

## 2025-02-20 PROCEDURE — 69210 REMOVE IMPACTED EAR WAX UNI: CPT | Mod: PBBFAC,PO | Performed by: PHYSICIAN ASSISTANT

## 2025-02-20 RX ORDER — FAMOTIDINE 40 MG/1
TABLET, FILM COATED ORAL
Qty: 90 TABLET | Refills: 1 | Status: SHIPPED | OUTPATIENT
Start: 2025-02-20

## 2025-02-20 NOTE — PATIENT INSTRUCTIONS
Every Monday/Wednesday/Friday: Maintenance for ear wax or dry/itchy ear canals: Use mineral oil. Tilt head to the side (tilt head to right to apply in left ear and tilt head to left to apply in right ear) and place 3-4 drops in affected ear canal(s) and let sit for around 30 seconds to 1 minute and then bring head up and dab excess oil as it comes from ear canals with clean kleenex. Do this in the evening. This can help soften cerumen (ear wax) and help with the ear's natural exfoliation process to help clear ear wax. Ear cleaning still may be needed even with maintenance drops to help with cerumen (ear wax). If suffering from excessive ear wax, then over the counter debrox drops may be used and if not effective in getting rid of suspected cerumen (ear wax), then follow up in office for further assessment. This can also help as maintenance for individual that suffer from dry itchy ear canals and can also be done in the evening to help with maintenance of dry itchy ear canals). Please note that this is a handout for individuals that want better control of cerumen (ear wax) and for individuals that suffer from chronic dry itchy ear canals and can be used for individuals who suffer with both.

## 2025-02-20 NOTE — PROGRESS NOTES
Arleen Zarate was seen on 02/20/2025 for an audiological evaluation. Pt was accompanied by a family member during today's visit. Pertinent complaints today include hearing loss. Pt denies history of loud noise exposure and denies early onset of genetic family history of hearing loss. Otoscopy revealed no cerumen in both ears. The tympanic membrane was visualized AU prior to proceeding with the hearing testing.     Results reveal a moderate-to-profound sensorineural hearing loss for the right ear and  moderate-to-profound sensorineural hearing loss for the left ear.    Speech Reception Thresholds were  60 dBHL for the right ear and 60 dBHL for the left ear.    Word recognition scores were fair for the right ear and fair for the left ear.   Tympanograms were Type A for the right ear and Type A for the left ear.    Recommendations: 1) Follow up with ENT     2) Annual hearing evaluation     3) Hearing aid consult when ready    Audiogram results were reviewed in detail with patient and all questions were answered. Results will be reviewed by the referring provider at the completion of this note. All complaints were addressed during this visit to the patient's satisfaction.

## 2025-02-20 NOTE — PROGRESS NOTES
[facility]    Subjective:      Patient: Arleen Zarate Patient PCP: Perry Jorge MD         :  8/15/1925     Sex:  female      MRN:  [id number]          Date of Visit: 2025      Chief Complaint: Hearing Loss    Patient ID: Arleen Zarate is a 89 y.o. female who presents to office for evaluation of hearing loss. Pt states that she has had issues hearing out of her left ear for years. She has dry itchy ear canals and uses q-tips in her ears. Pt states that she has had issues with hearing loss in her right ear as well. Pt states that she has occasional tinnitus.     Past Medical History  She has a past medical history of Acute encephalopathy, Anemia, Anxiety, Arthritis, Depression, Disorder of kidney and ureter, Foot pain, left, GERD (gastroesophageal reflux disease), Hiatal hernia, Hyperlipidemia, Hypertension, Reflux, Tendon tear, and Tendonitis.    Family History  Her family history includes Hearing loss in her son.    Past Surgical History:   Procedure Laterality Date    CHOLECYSTECTOMY      EYE SURGERY Bilateral     cataracts and implants    HEMIARTHROPLASTY OF HIP Right 3/1/2024    Procedure: HEMIARTHROPLASTY, HIP;  Surgeon: Wan Carmona MD;  Location: [facility];  Service: Orthopedics;  Laterality: Right;    HEMIARTHROPLASTY OF HIP Left 2024    Procedure: HEMIARTHROPLASTY, HIP;  Surgeon: Wan Carmona MD;  Location: [facility];  Service: Orthopedics;  Laterality: Left;    TONSILLECTOMY       Social History     Tobacco Use    Smoking status: Never     Passive exposure: Never    Smokeless tobacco: Never   Substance and Sexual Activity    Alcohol use: Not Currently     Alcohol/week: 1.0 standard drink of alcohol     Types: 1 Glasses of wine per week     Comment: rarely    Drug use: No    Sexual activity: Not Currently     Medications  She has a current medication list which includes the following prescription(s): acetaminophen, aspirin, calcium citrate, docusate sodium,  "famotidine, lisinopril, melatonin, multivitamin, omeprazole, and [DISCONTINUED] bupropion.    Review of patient's allergies indicates:   Allergen Reactions    Buspar [buspirone] Other (See Comments)     Syncope and nausea     Doxy      Other reaction(s): Unknown    Effexor [venlafaxine] Other (See Comments)     shaking    Biaxin [clarithromycin] Other (See Comments)     Unknown    Codeine Other (See Comments)     Syncope, and nausea.    Statins-hmg-coa reductase inhibitors      myalgia    Doxycycline Other (See Comments)     Made very weak     All medications, allergies, and past history have been reviewed.    Objective:      Vitals:      10/22/2024     2:38 PM 12/6/2024    11:00 AM 1/31/2025    11:03 AM   Vitals - 1 value per visit   SYSTOLIC 138     DIASTOLIC 62     Pulse 91  86   Temp 98.3 °F (36.8 °C)  97.8 °F (36.6 °C)   Resp 16     SPO2 95 %  99 %   Weight (lb) 132.28 132 133.38   Weight (kg) 60 59.875 60.5   Height 4' 11" (1.499 m) 4' 11" (1.499 m) 4' 11" (1.499 m)   BMI (Calculated) 26.7 26.6 26.9   Pain Score Seven Five Eight       There is no height or weight on file to calculate BSA.    Physical Exam  Constitutional:       General: She is not in acute distress.     Appearance: Normal appearance. She is not ill-appearing.   HENT:      Head: Normocephalic and atraumatic.      Right Ear: Tympanic membrane, ear canal and external ear normal. There is impacted cerumen.      Left Ear: Tympanic membrane, ear canal and external ear normal.      Nose: Septal deviation (leftward) present.      Mouth/Throat:      Lips: Pink. No lesions.      Mouth: Mucous membranes are moist. No oral lesions.      Tongue: No lesions.      Palate: No lesions.      Pharynx: Oropharynx is clear. Uvula midline. No pharyngeal swelling, oropharyngeal exudate, posterior oropharyngeal erythema or uvula swelling.      Tonsils: 0 on the right. 0 on the left.      Comments: S/p tonsillectomy  Eyes:      General:         Right eye: No " discharge.         Left eye: No discharge.      Extraocular Movements: Extraocular movements intact.      Conjunctiva/sclera: Conjunctivae normal.   Pulmonary:      Effort: Pulmonary effort is normal.   Neurological:      General: No focal deficit present.      Mental Status: She is alert and oriented to person, place, and time. Mental status is at baseline.   Psychiatric:         Mood and Affect: Mood normal.         Behavior: Behavior normal.         Thought Content: Thought content normal.         Judgment: Judgment normal.         Ear Cerumen Removal     Date/Time: 2/20/2025 2:30 PM     Performed by: Austin Ortiz PA-C  Authorized by: Austin Ortiz PA-C       Local anesthetic:  None  Location details:  Right ear  Procedure type: curette    Procedure type comment:  Leigh and suction  Cerumen  Removal Results:  Cerumen completely removed  Patient tolerance:  Patient tolerated the procedure well with no immediate complications          Labs:  WBC   Date Value Ref Range Status   10/22/2024 7.53 3.90 - 12.70 K/uL Final     Platelets   Date Value Ref Range Status   10/22/2024 180 150 - 450 K/uL Final     Creatinine   Date Value Ref Range Status   01/31/2025 1.0 0.5 - 1.4 mg/dL Final     TSH   Date Value Ref Range Status   07/25/2024 0.686 0.400 - 4.000 uIU/mL Final     Glucose   Date Value Ref Range Status   01/31/2025 92 70 - 110 mg/dL Final     Hemoglobin A1C   Date Value Ref Range Status   04/12/2023 5.2 4.0 - 5.6 % Final     Comment:     ADA Screening Guidelines:  5.7-6.4%  Consistent with prediabetes  >or=6.5%  Consistent with diabetes    High levels of fetal hemoglobin interfere with the HbA1C  assay. Heterozygous hemoglobin variants (HbS, HgC, etc)do  not significantly interfere with this assay.   However, presence of multiple variants may affect accuracy.         Audiogram Summary:    All lab results and imaging results have been reviewed.    Assessment:        ICD-10-CM ICD-9-CM   1.  Impacted cerumen of right ear  H61.21 380.4   2. Dry both ear canals  H61.893 380.89   3. Sensorineural hearing loss (SNHL) of both ears  H90.3 389.18              Plan:      Right ear cleaning for cerumen impaction completed today in office. Pt is to avoid using q-tips in ears. Start mineral oil maintenance every Monday/Wednesday/Friday for dry ear canals. Mineral oil maintenance handout provided via AVS.     Audiogram reviewed with pt in detail. Pt has moderate to profound SNHL AU. Type A tymp AD and type A tymp AS-normal middle ear function. SRTs 60dB AD and 60dB AS. WRS 72%AD at 95dB and 64%AS at 95dB. Pt is medically cleared for hearing aids if she chooses to pursue them in the future. We will monitor hearing loss with annual audiograms. Follow up as needed for ENT issues/concerns prior to annual audiograms.

## 2025-02-20 NOTE — PROCEDURES
Ear Cerumen Removal    Date/Time: 2/20/2025 2:30 PM    Performed by: Austin Ortiz PA-C  Authorized by: Austin Ortiz PA-C      Local anesthetic:  None  Location details:  Right ear  Procedure type: curette    Procedure type comment:  Leigh and suction  Cerumen  Removal Results:  Cerumen completely removed  Patient tolerance:  Patient tolerated the procedure well with no immediate complications

## 2025-02-25 ENCOUNTER — TELEPHONE (OUTPATIENT)
Dept: AUDIOLOGY | Facility: CLINIC | Age: OVER 89
End: 2025-02-25
Payer: MEDICARE

## 2025-02-25 NOTE — TELEPHONE ENCOUNTER
----- Message from Ashia sent at 2/25/2025  1:20 PM CST -----  Contact: Tita/daughter in law  Griselda is calling in regards to getting the appt on 03/11 rescheduled to the next available appt on a Tuesday if possible.please call Griselda back at [phone]

## 2025-03-10 DIAGNOSIS — I10 ESSENTIAL HYPERTENSION: ICD-10-CM

## 2025-03-10 RX ORDER — LISINOPRIL 30 MG/1
30 TABLET ORAL NIGHTLY
Qty: 90 TABLET | Refills: 3 | Status: SHIPPED | OUTPATIENT
Start: 2025-03-10

## 2025-03-10 NOTE — TELEPHONE ENCOUNTER
No care due was identified.  Health [facility] Embedded Care Due Messages. Reference number: [id number].   3/10/2025 2:24:32 AM CDT

## 2025-03-18 ENCOUNTER — CLINICAL SUPPORT (OUTPATIENT)
Dept: AUDIOLOGY | Facility: CLINIC | Age: OVER 89
End: 2025-03-18
Payer: MEDICARE

## 2025-03-18 DIAGNOSIS — Z71.89 HEARING AID CONSULTATION: Primary | ICD-10-CM

## 2025-03-18 PROCEDURE — 99499 UNLISTED E&M SERVICE: CPT | Mod: S$PBB,,, | Performed by: AUDIOLOGIST

## 2025-03-18 NOTE — PROGRESS NOTES
Arleen Zarate was seen on 03/18/2025 for a hearing aid consultation. Pt was accompanied by a family member during today's visit. Patient's audiogram was discussed in detail. We also discussed the different brands, styles and levels of technology. It was decided based on the patients lifestyle that the best choice would be ITEMIZED Gabrielle Bergman AI 24 in color brown with size 3P receivers AU. The price quoted was $4951.88 with tax for Premium aids. Pt will pay the $250.00 non refundable fitting fee at the end of this visit and will pay the remaining balance for the hearing aids at time of fitting. Pt was also informed that insurance reimbursement by their insurance company for any hearing aid benefits would need to be filed for by the patient since [facility] does not file for insurance benefits for hearing aids at this time. The pt scheduled a fitting on 4/1/2025.  Plan of care was discussed in detail with the patient, who agreed with the plan as above. All complaints were addressed during this visit to the patient's satisfaction. Order number is [id number] .

## 2025-04-01 ENCOUNTER — CLINICAL SUPPORT (OUTPATIENT)
Dept: AUDIOLOGY | Facility: CLINIC | Age: OVER 89
End: 2025-04-01
Payer: MEDICARE

## 2025-04-01 DIAGNOSIS — Z46.1 ENCOUNTER FOR HEARING AID FITTING OF BOTH EARS: Primary | ICD-10-CM

## 2025-04-01 PROCEDURE — 99499 UNLISTED E&M SERVICE: CPT | Mod: S$PBB,,, | Performed by: AUDIOLOGIST

## 2025-04-01 NOTE — PROGRESS NOTES
HEARING AID FITTING    Arleen Zarate was seen today for a hearing aid fitting.  All parts of the hearing aid, including battery, domes, wax filters, and microphones, were discussed with the patient.  Battery charging was also reviewed and practiced with the patient.  Feedback measures were taken and hearing aid was fit to patient's satisfaction.  Counseled patient on daily wear and maintenance of the hearing aid.  Mrs. Zarate acknowledged that she understood.  The hearing aids were not connected to the patient's phone.  The purchase agreement, 30-day trial period, and warranties were also reviewed with patient.  It is recommended Mrs. Zarate return to clinic in 2 weeks or as needed.      Hearing Aid Details    Hearing Aid Information  Service Type: Itemized  Service End Date: 07/01/25  Trial Period End Date: 05/01/25   and Model: Gabrielle Edge AI 24 MAYRA  Color: brown  Right SN: [id number]  Left SN: [id number]  Battery: Li-Ion Rechargeable 312  Right  and Dome: #3 power  and 6mm occluded domes  Left  and Dome: #3 power  and 6mm occluded domes  Repair Warranty Expiration Date: 06/17/28  L&D Warranty Expiration Date: 06/17/28   SN: [id number]

## 2025-04-09 DIAGNOSIS — K21.9 GASTROESOPHAGEAL REFLUX DISEASE: ICD-10-CM

## 2025-04-09 RX ORDER — OMEPRAZOLE 40 MG/1
40 CAPSULE, DELAYED RELEASE ORAL
Qty: 90 CAPSULE | Refills: 3 | Status: SHIPPED | OUTPATIENT
Start: 2025-04-09

## 2025-04-09 NOTE — TELEPHONE ENCOUNTER
No care due was identified.  Health [facility] Embedded Care Due Messages. Reference number: [id number].   4/09/2025 10:20:43 AM CDT

## 2025-04-15 ENCOUNTER — CLINICAL SUPPORT (OUTPATIENT)
Dept: AUDIOLOGY | Facility: CLINIC | Age: OVER 89
End: 2025-04-15
Payer: MEDICARE

## 2025-04-15 DIAGNOSIS — Z97.4 WEARS HEARING AID IN BOTH EARS: Primary | ICD-10-CM

## 2025-04-15 PROCEDURE — 99499 UNLISTED E&M SERVICE: CPT | Mod: S$PBB,,, | Performed by: AUDIOLOGIST

## 2025-04-15 NOTE — PROGRESS NOTES
Arleen Zarate came in on 04/15/2025 for a hearing aid follow up. Pt was accompanied by her daughter during today's visit. Pt stated she is not wearing the hearing aids much. Counseled on wearing the hearing aids most of the day and how to put them on better. She practiced putting the hearing aids on and taking them off in the office. All complaints were addressed during this visit to the patient's satisfaction. Plan of care was discussed in detail with the patient, who agreed with the plan as above.

## 2025-04-22 ENCOUNTER — LAB VISIT (OUTPATIENT)
Dept: LAB | Facility: HOSPITAL | Age: OVER 89
End: 2025-04-22
Attending: STUDENT IN AN ORGANIZED HEALTH CARE EDUCATION/TRAINING PROGRAM
Payer: MEDICARE

## 2025-04-22 ENCOUNTER — OFFICE VISIT (OUTPATIENT)
Dept: FAMILY MEDICINE | Facility: CLINIC | Age: OVER 89
End: 2025-04-22
Payer: MEDICARE

## 2025-04-22 VITALS
WEIGHT: 134.5 LBS | HEIGHT: 59 IN | OXYGEN SATURATION: 97 % | RESPIRATION RATE: 16 BRPM | TEMPERATURE: 99 F | HEART RATE: 91 BPM | DIASTOLIC BLOOD PRESSURE: 62 MMHG | SYSTOLIC BLOOD PRESSURE: 122 MMHG | BODY MASS INDEX: 27.12 KG/M2

## 2025-04-22 DIAGNOSIS — D64.9 NORMOCYTIC ANEMIA: ICD-10-CM

## 2025-04-22 DIAGNOSIS — I10 ESSENTIAL HYPERTENSION: ICD-10-CM

## 2025-04-22 DIAGNOSIS — M25.50 ARTHRALGIA, UNSPECIFIED JOINT: ICD-10-CM

## 2025-04-22 DIAGNOSIS — K31.83 ACHLORHYDRIA: ICD-10-CM

## 2025-04-22 DIAGNOSIS — R79.9 ABNORMAL FINDING OF BLOOD CHEMISTRY, UNSPECIFIED: ICD-10-CM

## 2025-04-22 DIAGNOSIS — J31.0 RHINITIS, UNSPECIFIED TYPE: ICD-10-CM

## 2025-04-22 DIAGNOSIS — J31.0 GUSTATORY RHINITIS: ICD-10-CM

## 2025-04-22 DIAGNOSIS — K21.9 GASTROESOPHAGEAL REFLUX DISEASE WITHOUT ESOPHAGITIS: ICD-10-CM

## 2025-04-22 DIAGNOSIS — Z00.00 HEALTHCARE MAINTENANCE: Primary | ICD-10-CM

## 2025-04-22 LAB
(HCYS)2 SERPL-MCNC: 10.1 UMOL/L (ref 4–15.5)
ANION GAP (OHS): 7 MMOL/L (ref 8–16)
BUN SERPL-MCNC: 29 MG/DL (ref 10–30)
CALCIUM SERPL-MCNC: 9.7 MG/DL (ref 8.7–10.5)
CHLORIDE SERPL-SCNC: 107 MMOL/L (ref 95–110)
CO2 SERPL-SCNC: 27 MMOL/L (ref 23–29)
CREAT SERPL-MCNC: 1.2 MG/DL (ref 0.5–1.4)
GFR SERPLBLD CREATININE-BSD FMLA CKD-EPI: 41 ML/MIN/1.73/M2
GLUCOSE SERPL-MCNC: 101 MG/DL (ref 70–110)
POTASSIUM SERPL-SCNC: 4.9 MMOL/L (ref 3.5–5.1)
SODIUM SERPL-SCNC: 141 MMOL/L (ref 136–145)

## 2025-04-22 PROCEDURE — 80048 BASIC METABOLIC PNL TOTAL CA: CPT

## 2025-04-22 PROCEDURE — 36415 COLL VENOUS BLD VENIPUNCTURE: CPT | Mod: PO

## 2025-04-22 PROCEDURE — 83735 ASSAY OF MAGNESIUM: CPT

## 2025-04-22 PROCEDURE — 99214 OFFICE O/P EST MOD 30 MIN: CPT | Mod: PBBFAC,PO | Performed by: STUDENT IN AN ORGANIZED HEALTH CARE EDUCATION/TRAINING PROGRAM

## 2025-04-22 PROCEDURE — 83090 ASSAY OF HOMOCYSTEINE: CPT

## 2025-04-22 PROCEDURE — 84207 ASSAY OF VITAMIN B-6: CPT

## 2025-04-22 PROCEDURE — 83921 ORGANIC ACID SINGLE QUANT: CPT

## 2025-04-22 PROCEDURE — 99999 PR PBB SHADOW E&M-EST. PATIENT-LVL IV: CPT | Mod: PBBFAC,,, | Performed by: STUDENT IN AN ORGANIZED HEALTH CARE EDUCATION/TRAINING PROGRAM

## 2025-04-22 RX ORDER — FAMOTIDINE 40 MG/1
40 TABLET, FILM COATED ORAL DAILY
Qty: 90 TABLET | Refills: 3 | Status: SHIPPED | OUTPATIENT
Start: 2025-04-22 | End: 2026-04-17

## 2025-04-22 RX ORDER — IPRATROPIUM BROMIDE 42 UG/1
2 SPRAY, METERED NASAL
Qty: 15 ML | Refills: 5 | Status: SHIPPED | OUTPATIENT
Start: 2025-04-22

## 2025-04-22 NOTE — PROGRESS NOTES
[facility] Note    Subjective:    The HPI and pertinent ROS is included in the Diagnostic Impression Remarks section at the end of the note. Please see below for further details. Chief complaint is at end of note.     Arleen is a pleasant intelligent patient who is here for evaluation.     Modified Medications    Modified Medication Previous Medication    FAMOTIDINE (PEPCID) 40 MG TABLET famotidine (PEPCID) 40 MG tablet       Take 1 tablet (40 mg total) by mouth Daily.    TAKE 1 TABLET(40 MG) BY MOUTH EVERY NIGHT AS NEEDED FOR HEARTBURN       Data reviewed 274}  Previous medical records reviewed and summarized in plan section at end of note.      If you are due for any health screening(s) below please notify me so we can arrange them to be ordered and scheduled. Most healthy patients at your age complete them, but you are free to accept or refuse. If you can't do it, I'll definitely understand. If you can, I'd certainly appreciate it!     All of your core healthy metrics are met.      The following portions of the patient's history were reviewed and updated as appropriate: allergies, current medications, past family history, past medical history, past social history, past surgical history and problem list.    She  has a past medical history of Acute encephalopathy (7/25/2024), Anemia (3/1/2024), Anxiety, Arthritis, Depression, Disorder of kidney and ureter, Foot pain, left (9/18/2013), GERD (gastroesophageal reflux disease), Hiatal hernia, Hyperlipidemia, Hypertension, Reflux, Tendon tear (8/21/2013), and Tendonitis (8/21/2013).  She  has a past surgical history that includes Cholecystectomy; Tonsillectomy; Eye surgery (Bilateral); Hemiarthroplasty of hip (Right, 3/1/2024); and Hemiarthroplasty of hip (Left, 7/24/2024).    She  reports that she has never smoked. She has never been exposed to tobacco smoke. She has never used smokeless tobacco. She reports that she does not currently use alcohol after  "a past usage of about 1.0 standard drink of alcohol per week. She reports that she does not use drugs.  She family history includes Hearing loss in her son.    Review of patient's allergies indicates:   Allergen Reactions    Buspar [buspirone] Other (See Comments)     Syncope and nausea     Doxy      Other reaction(s): Unknown    Effexor [venlafaxine] Other (See Comments)     shaking    Biaxin [clarithromycin] Other (See Comments)     Unknown    Codeine Other (See Comments)     Syncope, and nausea.    Statins-hmg-coa reductase inhibitors      myalgia    Doxycycline Other (See Comments)     Made very weak       Tobacco Use: Low Risk  (4/22/2025)    Patient History     Smoking Tobacco Use: Never     Smokeless Tobacco Use: Never     Passive Exposure: Never     Physical Examination  Physical Exam       General appearance: alert, cooperative, no distress  Neck: no thyromegaly, no neck stiffness  Lungs: clear to auscultation, no wheezes, rales or rhonchi, symmetric air entry  Heart: normal rate, regular rhythm, normal S1, S2, no murmurs, rubs, clicks or gallops  Abdomen: soft, nontender, nondistended, no rigidity, rebound, or guarding.   Back: no point tenderness over spine  Extremities: peripheral pulses normal, no unilateral leg swelling or calf tenderness   Neurological:alert, oriented, normal speech, no new focal findings or movement disorder noted from baseline      BP Readings from Last 3 Encounters:   04/22/25 122/62   10/22/24 138/62   08/23/24 130/60     Wt Readings from Last 3 Encounters:   04/22/25 61 kg (134 lb 7.7 oz)   01/31/25 60.5 kg (133 lb 6.1 oz)   12/06/24 59.9 kg (132 lb)     /62 (BP Location: Right arm, Patient Position: Sitting)   Pulse 91   Temp 99 °F (37.2 °C) (Oral)   Resp 16   Ht 4' 11" (1.499 m)   Wt 61 kg (134 lb 7.7 oz)   SpO2 97%   BMI 27.16 kg/m²    274}  Laboratory: I have reviewed old labs below:    274}    Lab Results   Component Value Date    WBC 7.53 10/22/2024    HGB 9.9 " (L) 10/22/2024    HCT 31.8 (L) 10/22/2024    MCV 92 10/22/2024     10/22/2024     01/31/2025    K 4.7 01/31/2025     01/31/2025    CALCIUM 9.2 01/31/2025    PHOS 2.7 07/26/2024    CO2 27 01/31/2025    BUN 22 01/31/2025    CREATININE 1.0 01/31/2025    EGFRNORACEVR 50.6 (A) 01/31/2025    LABPROT 10.9 02/28/2024    ALBUMIN 3.6 10/22/2024    BILITOT 0.2 10/22/2024    ALKPHOS 119 10/22/2024    ALT 12 10/22/2024    AST 16 10/22/2024    INR 1.0 02/28/2024    CHOL 192 05/02/2024    TRIG 163 (H) 05/02/2024    HDL 51 05/02/2024    TSH 0.686 07/25/2024    HGBA1C 5.2 04/12/2023      Lab reviewed by me: Particular labs of significance that I will monitor, workup, or treat to improve are mentioned below in diagnostic impression remarks.    Results         Imaging/EKG: I have reviewed the pertinent results and my findings are noted in remarks.  274}    CC:   Chief Complaint   Patient presents with    Annual Exam    Fall    Hypertension        274}    History of Present Illness  The patient reports that she is doing well overall but does have some runny nose before meals. This has been bothering her a lot. She reports her blood pressure has been stable, does have some fatigue, does have some memory loss as well, minor, does not want to see neurology. No headaches or falls.    She experiences rhinorrhea, particularly before meals, which has been causing her significant discomfort.    Her blood pressure has remained stable.    She also reports experiencing fatigue.    Additionally, she has noticed minor memory loss but expresses no interest in consulting a neurologist at this time. She does not experience any headaches or falls.       Assessment/Plan  Arleen Zarate is a 89 y.o. female who presents to clinic with:  1. Healthcare maintenance    2. Gastroesophageal reflux disease without esophagitis    3. Essential hypertension    4. Normocytic anemia    5. Abnormal finding of blood chemistry, unspecified    6.  Arthralgia, unspecified joint    7. Achlorhydria    8. Rhinitis, unspecified type    9. Gustatory rhinitis       274}    Assessment & Plan  1. Memory loss.  A re-evaluation of B12, B6, thyroid function, and other relevant blood parameters will be conducted. A consultation with neurology is recommended; however, she prefers to postpone this for the time being.    2. Fatigue.  A comprehensive blood workup will be performed to investigate the cause of her fatigue.    3. Hypertension.  Her hypertension is well-managed. She will continue with her current medication regimen and monitor her blood pressure.    4. Gustatory rhinitis.  A nasal spray will be prescribed for her gustatory rhinitis, and her response to this treatment will be monitored.    Anemia-stable will get blood work and follow-up on this        This note was generated with the assistance of ambient listening technology. Verbal consent was obtained by the patient and accompanying visitor(s) for the recording of patient appointment to facilitate this note. I attest to having reviewed and edited the generated note for accuracy, though some syntax or spelling errors may persist. Please contact the author of this note for any clarification.      1. Gastroesophageal reflux disease without esophagitis  - famotidine (PEPCID) 40 MG tablet; Take 1 tablet (40 mg total) by mouth Daily.  Dispense: 90 tablet; Refill: 3    2. Healthcare maintenance    3. Essential hypertension    4. Normocytic anemia    5. Abnormal finding of blood chemistry, unspecified  - CBC Auto Differential; Future  - TSH; Future  - Lipid Panel; Future  - Magnesium, RBC; Future  - Basic Metabolic Panel; Future  - Homocysteine, Serum; Future  - Methylmalonic Acid, Serum; Future  - Vitamin B6; Future    6. Arthralgia, unspecified joint    7. Achlorhydria  - Homocysteine, Serum; Future    8. Rhinitis, unspecified type    9. Gustatory rhinitis  - ipratropium (ATROVENT) 42 mcg (0.06 %) nasal spray; 2  sprays by Each Nostril route 3 (three) times daily before meals.  Dispense: 15 mL; Refill: 5      Perry Jorge MD   274}    If you are due for any health screening(s) below please notify me so we can arrange them to be ordered and scheduled. Most healthy patients at your age complete them, but you are free to accept or refuse.     If you can't do it, I'll definitely understand. If you can, I'd certainly appreciate it!   All of your core healthy metrics are met.

## 2025-04-29 ENCOUNTER — TELEPHONE (OUTPATIENT)
Dept: FAMILY MEDICINE | Facility: CLINIC | Age: OVER 89
End: 2025-04-29
Payer: MEDICARE

## 2025-04-29 ENCOUNTER — RESULTS FOLLOW-UP (OUTPATIENT)
Dept: FAMILY MEDICINE | Facility: CLINIC | Age: OVER 89
End: 2025-04-29

## 2025-04-29 ENCOUNTER — CLINICAL SUPPORT (OUTPATIENT)
Dept: AUDIOLOGY | Facility: CLINIC | Age: OVER 89
End: 2025-04-29
Payer: MEDICARE

## 2025-04-29 DIAGNOSIS — E53.8 B12 DEFICIENCY: Primary | ICD-10-CM

## 2025-04-29 DIAGNOSIS — Z97.4 WEARS HEARING AID IN BOTH EARS: Primary | ICD-10-CM

## 2025-04-29 LAB
W MAGNESIUM RBC: 5.6 MG/DL
W METHYLMALONIC ACID: 0.59 UMOL/L
W VITAMIN B6: 85 UG/L

## 2025-04-29 PROCEDURE — 99499 UNLISTED E&M SERVICE: CPT | Mod: S$PBB,,, | Performed by: AUDIOLOGIST

## 2025-04-29 RX ORDER — LANOLIN ALCOHOL/MO/W.PET/CERES
1000 CREAM (GRAM) TOPICAL DAILY
Qty: 90 TABLET | Refills: 3 | Status: SHIPPED | OUTPATIENT
Start: 2025-04-29 | End: 2026-04-29

## 2025-04-29 NOTE — TELEPHONE ENCOUNTER
----- Message from Perry Jorge MD sent at 4/29/2025 12:35 PM CDT -----  Patient had a low B12 level thus sent B12 supplement to pharmacy please notify. Thanks.      Perry Jorge MD   ----- Message -----  From: Lab, Background User  Sent: 4/22/2025   9:30 PM CDT  To: Perry Jorge MD

## 2025-04-29 NOTE — PROGRESS NOTES
Arleen Zarate came in on 04/29/2025 for a hearing aid follow up. Pt was accompanied by her daughter during today's visit. Pt stated she doesn't wear the hearing aids much but will try to wear them more. All complaints were addressed during this visit to the patient's satisfaction. Plan of care was discussed in detail with the patient, who agreed with the plan as above.

## 2025-05-20 ENCOUNTER — TELEPHONE (OUTPATIENT)
Dept: FAMILY MEDICINE | Facility: CLINIC | Age: OVER 89
End: 2025-05-20
Payer: MEDICARE

## 2025-05-20 NOTE — TELEPHONE ENCOUNTER
----- Message from Bertha sent at 5/20/2025  7:56 AM CDT -----  Contact: JOSHUA/GLENIS  Type:  Test ResultsWho Called:  GLENIS/JOSHUAName of Test (Lab/Mammo/Etc):  blood workDate of Test:  4/22Ordering Provider:  LuisitoWhere the test was performed:  [facility] Call Back Number:  [phone] Information:  JOSHUA said no one has called her regarding the lab work, but did call her to tell her about needing vitamin B, please call BJ with the results

## 2025-06-22 ENCOUNTER — HOSPITAL ENCOUNTER (EMERGENCY)
Facility: HOSPITAL | Age: OVER 89
Discharge: HOME | End: 2025-06-22
Attending: STUDENT IN AN ORGANIZED HEALTH CARE EDUCATION/TRAINING PROGRAM
Payer: MEDICARE

## 2025-06-22 VITALS
HEART RATE: 74 BPM | OXYGEN SATURATION: 98 % | HEIGHT: 59 IN | DIASTOLIC BLOOD PRESSURE: 66 MMHG | WEIGHT: 134 LBS | BODY MASS INDEX: 27.01 KG/M2 | RESPIRATION RATE: 20 BRPM | SYSTOLIC BLOOD PRESSURE: 143 MMHG | TEMPERATURE: 99 F
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
## 2025-06-22 DIAGNOSIS — S39.92XA TRAUMATIC INJURY OF BACK: ICD-10-CM

## 2025-06-22 DIAGNOSIS — M54.6 THORACIC BACK PAIN: ICD-10-CM

## 2025-06-22 DIAGNOSIS — S09.90XA TRAUMATIC INJURY OF HEAD, INITIAL ENCOUNTER: ICD-10-CM

## 2025-06-22 PROCEDURE — 99285 EMERGENCY DEPT VISIT HI MDM: CPT | Mod: 25

## 2025-06-22 PROCEDURE — 25000003 PHARM REV CODE 250: Performed by: STUDENT IN AN ORGANIZED HEALTH CARE EDUCATION/TRAINING PROGRAM

## 2025-06-22 RX ORDER — ACETAMINOPHEN 325 MG/1
650 TABLET ORAL
Status: COMPLETED | OUTPATIENT
Start: 2025-06-22 | End: 2025-06-22

## 2025-06-22 RX ADMIN — ACETAMINOPHEN 650 MG: 325 TABLET ORAL at 12:06

## 2025-06-22 NOTE — ED PROVIDER NOTES
Encounter Date: 6/22/2025       History     Chief Complaint   Patient presents with    Fall     In shower  this am     Hip Pain     89-year-old female presents for evaluation of back pain.  Occurred after mechanical fall.  No associated neurological deficits, no hip pain.  Triage note list hip pain, patient denies any hip pain, on exam it is thoracic pain    The history is provided by the patient.     Review of patient's allergies indicates:   Allergen Reactions    Buspar [buspirone] Other (See Comments)     Syncope and nausea     Doxy      Other reaction(s): Unknown    Effexor [venlafaxine] Other (See Comments)     shaking    Biaxin [clarithromycin] Other (See Comments)     Unknown    Codeine Other (See Comments)     Syncope, and nausea.    Statins-hmg-coa reductase inhibitors      myalgia    Doxycycline Other (See Comments)     Made very weak     Past Medical History:   Diagnosis Date    Acute encephalopathy 7/25/2024    Anemia 3/1/2024    Anxiety     Arthritis     fingers and back    Depression     Disorder of kidney and ureter     Foot pain, left 9/18/2013    GERD (gastroesophageal reflux disease)     Hiatal hernia     Hyperlipidemia     Hypertension     Reflux     on meds    Tendon tear 8/21/2013    Tendonitis 8/21/2013     Past Surgical History:   Procedure Laterality Date    CHOLECYSTECTOMY      EYE SURGERY Bilateral     cataracts and implants    HEMIARTHROPLASTY OF HIP Right 3/1/2024    Procedure: HEMIARTHROPLASTY, HIP;  Surgeon: Wan Carmona MD;  Location: [facility] OR;  Service: Orthopedics;  Laterality: Right;    HEMIARTHROPLASTY OF HIP Left 7/24/2024    Procedure: HEMIARTHROPLASTY, HIP;  Surgeon: Wan Carmona MD;  Location: [facility] OR;  Service: Orthopedics;  Laterality: Left;    TONSILLECTOMY       Family History   Problem Relation Name Age of Onset    Hearing loss Son       Social History[1]  Review of Systems   All other systems reviewed and are negative.      Physical Exam     Initial  Vitals [06/22/25 1132]   BP Pulse Resp Temp SpO2   (!) 167/72 86 18 97.9 °F (36.6 °C) 96 %      MAP       --         Physical Exam    Nursing note and vitals reviewed.  Constitutional: No distress.   HENT:   Head: Normocephalic.   Eyes: No scleral icterus.   Cardiovascular:  Normal rate.           Pulmonary/Chest: Effort normal. No stridor. No respiratory distress.   Abdominal: There is no guarding.   Musculoskeletal:      Comments: Midline T-spine tenderness to palpation, no significant L-spine deformity, no C-spine tenderness to palpation, no extremity pain, no hip pain with internal external rotation of hips, no leg length deformity, distal pulses 2+, no pelvic tenderness to palpation     Neurological: She is alert.   GCS 15 with no focal deficits able to move bilateral lower extremities   Skin: No rash noted. No erythema.         ED Course   Procedures  Labs Reviewed - No data to display       Imaging Results              CT Thoracic Spine Without Contrast (Final result)  Result time 06/22/25 14:13:17      Final result by Jacoby Benavides MD (06/22/25 14:13:17)                   Impression:      1. Degenerative changes as discussed above without evidence of an acute bony process.  Exaggerated thoracic kyphosis.      Electronically signed by: Jacoby Benavides  Date:    06/22/2025  Time:    14:13               Narrative:    EXAMINATION:  CT THORACIC SPINE WITHOUT CONTRAST    CLINICAL HISTORY:  Mid-back pain, compression fracture suspected;    TECHNIQUE:  CT thoracic spine performed without contrast.  Coronal and sagittal reformats provided.    COMPARISON:  Radiographs 06/22/2025.    FINDINGS:  Morphology: Diffuse bony demineralization.  Vertebral body heights are preserved.  No fractures or bony destructive changes.  Multilevel endplate centered osteophytes with subcortical cystic change and sclerosis including bone-on-bone contact and partial ankylosis at T10-T11 and to a lesser extent T9-T10 and T8-T9.    Alignment:  Exaggerated thoracic kyphosis and grade 1 anterolisthesis of T1 on T2 and T2 on T3 and retrolisthesis of T11 on T12.    Disc levels: Advanced degenerative disc height loss throughout including ankylosis or partial ankylosis at T9-T10 and T10-T11.  Shallow disc bulging and facet arthrosis but no evidence of any high-grade osseous spinal canal narrowing.    Other: Cholecystectomy changes.  Incidental small left adrenal adenoma and moderate size hiatal hernia.                                       CT Head Without Contrast (Final result)  Result time 06/22/25 14:02:43      Final result by Jacoby Benavides MD (06/22/25 14:02:43)                   Impression:      1. No acute intracranial CT findings.      Electronically signed by: Jacoby Benavides  Date:    06/22/2025  Time:    14:02               Narrative:    EXAMINATION:  CT HEAD WITHOUT CONTRAST    CLINICAL HISTORY:  Head trauma, moderate-severe;    TECHNIQUE:  Low dose axial CT images obtained throughout the head without intravenous contrast. Sagittal and coronal reconstructions were performed.    COMPARISON:  Head CT 07/25/2024.    FINDINGS:  Brain: There is no evidence of a mass, edema, midline shift, or intracranial hemorrhage. No extra-axial fluid collection.  Similar age-related findings of chronic small vessel ischemic and involutional changes.  No CT evidence of an acute major vascular territorial infarct.    Ventricles: The ventricles, sulci, and cisterns are within normal limits.    Skull: The osseous structures are unremarkable in appearance.  Incidental small right frontal osteoma.    Extracranial soft tissues: Limited imaging is within normal limits.    Other: The visualized portions of the sinuses, orbits, and mastoid air cells are within normal limits.                                       X-Ray Pelvis Routine AP (Final result)  Result time 06/22/25 12:34:28      Final result by Jacoby Benavides MD (06/22/25 12:34:28)                   Impression:      Limited  single view of the pelvis demonstrates no acute process.      Electronically signed by: Jacoby Benavides  Date:    06/22/2025  Time:    12:34               Narrative:    EXAMINATION:  XR PELVIS ROUTINE AP    CLINICAL HISTORY:  pelvic trauma;    TECHNIQUE:  AP view of the pelvis was performed.    COMPARISON:  None.    FINDINGS:  Limited AP radiograph of the pelvis demonstrates bilateral hip arthroplasty changes.  No gross malalignment or acute bony process identified.  No obvious hardware loosening.  Degenerative changes of the partially imaged lower lumbar spine and pubic symphysis.                                       X-Ray Lumbar Spine Ap And Lateral (Final result)  Result time 06/22/25 12:33:15      Final result by Jacoby Benavides MD (06/22/25 12:33:15)                   Impression:      No acute radiographic findings.      Electronically signed by: Jacoby Benavides  Date:    06/22/2025  Time:    12:33               Narrative:    EXAMINATION:  XR LUMBAR SPINE AP AND LATERAL    CLINICAL HISTORY:  lumbar pain;    TECHNIQUE:  AP, lateral and spot images were performed of the lumbar spine.    COMPARISON:  Radiographs on 04/09/2014.    FINDINGS:  Diffuse bony demineralization.  Vertebral body heights are preserved.  No acute fractures or bony destructive changes within the lumbar spine.  Lower lumbar predominant facet arthrosis and grade 1 anterolisthesis of L4 on L5 and L5 on S1 and retrolisthesis of L2 on L3.  Moderate degenerative disc height loss throughout with more mild disc height loss at L1-L2.  Right upper quadrant surgical clips within the abdomen as well as moderate stool within the colon.  Partially imaged bilateral hip arthroplasty changes.                                       X-Ray Thoracic Spine AP Lateral (Final result)  Result time 06/22/25 12:32:18      Final result by Jacoby Benavides MD (06/22/25 12:32:18)                   Impression:      No acute radiographic findings.      Electronically signed by: Jacoby  Makayla  Date:    06/22/2025  Time:    12:32               Narrative:    EXAMINATION:  XR THORACIC SPINE AP LATERAL    CLINICAL HISTORY:  Pain in thoracic spine    TECHNIQUE:  AP and lateral views of the thoracic spine were performed.    COMPARISON:  None.    FINDINGS:  Diffuse bony demineralization.  No acute fractures identified.  Exaggerated thoracic kyphosis and overall moderate multilevel degenerative disc height loss throughout with more severe disc height loss within the lower thoracic spine with prominent endplate centered osteophytes.  Trace retrolisthesis of what appears to be T11 on T12.  Degenerative changes of the partially imaged cervical spine.  Broad dextrocurvature of the midthoracic spine.  Right upper quadrant surgical clips are noted within the abdomen.                                       Medications   acetaminophen tablet 650 mg (650 mg Oral Given 6/22/25 1220)     Medical Decision Making  89-year-old female presents for evaluation of back pain.  Occurred after mechanical fall.  No associated neurological deficits, no hip pain.  Triage note list hip pain, patient denies any hip pain, on exam it is thoracic pain    -exam consistent with more thoracic pain despite triage listed hip pain.  Workup initiated, radiographs obtained initially, concern for occult fracture so CT imaging obtained.  CT imaging with no acute fracture per radiology read, CT head no significant traumatic injury.  Friends at bedside.  Everyone updated including patient and agreeable with discharge and close PCP follow up and return precautions    Amount and/or Complexity of Data Reviewed  Independent Historian: friend  Radiology: ordered.    Risk  OTC drugs.               ED Course as of 06/22/25 1429   Sun Jun 22, 2025   1233 Concern for T-spine fracture, spinal precautions [KB]      ED Course User Index  [KB] Truman Hood Jr., DO                           Clinical Impression:  Final diagnoses:  [M54.6] Thoracic back  pain  [W19.XXXA] Fall, initial encounter (Primary)  [S09.90XA] Traumatic injury of head, initial encounter  [S39.92XA] Traumatic injury of back          ED Disposition Condition    Discharge Stable          ED Prescriptions    None       Follow-up Information    None                [1]   Social History  Tobacco Use    Smoking status: Never     Passive exposure: Never    Smokeless tobacco: Never   Substance Use Topics    Alcohol use: Not Currently     Alcohol/week: 1.0 standard drink of alcohol     Types: 1 Glasses of wine per week     Comment: rarely    Drug use: No        Truman Hood Jr., DO  06/22/25 1424

## 2025-06-24 ENCOUNTER — TELEPHONE (OUTPATIENT)
Dept: FAMILY MEDICINE | Facility: CLINIC | Age: OVER 89
End: 2025-06-24
Payer: MEDICARE

## 2025-06-24 NOTE — TELEPHONE ENCOUNTER
Copied from CRM #[id number]. Topic: General Inquiry - Test Results  >> Jun 24, 2025  8:43 AM Marylou wrote:  Type:  Test Results    Who Called:  Griselda/ Daughter in law  Name of Test (Lab/Mammo/Etc):  Xray & CT scan  Ordering Provider:  ER Physician  Where the test was performed:  [facility]  Best Call Back Number:  [phone]  Additional Information:  PT fell on 6-22 was taken to [facility]  would  like for provider to review at test results. Pt his having back pain have been giving OTC Tylenol and Advil to help aleviate pain.

## 2025-06-25 ENCOUNTER — OFFICE VISIT (OUTPATIENT)
Dept: FAMILY MEDICINE | Facility: CLINIC | Age: OVER 89
End: 2025-06-25
Payer: MEDICARE

## 2025-06-25 VITALS
TEMPERATURE: 99 F | BODY MASS INDEX: 27.03 KG/M2 | OXYGEN SATURATION: 98 % | HEIGHT: 59 IN | SYSTOLIC BLOOD PRESSURE: 114 MMHG | HEART RATE: 93 BPM | DIASTOLIC BLOOD PRESSURE: 54 MMHG | RESPIRATION RATE: 16 BRPM | WEIGHT: 134.06 LBS

## 2025-06-25 DIAGNOSIS — I10 ESSENTIAL HYPERTENSION: ICD-10-CM

## 2025-06-25 DIAGNOSIS — M51.360 DEGENERATION OF INTERVERTEBRAL DISC OF LUMBAR REGION WITH DISCOGENIC BACK PAIN: ICD-10-CM

## 2025-06-25 DIAGNOSIS — K21.9 GASTROESOPHAGEAL REFLUX DISEASE WITHOUT ESOPHAGITIS: ICD-10-CM

## 2025-06-25 DIAGNOSIS — M54.9 BACK PAIN, UNSPECIFIED BACK LOCATION, UNSPECIFIED BACK PAIN LATERALITY, UNSPECIFIED CHRONICITY: Primary | ICD-10-CM

## 2025-06-25 PROCEDURE — 99999 PR PBB SHADOW E&M-EST. PATIENT-LVL IV: CPT | Mod: PBBFAC,,, | Performed by: STUDENT IN AN ORGANIZED HEALTH CARE EDUCATION/TRAINING PROGRAM

## 2025-06-25 PROCEDURE — 99214 OFFICE O/P EST MOD 30 MIN: CPT | Mod: S$PBB,,, | Performed by: STUDENT IN AN ORGANIZED HEALTH CARE EDUCATION/TRAINING PROGRAM

## 2025-06-25 PROCEDURE — 99214 OFFICE O/P EST MOD 30 MIN: CPT | Mod: PBBFAC,PO | Performed by: STUDENT IN AN ORGANIZED HEALTH CARE EDUCATION/TRAINING PROGRAM

## 2025-06-25 PROCEDURE — G2211 COMPLEX E/M VISIT ADD ON: HCPCS | Mod: ,,, | Performed by: STUDENT IN AN ORGANIZED HEALTH CARE EDUCATION/TRAINING PROGRAM

## 2025-06-25 RX ORDER — METHYLPREDNISOLONE 4 MG/1
TABLET ORAL
Qty: 21 TABLET | Refills: 0 | Status: SHIPPED | OUTPATIENT
Start: 2025-06-25

## 2025-06-25 RX ORDER — GABAPENTIN 100 MG/1
100 CAPSULE ORAL 3 TIMES DAILY
Qty: 42 CAPSULE | Refills: 0 | Status: SHIPPED | OUTPATIENT
Start: 2025-06-25 | End: 2025-07-09

## 2025-06-25 NOTE — PROGRESS NOTES
[facility] Note    Subjective:    The HPI and pertinent ROS is included in the Diagnostic Impression Remarks section at the end of the note. Please see below for further details. Chief complaint is at end of note.     Arleen is a pleasant intelligent patient who is here for evaluation.     Modified Medications    No medications on file       Data reviewed 274}  Previous medical records reviewed and summarized in plan section at end of note.      If you are due for any health screening(s) below please notify me so we can arrange them to be ordered and scheduled. Most healthy patients at your age complete them, but you are free to accept or refuse. If you can't do it, I'll definitely understand. If you can, I'd certainly appreciate it!     All of your core healthy metrics are met.      The following portions of the patient's history were reviewed and updated as appropriate: allergies, current medications, past family history, past medical history, past social history, past surgical history and problem list.    She  has a past medical history of Acute encephalopathy (7/25/2024), Anemia (3/1/2024), Anxiety, Arthritis, Depression, Disorder of kidney and ureter, Foot pain, left (9/18/2013), GERD (gastroesophageal reflux disease), Hiatal hernia, Hyperlipidemia, Hypertension, Reflux, Tendon tear (8/21/2013), and Tendonitis (8/21/2013).  She  has a past surgical history that includes Cholecystectomy; Tonsillectomy; Eye surgery (Bilateral); Hemiarthroplasty of hip (Right, 3/1/2024); and Hemiarthroplasty of hip (Left, 7/24/2024).    She  reports that she has never smoked. She has never been exposed to tobacco smoke. She has never used smokeless tobacco. She reports that she does not currently use alcohol after a past usage of about 1.0 standard drink of alcohol per week. She reports that she does not use drugs.  She family history includes Hearing loss in her son.    Review of patient's allergies indicates:  "  Allergen Reactions    Buspar [buspirone] Other (See Comments)     Syncope and nausea     Doxy      Other reaction(s): Unknown    Effexor [venlafaxine] Other (See Comments)     shaking    Biaxin [clarithromycin] Other (See Comments)     Unknown    Codeine Other (See Comments)     Syncope, and nausea.    Statins-hmg-coa reductase inhibitors      myalgia    Doxycycline Other (See Comments)     Made very weak       Tobacco Use: Low Risk  (6/25/2025)    Patient History     Smoking Tobacco Use: Never     Smokeless Tobacco Use: Never     Passive Exposure: Never     Physical Examination  Physical Exam    General: No acute distress. Well-developed. Well-nourished.  Eyes: EOMI. Sclerae anicteric.  HENT: Normocephalic. Atraumatic. Nares patent. Moist oral mucosa.  Cardiovascular: Regular rate. Regular rhythm. No murmurs. No rubs. No gallops. Normal S1, S2.  Respiratory: Normal respiratory effort. Clear to auscultation bilaterally. No rales. No rhonchi. No wheezing.  Musculoskeletal: No  obvious deformity.  Extremities: No lower extremity edema.  Neurological: Alert & oriented x3. No slurred speech. Normal gait.  Psychiatric: Normal mood. Normal affect. Good insight. Good judgment.  Skin: Warm. Dry. No rash.              BP Readings from Last 3 Encounters:   06/25/25 (!) 114/54   06/22/25 (!) 143/66   04/22/25 122/62     Wt Readings from Last 3 Encounters:   06/25/25 60.8 kg (134 lb 0.6 oz)   06/22/25 60.8 kg (134 lb)   04/22/25 61 kg (134 lb 7.7 oz)     BP (!) 114/54 (BP Location: Right arm, Patient Position: Sitting)   Pulse 93   Temp 98.6 °F (37 °C) (Oral)   Resp 16   Ht 4' 11" (1.499 m)   Wt 60.8 kg (134 lb 0.6 oz)   SpO2 98%   BMI 27.07 kg/m²    274}  Laboratory: I have reviewed old labs below:    274}    Lab Results   Component Value Date    WBC 7.53 10/22/2024    HGB 9.9 (L) 10/22/2024    HCT 31.8 (L) 10/22/2024    MCV 92 10/22/2024     10/22/2024     04/22/2025    K 4.9 04/22/2025     " 04/22/2025    CALCIUM 9.7 04/22/2025    PHOS 2.7 07/26/2024    CO2 27 04/22/2025    BUN 29 04/22/2025    CREATININE 1.2 04/22/2025    EGFRNORACEVR 41 (L) 04/22/2025    LABPROT 10.9 02/28/2024    ALBUMIN 3.6 10/22/2024    BILITOT 0.2 10/22/2024    ALKPHOS 119 10/22/2024    ALT 12 10/22/2024    AST 16 10/22/2024    INR 1.0 02/28/2024    CHOL 192 05/02/2024    TRIG 163 (H) 05/02/2024    HDL 51 05/02/2024    LDLCALC 108.4 05/02/2024    TSH 0.686 07/25/2024    HGBA1C 5.2 04/12/2023      Lab reviewed by me: Particular labs of significance that I will monitor, workup, or treat to improve are mentioned below in diagnostic impression remarks.      Imaging/EKG: I have reviewed the pertinent results and my findings are noted in remarks.  274}    CC:   Chief Complaint   Patient presents with    Fall        274}    History of Present Illness    CHIEF COMPLAINT:  Patient presents today for follow up of back pain.    BACK PAIN:  She reports ongoing thoracic back pain currently managed with lidocaine patches and Aleve. She denies urinary incontinence or leg weakness. She declines home health and physical/occupational therapy. CT imaging confirmed no fracture present.    HYPERTENSION:  Her hypertension is well-controlled on current medical management. She denies any recent symptoms related to blood pressure elevation or complications.      ROS:  Genitourinary: -urinary incontinence  Musculoskeletal: +back pain, -muscle weakness          Assessment/Plan  Arleen Zarate is a 89 y.o. female who presents to clinic with:  1. Back pain, unspecified back location, unspecified back pain laterality, unspecified chronicity    2. Essential hypertension    3. Degeneration of intervertebral disc of lumbar region with discogenic back pain    4. Gastroesophageal reflux disease without esophagitis       274}    Assessment & Plan    M54.9 Back pain, unspecified back location, unspecified back pain laterality, unspecified chronicity  I10 Essential  hypertension  M51.360 Degeneration of intervertebral disc of lumbar region with discogenic back pain  K21.9 Gastroesophageal reflux disease without esophagitis    PLAN SUMMARY:  - Discontinued Aleve (naproxen) due to NSAID avoidance  - Continued lidocaine patch for chronic back pain  - Initiated gabapentin (low dose) for pain management  - Started steroids for pain management  - Recommend Tylenol (acetaminophen) for pain relief    BACK PAIN, UNSPECIFIED BACK LOCATION, UNSPECIFIED BACK PAIN LATERALITY, UNSPECIFIED CHRONICITY:  - Chronic back pain - no fracture on CT imaging.  - Initiated gabapentin (low dose) and steroids for pain management.  - Continued lidocaine patch.  - Can use Tylenol (acetaminophen) for pain.  - Discontinued Aleve (naproxen) due to NSAID avoidance.    ESSENTIAL HYPERTENSION:  - Evaluated HTN control.    GASTROESOPHAGEAL REFLUX DISEASE WITHOUT ESOPHAGITIS:  - Reinforced importance of avoiding NSAIDs due to GERD.             This note was generated with the assistance of ambient listening technology. Verbal consent was obtained by the patient and accompanying visitor(s) for the recording of patient appointment to facilitate this note. I attest to having reviewed and edited the generated note for accuracy, though some syntax or spelling errors may persist. Please contact the author of this note for any clarification.       1. Back pain, unspecified back location, unspecified back pain laterality, unspecified chronicity  - gabapentin (NEURONTIN) 100 MG capsule; Take 1 capsule (100 mg total) by mouth 3 (three) times daily. for 14 days  Dispense: 42 capsule; Refill: 0  - methylPREDNISolone (MEDROL DOSEPACK) 4 mg tablet; follow package directions  Dispense: 21 tablet; Refill: 0    2. Essential hypertension    3. Degeneration of intervertebral disc of lumbar region with discogenic back pain    4. Gastroesophageal reflux disease without esophagitis      Perry Jorge MD   274}    If you are due for  any health screening(s) below please notify me so we can arrange them to be ordered and scheduled. Most healthy patients at your age complete them, but you are free to accept or refuse.     If you can't do it, I'll definitely understand. If you can, I'd certainly appreciate it!   All of your core healthy metrics are met.

## 2025-06-25 NOTE — PATIENT INSTRUCTIONS
Amrit Bacon,     If you are due for any health screening(s) below please notify me so we can arrange them to be ordered and scheduled. Most healthy patients at your age complete them, but you are free to accept or refuse.     If you can't do it, I'll definitely understand. If you can, I'd certainly appreciate it!    All of your core healthy metrics are met.

## 2025-07-07 RX ORDER — LIDOCAINE 50 MG/G
2 PATCH TOPICAL DAILY
Qty: 60 PATCH | Refills: 0 | Status: SHIPPED | OUTPATIENT
Start: 2025-07-07 | End: 2025-08-06

## 2025-07-07 NOTE — TELEPHONE ENCOUNTER
No care due was identified.  [facility] Embedded Care Due Messages. Reference number: [id number].   7/07/2025 11:10:07 AM CDT

## 2025-07-07 NOTE — TELEPHONE ENCOUNTER
Copied from CRM #[id number]. Topic: Medications - New Medication Request  >> Jul 7, 2025 10:57 AM Bertha wrote:  Type: Needs Medical Advice  Who Called:  Griselda, daughter in law at [phone]  Symptoms (please be specific):  back pain from fall   How long has patient had these symptoms:  2 weeks    Pharmacy name and phone #:    [id number] - [place], [place] - [address] AT [facility] [address]  [address] [address]  Phone: [phone] Fax: [phone]      Additional Information: patient would like to have the Lidocaine Patch 5% that was called in another time for patient. Please call and advise. Thank you

## (undated) DEVICE — DRAPE HIP PCH 112X137X89IN

## (undated) DEVICE — SUT ETHIBOND EXCEL 2 V37 30

## (undated) DEVICE — SPONGE BULKEE II ABSRB 6X6.75

## (undated) DEVICE — DRAPE INCISE IOBAN 2 23X33IN

## (undated) DEVICE — PACK SIRUS BASIC V SURG STRL

## (undated) DEVICE — TAPE SURGICAL MICROFOAM 4IN

## (undated) DEVICE — WRAP SHLDR HIP ACCU THRM PACK

## (undated) DEVICE — APPLICATOR CHLORAPREP ORN 26ML

## (undated) DEVICE — DRAPE STERI U-SHAPED 47X51IN

## (undated) DEVICE — ELECTRODE REM PLYHSV RETURN 9

## (undated) DEVICE — COVER TABLE 44X90 STERILE

## (undated) DEVICE — STRIP MEDI WND CLSR 1/2X4IN

## (undated) DEVICE — GLOVE SENSICARE PI ALOE 7

## (undated) DEVICE — BNDG COFLEX FOAM LF2 ST 6X5YD

## (undated) DEVICE — PACK CUSTOM UNIV BASIN SLI

## (undated) DEVICE — GOWN POLY REINF BRTH SLV XL

## (undated) DEVICE — PAD ABDOMINAL STERILE 8X10IN

## (undated) DEVICE — GLOVE SENSICARE PI GRN 8

## (undated) DEVICE — BLADE SAG DUAL 18MMX1.27MMX90M

## (undated) DEVICE — SOL IRR NACL .9% 3000ML

## (undated) DEVICE — SUT 2/0 18IN COATED VICRYL

## (undated) DEVICE — DRAPE U SPLIT SHEET 54X76IN

## (undated) DEVICE — GLOVE SENSICARE PI ALOE 7.5

## (undated) DEVICE — CLOSURE SKIN STERI STRIP 1/2X4

## (undated) DEVICE — ADHESIVE MASTISOL VIAL 48/BX

## (undated) DEVICE — GLOVE SENSICARE PI GRN 7

## (undated) DEVICE — TOWEL OR DISP STRL BLUE 4/PK

## (undated) DEVICE — SUT 0 VICRYL / CT-1

## (undated) DEVICE — ALCOHOL 70% ANTISEPTIC ISO 4OZ

## (undated) DEVICE — DRAPE THREE-QTR REINF 53X77IN

## (undated) DEVICE — DRESSING GAUZE XEROFORM 5X9

## (undated) DEVICE — SOL NACL IRR 1000ML BTL

## (undated) DEVICE — YANKAUER FLEX NO VENT HI CAP

## (undated) DEVICE — GLOVE SENSICARE PI ALOE 8

## (undated) DEVICE — DRAPE ORTH SPLIT 77X108IN

## (undated) DEVICE — SOCKINETTE IMPERVIOUS 12X48IN

## (undated) DEVICE — GLOVE SENSICARE PI ALOE 6

## (undated) DEVICE — COVER MAYO STND XL 30X57IN

## (undated) DEVICE — ELECTRODE BLADE TEFLON 6

## (undated) DEVICE — INTERPULSE SET

## (undated) DEVICE — DEVICE SNAPSECURE FOL CATH

## (undated) DEVICE — TOGA FLYTE PEEL AWAY XLARGE

## (undated) DEVICE — GLOVE BIOGEL PI ORTHO PRO 7.5

## (undated) DEVICE — SPONGE LAP 18X18 PREWASHED

## (undated) DEVICE — SUT STRATAFIX SPRL PS-2 3-0

## (undated) DEVICE — DRAPE STERI INSTRUMENT 1018

## (undated) DEVICE — GLOVE SENSICARE PI ALOE 6.5

## (undated) DEVICE — GOWN POLY REINF BRTH SLV LG

## (undated) DEVICE — GLOVE SENSICARE PI GRN 6